# Patient Record
Sex: FEMALE | Race: WHITE | NOT HISPANIC OR LATINO | Employment: OTHER | URBAN - NONMETROPOLITAN AREA
[De-identification: names, ages, dates, MRNs, and addresses within clinical notes are randomized per-mention and may not be internally consistent; named-entity substitution may affect disease eponyms.]

---

## 2017-09-20 ENCOUNTER — DOCTOR'S OFFICE (OUTPATIENT)
Dept: URBAN - NONMETROPOLITAN AREA CLINIC 1 | Facility: CLINIC | Age: 67
Setting detail: OPHTHALMOLOGY
End: 2017-09-20
Payer: MEDICARE

## 2017-09-20 ENCOUNTER — RX ONLY (RX ONLY)
Age: 67
End: 2017-09-20

## 2017-09-20 DIAGNOSIS — H04.123: ICD-10-CM

## 2017-09-20 DIAGNOSIS — H26.492: ICD-10-CM

## 2017-09-20 DIAGNOSIS — H10.13: ICD-10-CM

## 2017-09-20 DIAGNOSIS — Z96.1: ICD-10-CM

## 2017-09-20 PROCEDURE — 92014 COMPRE OPH EXAM EST PT 1/>: CPT | Performed by: OPHTHALMOLOGY

## 2017-09-20 ASSESSMENT — REFRACTION_MANIFEST
OD_VA1: 20/
OD_ADD: +2.25
OD_VA1: 20/30
OD_CYLINDER: -0.50
OU_VA: 20/
OD_VA3: 20/
OD_VA2: 20/30
OD_VA1: 20/
OS_VA2: 20/25-1
OS_VA2: 20/
OD_VA3: 20/
OS_VA2: 20/
OU_VA: 20/
OU_VA: 20/
OD_VA3: 20/
OS_VA1: 20/25-1
OS_VA1: 20/
OS_VA3: 20/
OD_AXIS: 040
OS_VA3: 20/
OD_VA2: 20/
OD_SPHERE: -1.00
OS_VA3: 20/
OS_ADD: +2.25
OS_SPHERE: PLANO
OS_VA1: 20/
OS_CYLINDER: -0.25
OS_AXIS: 165
OD_VA2: 20/

## 2017-09-20 ASSESSMENT — SPHEQUIV_DERIVED
OD_SPHEQUIV: -1.25
OD_SPHEQUIV: -1.25
OS_SPHEQUIV: -1.125

## 2017-09-20 ASSESSMENT — REFRACTION_CURRENTRX
OS_OVR_VA: 20/
OS_OVR_VA: 20/
OD_OVR_VA: 20/
OD_OVR_VA: 20/
OS_OVR_VA: 20/
OD_OVR_VA: 20/

## 2017-09-20 ASSESSMENT — REFRACTION_AUTOREFRACTION
OS_SPHERE: -0.50
OS_CYLINDER: -1.25
OD_SPHERE: -0.75
OD_AXIS: 117
OS_AXIS: 163
OD_CYLINDER: -1.00

## 2017-09-20 ASSESSMENT — PUNCTA - ASSESSMENT
OS_PUNCTA: LLL LUL
OD_PUNCTA: RLL RUL

## 2017-09-20 ASSESSMENT — VISUAL ACUITY
OS_BCVA: 20/100-2
OD_BCVA: 20/30-2

## 2017-09-20 ASSESSMENT — CONFRONTATIONAL VISUAL FIELD TEST (CVF)
OS_FINDINGS: FULL
OD_FINDINGS: FULL

## 2017-09-27 ENCOUNTER — RX ONLY (RX ONLY)
Age: 67
End: 2017-09-27

## 2017-09-27 ENCOUNTER — DOCTOR'S OFFICE (OUTPATIENT)
Dept: URBAN - NONMETROPOLITAN AREA CLINIC 1 | Facility: CLINIC | Age: 67
Setting detail: OPHTHALMOLOGY
End: 2017-09-27
Payer: MEDICARE

## 2017-09-27 ENCOUNTER — DOCTOR'S OFFICE (OUTPATIENT)
Dept: URBAN - METROPOLITAN AREA CLINIC 125 | Facility: CLINIC | Age: 67
Setting detail: OPHTHALMOLOGY
End: 2017-09-27

## 2017-09-27 DIAGNOSIS — H10.13: ICD-10-CM

## 2017-09-27 DIAGNOSIS — H52.4: ICD-10-CM

## 2017-09-27 DIAGNOSIS — H04.123: ICD-10-CM

## 2017-09-27 DIAGNOSIS — H26.492: ICD-10-CM

## 2017-09-27 DIAGNOSIS — Z96.1: ICD-10-CM

## 2017-09-27 PROCEDURE — 92012 INTRM OPH EXAM EST PATIENT: CPT | Performed by: OPHTHALMOLOGY

## 2017-09-27 PROCEDURE — VITAEYE VITA EYE VITAMINS: Performed by: OPHTHALMOLOGY

## 2017-09-27 ASSESSMENT — REFRACTION_CURRENTRX
OS_OVR_VA: 20/
OS_OVR_VA: 20/
OD_OVR_VA: 20/
OS_OVR_VA: 20/
OD_OVR_VA: 20/
OD_OVR_VA: 20/

## 2017-09-27 ASSESSMENT — REFRACTION_MANIFEST
OD_VA2: 20/30
OS_VA2: 20/
OS_SPHERE: PLANO
OS_AXIS: 165
OD_CYLINDER: -0.50
OS_VA3: 20/
OS_VA3: 20/
OD_VA1: 20/
OU_VA: 20/
OD_VA3: 20/
OS_VA1: 20/
OU_VA: 20/
OS_ADD: +2.25
OD_VA3: 20/
OD_ADD: +2.25
OD_VA2: 20/
OD_AXIS: 040
OS_VA3: 20/
OD_VA1: 20/
OD_VA3: 20/
OD_SPHERE: -1.00
OS_VA2: 20/
OS_CYLINDER: -0.25
OS_VA1: 20/25-1
OU_VA: 20/
OS_VA1: 20/
OD_VA2: 20/
OD_VA1: 20/30
OS_VA2: 20/25-1

## 2017-09-27 ASSESSMENT — SPHEQUIV_DERIVED
OD_SPHEQUIV: -1.25
OD_SPHEQUIV: -1.25
OS_SPHEQUIV: -1.125

## 2017-09-27 ASSESSMENT — REFRACTION_AUTOREFRACTION
OD_AXIS: 117
OD_SPHERE: -0.75
OS_CYLINDER: -1.25
OD_CYLINDER: -1.00
OS_SPHERE: -0.50
OS_AXIS: 163

## 2017-09-27 ASSESSMENT — VISUAL ACUITY
OS_BCVA: 20/100-2
OD_BCVA: 20/40+2

## 2017-09-27 ASSESSMENT — CONFRONTATIONAL VISUAL FIELD TEST (CVF)
OS_FINDINGS: FULL
OD_FINDINGS: FULL

## 2017-09-27 ASSESSMENT — PUNCTA - ASSESSMENT
OS_PUNCTA: LLL LUL
OD_PUNCTA: RLL RUL

## 2017-11-06 ENCOUNTER — DOCTOR'S OFFICE (OUTPATIENT)
Dept: URBAN - METROPOLITAN AREA CLINIC 136 | Facility: CLINIC | Age: 67
Setting detail: OPHTHALMOLOGY
End: 2017-11-06
Payer: MEDICARE

## 2017-11-06 ENCOUNTER — RX ONLY (RX ONLY)
Age: 67
End: 2017-11-06

## 2017-11-06 DIAGNOSIS — H26.492: ICD-10-CM

## 2017-11-06 DIAGNOSIS — H10.13: ICD-10-CM

## 2017-11-06 DIAGNOSIS — D31.42: ICD-10-CM

## 2017-11-06 DIAGNOSIS — H35.373: ICD-10-CM

## 2017-11-06 DIAGNOSIS — H43.813: ICD-10-CM

## 2017-11-06 DIAGNOSIS — H33.311: ICD-10-CM

## 2017-11-06 DIAGNOSIS — Z96.1: ICD-10-CM

## 2017-11-06 DIAGNOSIS — H04.123: ICD-10-CM

## 2017-11-06 PROCEDURE — 92014 COMPRE OPH EXAM EST PT 1/>: CPT | Performed by: OPHTHALMOLOGY

## 2017-11-06 PROCEDURE — 92134 CPTRZ OPH DX IMG PST SGM RTA: CPT | Performed by: OPHTHALMOLOGY

## 2017-11-06 ASSESSMENT — CONFRONTATIONAL VISUAL FIELD TEST (CVF)
OD_FINDINGS: FULL
OS_FINDINGS: FULL

## 2017-11-06 ASSESSMENT — PUNCTA - ASSESSMENT
OS_PUNCTA: LLL LUL
OD_PUNCTA: RLL RUL

## 2017-11-08 ASSESSMENT — REFRACTION_CURRENTRX
OD_OVR_VA: 20/
OD_OVR_VA: 20/
OS_OVR_VA: 20/
OD_OVR_VA: 20/

## 2017-11-08 ASSESSMENT — REFRACTION_MANIFEST
OD_VA1: 20/
OS_VA1: 20/
OU_VA: 20/
OS_ADD: +2.25
OD_VA1: 20/30
OD_AXIS: 040
OS_CYLINDER: -0.25
OU_VA: 20/
OS_VA2: 20/
OD_VA2: 20/
OD_VA2: 20/
OS_VA2: 20/25-1
OS_VA1: 20/
OU_VA: 20/
OS_SPHERE: PLANO
OS_VA3: 20/
OD_ADD: +2.25
OS_VA3: 20/
OD_VA1: 20/
OD_VA2: 20/30
OS_AXIS: 165
OS_VA1: 20/25-1
OS_VA2: 20/
OD_VA3: 20/
OD_VA3: 20/
OD_CYLINDER: -0.50
OS_VA3: 20/
OD_SPHERE: -1.00
OD_VA3: 20/

## 2017-11-08 ASSESSMENT — REFRACTION_AUTOREFRACTION
OS_AXIS: 163
OD_SPHERE: -0.75
OS_SPHERE: -0.50
OD_CYLINDER: -1.00
OD_AXIS: 117
OS_CYLINDER: -1.25

## 2017-11-08 ASSESSMENT — VISUAL ACUITY
OD_BCVA: 20/40-2
OS_BCVA: 20/200

## 2017-11-08 ASSESSMENT — SPHEQUIV_DERIVED
OD_SPHEQUIV: -1.25
OD_SPHEQUIV: -1.25
OS_SPHEQUIV: -1.125

## 2017-11-16 ENCOUNTER — DOCTOR'S OFFICE (OUTPATIENT)
Dept: URBAN - NONMETROPOLITAN AREA CLINIC 1 | Facility: CLINIC | Age: 67
Setting detail: OPHTHALMOLOGY
End: 2017-11-16
Payer: MEDICARE

## 2017-11-16 DIAGNOSIS — H01.001: ICD-10-CM

## 2017-11-16 DIAGNOSIS — H43.813: ICD-10-CM

## 2017-11-16 DIAGNOSIS — H04.122: ICD-10-CM

## 2017-11-16 DIAGNOSIS — H10.13: ICD-10-CM

## 2017-11-16 DIAGNOSIS — D31.42: ICD-10-CM

## 2017-11-16 DIAGNOSIS — Z96.1: ICD-10-CM

## 2017-11-16 DIAGNOSIS — H01.002: ICD-10-CM

## 2017-11-16 DIAGNOSIS — H35.379: ICD-10-CM

## 2017-11-16 DIAGNOSIS — H04.121: ICD-10-CM

## 2017-11-16 DIAGNOSIS — B30.9: ICD-10-CM

## 2017-11-16 DIAGNOSIS — H26.492: ICD-10-CM

## 2017-11-16 DIAGNOSIS — H33.311: ICD-10-CM

## 2017-11-16 PROBLEM — H10.503: Status: ACTIVE | Noted: 2017-11-16

## 2017-11-16 PROCEDURE — 92012 INTRM OPH EXAM EST PATIENT: CPT | Performed by: OPHTHALMOLOGY

## 2017-11-16 ASSESSMENT — LID EXAM ASSESSMENTS
OD_BLEPHARITIS: 2+
OS_BLEPHARITIS: 2+

## 2017-11-16 ASSESSMENT — REFRACTION_CURRENTRX
OD_OVR_VA: 20/
OS_OVR_VA: 20/
OD_OVR_VA: 20/
OD_OVR_VA: 20/

## 2017-11-16 ASSESSMENT — REFRACTION_MANIFEST
OS_VA3: 20/
OS_CYLINDER: -0.25
OS_VA3: 20/
OU_VA: 20/
OS_VA2: 20/
OS_AXIS: 165
OS_VA2: 20/25-1
OD_VA1: 20/
OS_SPHERE: PLANO
OD_AXIS: 040
OD_VA3: 20/
OD_SPHERE: -1.00
OS_VA3: 20/
OS_VA1: 20/
OS_VA2: 20/
OD_VA2: 20/
OD_VA1: 20/30
OS_VA1: 20/
OD_VA2: 20/
OD_VA1: 20/
OD_CYLINDER: -0.50
OD_VA3: 20/
OD_ADD: +2.25
OS_ADD: +2.25
OD_VA3: 20/
OD_VA2: 20/30
OS_VA1: 20/25-1

## 2017-11-16 ASSESSMENT — REFRACTION_AUTOREFRACTION
OD_SPHERE: -0.75
OS_CYLINDER: -1.25
OS_SPHERE: -0.50
OD_CYLINDER: -1.00
OS_AXIS: 163
OD_AXIS: 117

## 2017-11-16 ASSESSMENT — SPHEQUIV_DERIVED
OS_SPHEQUIV: -1.125
OD_SPHEQUIV: -1.25
OD_SPHEQUIV: -1.25

## 2017-11-16 ASSESSMENT — VISUAL ACUITY
OD_BCVA: 20/25
OS_BCVA: 20/70

## 2017-11-16 ASSESSMENT — PUNCTA - ASSESSMENT
OD_PUNCTA: RLL RUL
OS_PUNCTA: LLL LUL

## 2017-11-20 ENCOUNTER — RX ONLY (RX ONLY)
Age: 67
End: 2017-11-20

## 2017-11-20 ENCOUNTER — HOSPITAL ENCOUNTER (EMERGENCY)
Facility: HOSPITAL | Age: 67
Discharge: HOME | End: 2017-11-20
Attending: EMERGENCY MEDICINE | Admitting: EMERGENCY MEDICINE
Payer: COMMERCIAL

## 2017-11-20 ENCOUNTER — APPOINTMENT (EMERGENCY)
Dept: CT IMAGING | Facility: HOSPITAL | Age: 67
End: 2017-11-20
Payer: COMMERCIAL

## 2017-11-20 ENCOUNTER — APPOINTMENT (EMERGENCY)
Dept: RADIOLOGY | Facility: HOSPITAL | Age: 67
End: 2017-11-20
Payer: COMMERCIAL

## 2017-11-20 VITALS
SYSTOLIC BLOOD PRESSURE: 205 MMHG | TEMPERATURE: 96.8 F | RESPIRATION RATE: 18 BRPM | HEART RATE: 78 BPM | OXYGEN SATURATION: 100 % | DIASTOLIC BLOOD PRESSURE: 92 MMHG | WEIGHT: 193 LBS

## 2017-11-20 DIAGNOSIS — S39.012A LUMBAR STRAIN, INITIAL ENCOUNTER: Primary | ICD-10-CM

## 2017-11-20 LAB
ALBUMIN SERPL BCP-MCNC: 4.1 G/DL (ref 3.5–5)
ALP SERPL-CCNC: 87 U/L (ref 46–116)
ALT SERPL W P-5'-P-CCNC: 31 U/L (ref 12–78)
ANION GAP SERPL CALCULATED.3IONS-SCNC: 6 MMOL/L (ref 4–13)
AST SERPL W P-5'-P-CCNC: 20 U/L (ref 5–45)
BASOPHILS # BLD MANUAL: 0 THOUSAND/UL (ref 0–0.1)
BASOPHILS NFR MAR MANUAL: 0 % (ref 0–1)
BILIRUB SERPL-MCNC: 0.6 MG/DL (ref 0.2–1)
BILIRUB UR QL STRIP: NEGATIVE
BUN SERPL-MCNC: 24 MG/DL (ref 5–25)
CALCIUM SERPL-MCNC: 9.1 MG/DL (ref 8.3–10.1)
CHLORIDE SERPL-SCNC: 102 MMOL/L (ref 100–108)
CLARITY UR: CLEAR
CO2 SERPL-SCNC: 27 MMOL/L (ref 21–32)
COLOR UR: YELLOW
CREAT SERPL-MCNC: 0.85 MG/DL (ref 0.6–1.3)
EOSINOPHIL # BLD MANUAL: 0 THOUSAND/UL (ref 0–0.4)
EOSINOPHIL NFR BLD MANUAL: 0 % (ref 0–6)
ERYTHROCYTE [DISTWIDTH] IN BLOOD BY AUTOMATED COUNT: 13 % (ref 11.6–15.1)
GFR SERPL CREATININE-BSD FRML MDRD: 70 ML/MIN/1.73SQ M
GLUCOSE SERPL-MCNC: 100 MG/DL (ref 65–140)
GLUCOSE UR STRIP-MCNC: NEGATIVE MG/DL
HCT VFR BLD AUTO: 44.7 % (ref 34.8–46.1)
HGB BLD-MCNC: 15.1 G/DL (ref 11.5–15.4)
HGB UR QL STRIP.AUTO: NEGATIVE
KETONES UR STRIP-MCNC: NEGATIVE MG/DL
LEUKOCYTE ESTERASE UR QL STRIP: NEGATIVE
LYMPHOCYTES # BLD AUTO: 1.17 THOUSAND/UL (ref 0.6–4.47)
LYMPHOCYTES # BLD AUTO: 9 % (ref 14–44)
MCH RBC QN AUTO: 31.3 PG (ref 26.8–34.3)
MCHC RBC AUTO-ENTMCNC: 33.8 G/DL (ref 31.4–37.4)
MCV RBC AUTO: 93 FL (ref 82–98)
MONOCYTES # BLD AUTO: 0.13 THOUSAND/UL (ref 0–1.22)
MONOCYTES NFR BLD: 1 % (ref 4–12)
NEUTROPHILS # BLD MANUAL: 11.66 THOUSAND/UL (ref 1.85–7.62)
NEUTS BAND NFR BLD MANUAL: 1 % (ref 0–8)
NEUTS SEG NFR BLD AUTO: 89 % (ref 43–75)
NITRITE UR QL STRIP: NEGATIVE
PH UR STRIP.AUTO: 6 [PH] (ref 4.5–8)
PLATELET # BLD AUTO: 183 THOUSANDS/UL (ref 149–390)
PLATELET BLD QL SMEAR: ADEQUATE
PMV BLD AUTO: 10.9 FL (ref 8.9–12.7)
POTASSIUM SERPL-SCNC: 4 MMOL/L (ref 3.5–5.3)
PROT SERPL-MCNC: 7 G/DL (ref 6.4–8.2)
PROT UR STRIP-MCNC: NEGATIVE MG/DL
RBC # BLD AUTO: 4.82 MILLION/UL (ref 3.81–5.12)
SODIUM SERPL-SCNC: 135 MMOL/L (ref 136–145)
SP GR UR STRIP.AUTO: 1.02 (ref 1–1.03)
TOTAL CELLS COUNTED SPEC: 100
TROPONIN I SERPL-MCNC: <0.02 NG/ML
UROBILINOGEN UR QL STRIP.AUTO: 0.2 E.U./DL
WBC # BLD AUTO: 12.95 THOUSAND/UL (ref 4.31–10.16)

## 2017-11-20 PROCEDURE — 74176 CT ABD & PELVIS W/O CONTRAST: CPT

## 2017-11-20 PROCEDURE — 96374 THER/PROPH/DIAG INJ IV PUSH: CPT

## 2017-11-20 PROCEDURE — 81003 URINALYSIS AUTO W/O SCOPE: CPT | Performed by: EMERGENCY MEDICINE

## 2017-11-20 PROCEDURE — 84484 ASSAY OF TROPONIN QUANT: CPT | Performed by: EMERGENCY MEDICINE

## 2017-11-20 PROCEDURE — 36415 COLL VENOUS BLD VENIPUNCTURE: CPT | Performed by: EMERGENCY MEDICINE

## 2017-11-20 PROCEDURE — 80053 COMPREHEN METABOLIC PANEL: CPT | Performed by: EMERGENCY MEDICINE

## 2017-11-20 PROCEDURE — 71020 HB CHEST X-RAY 2VW FRONTAL&LATL: CPT

## 2017-11-20 PROCEDURE — 99285 EMERGENCY DEPT VISIT HI MDM: CPT

## 2017-11-20 PROCEDURE — 96375 TX/PRO/DX INJ NEW DRUG ADDON: CPT

## 2017-11-20 PROCEDURE — 96361 HYDRATE IV INFUSION ADD-ON: CPT

## 2017-11-20 PROCEDURE — 85027 COMPLETE CBC AUTOMATED: CPT | Performed by: EMERGENCY MEDICINE

## 2017-11-20 PROCEDURE — 85007 BL SMEAR W/DIFF WBC COUNT: CPT | Performed by: EMERGENCY MEDICINE

## 2017-11-20 PROCEDURE — 93005 ELECTROCARDIOGRAM TRACING: CPT | Performed by: EMERGENCY MEDICINE

## 2017-11-20 RX ORDER — ONDANSETRON 2 MG/ML
4 INJECTION INTRAMUSCULAR; INTRAVENOUS ONCE
Status: COMPLETED | OUTPATIENT
Start: 2017-11-20 | End: 2017-11-20

## 2017-11-20 RX ORDER — PREDNISONE 20 MG/1
60 TABLET ORAL DAILY
Qty: 12 TABLET | Refills: 0 | Status: SHIPPED | OUTPATIENT
Start: 2017-11-20 | End: 2019-06-10

## 2017-11-20 RX ORDER — LIDOCAINE 50 MG/G
1 PATCH TOPICAL DAILY
Qty: 30 PATCH | Refills: 0 | Status: SHIPPED | OUTPATIENT
Start: 2017-11-20 | End: 2019-06-10

## 2017-11-20 RX ORDER — KETOROLAC TROMETHAMINE 30 MG/ML
30 INJECTION, SOLUTION INTRAMUSCULAR; INTRAVENOUS ONCE
Status: COMPLETED | OUTPATIENT
Start: 2017-11-20 | End: 2017-11-20

## 2017-11-20 RX ORDER — LIDOCAINE 50 MG/G
1 PATCH TOPICAL ONCE
Status: DISCONTINUED | OUTPATIENT
Start: 2017-11-20 | End: 2017-11-20 | Stop reason: HOSPADM

## 2017-11-20 RX ORDER — PREDNISONE 20 MG/1
60 TABLET ORAL ONCE
Status: COMPLETED | OUTPATIENT
Start: 2017-11-20 | End: 2017-11-20

## 2017-11-20 RX ORDER — NAPROXEN 500 MG/1
500 TABLET ORAL 2 TIMES DAILY WITH MEALS
Qty: 14 TABLET | Refills: 0 | Status: SHIPPED | OUTPATIENT
Start: 2017-11-20 | End: 2019-06-10

## 2017-11-20 RX ADMIN — PREDNISONE 60 MG: 20 TABLET ORAL at 12:57

## 2017-11-20 RX ADMIN — KETOROLAC TROMETHAMINE 30 MG: 30 INJECTION, SOLUTION INTRAMUSCULAR at 11:21

## 2017-11-20 RX ADMIN — ONDANSETRON 4 MG: 2 INJECTION INTRAMUSCULAR; INTRAVENOUS at 11:21

## 2017-11-20 RX ADMIN — SODIUM CHLORIDE 1000 ML: 0.9 INJECTION, SOLUTION INTRAVENOUS at 11:21

## 2017-11-20 RX ADMIN — LIDOCAINE 1 PATCH: 50 PATCH CUTANEOUS at 12:56

## 2017-11-20 NOTE — ED PROCEDURE NOTE
PROCEDURE  ECG 12 Lead Documentation  Date/Time: 11/20/2017 11:09 AM  Performed by: Antonio Sosa  Authorized by: Antonio Sosa     Indications / Diagnosis:  Left lower back pain   ECG reviewed by me, the ED Provider: yes    Patient location:  ED  Previous ECG:     Previous ECG:  Unavailable  Interpretation:     Interpretation: non-specific    Rate:     ECG rate:  74    ECG rate assessment: normal    Rhythm:     Rhythm: sinus rhythm    Ectopy:     Ectopy: none    T waves:     T waves: non-specific

## 2017-11-20 NOTE — ED PROVIDER NOTES
History  Chief Complaint   Patient presents with    Flank Pain     left flank pain onset this morning     70-year-old female presents complaining of left lower back pain along with left flank pain which started this morning at about 5 o'clock  Patient states that last week she "had a twinge "of similar pain that went away spontaneously  She denies chest pain or shortness of breath  She has no pleuritic chest   Patient does state that it hurts when she rotates her torso or when she lifts her left leg  Patient is tender palpation in the left lateral lower back  Patient states that she was helping her daughter put of crown molding all day yesterday and this may have aggravated her back  History provided by:  Patient  Flank Pain   Pain location:  L flank  Pain quality: cramping, sharp and stabbing    Pain radiates to:  Does not radiate  Pain severity:  Moderate  Onset quality:  Gradual  Duration:  6 hours  Timing:  Constant  Progression:  Waxing and waning  Context: not alcohol use, not awakening from sleep, not diet changes, not eating and not laxative use    Relieved by:  Nothing  Worsened by:  Nothing  Ineffective treatments:  None tried  Associated symptoms: no anorexia, no belching, no chest pain, no chills, no constipation, no cough and no diarrhea    Risk factors: no alcohol abuse        None       History reviewed  No pertinent past medical history  History reviewed  No pertinent surgical history  History reviewed  No pertinent family history  I have reviewed and agree with the history as documented  Social History   Substance Use Topics    Smoking status: Never Smoker    Smokeless tobacco: Never Used    Alcohol use No        Review of Systems   Constitutional: Negative for chills  HENT: Negative for congestion, dental problem, drooling and ear discharge  Eyes: Negative for pain, discharge and itching  Respiratory: Negative for cough  Cardiovascular: Negative for chest pain  Gastrointestinal: Negative for anorexia, constipation and diarrhea  Endocrine: Negative for cold intolerance and heat intolerance  Genitourinary: Positive for flank pain  Musculoskeletal: Negative for arthralgias, back pain and gait problem  Skin: Negative for color change and pallor  Allergic/Immunologic: Negative for environmental allergies and food allergies  Neurological: Negative for dizziness, facial asymmetry and headaches  Hematological: Negative for adenopathy  Psychiatric/Behavioral: Negative for agitation, behavioral problems, confusion, decreased concentration and dysphoric mood  Physical Exam  ED Triage Vitals [11/20/17 1042]   Temperature Pulse Respirations Blood Pressure SpO2   (!) 96 8 °F (36 °C) 84 18 (!) 205/92 100 %      Temp Source Heart Rate Source Patient Position - Orthostatic VS BP Location FiO2 (%)   Temporal Monitor Lying Left arm --      Pain Score       Worst Possible Pain           Orthostatic Vital Signs  Vitals:    11/20/17 1042 11/20/17 1128   BP: (!) 205/92    Pulse: 84 78   Patient Position - Orthostatic VS: Lying        Physical Exam   Constitutional: She appears well-developed and well-nourished  HENT:   Head: Normocephalic  Right Ear: External ear normal    Left Ear: External ear normal    Eyes: Pupils are equal, round, and reactive to light  Right eye exhibits no discharge  Left eye exhibits no discharge  Neck: Normal range of motion  No JVD present  No tracheal deviation present  No thyromegaly present  Cardiovascular: Normal rate and regular rhythm  Pulmonary/Chest: Effort normal  No respiratory distress  She has no wheezes  She has no rales  Abdominal:   Left lower back at posterior superior ischial spine tender to palpation with paraspinal ropy spasm  Musculoskeletal: She exhibits tenderness  Tender to palpation left posterior lateral lumbar region   Neurological: She is alert  She displays normal reflexes  No cranial nerve deficit  She exhibits normal muscle tone  Coordination normal    Skin: Skin is warm  Capillary refill takes less than 2 seconds  No erythema  Psychiatric: She has a normal mood and affect  Her behavior is normal    Vitals reviewed  ED Medications  Medications   predniSONE tablet 60 mg (not administered)   lidocaine (LIDODERM) 5 % patch 1 patch (not administered)   sodium chloride 0 9 % bolus 1,000 mL (1,000 mL Intravenous New Bag 11/20/17 1121)   ondansetron (ZOFRAN) injection 4 mg (4 mg Intravenous Given 11/20/17 1121)   ketorolac (TORADOL) 30 mg/mL injection 30 mg (30 mg Intravenous Given 11/20/17 1121)       Diagnostic Studies  Results Reviewed     Procedure Component Value Units Date/Time    Troponin I [[id number]]  (Normal) Collected:  11/20/17 1113    Lab Status:  Final result Specimen:  Blood from Arm, Right Updated:  11/20/17 1140     Troponin I <0 02 ng/mL     Narrative:         [employer] Chemistry analyzer 99% cutoff is > 0 04 ng/mL in network labs    o cTnI 99% cutoff is useful only when applied to patients in the clinical setting of myocardial ischemia  o cTnI 99% cutoff should be interpreted in the context of clinical history, ECG findings and possibly cardiac imaging to establish correct diagnosis  o cTnI 99% cutoff may be suggestive but clearly not indicative of a coronary event without the clinical setting of myocardial ischemia  CBC and differential [[id number]]  (Abnormal) Collected:  11/20/17 1113    Lab Status:  Final result Specimen:  Blood from Arm, Right Updated:  11/20/17 1137     WBC 12 95 (H) Thousand/uL      RBC 4 82 Million/uL      Hemoglobin 15 1 g/dL      Hematocrit 44 7 %      MCV 93 fL      MCH 31 3 pg      MCHC 33 8 g/dL      RDW 13 0 %      MPV 10 9 fL      Platelets 618 Thousands/uL     Narrative: This is an appended report  These results have been appended to a previously verified report      Comprehensive metabolic panel [[id number]]  (Abnormal) Collected:  11/20/17 1113    Lab Status:  Final result Specimen:  Blood from Arm, Right Updated:  11/20/17 1135     Sodium 135 (L) mmol/L      Potassium 4 0 mmol/L      Chloride 102 mmol/L      CO2 27 mmol/L      Anion Gap 6 mmol/L      BUN 24 mg/dL      Creatinine 0 85 mg/dL      Glucose 100 mg/dL      Calcium 9 1 mg/dL      AST 20 U/L      ALT 31 U/L      Alkaline Phosphatase 87 U/L      Total Protein 7 0 g/dL      Albumin 4 1 g/dL      Total Bilirubin 0 60 mg/dL      eGFR 70 ml/min/1 73sq m     Narrative:         National Kidney Disease Education Program recommendations are as follows:  GFR calculation is accurate only with a steady state creatinine  Chronic Kidney disease less than 60 ml/min/1 73 sq  meters  Kidney failure less than 15 ml/min/1 73 sq  meters  UA w Reflex to Microscopic w Reflex to Culture [[id number]]  (Normal) Collected:  11/20/17 1114    Lab Status:  Final result Specimen:  Urine from Urine, Clean Catch Updated:  11/20/17 1125     Color, UA Yellow     Clarity, UA Clear     Specific [place], UA 1 020     pH, UA 6 0     Leukocytes, UA Negative     Nitrite, UA Negative     Protein, UA Negative mg/dl      Glucose, UA Negative mg/dl      Ketones, UA Negative mg/dl      Urobilinogen, UA 0 2 E U /dl      Bilirubin, UA Negative     Blood, UA Negative                 XR chest 2 views   ED Interpretation by Mary Montoya DO (11/20 1245)   No infiltrate       Final Result by VIRGILIO Morrow MD (11/20 1154)      No active pulmonary disease            Workstation performed: [id number]         CT renal stone study abdomen pelvis without contrast   Final Result by VIRGILIO Morrow MD (11/20 1220)      Negative for urinary calculi or obstructive uropathy  Multiple hepatic lesions, likely cysts  These may be confirmed with sonography  Cholecystectomy  Occasional colonic diverticular disease without evidence of acute diverticulitis  See above additional nonacute findings            Workstation performed: [id number]                    Procedures  Procedures       Phone Contacts  ED Phone Contact    ED Course  ED Course          HEART Risk Score    Flowsheet Row Most Recent Value   History  1 Filed at: 11/20/2017 1047   ECG  0 Filed at: 11/20/2017 1047   Age  2 Filed at: 11/20/2017 1047   Risk Factors  1 Filed at: 11/20/2017 1047   Troponin  0 Filed at: 11/20/2017 1047   Heart Score Risk Calculator   History  1 Filed at: 11/20/2017 1047   ECG  0 Filed at: 11/20/2017 1047   Age  2 Filed at: 11/20/2017 1047   Risk Factors  1 Filed at: 11/20/2017 1047   Troponin  0 Filed at: 11/20/2017 1047   HEART Score  4 Filed at: 11/20/2017 1047   HEART Score  4 Filed at: 11/20/2017 1047                            MDM  Number of Diagnoses or Management Options  Diagnosis management comments: Differential diagnosis 1  Nephrolithiasis 2  Ureterolithiasis 3  Musculoskeletal back pain  I will perform CT scan check labs EKG       Amount and/or Complexity of Data Reviewed  Clinical lab tests: reviewed  Tests in the radiology section of CPT®: reviewed  Tests in the medicine section of CPT®: reviewed    Risk of Complications, Morbidity, and/or Mortality  Presenting problems: high  Diagnostic procedures: high  Management options: high      CritCare Time    Disposition  Final diagnoses:   Lumbar strain, initial encounter     Time reflects when diagnosis was documented in both MDM as applicable and the Disposition within this note     Time User Action Codes Description Comment    11/20/2017 12:52 PM Kyle Day Add [[id number]] Lumbar strain, initial encounter       ED Disposition     ED Disposition Condition Comment    Discharge  [facility] discharge to home/self care      Condition at discharge: Stable        Follow-up Information    None       Patient's Medications   Discharge Prescriptions    LIDOCAINE (LIDODERM) 5 %    Place 1 patch on the skin daily Remove & Discard patch within 12 hours or as directed by MD       Start Date: 11/20/2017End Date: --       Order Dose: 1 patch       Quantity: 30 patch    Refills: 0    NAPROXEN (NAPROSYN) 500 MG TABLET    Take 1 tablet by mouth 2 (two) times a day with meals       Start Date: 11/20/2017End Date: --       Order Dose: 500 mg       Quantity: 14 tablet    Refills: 0    PREDNISONE 20 MG TABLET    Take 3 tablets by mouth daily       Start Date: 11/20/2017End Date: --       Order Dose: 60 mg       Quantity: 12 tablet    Refills: 0     No discharge procedures on file      ED Provider  Electronically Signed by           Sujatha Gary DO  11/20/17 1121

## 2017-11-20 NOTE — DISCHARGE INSTRUCTIONS
Low Back Strain, Ambulatory Care   GENERAL INFORMATION:   Low back strain  is an injury to your lower back muscles or tendons  Tendons are strong tissues that connect muscles to bones  The lower back supports most of your body weight and helps you move, twist, and bend  Low back strain is usually caused by activities that increase stress on the lower back, such as exercise or injury  Common symptoms include the following:   · Low back pain or muscle spasms    · Stiffness or limited movement    · Pain that goes down to the buttocks, groin, or legs    · Pain that is worse with activity  Seek immediate care for the following symptoms:   · A pop in your lower back    · Increased swelling or pain in your lower back    · Trouble moving your legs    · Numbness in your legs  Treatment for low back strain:   · NSAIDs  help decrease swelling and pain or fever  This medicine is available with or without a doctor's order  NSAIDs can cause stomach bleeding or kidney problems in certain people  If you take blood thinner medicine, always ask your healthcare provider if NSAIDs are safe for you  Always read the medicine label and follow directions  · Muscle relaxers  help decrease muscle spasms pain  · Prescription pain medicine  may be given  Ask how to take this medicine safely  Manage your symptoms:   · Rest  in bed after your injury  Slowly start to increase your activity as the pain decreases, or as directed  · Apply ice  on your lower back for 15 to 20 minutes every hour or as directed  Use an ice pack, or put crushed ice in a plastic bag  Cover it with a towel  Ice helps prevent tissue damage and decreases swelling and pain  You can alternate ice and heat  · Apply heat  on your lower back for 20 to 30 minutes every 2 hours for as many days as directed  Heat helps decrease pain and muscle spasms  Prevent another low back strain:   · Use proper body mechanics        ¨ Bend at the hips and knees when you  objects  Do not bend from the waist  Use your leg muscles as you lift the load  Do not use your back  Keep the object close to your chest as you lift it  Try not to twist or lift anything above your waist     ¨ Change your position often when you stand for long periods of time  Rest one foot on a small box or footrest, and then switch to the other foot often  ¨ Try not to sit for long periods of time  When you do, sit in a straight-backed chair with your feet flat on the floor  Never reach, pull, or push while you are sitting  · Exercise regularly  Warm up before you exercise  Do exercises that strengthen your back muscles  Ask about the best exercise plan for you  · Maintain a healthy weight  Ask your healthcare provider how much you should weigh  Ask him to help you create a weight loss plan if you are overweight  Follow up with your healthcare provider as directed:  Write down your questions so you remember to ask them during your visits  CARE AGREEMENT:   You have the right to help plan your care  Learn about your health condition and how it may be treated  Discuss treatment options with your caregivers to decide what care you want to receive  You always have the right to refuse treatment  The above information is an  only  It is not intended as medical advice for individual conditions or treatments  Talk to your doctor, nurse or pharmacist before following any medical regimen to see if it is safe and effective for you  © 2014 [address] is for End User's use only and may not be sold, redistributed or otherwise used for commercial purposes  All illustrations and images included in CareNotes® are the copyrighted property of [employer]  or [employer]  Low Back Strain, Ambulatory Care   GENERAL INFORMATION:   Low back strain  is an injury to your lower back muscles or tendons  Tendons are strong tissues that connect muscles to bones   The lower back supports most of your body weight and helps you move, twist, and bend  Low back strain is usually caused by activities that increase stress on the lower back, such as exercise or injury  Common symptoms include the following:   · Low back pain or muscle spasms    · Stiffness or limited movement    · Pain that goes down to the buttocks, groin, or legs    · Pain that is worse with activity  Seek immediate care for the following symptoms:   · A pop in your lower back    · Increased swelling or pain in your lower back    · Trouble moving your legs    · Numbness in your legs  Treatment for low back strain:   · NSAIDs  help decrease swelling and pain or fever  This medicine is available with or without a doctor's order  NSAIDs can cause stomach bleeding or kidney problems in certain people  If you take blood thinner medicine, always ask your healthcare provider if NSAIDs are safe for you  Always read the medicine label and follow directions  · Muscle relaxers  help decrease muscle spasms pain  · Prescription pain medicine  may be given  Ask how to take this medicine safely  Manage your symptoms:   · Rest  in bed after your injury  Slowly start to increase your activity as the pain decreases, or as directed  · Apply ice  on your lower back for 15 to 20 minutes every hour or as directed  Use an ice pack, or put crushed ice in a plastic bag  Cover it with a towel  Ice helps prevent tissue damage and decreases swelling and pain  You can alternate ice and heat  · Apply heat  on your lower back for 20 to 30 minutes every 2 hours for as many days as directed  Heat helps decrease pain and muscle spasms  Prevent another low back strain:   · Use proper body mechanics  ¨ Bend at the hips and knees when you  objects  Do not bend from the waist  Use your leg muscles as you lift the load  Do not use your back  Keep the object close to your chest as you lift it   Try not to twist or lift anything above your waist     ¨ Change your position often when you stand for long periods of time  Rest one foot on a small box or footrest, and then switch to the other foot often  ¨ Try not to sit for long periods of time  When you do, sit in a straight-backed chair with your feet flat on the floor  Never reach, pull, or push while you are sitting  · Exercise regularly  Warm up before you exercise  Do exercises that strengthen your back muscles  Ask about the best exercise plan for you  · Maintain a healthy weight  Ask your healthcare provider how much you should weigh  Ask him to help you create a weight loss plan if you are overweight  Follow up with your healthcare provider as directed:  Write down your questions so you remember to ask them during your visits  CARE AGREEMENT:   You have the right to help plan your care  Learn about your health condition and how it may be treated  Discuss treatment options with your caregivers to decide what care you want to receive  You always have the right to refuse treatment  The above information is an  only  It is not intended as medical advice for individual conditions or treatments  Talk to your doctor, nurse or pharmacist before following any medical regimen to see if it is safe and effective for you  © 2014 [address] is for End User's use only and may not be sold, redistributed or otherwise used for commercial purposes  All illustrations and images included in CareNotes® are the copyrighted property of [employer]  or [employer]

## 2017-11-21 LAB
ATRIAL RATE: 74 BPM
P AXIS: 53 DEGREES
PR INTERVAL: 180 MS
QRS AXIS: 7 DEGREES
QRSD INTERVAL: 62 MS
QT INTERVAL: 402 MS
QTC INTERVAL: 446 MS
T WAVE AXIS: 36 DEGREES
VENTRICULAR RATE: 74 BPM

## 2017-12-05 ENCOUNTER — APPOINTMENT (OUTPATIENT)
Dept: PHYSICAL THERAPY | Facility: CLINIC | Age: 67
End: 2017-12-05
Payer: COMMERCIAL

## 2017-12-05 PROCEDURE — G8978 MOBILITY CURRENT STATUS: HCPCS

## 2017-12-05 PROCEDURE — 97110 THERAPEUTIC EXERCISES: CPT

## 2017-12-05 PROCEDURE — 97161 PT EVAL LOW COMPLEX 20 MIN: CPT

## 2017-12-05 PROCEDURE — 97140 MANUAL THERAPY 1/> REGIONS: CPT

## 2017-12-05 PROCEDURE — G8979 MOBILITY GOAL STATUS: HCPCS

## 2017-12-05 PROCEDURE — 97010 HOT OR COLD PACKS THERAPY: CPT

## 2017-12-05 PROCEDURE — 97535 SELF CARE MNGMENT TRAINING: CPT

## 2017-12-11 ENCOUNTER — APPOINTMENT (OUTPATIENT)
Dept: PHYSICAL THERAPY | Facility: CLINIC | Age: 67
End: 2017-12-11
Payer: COMMERCIAL

## 2017-12-11 PROCEDURE — 97010 HOT OR COLD PACKS THERAPY: CPT

## 2017-12-11 PROCEDURE — 97140 MANUAL THERAPY 1/> REGIONS: CPT

## 2017-12-11 PROCEDURE — 97112 NEUROMUSCULAR REEDUCATION: CPT

## 2017-12-11 PROCEDURE — 97110 THERAPEUTIC EXERCISES: CPT

## 2017-12-15 ENCOUNTER — APPOINTMENT (OUTPATIENT)
Dept: PHYSICAL THERAPY | Facility: CLINIC | Age: 67
End: 2017-12-15
Payer: COMMERCIAL

## 2017-12-19 ENCOUNTER — APPOINTMENT (OUTPATIENT)
Dept: PHYSICAL THERAPY | Facility: CLINIC | Age: 67
End: 2017-12-19
Payer: COMMERCIAL

## 2017-12-19 PROCEDURE — 97140 MANUAL THERAPY 1/> REGIONS: CPT

## 2017-12-19 PROCEDURE — 97112 NEUROMUSCULAR REEDUCATION: CPT

## 2017-12-19 PROCEDURE — 97010 HOT OR COLD PACKS THERAPY: CPT

## 2017-12-19 PROCEDURE — 97110 THERAPEUTIC EXERCISES: CPT

## 2017-12-27 ENCOUNTER — APPOINTMENT (OUTPATIENT)
Dept: PHYSICAL THERAPY | Facility: CLINIC | Age: 67
End: 2017-12-27
Payer: COMMERCIAL

## 2018-01-31 ENCOUNTER — RX ONLY (RX ONLY)
Age: 68
End: 2018-01-31

## 2018-01-31 ENCOUNTER — DOCTOR'S OFFICE (OUTPATIENT)
Dept: URBAN - NONMETROPOLITAN AREA CLINIC 1 | Facility: CLINIC | Age: 68
Setting detail: OPHTHALMOLOGY
End: 2018-01-31
Payer: MEDICARE

## 2018-01-31 DIAGNOSIS — H01.004: ICD-10-CM

## 2018-01-31 DIAGNOSIS — H04.121: ICD-10-CM

## 2018-01-31 DIAGNOSIS — H04.122: ICD-10-CM

## 2018-01-31 DIAGNOSIS — B30.9: ICD-10-CM

## 2018-01-31 DIAGNOSIS — H01.005: ICD-10-CM

## 2018-01-31 DIAGNOSIS — H35.379: ICD-10-CM

## 2018-01-31 DIAGNOSIS — H26.492: ICD-10-CM

## 2018-01-31 DIAGNOSIS — H43.813: ICD-10-CM

## 2018-01-31 DIAGNOSIS — H10.13: ICD-10-CM

## 2018-01-31 DIAGNOSIS — H01.002: ICD-10-CM

## 2018-01-31 DIAGNOSIS — H33.311: ICD-10-CM

## 2018-01-31 DIAGNOSIS — H01.001: ICD-10-CM

## 2018-01-31 PROCEDURE — 92012 INTRM OPH EXAM EST PATIENT: CPT | Performed by: OPHTHALMOLOGY

## 2018-01-31 ASSESSMENT — REFRACTION_MANIFEST
OD_CYLINDER: -0.50
OS_VA3: 20/
OD_VA2: 20/
OD_VA1: 20/30
OS_VA1: 20/
OS_VA2: 20/
OD_VA3: 20/
OS_VA3: 20/
OD_VA1: 20/
OU_VA: 20/
OS_SPHERE: PLANO
OD_VA1: 20/
OU_VA: 20/
OS_AXIS: 165
OS_VA1: 20/25-1
OS_VA1: 20/
OU_VA: 20/
OD_VA2: 20/
OS_VA2: 20/
OD_SPHERE: -1.00
OD_VA3: 20/
OD_VA2: 20/30
OD_AXIS: 040
OS_CYLINDER: -0.25
OS_VA2: 20/25-1
OD_VA3: 20/
OD_ADD: +2.25
OS_VA3: 20/
OS_ADD: +2.25

## 2018-01-31 ASSESSMENT — REFRACTION_CURRENTRX
OS_OVR_VA: 20/
OD_OVR_VA: 20/
OD_OVR_VA: 20/
OS_OVR_VA: 20/
OD_OVR_VA: 20/
OS_OVR_VA: 20/

## 2018-01-31 ASSESSMENT — SPHEQUIV_DERIVED
OD_SPHEQUIV: -1.25
OD_SPHEQUIV: -1.25
OS_SPHEQUIV: -1.125

## 2018-01-31 ASSESSMENT — VISUAL ACUITY
OS_BCVA: 20/100
OD_BCVA: 20/50

## 2018-01-31 ASSESSMENT — REFRACTION_AUTOREFRACTION
OD_AXIS: 117
OS_AXIS: 163
OD_CYLINDER: -1.00
OS_CYLINDER: -1.25
OS_SPHERE: -0.50
OD_SPHERE: -0.75

## 2018-01-31 ASSESSMENT — LID EXAM ASSESSMENTS
OD_BLEPHARITIS: 2+
OS_BLEPHARITIS: 2+

## 2018-01-31 ASSESSMENT — PUNCTA - ASSESSMENT
OS_PUNCTA: LLL LUL
OD_PUNCTA: RLL RUL

## 2018-02-14 ENCOUNTER — RX ONLY (RX ONLY)
Age: 68
End: 2018-02-14

## 2018-02-14 ENCOUNTER — DOCTOR'S OFFICE (OUTPATIENT)
Dept: URBAN - NONMETROPOLITAN AREA CLINIC 1 | Facility: CLINIC | Age: 68
Setting detail: OPHTHALMOLOGY
End: 2018-02-14
Payer: MEDICARE

## 2018-02-14 DIAGNOSIS — B30.9: ICD-10-CM

## 2018-02-14 PROCEDURE — 92012 INTRM OPH EXAM EST PATIENT: CPT | Performed by: OPHTHALMOLOGY

## 2018-02-14 ASSESSMENT — VISUAL ACUITY
OD_BCVA: 20/60-2
OS_BCVA: 20/200

## 2018-02-14 ASSESSMENT — REFRACTION_CURRENTRX
OD_OVR_VA: 20/
OD_OVR_VA: 20/
OS_OVR_VA: 20/
OS_OVR_VA: 20/
OD_OVR_VA: 20/
OS_OVR_VA: 20/

## 2018-02-14 ASSESSMENT — LID EXAM ASSESSMENTS
OD_BLEPHARITIS: 2+
OS_BLEPHARITIS: 2+

## 2018-02-14 ASSESSMENT — REFRACTION_MANIFEST
OS_VA2: 20/25-1
OS_VA1: 20/
OS_VA3: 20/
OS_SPHERE: PLANO
OD_VA2: 20/
OU_VA: 20/
OS_VA1: 20/25-1
OS_VA1: 20/
OD_VA1: 20/
OS_VA2: 20/
OU_VA: 20/
OD_SPHERE: -1.00
OD_VA1: 20/30
OD_VA2: 20/30
OD_VA3: 20/
OS_ADD: +2.25
OS_VA3: 20/
OS_VA2: 20/
OS_VA3: 20/
OD_VA1: 20/
OD_CYLINDER: -0.50
OD_VA3: 20/
OS_CYLINDER: -0.25
OS_AXIS: 165
OU_VA: 20/
OD_VA2: 20/
OD_ADD: +2.25
OD_AXIS: 040
OD_VA3: 20/

## 2018-02-14 ASSESSMENT — REFRACTION_AUTOREFRACTION
OD_SPHERE: -0.75
OD_CYLINDER: -1.00
OS_SPHERE: -0.50
OD_AXIS: 117
OS_AXIS: 163
OS_CYLINDER: -1.25

## 2018-02-14 ASSESSMENT — CONFRONTATIONAL VISUAL FIELD TEST (CVF)
OS_FINDINGS: FULL
OD_FINDINGS: FULL

## 2018-02-14 ASSESSMENT — SPHEQUIV_DERIVED
OS_SPHEQUIV: -1.125
OD_SPHEQUIV: -1.25
OD_SPHEQUIV: -1.25

## 2018-02-14 ASSESSMENT — PUNCTA - ASSESSMENT
OS_PUNCTA: LLL LUL
OD_PUNCTA: RLL RUL

## 2018-02-22 ENCOUNTER — DOCTOR'S OFFICE (OUTPATIENT)
Dept: URBAN - NONMETROPOLITAN AREA CLINIC 1 | Facility: CLINIC | Age: 68
Setting detail: OPHTHALMOLOGY
End: 2018-02-22
Payer: MEDICARE

## 2018-02-22 DIAGNOSIS — H01.002: ICD-10-CM

## 2018-02-22 DIAGNOSIS — H04.121: ICD-10-CM

## 2018-02-22 DIAGNOSIS — H40.043: ICD-10-CM

## 2018-02-22 DIAGNOSIS — H35.379: ICD-10-CM

## 2018-02-22 DIAGNOSIS — H33.311: ICD-10-CM

## 2018-02-22 DIAGNOSIS — B30.9: ICD-10-CM

## 2018-02-22 DIAGNOSIS — H01.005: ICD-10-CM

## 2018-02-22 DIAGNOSIS — H43.813: ICD-10-CM

## 2018-02-22 DIAGNOSIS — H01.004: ICD-10-CM

## 2018-02-22 DIAGNOSIS — H26.492: ICD-10-CM

## 2018-02-22 DIAGNOSIS — H04.122: ICD-10-CM

## 2018-02-22 DIAGNOSIS — H10.13: ICD-10-CM

## 2018-02-22 PROCEDURE — 92012 INTRM OPH EXAM EST PATIENT: CPT | Performed by: OPHTHALMOLOGY

## 2018-02-22 PROCEDURE — 68761 CLOSE TEAR DUCT OPENING: CPT | Performed by: OPHTHALMOLOGY

## 2018-02-22 ASSESSMENT — REFRACTION_MANIFEST
OD_VA2: 20/
OD_VA1: 20/30
OD_AXIS: 040
OS_VA3: 20/
OS_VA2: 20/
OD_ADD: +2.25
OU_VA: 20/
OD_VA3: 20/
OD_VA3: 20/
OS_VA1: 20/
OD_VA2: 20/
OD_SPHERE: -1.00
OS_ADD: +2.25
OU_VA: 20/
OS_VA3: 20/
OD_VA2: 20/30
OS_VA1: 20/
OD_CYLINDER: -0.50
OS_SPHERE: PLANO
OS_VA2: 20/25-1
OS_VA1: 20/25-1
OU_VA: 20/
OD_VA1: 20/
OD_VA1: 20/
OS_VA2: 20/
OS_VA3: 20/
OS_CYLINDER: -0.25
OS_AXIS: 165
OD_VA3: 20/

## 2018-02-22 ASSESSMENT — REFRACTION_CURRENTRX
OD_OVR_VA: 20/
OS_OVR_VA: 20/
OD_OVR_VA: 20/
OD_OVR_VA: 20/
OS_OVR_VA: 20/
OS_OVR_VA: 20/

## 2018-02-22 ASSESSMENT — PUNCTA - ASSESSMENT
OS_PUNCTA: LLL LUL
OD_PUNCTA: RLL RUL

## 2018-02-22 ASSESSMENT — REFRACTION_AUTOREFRACTION
OD_SPHERE: -0.75
OD_AXIS: 117
OS_SPHERE: -0.50
OD_CYLINDER: -1.00
OS_AXIS: 163
OS_CYLINDER: -1.25

## 2018-02-22 ASSESSMENT — SPHEQUIV_DERIVED
OS_SPHEQUIV: -1.125
OD_SPHEQUIV: -1.25
OD_SPHEQUIV: -1.25

## 2018-02-22 ASSESSMENT — VISUAL ACUITY
OS_BCVA: 20/150
OD_BCVA: 20/25

## 2018-02-22 ASSESSMENT — LID EXAM ASSESSMENTS
OD_BLEPHARITIS: 2+
OS_BLEPHARITIS: 2+

## 2018-05-08 ENCOUNTER — DOCTOR'S OFFICE (OUTPATIENT)
Dept: URBAN - METROPOLITAN AREA CLINIC 136 | Facility: CLINIC | Age: 68
Setting detail: OPHTHALMOLOGY
End: 2018-05-08
Payer: MEDICARE

## 2018-05-08 ENCOUNTER — RX ONLY (RX ONLY)
Age: 68
End: 2018-05-08

## 2018-05-08 DIAGNOSIS — H01.004: ICD-10-CM

## 2018-05-08 DIAGNOSIS — H01.001: ICD-10-CM

## 2018-05-08 DIAGNOSIS — H04.123: ICD-10-CM

## 2018-05-08 DIAGNOSIS — H26.492: ICD-10-CM

## 2018-05-08 DIAGNOSIS — H35.373: ICD-10-CM

## 2018-05-08 DIAGNOSIS — H33.311: ICD-10-CM

## 2018-05-08 DIAGNOSIS — H01.002: ICD-10-CM

## 2018-05-08 DIAGNOSIS — H40.043: ICD-10-CM

## 2018-05-08 DIAGNOSIS — H43.813: ICD-10-CM

## 2018-05-08 DIAGNOSIS — H01.005: ICD-10-CM

## 2018-05-08 DIAGNOSIS — Z96.1: ICD-10-CM

## 2018-05-08 PROCEDURE — 92014 COMPRE OPH EXAM EST PT 1/>: CPT | Performed by: OPHTHALMOLOGY

## 2018-05-08 PROCEDURE — 92134 CPTRZ OPH DX IMG PST SGM RTA: CPT | Performed by: OPHTHALMOLOGY

## 2018-05-08 ASSESSMENT — LID EXAM ASSESSMENTS
OD_BLEPHARITIS: 2+
OS_BLEPHARITIS: 2+

## 2018-05-08 ASSESSMENT — CONFRONTATIONAL VISUAL FIELD TEST (CVF)
OS_FINDINGS: FULL
OD_FINDINGS: FULL

## 2018-05-08 ASSESSMENT — PUNCTA - ASSESSMENT
OS_PUNCTA: LLL LUL
OD_PUNCTA: RLL RUL

## 2018-05-10 ASSESSMENT — REFRACTION_AUTOREFRACTION
OS_SPHERE: -0.50
OS_AXIS: 163
OD_CYLINDER: -1.00
OS_CYLINDER: -1.25
OD_SPHERE: -0.75
OD_AXIS: 117

## 2018-05-10 ASSESSMENT — REFRACTION_MANIFEST
OD_VA1: 20/
OD_VA2: 20/
OD_VA3: 20/
OS_VA3: 20/
OD_VA1: 20/30
OU_VA: 20/
OS_AXIS: 165
OS_ADD: +2.25
OS_SPHERE: PLANO
OS_VA2: 20/25-1
OS_VA3: 20/
OD_AXIS: 040
OS_VA3: 20/
OU_VA: 20/
OD_VA1: 20/
OD_VA2: 20/30
OS_VA1: 20/25-1
OS_VA2: 20/
OS_VA1: 20/
OD_CYLINDER: -0.50
OU_VA: 20/
OD_SPHERE: -1.00
OS_VA1: 20/
OD_VA3: 20/
OD_VA2: 20/
OS_VA2: 20/
OS_CYLINDER: -0.25
OD_ADD: +2.25
OD_VA3: 20/

## 2018-05-10 ASSESSMENT — REFRACTION_CURRENTRX
OD_OVR_VA: 20/
OS_OVR_VA: 20/
OS_OVR_VA: 20/
OD_OVR_VA: 20/
OS_OVR_VA: 20/
OD_OVR_VA: 20/

## 2018-05-10 ASSESSMENT — VISUAL ACUITY
OS_BCVA: 20/100
OD_BCVA: 20/25

## 2018-05-10 ASSESSMENT — SPHEQUIV_DERIVED
OS_SPHEQUIV: -1.125
OD_SPHEQUIV: -1.25
OD_SPHEQUIV: -1.25

## 2018-05-30 ENCOUNTER — TELEPHONE (OUTPATIENT)
Dept: PAIN MEDICINE | Facility: MEDICAL CENTER | Age: 68
End: 2018-05-30

## 2018-05-30 NOTE — TELEPHONE ENCOUNTER
Pt called stating that Dr Renae Babinski referred her to [facility]  Pt is not a patient of his, but her  is and states that Dr Bard Matamoros told her to call us to make an appt  Pt does not have an order from Dr Bard Matamoros, or from her PCP  Pt made aware that she will need an order referring her here for pain management  Pt also stated that she saw a prior pain specialist in [facility] last year, but was not able to give me a name  Pt was not happy about needing a referral or having to get prior pain records  Pt stated that she was going to let Dr Bard Matamoros know that we would not give her an appt  Intake sent to [facility] office to be kept on file if patient calls back

## 2018-11-13 ENCOUNTER — DOCTOR'S OFFICE (OUTPATIENT)
Dept: URBAN - METROPOLITAN AREA CLINIC 136 | Facility: CLINIC | Age: 68
Setting detail: OPHTHALMOLOGY
End: 2018-11-13
Payer: MEDICARE

## 2018-11-13 DIAGNOSIS — H40.043: ICD-10-CM

## 2018-11-13 DIAGNOSIS — H26.492: ICD-10-CM

## 2018-11-13 DIAGNOSIS — H33.311: ICD-10-CM

## 2018-11-13 DIAGNOSIS — H01.004: ICD-10-CM

## 2018-11-13 DIAGNOSIS — H01.001: ICD-10-CM

## 2018-11-13 DIAGNOSIS — H01.005: ICD-10-CM

## 2018-11-13 DIAGNOSIS — H43.813: ICD-10-CM

## 2018-11-13 DIAGNOSIS — Z96.1: ICD-10-CM

## 2018-11-13 DIAGNOSIS — H35.373: ICD-10-CM

## 2018-11-13 DIAGNOSIS — H04.123: ICD-10-CM

## 2018-11-13 DIAGNOSIS — H01.002: ICD-10-CM

## 2018-11-13 PROCEDURE — 92134 CPTRZ OPH DX IMG PST SGM RTA: CPT | Performed by: OPHTHALMOLOGY

## 2018-11-13 PROCEDURE — 92014 COMPRE OPH EXAM EST PT 1/>: CPT | Performed by: OPHTHALMOLOGY

## 2018-11-13 ASSESSMENT — REFRACTION_MANIFEST
OS_VA1: 20/25-1
OD_VA3: 20/
OS_VA1: 20/
OS_CYLINDER: -0.25
OD_VA1: 20/
OD_CYLINDER: -0.50
OS_VA2: 20/
OD_AXIS: 040
OD_ADD: +2.25
OU_VA: 20/
OD_VA2: 20/
OD_VA1: 20/30
OS_ADD: +2.25
OS_AXIS: 165
OS_SPHERE: PLANO
OU_VA: 20/
OS_VA2: 20/25-1
OD_VA3: 20/
OD_VA2: 20/30
OS_VA3: 20/
OD_SPHERE: -1.00
OS_VA3: 20/

## 2018-11-13 ASSESSMENT — VISUAL ACUITY
OS_BCVA: 20/200
OD_BCVA: 20/20-1

## 2018-11-13 ASSESSMENT — REFRACTION_AUTOREFRACTION
OD_AXIS: 117
OS_AXIS: 163
OD_CYLINDER: -1.00
OS_CYLINDER: -1.25
OD_SPHERE: -0.75
OS_SPHERE: -0.50

## 2018-11-13 ASSESSMENT — REFRACTION_CURRENTRX
OD_OVR_VA: 20/
OD_OVR_VA: 20/
OS_OVR_VA: 20/
OD_OVR_VA: 20/

## 2018-11-13 ASSESSMENT — SPHEQUIV_DERIVED
OS_SPHEQUIV: -1.125
OD_SPHEQUIV: -1.25
OD_SPHEQUIV: -1.25

## 2018-11-13 ASSESSMENT — LID EXAM ASSESSMENTS
OD_BLEPHARITIS: 2+
OS_BLEPHARITIS: 2+

## 2018-11-13 ASSESSMENT — CONFRONTATIONAL VISUAL FIELD TEST (CVF)
OS_FINDINGS: FULL
OD_FINDINGS: FULL

## 2018-11-13 ASSESSMENT — PUNCTA - ASSESSMENT
OD_PUNCTA: RLL RUL
OS_PUNCTA: LLL LUL

## 2018-11-21 ENCOUNTER — DOCTOR'S OFFICE (OUTPATIENT)
Dept: URBAN - NONMETROPOLITAN AREA CLINIC 1 | Facility: CLINIC | Age: 68
Setting detail: OPHTHALMOLOGY
End: 2018-11-21
Payer: MEDICARE

## 2018-11-21 DIAGNOSIS — Z96.1: ICD-10-CM

## 2018-11-21 DIAGNOSIS — H52.4: ICD-10-CM

## 2018-11-21 DIAGNOSIS — H52.31: ICD-10-CM

## 2018-11-21 PROCEDURE — 92015 DETERMINE REFRACTIVE STATE: CPT | Performed by: OPTOMETRIST

## 2018-11-21 ASSESSMENT — REFRACTION_MANIFEST
OD_VA1: 20/25
OD_ADD: +2.50
OD_VA3: 20/
OD_VA3: 20/
OS_VA2: 20/
OS_VA1: 20/20-2
OS_VA3: 20/
OU_VA: 20/
OD_VA1: 20/
OS_CYLINDER: -0.25
OS_VA1: 20/
OS_VA3: 20/
OS_SPHERE: PLANO
OD_VA2: 20/
OD_CYLINDER: -1.00
OS_AXIS: 035
OS_ADD: +2.50
OD_SPHERE: -0.75
OD_AXIS: 105
OU_VA: 20/
OD_VA2: 20/25
OS_VA2: 20/20-2

## 2018-11-21 ASSESSMENT — SPHEQUIV_DERIVED
OD_SPHEQUIV: -1.25
OD_SPHEQUIV: -1.25
OS_SPHEQUIV: -0.125

## 2018-11-21 ASSESSMENT — REFRACTION_AUTOREFRACTION
OD_CYLINDER: -1.00
OS_AXIS: 037
OS_CYLINDER: -0.25
OD_SPHERE: -0.75
OD_AXIS: 107
OS_SPHERE: 0.00

## 2018-11-21 ASSESSMENT — REFRACTION_CURRENTRX
OD_CYLINDER: -0.50
OD_SPHERE: -1.00
OD_OVR_VA: 20/
OD_OVR_VA: 20/
OS_SPHERE: PLANO
OD_ADD: +2.25
OS_OVR_VA: 20/
OS_OVR_VA: 20/
OS_ADD: +2.25
OD_AXIS: 040
OS_AXIS: 165
OS_CYLINDER: -0.25
OD_OVR_VA: 20/
OS_OVR_VA: 20/

## 2018-11-21 ASSESSMENT — VISUAL ACUITY
OS_BCVA: 20/200
OD_BCVA: 20/25-2

## 2019-02-27 ENCOUNTER — DOCTOR'S OFFICE (OUTPATIENT)
Dept: URBAN - NONMETROPOLITAN AREA CLINIC 1 | Facility: CLINIC | Age: 69
Setting detail: OPHTHALMOLOGY
End: 2019-02-27
Payer: MEDICARE

## 2019-02-27 DIAGNOSIS — H53.2: ICD-10-CM

## 2019-02-27 PROCEDURE — 92012 INTRM OPH EXAM EST PATIENT: CPT | Performed by: OPHTHALMOLOGY

## 2019-02-27 ASSESSMENT — REFRACTION_MANIFEST
OS_CYLINDER: -0.25
OS_VA1: 20/20-2
OD_VA2: 20/
OD_VA3: 20/
OS_VA3: 20/
OS_ADD: +2.50
OS_VA1: 20/
OD_VA1: 20/
OS_AXIS: 035
OD_ADD: +2.50
OU_VA: 20/
OS_VA2: 20/20-2
OD_AXIS: 105
OD_CYLINDER: -1.00
OU_VA: 20/
OS_SPHERE: PLANO
OS_VA3: 20/
OD_SPHERE: -0.75
OD_VA2: 20/25
OS_VA2: 20/
OD_VA1: 20/25
OD_VA3: 20/

## 2019-02-27 ASSESSMENT — REFRACTION_AUTOREFRACTION
OS_CYLINDER: -0.25
OS_AXIS: 037
OD_SPHERE: -0.75
OD_CYLINDER: -1.00
OS_SPHERE: 0.00
OD_AXIS: 107

## 2019-02-27 ASSESSMENT — REFRACTION_CURRENTRX
OD_CYLINDER: -0.50
OD_OVR_VA: 20/
OS_OVR_VA: 20/
OS_OVR_VA: 20/
OD_SPHERE: -1.00
OD_AXIS: 040
OS_AXIS: 165
OD_ADD: +2.25
OS_ADD: +2.25
OS_SPHERE: PLANO
OD_OVR_VA: 20/
OS_CYLINDER: -0.25
OD_OVR_VA: 20/
OS_OVR_VA: 20/

## 2019-02-27 ASSESSMENT — VISUAL ACUITY
OS_BCVA: 20/200
OD_BCVA: 20/25-2

## 2019-02-27 ASSESSMENT — LID EXAM ASSESSMENTS
OS_BLEPHARITIS: 2+
OD_BLEPHARITIS: 2+

## 2019-02-27 ASSESSMENT — PUNCTA - ASSESSMENT
OD_PUNCTA: RLL RUL
OS_PUNCTA: LLL LUL

## 2019-02-27 ASSESSMENT — SPHEQUIV_DERIVED
OD_SPHEQUIV: -1.25
OS_SPHEQUIV: -0.125
OD_SPHEQUIV: -1.25

## 2019-03-19 ENCOUNTER — TRANSCRIBE ORDERS (OUTPATIENT)
Dept: ADMINISTRATIVE | Facility: HOSPITAL | Age: 69
End: 2019-03-19

## 2019-03-19 ENCOUNTER — HOSPITAL ENCOUNTER (OUTPATIENT)
Dept: RADIOLOGY | Facility: HOSPITAL | Age: 69
Discharge: HOME | End: 2019-03-19
Payer: COMMERCIAL

## 2019-03-19 DIAGNOSIS — M54.40 LOW BACK PAIN WITH SCIATICA, SCIATICA LATERALITY UNSPECIFIED, UNSPECIFIED BACK PAIN LATERALITY, UNSPECIFIED CHRONICITY: ICD-10-CM

## 2019-03-19 DIAGNOSIS — M54.40 LOW BACK PAIN WITH SCIATICA, SCIATICA LATERALITY UNSPECIFIED, UNSPECIFIED BACK PAIN LATERALITY, UNSPECIFIED CHRONICITY: Primary | ICD-10-CM

## 2019-03-19 PROCEDURE — 72220 X-RAY EXAM SACRUM TAILBONE: CPT

## 2019-03-19 PROCEDURE — 72110 X-RAY EXAM L-2 SPINE 4/>VWS: CPT

## 2019-03-20 ENCOUNTER — APPOINTMENT (OUTPATIENT)
Dept: LAB | Facility: HOSPITAL | Age: 69
End: 2019-03-20
Payer: COMMERCIAL

## 2019-03-20 DIAGNOSIS — M54.40 LOW BACK PAIN WITH SCIATICA, SCIATICA LATERALITY UNSPECIFIED, UNSPECIFIED BACK PAIN LATERALITY, UNSPECIFIED CHRONICITY: ICD-10-CM

## 2019-03-20 LAB
ALBUMIN SERPL BCP-MCNC: 3.9 G/DL (ref 3.5–5)
ALP SERPL-CCNC: 76 U/L (ref 46–116)
ALT SERPL W P-5'-P-CCNC: 34 U/L (ref 12–78)
ANION GAP SERPL CALCULATED.3IONS-SCNC: 12 MMOL/L (ref 4–13)
AST SERPL W P-5'-P-CCNC: 21 U/L (ref 5–45)
BILIRUB SERPL-MCNC: 0.5 MG/DL (ref 0.2–1)
BUN SERPL-MCNC: 22 MG/DL (ref 5–25)
CALCIUM SERPL-MCNC: 8.6 MG/DL (ref 8.3–10.1)
CHLORIDE SERPL-SCNC: 107 MMOL/L (ref 100–108)
CHOLEST SERPL-MCNC: 181 MG/DL (ref 50–200)
CO2 SERPL-SCNC: 23 MMOL/L (ref 21–32)
CREAT SERPL-MCNC: 0.8 MG/DL (ref 0.6–1.3)
ERYTHROCYTE [DISTWIDTH] IN BLOOD BY AUTOMATED COUNT: 13 % (ref 11.6–15.1)
GFR SERPL CREATININE-BSD FRML MDRD: 75 ML/MIN/1.73SQ M
GLUCOSE P FAST SERPL-MCNC: 103 MG/DL (ref 65–99)
HCT VFR BLD AUTO: 43.8 % (ref 34.8–46.1)
HDLC SERPL-MCNC: 54 MG/DL (ref 40–60)
HGB BLD-MCNC: 14.6 G/DL (ref 11.5–15.4)
LDLC SERPL CALC-MCNC: 111 MG/DL (ref 0–100)
MCH RBC QN AUTO: 32 PG (ref 26.8–34.3)
MCHC RBC AUTO-ENTMCNC: 33.3 G/DL (ref 31.4–37.4)
MCV RBC AUTO: 96 FL (ref 82–98)
NONHDLC SERPL-MCNC: 127 MG/DL
PLATELET # BLD AUTO: 197 THOUSANDS/UL (ref 149–390)
PMV BLD AUTO: 10.7 FL (ref 8.9–12.7)
POTASSIUM SERPL-SCNC: 4.4 MMOL/L (ref 3.5–5.3)
PROT SERPL-MCNC: 6.8 G/DL (ref 6.4–8.2)
RBC # BLD AUTO: 4.56 MILLION/UL (ref 3.81–5.12)
SODIUM SERPL-SCNC: 142 MMOL/L (ref 136–145)
TRIGL SERPL-MCNC: 81 MG/DL
TSH SERPL DL<=0.05 MIU/L-ACNC: 0.92 UIU/ML (ref 0.36–3.74)
WBC # BLD AUTO: 7.14 THOUSAND/UL (ref 4.31–10.16)

## 2019-03-20 PROCEDURE — 80061 LIPID PANEL: CPT

## 2019-03-20 PROCEDURE — 80053 COMPREHEN METABOLIC PANEL: CPT

## 2019-03-20 PROCEDURE — 36415 COLL VENOUS BLD VENIPUNCTURE: CPT

## 2019-03-20 PROCEDURE — 85027 COMPLETE CBC AUTOMATED: CPT

## 2019-03-20 PROCEDURE — 84443 ASSAY THYROID STIM HORMONE: CPT

## 2019-05-20 ENCOUNTER — APPOINTMENT (OUTPATIENT)
Dept: RADIOLOGY | Facility: MEDICAL CENTER | Age: 69
End: 2019-05-20
Payer: COMMERCIAL

## 2019-05-20 ENCOUNTER — TRANSCRIBE ORDERS (OUTPATIENT)
Dept: ADMINISTRATIVE | Facility: HOSPITAL | Age: 69
End: 2019-05-20

## 2019-05-20 DIAGNOSIS — M25.562 PAIN IN BOTH KNEES, UNSPECIFIED CHRONICITY: Primary | ICD-10-CM

## 2019-05-20 DIAGNOSIS — M25.561 PAIN IN BOTH KNEES, UNSPECIFIED CHRONICITY: Primary | ICD-10-CM

## 2019-05-20 DIAGNOSIS — M25.561 PAIN IN BOTH KNEES, UNSPECIFIED CHRONICITY: ICD-10-CM

## 2019-05-20 DIAGNOSIS — M25.562 PAIN IN BOTH KNEES, UNSPECIFIED CHRONICITY: ICD-10-CM

## 2019-05-20 PROCEDURE — 73564 X-RAY EXAM KNEE 4 OR MORE: CPT

## 2019-06-10 ENCOUNTER — APPOINTMENT (EMERGENCY)
Dept: NON INVASIVE DIAGNOSTICS | Facility: HOSPITAL | Age: 69
End: 2019-06-10
Payer: COMMERCIAL

## 2019-06-10 ENCOUNTER — APPOINTMENT (EMERGENCY)
Dept: RADIOLOGY | Facility: HOSPITAL | Age: 69
End: 2019-06-10
Payer: COMMERCIAL

## 2019-06-10 ENCOUNTER — HOSPITAL ENCOUNTER (EMERGENCY)
Facility: HOSPITAL | Age: 69
Discharge: HOME | End: 2019-06-10
Attending: EMERGENCY MEDICINE | Admitting: EMERGENCY MEDICINE
Payer: COMMERCIAL

## 2019-06-10 VITALS
OXYGEN SATURATION: 97 % | HEART RATE: 81 BPM | DIASTOLIC BLOOD PRESSURE: 89 MMHG | SYSTOLIC BLOOD PRESSURE: 151 MMHG | WEIGHT: 201 LBS | TEMPERATURE: 98.4 F | RESPIRATION RATE: 18 BRPM

## 2019-06-10 DIAGNOSIS — R60.0 LOWER EXTREMITY EDEMA: Primary | ICD-10-CM

## 2019-06-10 PROCEDURE — 93971 EXTREMITY STUDY: CPT

## 2019-06-10 PROCEDURE — 99284 EMERGENCY DEPT VISIT MOD MDM: CPT

## 2019-06-10 PROCEDURE — 73590 X-RAY EXAM OF LOWER LEG: CPT

## 2019-06-10 PROCEDURE — 99284 EMERGENCY DEPT VISIT MOD MDM: CPT | Performed by: EMERGENCY MEDICINE

## 2019-06-10 RX ORDER — DOXYCYCLINE 40 MG/1
40 CAPSULE ORAL
COMMUNITY

## 2019-06-10 RX ORDER — MECLIZINE HYDROCHLORIDE 25 MG/1
25 TABLET ORAL 3 TIMES DAILY PRN
COMMUNITY
Start: 2019-05-29 | End: 2020-05-28

## 2019-06-10 RX ORDER — PANTOPRAZOLE SODIUM 20 MG/1
TABLET, DELAYED RELEASE ORAL
COMMUNITY

## 2019-06-11 PROCEDURE — 93971 EXTREMITY STUDY: CPT | Performed by: SURGERY

## 2019-06-14 ENCOUNTER — EVALUATION (OUTPATIENT)
Dept: PHYSICAL THERAPY | Facility: CLINIC | Age: 69
End: 2019-06-14
Payer: COMMERCIAL

## 2019-06-14 DIAGNOSIS — H81.11 BPPV (BENIGN PAROXYSMAL POSITIONAL VERTIGO), RIGHT: Primary | ICD-10-CM

## 2019-06-14 PROCEDURE — 97161 PT EVAL LOW COMPLEX 20 MIN: CPT | Performed by: PHYSICAL THERAPIST

## 2019-06-14 PROCEDURE — 97112 NEUROMUSCULAR REEDUCATION: CPT | Performed by: PHYSICAL THERAPIST

## 2019-06-14 PROCEDURE — 95992 CANALITH REPOSITIONING PROC: CPT | Performed by: PHYSICAL THERAPIST

## 2019-06-18 ENCOUNTER — TRANSCRIBE ORDERS (OUTPATIENT)
Dept: PHYSICAL THERAPY | Facility: CLINIC | Age: 69
End: 2019-06-18

## 2019-06-18 DIAGNOSIS — H81.11 BENIGN PAROXYSMAL VERTIGO OF RIGHT EAR: Primary | ICD-10-CM

## 2019-07-23 ENCOUNTER — CONSULT (OUTPATIENT)
Dept: PAIN MEDICINE | Facility: CLINIC | Age: 69
End: 2019-07-23
Payer: COMMERCIAL

## 2019-07-23 VITALS — WEIGHT: 204.2 LBS | BODY MASS INDEX: 30.95 KG/M2 | HEIGHT: 68 IN

## 2019-07-23 DIAGNOSIS — M54.16 LUMBAR RADICULOPATHY: ICD-10-CM

## 2019-07-23 DIAGNOSIS — M54.41 CHRONIC BILATERAL LOW BACK PAIN WITH BILATERAL SCIATICA: Primary | ICD-10-CM

## 2019-07-23 DIAGNOSIS — G89.4 CHRONIC PAIN SYNDROME: ICD-10-CM

## 2019-07-23 DIAGNOSIS — M47.816 LUMBAR SPONDYLOSIS: ICD-10-CM

## 2019-07-23 DIAGNOSIS — M48.062 SPINAL STENOSIS OF LUMBAR REGION WITH NEUROGENIC CLAUDICATION: ICD-10-CM

## 2019-07-23 DIAGNOSIS — M54.42 CHRONIC BILATERAL LOW BACK PAIN WITH BILATERAL SCIATICA: Primary | ICD-10-CM

## 2019-07-23 DIAGNOSIS — M51.16 LUMBAR DISC DISEASE WITH RADICULOPATHY: ICD-10-CM

## 2019-07-23 DIAGNOSIS — G89.29 CHRONIC BILATERAL LOW BACK PAIN WITH BILATERAL SCIATICA: Primary | ICD-10-CM

## 2019-07-23 PROCEDURE — 99214 OFFICE O/P EST MOD 30 MIN: CPT | Performed by: ANESTHESIOLOGY

## 2019-07-23 NOTE — PROGRESS NOTES
Assessment:  1  Chronic bilateral low back pain with bilateral sciatica    2  Lumbar radiculopathy    3  Lumbar disc disease with radiculopathy    4  Lumbar spondylosis    5  Chronic pain syndrome        Plan:  Patient is a 57-year-old female with complaints of low back pain and bilateral leg pain with a history significant for lumbar degenerative disc disease, presents to office for initial consultation  Patient reports radiculopathy in the L3, L4, S1 nerve root distribution bilaterally  On x-ray patient does have degenerative disc changes with disc height changes multiple levels of the lumbar spine  There is a high suspicion of multilevel degenerative disc changes that would correlate with bilateral anterior-posterior radicular symptoms that the patient is reported  We will knee MRI of the lumbar spine because will provide diagnostic imaging of the discs and the nerve roots which may be possibly impacted causing patient's bilateral lower extremity symptoms  Patient reports symptoms are brought on by standing for prolonged periods of time or standing with weight in hand  Patient reports pain is alleviated when she bends forward which is similar to the shopping cart sign  There is a high suspicion clinically of patient having a discogenic pathology is correlate with patient's radicular symptoms  Patient reports having sensation of feeling like her legs are going to give out after long walking several blocks  One patient walks her legs get very heavy  1  We will start patient on physical therapy for lumbar core strengthening  2  We will order a MRI of lumbar spine, patient does have a bladder stimulator on [employer] is MRI compatible  Model number [id number]  3  Follow-up in 1 month        [facility] Program report was reviewed and was appropriate       History of Present Illness:     The patient is a 68 y o  female who presents for consultation in regards to Leg Pain; Knee Pain; and Foot Pain  Symptoms have been present for several years  Symptoms began without any precipitating injury or trauma  Pain is reported to be 4 on the numeric rating scale  Symptoms are felt intermittently and worst in the no typical pattern  Symptoms are characterized as burning, cramping, dull/aching, numbing, tingling and throbbing  Symptoms are associated with no weakness  Aggravating factors include kneeling, lying down, walking, coughing/sneezing and bowel movements  Relieving factors include sitting  No change in symptoms with bending  Treatments that have been helpful include prior injections including LESI and physical therapy  heat/ice have provided no relief  Medications to relieve symptoms include nothing  Review of Systems:    Review of Systems   All other systems reviewed and are negative  Past Medical History:   Diagnosis Date    Urine incontinence        Past Surgical History:   Procedure Laterality Date    BLADDER SURGERY      BREAST SURGERY      CHOLECYSTECTOMY      EYE SURGERY      HYSTERECTOMY      JOINT REPLACEMENT      SHOULDER SURGERY      THROAT SURGERY      TONSILLECTOMY         No family history on file      Social History     Occupational History    Not on file   Tobacco Use    Smoking status: Never Smoker    Smokeless tobacco: Never Used   Substance and Sexual Activity    Alcohol use: No    Drug use: No    Sexual activity: Not on file         Current Outpatient Medications:     doxycycline (ORACEA) 40 MG capsule, Take 40 mg by mouth, Disp: , Rfl:     meclizine (ANTIVERT) 25 mg tablet, Take 25 mg by mouth Three times daily as needed, Disp: , Rfl:     pantoprazole (PROTONIX) 20 mg tablet, Take by mouth, Disp: , Rfl:     Allergies   Allergen Reactions    Morphine GI Intolerance    Fentanyl Rash       Physical Exam:    Ht 5' 7 5" (1 715 m)   Wt 92 6 kg (204 lb 3 2 oz)   BMI 31 51 kg/m²     Constitutional: normal, well developed, well nourished, alert, in no distress and non-toxic and no overt pain behavior  and obese  Eyes: anicteric  HEENT: grossly intact  Neck: supple, symmetric, trachea midline and no masses   Pulmonary:even and unlabored  Cardiovascular:No edema or pitting edema present  Skin:Normal without rashes or lesions and well hydrated  Psychiatric:Mood and affect appropriate  Neurologic:Cranial Nerves II-XII grossly intact  Musculoskeletal:antalgic       Lumbar/Sacral Spine examination demonstrates  Full range of motion lumbar spine with pain upon: flexion, lateral rotation to the left/right, and bending to the left/right  Bilateral lumbar paraspinals tender to palpation  Muscle spasms noted in the lumbar area bilaterally  4/5 lower extremity strength in all muscle groups bilaterally  Positive seated straight leg raise for bilateral lower extremities  Sensitivity to light touch intact bilateral lower extremities  2+ reflexes in the patella and Achilles  No ankle clonus     Imaging  RIGHT TIBIA AND FIBULA     INDICATION:   rt leg swelling      COMPARISON:  None     VIEWS:  XR TIBIA FIBULA 2 VW RIGHT         FINDINGS:     There is no acute fracture or dislocation      No significant degenerative changes seen      No lytic or blastic lesions are seen      There appears to be some mild swelling of the lower leg mostly laterally  No soft tissue gas or foreign bodies      IMPRESSION:     Mild swelling  No acute bone pathology seen  RIGHT KNEE     INDICATION:   M25 561: Pain in right knee  M25 562: Pain in left knee      COMPARISON:  None     VIEWS:  XR KNEE 4+ VW RIGHT INJURY   Images: 4     FINDINGS:     There is no acute fracture or dislocation      There is no joint effusion      Mild degenerative changes in the medial compartment and patellofemoral compartment     No lytic or blastic lesions are seen      Soft tissues are unremarkable      IMPRESSION:     Mild degenerative changes in the medial compartment and patellofemoral compartment      LEFT KNEE     INDICATION:   M25 561: Pain in right knee  M25 562: Pain in left knee  Bilateral knee pain  No injury  Occasional swelling      COMPARISON:  None     VIEWS:  XR KNEE 4+ VW LEFT INJURY   Images: 4     FINDINGS:     There is no acute fracture or dislocation      There is no joint effusion      Degenerative changes are mild, characterized by osteophyte formation and joint space narrowing, particularly involving the medial and patellar joint compartments      No lytic or blastic lesions are seen      Soft tissues are unremarkable    Calcific tendinopathy involving quadriceps insertion upon the patella is present      IMPRESSION:     Mild degenerative changes, most notably involving the medial and patellar joint compartments        LUMBAR SPINE     INDICATION:   M54 40: Lumbago with sciatica, unspecified side      COMPARISON:  June 1, 2015     VIEWS:  XR SPINE LUMBAR MINIMUM 4 VIEWS NON INJURY        FINDINGS:     There is no evidence of acute fracture or destructive osseous lesion      Mild lumbar scoliosis again noted convex to the right centered at L2-L3 level      Disc space narrowing is seen at the L3-L4, L4-L5 and L5-S1 levels      The pedicles appear intact      Soft tissues are unremarkable      IMPRESSION:     Multilevel degenerative disc disease

## 2019-07-31 ENCOUNTER — TELEPHONE (OUTPATIENT)
Dept: PAIN MEDICINE | Facility: CLINIC | Age: 69
End: 2019-07-31

## 2019-07-31 NOTE — TELEPHONE ENCOUNTER
S/W Scheduling, tech is going to contact pt once she can be scheduled as she is on a wait list   S/W pt and made aware  done

## 2019-07-31 NOTE — TELEPHONE ENCOUNTER
S/w pt can not schedule the MRI due to a contradiction, pt has an implant   Please review and f/u w/pt need new order     # [phone]

## 2019-08-02 DIAGNOSIS — M51.16 LUMBAR DISC DISEASE WITH RADICULOPATHY: ICD-10-CM

## 2019-08-02 DIAGNOSIS — M54.16 LUMBAR RADICULOPATHY: Primary | ICD-10-CM

## 2019-08-02 NOTE — TELEPHONE ENCOUNTER
Welby Brittle from Radiology called stating that MRI of lumbar spine can not be done due to pt Bladder Stimulator  Pt can only have MRI of her brain  radiology has not called pt to advise yet   Suggest that pt can have CAT scan done instead RM would agree       Pt can be reached at [phone]

## 2019-08-02 NOTE — TELEPHONE ENCOUNTER
S/W pt  Advised pt of the previous tasks  Gave pt central scheduling's phone #  Pt verbalized understanding

## 2019-08-12 ENCOUNTER — HOSPITAL ENCOUNTER (OUTPATIENT)
Dept: CT IMAGING | Facility: HOSPITAL | Age: 69
Discharge: HOME | End: 2019-08-12
Attending: ANESTHESIOLOGY
Payer: COMMERCIAL

## 2019-08-12 DIAGNOSIS — M54.16 LUMBAR RADICULOPATHY: ICD-10-CM

## 2019-08-12 DIAGNOSIS — M51.16 LUMBAR DISC DISEASE WITH RADICULOPATHY: ICD-10-CM

## 2019-08-12 PROCEDURE — 72131 CT LUMBAR SPINE W/O DYE: CPT

## 2019-09-26 ENCOUNTER — OFFICE VISIT (OUTPATIENT)
Dept: PAIN MEDICINE | Facility: CLINIC | Age: 69
End: 2019-09-26
Payer: COMMERCIAL

## 2019-09-26 ENCOUNTER — APPOINTMENT (OUTPATIENT)
Dept: RADIOLOGY | Facility: MEDICAL CENTER | Age: 69
End: 2019-09-26
Payer: COMMERCIAL

## 2019-09-26 VITALS
BODY MASS INDEX: 30.92 KG/M2 | WEIGHT: 204 LBS | DIASTOLIC BLOOD PRESSURE: 75 MMHG | SYSTOLIC BLOOD PRESSURE: 142 MMHG | HEART RATE: 71 BPM | HEIGHT: 68 IN

## 2019-09-26 DIAGNOSIS — M47.816 LUMBAR SPONDYLOSIS: ICD-10-CM

## 2019-09-26 DIAGNOSIS — G89.29 CHRONIC BILATERAL LOW BACK PAIN WITH BILATERAL SCIATICA: ICD-10-CM

## 2019-09-26 DIAGNOSIS — G89.4 CHRONIC PAIN SYNDROME: ICD-10-CM

## 2019-09-26 DIAGNOSIS — M54.16 LUMBAR RADICULOPATHY: ICD-10-CM

## 2019-09-26 DIAGNOSIS — M54.41 CHRONIC BILATERAL LOW BACK PAIN WITH BILATERAL SCIATICA: ICD-10-CM

## 2019-09-26 DIAGNOSIS — M54.42 CHRONIC BILATERAL LOW BACK PAIN WITH BILATERAL SCIATICA: ICD-10-CM

## 2019-09-26 DIAGNOSIS — M51.16 LUMBAR DISC DISEASE WITH RADICULOPATHY: ICD-10-CM

## 2019-09-26 DIAGNOSIS — M48.062 SPINAL STENOSIS OF LUMBAR REGION WITH NEUROGENIC CLAUDICATION: Primary | ICD-10-CM

## 2019-09-26 PROCEDURE — 99214 OFFICE O/P EST MOD 30 MIN: CPT | Performed by: ANESTHESIOLOGY

## 2019-09-26 PROCEDURE — 72114 X-RAY EXAM L-S SPINE BENDING: CPT

## 2019-09-26 NOTE — PROGRESS NOTES
Assessment:  1  Spinal stenosis of lumbar region with neurogenic claudication    2  Lumbar disc disease with radiculopathy    3  Lumbar radiculopathy    4  Lumbar spondylosis    5  Chronic bilateral low back pain with bilateral sciatica    6  Chronic pain syndrome        Plan:  Patient is a 64-year-old female complaints of low back pain and bilateral leg pain in the L3, L4, L5 nerve root distribution bilaterally with a history significant for lumbar degenerative disc disease presents office for follow-up visit  Patient had a CT lumbar spine performed on 08/12/2019 which showed multilevel spondylosis and osteoarthritic changes with severe canal stenosis at L3-L4  Patient has had multiple epidural steroid injections which failed to provide patient with any relief lasting greater than 2 weeks at a time  After which then her pain in her weakness resolves rapidly and back to baseline  Patient reports the weakness in her legs and her inability to stand for prolonged periods of time have been increasingly worse over the past months  Patient fails to respond to physical therapy also  At this time we feel that surgical intervention would be the best route to go to provide patient with a longer period of relief  1  We will provide patient with a neurosurgical evaluation referral severe L3-L4 spinal canal stenosis  2  We will also obtain an x-ray of lumbar spine with flexion-extension rule out any instability  3  Follow-up orthopedic spinal surgeon referral    [place] Prescription Drug Monitoring Program report was reviewed and was appropriate     Complete risks and benefits including bleeding, infection, tissue reaction, nerve injury and allergic reaction were discussed  The approach was demonstrated using models and literature was provided  Verbal and written consent was obtained      My impressions and treatment recommendations were discussed in detail with the patient who verbalized understanding and had no further questions  Discharge instructions were provided  I personally saw and examined the patient and I agree with the above discussed plan of care  No orders of the defined types were placed in this encounter  No orders of the defined types were placed in this encounter  History of Present Illness:  Colt Keene is a 68 y o  female who presents for a follow up office visit in regards to No chief complaint on file      The patients current symptoms include 2/10 intermittent dull/aching, cramping, numbness, pins and needles in bilateral lower extremities without any particular time pattern  Current pain medications includes:  None  The patient reports that this regimen is providing 0% pain relief  The patient is reporting no side effects from this pain medication regimen  I have personally reviewed and/or updated the patient's past medical history, past surgical history, family history, social history, current medications, allergies, and vital signs today  Review of Systems   Musculoskeletal: Positive for gait problem  Joint stiffness  Pain in extremity-legs       Patient Active Problem List   Diagnosis    Chronic bilateral low back pain with bilateral sciatica    Lumbar radiculopathy    Chronic pain syndrome    Lumbar spondylosis    Lumbar disc disease with radiculopathy       Past Medical History:   Diagnosis Date    Urine incontinence        Past Surgical History:   Procedure Laterality Date    BLADDER SURGERY      BREAST SURGERY      CHOLECYSTECTOMY      EYE SURGERY      HYSTERECTOMY      JOINT REPLACEMENT      SHOULDER SURGERY      THROAT SURGERY      TONSILLECTOMY         No family history on file      Social History     Occupational History    Not on file   Tobacco Use    Smoking status: Never Smoker    Smokeless tobacco: Never Used   Substance and Sexual Activity    Alcohol use: No    Drug use: No    Sexual activity: Not on file       Current Outpatient Medications on File Prior to Visit   Medication Sig    doxycycline (ORACEA) 40 MG capsule Take 40 mg by mouth    meclizine (ANTIVERT) 25 mg tablet Take 25 mg by mouth Three times daily as needed    pantoprazole (PROTONIX) 20 mg tablet Take by mouth     No current facility-administered medications on file prior to visit  Allergies   Allergen Reactions    Morphine GI Intolerance    Fentanyl Rash       Physical Exam:    /75 (BP Location: Left arm, Patient Position: Sitting, Cuff Size: Large)   Pulse 71   Ht 5' 7 5" (1 715 m)   Wt 92 5 kg (204 lb)   BMI 31 48 kg/m²     Constitutional:normal, well developed, well nourished, alert, in no distress and non-toxic and no overt pain behavior  Eyes:anicteric  HEENT:grossly intact  Neck:supple, symmetric, trachea midline and no masses   Pulmonary:even and unlabored  Cardiovascular:No edema or pitting edema present  Skin:Normal without rashes or lesions and well hydrated  Psychiatric:Mood and affect appropriate  Neurologic:Cranial Nerves II-XII grossly intact  Musculoskeletal:normal     Lumbar/Sacral Spine examination demonstrates  Decreased range of motion lumbar spine with pain upon: flexion, lateral rotation to the left/right, and bending to the left/right  Bilateral lumbar paraspinals tender to palpation  Muscle spasms noted in the lumbar area bilaterally  4/5 lower extremity strength in all muscle groups bilaterally  Positive seated straight leg raise for bilateral lower extremities  Sensitivity to light touch intact bilateral lower extremities  2+ reflexes in the patella and Achilles    No ankle clonus     Imaging

## 2019-10-02 ENCOUNTER — TELEPHONE (OUTPATIENT)
Dept: RADIOLOGY | Facility: CLINIC | Age: 69
End: 2019-10-02

## 2019-10-02 NOTE — TELEPHONE ENCOUNTER
----- Message from Leigh Doss MD sent at 10/2/2019 10:40 AM EDT -----  Please call the patient regarding her abnormal result    X-ray lumbar spine shows a mild disc slip at L3-L4 with multilevel degenerative disc changes most pronounced at L5-S1 without any signs of instability noted

## 2019-10-02 NOTE — TELEPHONE ENCOUNTER
S/W pt and advised of below results  Will consult with Dr Bethel Gutierrez and back to RM per OV scheduled

## 2019-10-02 NOTE — TELEPHONE ENCOUNTER
Pt returned call and would like a callback  Tried to reach out but received         Pt can be reached at [phone]

## 2019-10-14 ENCOUNTER — OFFICE VISIT (OUTPATIENT)
Dept: OBGYN CLINIC | Facility: HOSPITAL | Age: 69
End: 2019-10-14
Attending: ANESTHESIOLOGY
Payer: COMMERCIAL

## 2019-10-14 ENCOUNTER — TELEPHONE (OUTPATIENT)
Dept: INTERVENTIONAL RADIOLOGY/VASCULAR | Facility: HOSPITAL | Age: 69
End: 2019-10-14

## 2019-10-14 VITALS
BODY MASS INDEX: 32.02 KG/M2 | DIASTOLIC BLOOD PRESSURE: 83 MMHG | WEIGHT: 204 LBS | HEART RATE: 69 BPM | SYSTOLIC BLOOD PRESSURE: 133 MMHG | HEIGHT: 67 IN

## 2019-10-14 DIAGNOSIS — M48.062 NEUROGENIC CLAUDICATION DUE TO LUMBAR SPINAL STENOSIS: ICD-10-CM

## 2019-10-14 DIAGNOSIS — M51.16 LUMBAR DISC DISEASE WITH RADICULOPATHY: ICD-10-CM

## 2019-10-14 DIAGNOSIS — G89.4 CHRONIC PAIN SYNDROME: ICD-10-CM

## 2019-10-14 DIAGNOSIS — M47.816 LUMBAR SPONDYLOSIS: ICD-10-CM

## 2019-10-14 DIAGNOSIS — M48.062 SPINAL STENOSIS OF LUMBAR REGION WITH NEUROGENIC CLAUDICATION: Primary | ICD-10-CM

## 2019-10-14 DIAGNOSIS — M54.16 LUMBAR RADICULOPATHY: ICD-10-CM

## 2019-10-14 PROCEDURE — 99203 OFFICE O/P NEW LOW 30 MIN: CPT | Performed by: ORTHOPAEDIC SURGERY

## 2019-10-14 NOTE — TELEPHONE ENCOUNTER
Called patient to schedule CT myelogram  Patient scheduled for 10/24/19 for 1000 IR 1030 CT  Patient aware of 4 hour bed rest post procedure  Consult complete

## 2019-10-14 NOTE — PROGRESS NOTES
Assessment/Plan:    Multilevel lumbar DDD with stenosis and spondylolisthesis at L3-4  · CT myelogram of lumbar spine was ordered for further evaluation  · BUN/Cratinine ordered  · Surgical intervention was discussed  · Follow up after CT myelogram        Problem List Items Addressed This Visit        Nervous and Auditory    Lumbar radiculopathy    Lumbar disc disease with radiculopathy    Relevant Orders    CT spine lumbar w contrast    BUN/Creatinine Ratio    IR spine procedure       Musculoskeletal and Integument    Lumbar spondylosis    Relevant Orders    CT spine lumbar w contrast    BUN/Creatinine Ratio    IR spine procedure       Other    Chronic pain syndrome    Neurogenic claudication due to lumbar spinal stenosis      Other Visit Diagnoses     Spinal stenosis of lumbar region with neurogenic claudication    -  Primary    Relevant Orders    CT spine lumbar w contrast    BUN/Creatinine Ratio    IR spine procedure            Subjective:      Patient ID: Erna Heredia is a 68 y o  female  SHANNAN Upton is a 68year old female who presents to the office for evaluation of low back and bilateral leg pain  She was referred to us by Dr Tiara Samayoa  She is experiencing low back pain that goes down her bilateral legs to her feet  She has associated numbness, tingling and weakness  She describes a positive shopping cart sign  She has difficulty with standing and prolonged activities  She is accompanied by her daughter today in the office  Patient has history of bladder stimulator    The following portions of the patient's history were reviewed and updated as appropriate: current medications, past family history, past medical history, past social history, past surgical history and problem list     Review of Systems   Constitutional: Negative for fever and unexpected weight change  HENT: Negative for hearing loss, nosebleeds and sore throat  Eyes: Negative for pain, redness and visual disturbance  Respiratory: Negative for cough, shortness of breath and wheezing  Cardiovascular: Negative for chest pain, palpitations and leg swelling  Gastrointestinal: Negative for abdominal pain, nausea and vomiting  Endocrine: Negative for polydipsia and polyuria  Genitourinary: Negative for dysuria and hematuria  Musculoskeletal: Positive for back pain  Skin: Negative for rash and wound  Neurological: Negative for dizziness and headaches  Psychiatric/Behavioral: Negative for agitation and suicidal ideas  Objective:      /83   Pulse 69   Ht 5' 7" (1 702 m)   Wt 92 5 kg (204 lb)   BMI 31 95 kg/m²          Physical Exam   Constitutional: She is oriented to person, place, and time  She appears well-developed and well-nourished  HENT:   Head: Normocephalic and atraumatic  Eyes: Pupils are equal, round, and reactive to light  Neck: Neck supple  Cardiovascular: Intact distal pulses  Pulmonary/Chest: Effort normal and breath sounds normal    Neurological: She is alert and oriented to person, place, and time  Skin: Skin is warm and dry  Psychiatric: She has a normal mood and affect   Her behavior is normal        Patient ambulates without assistance  Non-tender to palpation   Modified straight leg raise negative bilaterally  Strength L2-S1 5/5 bilaterally  Sensation L2-S1 intact bilaterally  Reflexes 1+ at L4 and absent at S1    Imaging  CT of lumbar spine 08/12/2019 was reviewed and shows multilevel lumbar DDD with stenosis and spondylolisthesis at L3-4    Scribe Attestation    I,:   Marcy Salamanca am acting as a scribe while in the presence of the attending physician :        I,:   Ilsa Teixeira MD personally performed the services described in this documentation    as scribed in my presence :

## 2019-10-18 ENCOUNTER — APPOINTMENT (OUTPATIENT)
Dept: LAB | Facility: HOSPITAL | Age: 69
End: 2019-10-18
Payer: COMMERCIAL

## 2019-10-18 DIAGNOSIS — M51.16 LUMBAR DISC DISEASE WITH RADICULOPATHY: ICD-10-CM

## 2019-10-18 DIAGNOSIS — M47.816 LUMBAR SPONDYLOSIS: Primary | ICD-10-CM

## 2019-10-18 DIAGNOSIS — M48.062 SPINAL STENOSIS OF LUMBAR REGION WITH NEUROGENIC CLAUDICATION: ICD-10-CM

## 2019-10-18 LAB
BUN SERPL-MCNC: 14 MG/DL (ref 5–25)
CREAT SERPL-MCNC: 0.75 MG/DL (ref 0.6–1.3)
GFR SERPL CREATININE-BSD FRML MDRD: 80 ML/MIN/1.73SQ M

## 2019-10-18 PROCEDURE — 82565 ASSAY OF CREATININE: CPT

## 2019-10-18 PROCEDURE — 36415 COLL VENOUS BLD VENIPUNCTURE: CPT

## 2019-10-18 PROCEDURE — 84520 ASSAY OF UREA NITROGEN: CPT

## 2019-10-23 ENCOUNTER — TELEPHONE (OUTPATIENT)
Dept: SURGERY | Facility: HOSPITAL | Age: 69
End: 2019-10-23

## 2019-10-24 ENCOUNTER — HOSPITAL ENCOUNTER (OUTPATIENT)
Dept: CT IMAGING | Facility: HOSPITAL | Age: 69
Discharge: HOME | End: 2019-10-24
Payer: COMMERCIAL

## 2019-10-24 ENCOUNTER — HOSPITAL ENCOUNTER (OUTPATIENT)
Dept: INTERVENTIONAL RADIOLOGY/VASCULAR | Facility: HOSPITAL | Age: 69
Discharge: HOME | End: 2019-10-24
Admitting: RADIOLOGY
Payer: COMMERCIAL

## 2019-10-24 VITALS
HEIGHT: 67 IN | BODY MASS INDEX: 32.02 KG/M2 | TEMPERATURE: 98 F | HEART RATE: 75 BPM | OXYGEN SATURATION: 97 % | SYSTOLIC BLOOD PRESSURE: 146 MMHG | WEIGHT: 204 LBS | DIASTOLIC BLOOD PRESSURE: 89 MMHG | RESPIRATION RATE: 18 BRPM

## 2019-10-24 VITALS
HEART RATE: 76 BPM | RESPIRATION RATE: 18 BRPM | DIASTOLIC BLOOD PRESSURE: 57 MMHG | TEMPERATURE: 98.8 F | SYSTOLIC BLOOD PRESSURE: 118 MMHG | OXYGEN SATURATION: 96 %

## 2019-10-24 DIAGNOSIS — M51.16 LUMBAR DISC DISEASE WITH RADICULOPATHY: ICD-10-CM

## 2019-10-24 DIAGNOSIS — M48.062 SPINAL STENOSIS OF LUMBAR REGION WITH NEUROGENIC CLAUDICATION: ICD-10-CM

## 2019-10-24 DIAGNOSIS — M47.816 LUMBAR SPONDYLOSIS: ICD-10-CM

## 2019-10-24 PROCEDURE — 62304 MYELOGRAPHY LUMBAR INJECTION: CPT

## 2019-10-24 PROCEDURE — 62284 INJECTION FOR MYELOGRAM: CPT | Performed by: RADIOLOGY

## 2019-10-24 PROCEDURE — 72132 CT LUMBAR SPINE W/DYE: CPT

## 2019-10-24 RX ADMIN — IOHEXOL 10 ML: 300 INJECTION, SOLUTION INTRAVENOUS at 10:12

## 2019-10-24 NOTE — DISCHARGE INSTRUCTIONS
Computed Tomographic Myelography   WHAT YOU NEED TO KNOW:   Computed tomographic (CT) myelography is a procedure to examine your spinal canal  Contrast dye is used to help healthcare providers see your nerves, bones, or spinal cord more clearly  DISCHARGE INSTRUCTIONS:   Follow up with your healthcare provider or specialist as directed:  Write down your questions so you remember to ask them during your visits  Drink liquids as directed:  Liquids will help flush the contrast dye out of your body  Ask how much liquid to drink each day, and which liquids to drink  Some foods, such as soup and fruit, also provide liquid  Contact your healthcare provider or specialist if:   · You have bleeding or a discharge coming from where the needle was put into your back  · You have severe neck or back pain  · You have a headache or nausea that does not go away with rest and medicine  · You feel anxious or irritable  · You have questions or concerns about your condition or care  Seek care immediately or call 911 if:   · You have a stiff neck or have trouble thinking clearly  · You have numbness, tingling, or weakness anywhere below your waist     · You have a severe headache that does not get better  · You have a fever  © 2017 [employer]  Information is for End User's use only and may not be sold, redistributed or otherwise used for commercial purposes  All illustrations and images included in CareNotes® are the copyrighted property of [employer]  or [employer]  The above information is an  only  It is not intended as medical advice for individual conditions or treatments  Talk to your doctor, nurse or pharmacist before following any medical regimen to see if it is safe and effective for you

## 2019-10-28 ENCOUNTER — OFFICE VISIT (OUTPATIENT)
Dept: OBGYN CLINIC | Facility: HOSPITAL | Age: 69
End: 2019-10-28
Payer: COMMERCIAL

## 2019-10-28 VITALS
HEIGHT: 67 IN | HEART RATE: 96 BPM | SYSTOLIC BLOOD PRESSURE: 149 MMHG | BODY MASS INDEX: 31.55 KG/M2 | DIASTOLIC BLOOD PRESSURE: 82 MMHG | WEIGHT: 201 LBS

## 2019-10-28 DIAGNOSIS — M54.42 CHRONIC BILATERAL LOW BACK PAIN WITH BILATERAL SCIATICA: ICD-10-CM

## 2019-10-28 DIAGNOSIS — M54.16 LUMBAR RADICULOPATHY: Primary | ICD-10-CM

## 2019-10-28 DIAGNOSIS — G89.29 CHRONIC BILATERAL LOW BACK PAIN WITH BILATERAL SCIATICA: ICD-10-CM

## 2019-10-28 DIAGNOSIS — M47.816 LUMBAR SPONDYLOSIS: ICD-10-CM

## 2019-10-28 DIAGNOSIS — M54.41 CHRONIC BILATERAL LOW BACK PAIN WITH BILATERAL SCIATICA: ICD-10-CM

## 2019-10-28 PROCEDURE — 99214 OFFICE O/P EST MOD 30 MIN: CPT | Performed by: ORTHOPAEDIC SURGERY

## 2019-10-28 NOTE — PROGRESS NOTES
68 y o female presenting for follow-up of spinal stenosis a review of a CT myelogram   Today she tissues complain of bilateral leg pain and heaviness  This is not improved since her last visit  Leg heaviness and pain is made worse with standing for prolonged periods of time and is improved with when he over an object or when sitting down  She also describes stiffness and difficulty when rising from a seated position and  She describes very minimal back pain  In the past we have allergies had epidural steroid injections which gave her a small amount of relief  Denies any numbness tingling or weakness    Review of Systems  Review of systems negative unless otherwise specified in HPI    Past Medical History  Past Medical History:   Diagnosis Date    Joint pain     Urine incontinence        Past Surgical History  Past Surgical History:   Procedure Laterality Date    BLADDER SURGERY      BREAST SURGERY      CHOLECYSTECTOMY      COLONOSCOPY      EYE SURGERY      HYSTERECTOMY      IR SPINE PROCEDURE  10/24/2019    ROTATOR CUFF REPAIR      SHOULDER SURGERY      THROAT SURGERY      TONSILLECTOMY         Current Medications  Current Outpatient Medications on File Prior to Visit   Medication Sig Dispense Refill    doxycycline (ORACEA) 40 MG capsule Take 40 mg by mouth      meclizine (ANTIVERT) 25 mg tablet Take 25 mg by mouth Three times daily as needed      pantoprazole (PROTONIX) 20 mg tablet Take by mouth       No current facility-administered medications on file prior to visit          Recent Labs [facility])  0   Lab Value Date/Time    HCT 43 8 03/20/2019 0808    HGB 14 6 03/20/2019 0808    WBC 7 14 03/20/2019 0808         Physical exam  · General: Awake, Alert, Oriented  · Eyes: Pupils equal, round and reactive to light  · Heart: regular rate and rhythm  · Lungs: No audible wheezing  · Abdomen: soft  Back exam  · Skin intact  · Tender over lumbar paraspinal muscles  · 5/5 strength in L3-S1 distribution   · Negative straight leg raise  · Sensation intact L3-S1  · Equal reflexes bilaterally  · Intact distal pulses      Imaging  Images were personally reviewed with the patient    CT myelogram of the lumbar spine shows multilevel DDD worse at L3-L4 L4-5 and L5-S1 with grade 1 spondylolisthesis at the L3-L4 with central canal stenosis and bilateral foraminal stenosis    Assessment/Plan:   68 y  o female with spinal stenosis, radiculopathy and L3-L4 spondylolisthesis    · A long discussion was had with the patient and her family member regarding the treatment options  This includes continued conservative care with injections, oral medications and activity modification as well as operative intervention  This time the patient wishes to think about her options and possibly seek a 2nd opinion from another spine surgeon

## 2019-11-12 ENCOUNTER — DOCTOR'S OFFICE (OUTPATIENT)
Dept: URBAN - METROPOLITAN AREA CLINIC 136 | Facility: CLINIC | Age: 69
Setting detail: OPHTHALMOLOGY
End: 2019-11-12
Payer: MEDICARE

## 2019-11-12 ENCOUNTER — RX ONLY (RX ONLY)
Age: 69
End: 2019-11-12

## 2019-11-12 DIAGNOSIS — H43.813: ICD-10-CM

## 2019-11-12 DIAGNOSIS — D31.42: ICD-10-CM

## 2019-11-12 DIAGNOSIS — H33.311: ICD-10-CM

## 2019-11-12 DIAGNOSIS — H35.373: ICD-10-CM

## 2019-11-12 PROCEDURE — 92014 COMPRE OPH EXAM EST PT 1/>: CPT | Performed by: OPHTHALMOLOGY

## 2019-11-12 PROCEDURE — 92134 CPTRZ OPH DX IMG PST SGM RTA: CPT | Performed by: OPHTHALMOLOGY

## 2019-11-12 ASSESSMENT — REFRACTION_AUTOREFRACTION
OD_AXIS: 107
OD_CYLINDER: -1.00
OS_CYLINDER: -0.25
OD_SPHERE: -0.75
OS_AXIS: 037
OS_SPHERE: 0.00

## 2019-11-12 ASSESSMENT — REFRACTION_MANIFEST
OS_SPHERE: PLANO
OU_VA: 20/
OU_VA: 20/
OD_VA3: 20/
OS_VA1: 20/
OS_VA3: 20/
OD_AXIS: 105
OD_SPHERE: -0.75
OD_ADD: +2.50
OS_ADD: +2.50
OS_CYLINDER: -0.25
OD_VA1: 20/
OS_VA2: 20/
OD_VA1: 20/25
OS_VA3: 20/
OD_CYLINDER: -1.00
OD_VA2: 20/
OS_AXIS: 035
OD_VA2: 20/25
OS_VA1: 20/20-2
OD_VA3: 20/
OS_VA2: 20/20-2

## 2019-11-12 ASSESSMENT — REFRACTION_CURRENTRX
OD_OVR_VA: 20/
OD_OVR_VA: 20/
OD_ADD: +2.25
OS_CYLINDER: -0.25
OS_OVR_VA: 20/
OD_CYLINDER: -0.50
OS_ADD: +2.25
OD_OVR_VA: 20/
OS_OVR_VA: 20/
OD_SPHERE: -1.00
OS_AXIS: 165
OD_AXIS: 040
OS_SPHERE: PLANO
OS_OVR_VA: 20/

## 2019-11-12 ASSESSMENT — VISUAL ACUITY
OS_BCVA: 20/150
OD_BCVA: 20/20-2

## 2019-11-12 ASSESSMENT — CONFRONTATIONAL VISUAL FIELD TEST (CVF)
OD_FINDINGS: FULL
OS_FINDINGS: FULL

## 2019-11-12 ASSESSMENT — LID EXAM ASSESSMENTS
OD_BLEPHARITIS: 2+
OS_BLEPHARITIS: 2+

## 2019-11-12 ASSESSMENT — SPHEQUIV_DERIVED
OS_SPHEQUIV: -0.125
OD_SPHEQUIV: -1.25
OD_SPHEQUIV: -1.25

## 2019-11-12 ASSESSMENT — PUNCTA - ASSESSMENT
OD_PUNCTA: RLL RUL
OS_PUNCTA: LLL LUL

## 2020-07-08 ENCOUNTER — APPOINTMENT (RX ONLY)
Dept: URBAN - NONMETROPOLITAN AREA CLINIC 4 | Facility: CLINIC | Age: 70
Setting detail: DERMATOLOGY
End: 2020-07-08

## 2020-07-08 DIAGNOSIS — L72.8 OTHER FOLLICULAR CYSTS OF THE SKIN AND SUBCUTANEOUS TISSUE: ICD-10-CM

## 2020-07-08 DIAGNOSIS — D22 MELANOCYTIC NEVI: ICD-10-CM

## 2020-07-08 DIAGNOSIS — L82.0 INFLAMED SEBORRHEIC KERATOSIS: ICD-10-CM

## 2020-07-08 PROBLEM — D48.5 NEOPLASM OF UNCERTAIN BEHAVIOR OF SKIN: Status: ACTIVE | Noted: 2020-07-08

## 2020-07-08 PROCEDURE — ? EXCISION

## 2020-07-08 PROCEDURE — 17110 DESTRUCTION B9 LES UP TO 14: CPT | Mod: 59

## 2020-07-08 PROCEDURE — 12031 INTMD RPR S/A/T/EXT 2.5 CM/<: CPT | Mod: 59

## 2020-07-08 PROCEDURE — 11401 EXC TR-EXT B9+MARG 0.6-1 CM: CPT | Mod: 59

## 2020-07-08 PROCEDURE — 99213 OFFICE O/P EST LOW 20 MIN: CPT | Mod: 25

## 2020-07-08 PROCEDURE — ? LIQUID NITROGEN

## 2020-07-08 PROCEDURE — ? COUNSELING

## 2020-07-08 PROCEDURE — ? SHAVE REMOVAL

## 2020-07-08 PROCEDURE — 11301 SHAVE SKIN LESION 0.6-1.0 CM: CPT

## 2020-07-08 PROCEDURE — ? DEFER

## 2020-07-08 ASSESSMENT — LOCATION SIMPLE DESCRIPTION DERM
LOCATION SIMPLE: LEFT LOWER BACK
LOCATION SIMPLE: LEFT BUTTOCK
LOCATION SIMPLE: LOWER BACK
LOCATION SIMPLE: LEFT THIGH

## 2020-07-08 ASSESSMENT — LOCATION DETAILED DESCRIPTION DERM
LOCATION DETAILED: SUPERIOR LUMBAR SPINE
LOCATION DETAILED: LEFT INFERIOR LATERAL MIDBACK
LOCATION DETAILED: LEFT BUTTOCK
LOCATION DETAILED: LEFT ANTERIOR LATERAL PROXIMAL THIGH

## 2020-07-08 ASSESSMENT — LOCATION ZONE DERM
LOCATION ZONE: TRUNK
LOCATION ZONE: LEG

## 2020-07-08 NOTE — PROCEDURE: LIQUID NITROGEN
Add 52 Modifier (Optional): no
Medical Necessity Clause: This procedure was medically necessary because the lesions that were treated were:
Render Post-Care Instructions In Note?: yes
Post-Care Instructions: I reviewed with the patient in detail post-care instructions. Patient is to wear sunprotection, and avoid picking at any of the treated lesions. Pt may apply Vaseline to crusted or scabbing areas.
Medical Necessity Information: It is in your best interest to select a reason for this procedure from the list below. All of these items fulfill various CMS LCD requirements except the new and changing color options.
Number Of Freeze-Thaw Cycles: 1 freeze-thaw cycle
Consent: The patient's consent was obtained including but not limited to risks of crusting, scabbing, blistering, scarring, darker or lighter pigmentary change, recurrence, incomplete removal and infection.
Detail Level: Zone

## 2020-07-08 NOTE — PROCEDURE: DEFER
Detail Level: Zone
Procedure To Be Performed At Next Visit: Excision
Introduction Text (Please End With A Colon): The following procedure was deferred: excision

## 2020-07-08 NOTE — PROCEDURE: SHAVE REMOVAL
Medical Necessity Information: It is in your best interest to select a reason for this procedure from the list below. All of these items fulfill various CMS LCD requirements except the new and changing color options.
Medical Necessity Clause: This procedure was medically necessary because the lesion that was treated was:
Lab: 6
Lab Facility: 3
Detail Level: Detailed
Was A Bandage Applied: Yes
Size Of Lesion In Cm (Required): 0.8
X Size Of Lesion In Cm (Optional): 0
Biopsy Method: Dermablade
Anesthesia Type: 1% lidocaine without epinephrine
Anesthesia Volume In Cc: 1
Hemostasis: Electrocautery and Aluminum Chloride
Wound Care: Bacitracin
Path Notes (To The Dermatopathologist): Please check margins.
Render Path Notes In Note?: No
Consent was obtained from the patient. The risks and benefits to therapy were discussed in detail. Specifically, the risks of infection, scarring, bleeding, prolonged wound healing, incomplete removal, allergy to anesthesia, nerve injury and recurrence were addressed. Prior to the procedure, the treatment site was clearly identified and confirmed by the patient. All components of Universal Protocol/PAUSE Rule completed.
Post-Care Instructions: I reviewed with the patient in detail post-care instructions. Patient is to keep the biopsy site dry overnight, and then apply bacitracin twice daily until healed. Patient may apply hydrogen peroxide soaks to remove any crusting.
Notification Instructions: Patient will be notified of biopsy results. However, patient instructed to call the office if not contacted within 2 weeks.
Billing Type: Third-Party Bill

## 2020-07-08 NOTE — PROCEDURE: EXCISION
Medical Necessity Information: It is in your best interest to select a reason for this procedure from the list below. All of these items fulfill various CMS LCD requirements except lesion extends to a margin.
Include Z78.9 (Other Specified Conditions Influencing Health Status) As An Associated Diagnosis?: No
Medical Necessity Clause: This procedure was medically necessary because the lesion that was treated was:
Lab: 6
Lab Facility: 3
Surgeon (Optional): RODNEY
Surgeon Performing Repair (Optional): Jules Luciano PA-C
Biopsy Photograph Reviewed: Yes
Size Of Lesion In Cm: 1
X Size Of Lesion In Cm (Optional): 0
Excision Method: Round
Repair Type: Intermediate
Suturegard Retention Suture: 2-0 Nylon
Retention Suture Bite Size: 3 mm
Length To Time In Minutes Device Was In Place: 10
Undermining Type: Entire Wound
Debridement Text: The wound edges were debrided prior to proceeding with the closure to facilitate wound healing.
Helical Rim Text: The closure involved the helical rim.
Vermilion Border Text: The closure involved the vermilion border.
Nostril Rim Text: The closure involved the nostril rim.
Retention Suture Text: Retention sutures were placed to support the closure and prevent dehiscence.
Suture Removal: 14 days
Epidermal Closure Graft Donor Site (Optional): simple interrupted
Detail Level: Detailed
Excision Depth: adipose tissue
Scalpel Size: 15 blade
Anesthesia Type: 1% lidocaine with epinephrine
Hemostasis: Ligature
Estimated Blood Loss (Cc): minimal
Deep Sutures: 4-0 Vicryl
Epidermal Sutures: 4-0 Ethilon
Wound Care: Bacitracin
Dressing: pressure dressing with telfa
Suturegard Intro: Intraoperative tissue expansion was performed, utilizing the SUTUREGARD device, in order to reduce wound tension.
Suturegard Body: The suture ends were repeatedly re-tightened and re-clamped to achieve the desired tissue expansion.
Hemigard Intro: Due to skin fragility and wound tension, it was decided to use HEMIGARD adhesive retention suture devices to permit a linear closure. The skin was cleaned and dried for a 6cm distance away from the wound. Excessive hair, if present, was removed to allow for adhesion.
Hemigard Postcare Instructions: The HEMIGARD strips are to remain completely dry for at least 5-7 days.
Complex Repair Preamble Text (Leave Blank If You Do Not Want): Extensive wide undermining was performed.
Intermediate Repair Preamble Text (Leave Blank If You Do Not Want): Undermining was performed with blunt dissection.
Fusiform Excision Additional Text (Leave Blank If You Do Not Want): The margin was drawn around the clinically apparent lesion.  A fusiform shape was then drawn on the skin incorporating the lesion and margins.  Incisions were then made along these lines to the appropriate tissue plane and the lesion was extirpated.
Eliptical Excision Additional Text (Leave Blank If You Do Not Want): The margin was drawn around the clinically apparent lesion.  An elliptical shape was then drawn on the skin incorporating the lesion and margins.  Incisions were then made along these lines to the appropriate tissue plane and the lesion was extirpated.
Saucerization Excision Additional Text (Leave Blank If You Do Not Want): The margin was drawn around the clinically apparent lesion.  Incisions were then made along these lines, in a tangential fashion, to the appropriate tissue plane and the lesion was extirpated.
Slit Excision Additional Text (Leave Blank If You Do Not Want): A linear line was drawn on the skin overlying the lesion. An incision was made slowly until the lesion was visualized.  Once visualized, the lesion was removed with blunt dissection.
Excisional Biopsy Additional Text (Leave Blank If You Do Not Want): The margin was drawn around the clinically apparent lesion. An elliptical shape was then drawn on the skin incorporating the lesion and margins.  Incisions were then made along these lines to the appropriate tissue plane and the lesion was extirpated.
Perilesional Excision Additional Text (Leave Blank If You Do Not Want): The margin was drawn around the clinically apparent lesion. Incisions were then made along these lines to the appropriate tissue plane and the lesion was extirpated.
Repair Performed By Another Provider Text (Leave Blank If You Do Not Want): After the tissue was excised the defect was repaired by another provider.
No Repair - Repaired With Adjacent Surgical Defect Text (Leave Blank If You Do Not Want): After the excision the defect was repaired concurrently with another surgical defect which was in close approximation.
Advancement Flap (Single) Text: The defect edges were debeveled with a #15 scalpel blade.  Given the location of the defect and the proximity to free margins a single advancement flap was deemed most appropriate.  Using a sterile surgical marker, an appropriate advancement flap was drawn incorporating the defect and placing the expected incisions within the relaxed skin tension lines where possible.    The area thus outlined was incised deep to adipose tissue with a #15 scalpel blade.  The skin margins were undermined to an appropriate distance in all directions utilizing iris scissors.
Advancement Flap (Double) Text: The defect edges were debeveled with a #15 scalpel blade.  Given the location of the defect and the proximity to free margins a double advancement flap was deemed most appropriate.  Using a sterile surgical marker, the appropriate advancement flaps were drawn incorporating the defect and placing the expected incisions within the relaxed skin tension lines where possible.    The area thus outlined was incised deep to adipose tissue with a #15 scalpel blade.  The skin margins were undermined to an appropriate distance in all directions utilizing iris scissors.
Burow's Advancement Flap Text: The defect edges were debeveled with a #15 scalpel blade.  Given the location of the defect and the proximity to free margins a Burow's advancement flap was deemed most appropriate.  Using a sterile surgical marker, the appropriate advancement flap was drawn incorporating the defect and placing the expected incisions within the relaxed skin tension lines where possible.    The area thus outlined was incised deep to adipose tissue with a #15 scalpel blade.  The skin margins were undermined to an appropriate distance in all directions utilizing iris scissors.
Chonodrocutaneous Helical Advancement Flap Text: The defect edges were debeveled with a #15 scalpel blade.  Given the location of the defect and the proximity to free margins a chondrocutaneous helical advancement flap was deemed most appropriate.  Using a sterile surgical marker, the appropriate advancement flap was drawn incorporating the defect and placing the expected incisions within the relaxed skin tension lines where possible.    The area thus outlined was incised deep to adipose tissue with a #15 scalpel blade.  The skin margins were undermined to an appropriate distance in all directions utilizing iris scissors.
Crescentic Advancement Flap Text: The defect edges were debeveled with a #15 scalpel blade.  Given the location of the defect and the proximity to free margins a crescentic advancement flap was deemed most appropriate.  Using a sterile surgical marker, the appropriate advancement flap was drawn incorporating the defect and placing the expected incisions within the relaxed skin tension lines where possible.    The area thus outlined was incised deep to adipose tissue with a #15 scalpel blade.  The skin margins were undermined to an appropriate distance in all directions utilizing iris scissors.
A-T Advancement Flap Text: The defect edges were debeveled with a #15 scalpel blade.  Given the location of the defect, shape of the defect and the proximity to free margins an A-T advancement flap was deemed most appropriate.  Using a sterile surgical marker, an appropriate advancement flap was drawn incorporating the defect and placing the expected incisions within the relaxed skin tension lines where possible.    The area thus outlined was incised deep to adipose tissue with a #15 scalpel blade.  The skin margins were undermined to an appropriate distance in all directions utilizing iris scissors.
O-T Advancement Flap Text: The defect edges were debeveled with a #15 scalpel blade.  Given the location of the defect, shape of the defect and the proximity to free margins an O-T advancement flap was deemed most appropriate.  Using a sterile surgical marker, an appropriate advancement flap was drawn incorporating the defect and placing the expected incisions within the relaxed skin tension lines where possible.    The area thus outlined was incised deep to adipose tissue with a #15 scalpel blade.  The skin margins were undermined to an appropriate distance in all directions utilizing iris scissors.
O-L Flap Text: The defect edges were debeveled with a #15 scalpel blade.  Given the location of the defect, shape of the defect and the proximity to free margins an O-L flap was deemed most appropriate.  Using a sterile surgical marker, an appropriate advancement flap was drawn incorporating the defect and placing the expected incisions within the relaxed skin tension lines where possible.    The area thus outlined was incised deep to adipose tissue with a #15 scalpel blade.  The skin margins were undermined to an appropriate distance in all directions utilizing iris scissors.
O-Z Flap Text: The defect edges were debeveled with a #15 scalpel blade.  Given the location of the defect, shape of the defect and the proximity to free margins an O-Z flap was deemed most appropriate.  Using a sterile surgical marker, an appropriate transposition flap was drawn incorporating the defect and placing the expected incisions within the relaxed skin tension lines where possible. The area thus outlined was incised deep to adipose tissue with a #15 scalpel blade.  The skin margins were undermined to an appropriate distance in all directions utilizing iris scissors.
Double O-Z Flap Text: The defect edges were debeveled with a #15 scalpel blade.  Given the location of the defect, shape of the defect and the proximity to free margins a Double O-Z flap was deemed most appropriate.  Using a sterile surgical marker, an appropriate transposition flap was drawn incorporating the defect and placing the expected incisions within the relaxed skin tension lines where possible. The area thus outlined was incised deep to adipose tissue with a #15 scalpel blade.  The skin margins were undermined to an appropriate distance in all directions utilizing iris scissors.
V-Y Flap Text: The defect edges were debeveled with a #15 scalpel blade.  Given the location of the defect, shape of the defect and the proximity to free margins a V-Y flap was deemed most appropriate.  Using a sterile surgical marker, an appropriate advancement flap was drawn incorporating the defect and placing the expected incisions within the relaxed skin tension lines where possible.    The area thus outlined was incised deep to adipose tissue with a #15 scalpel blade.  The skin margins were undermined to an appropriate distance in all directions utilizing iris scissors.
Mercedes Flap Text: The defect edges were debeveled with a #15 scalpel blade.  Given the location of the defect, shape of the defect and the proximity to free margins a Mercedes flap was deemed most appropriate.  Using a sterile surgical marker, an appropriate advancement flap was drawn incorporating the defect and placing the expected incisions within the relaxed skin tension lines where possible. The area thus outlined was incised deep to adipose tissue with a #15 scalpel blade.  The skin margins were undermined to an appropriate distance in all directions utilizing iris scissors.
Modified Advancement Flap Text: The defect edges were debeveled with a #15 scalpel blade.  Given the location of the defect, shape of the defect and the proximity to free margins a modified advancement flap was deemed most appropriate.  Using a sterile surgical marker, an appropriate advancement flap was drawn incorporating the defect and placing the expected incisions within the relaxed skin tension lines where possible.    The area thus outlined was incised deep to adipose tissue with a #15 scalpel blade.  The skin margins were undermined to an appropriate distance in all directions utilizing iris scissors.
Mucosal Advancement Flap Text: Given the location of the defect, shape of the defect and the proximity to free margins a mucosal advancement flap was deemed most appropriate. Incisions were made with a 15 blade scalpel in the appropriate fashion along the cutaneous vermillion border and the mucosal lip. The remaining actinically damaged mucosal tissue was excised.  The mucosal advancement flap was then elevated to the gingival sulcus with care taken to preserve the neurovascular structures and advanced into the primary defect. Care was taken to ensure that precise realignment of the vermilion border was achieved.
Hatchet Flap Text: The defect edges were debeveled with a #15 scalpel blade.  Given the location of the defect, shape of the defect and the proximity to free margins a hatchet flap was deemed most appropriate.  Using a sterile surgical marker, an appropriate hatchet flap was drawn incorporating the defect and placing the expected incisions within the relaxed skin tension lines where possible.    The area thus outlined was incised deep to adipose tissue with a #15 scalpel blade.  The skin margins were undermined to an appropriate distance in all directions utilizing iris scissors.
Rotation Flap Text: The defect edges were debeveled with a #15 scalpel blade.  Given the location of the defect, shape of the defect and the proximity to free margins a rotation flap was deemed most appropriate.  Using a sterile surgical marker, an appropriate rotation flap was drawn incorporating the defect and placing the expected incisions within the relaxed skin tension lines where possible.    The area thus outlined was incised deep to adipose tissue with a #15 scalpel blade.  The skin margins were undermined to an appropriate distance in all directions utilizing iris scissors.
Spiral Flap Text: The defect edges were debeveled with a #15 scalpel blade.  Given the location of the defect, shape of the defect and the proximity to free margins a spiral flap was deemed most appropriate.  Using a sterile surgical marker, an appropriate rotation flap was drawn incorporating the defect and placing the expected incisions within the relaxed skin tension lines where possible. The area thus outlined was incised deep to adipose tissue with a #15 scalpel blade.  The skin margins were undermined to an appropriate distance in all directions utilizing iris scissors.
Star Wedge Flap Text: The defect edges were debeveled with a #15 scalpel blade.  Given the location of the defect, shape of the defect and the proximity to free margins a star wedge flap was deemed most appropriate.  Using a sterile surgical marker, an appropriate rotation flap was drawn incorporating the defect and placing the expected incisions within the relaxed skin tension lines where possible. The area thus outlined was incised deep to adipose tissue with a #15 scalpel blade.  The skin margins were undermined to an appropriate distance in all directions utilizing iris scissors.
Transposition Flap Text: The defect edges were debeveled with a #15 scalpel blade.  Given the location of the defect and the proximity to free margins a transposition flap was deemed most appropriate.  Using a sterile surgical marker, an appropriate transposition flap was drawn incorporating the defect.    The area thus outlined was incised deep to adipose tissue with a #15 scalpel blade.  The skin margins were undermined to an appropriate distance in all directions utilizing iris scissors.
Muscle Hinge Flap Text: The defect edges were debeveled with a #15 scalpel blade.  Given the size, depth and location of the defect and the proximity to free margins a muscle hinge flap was deemed most appropriate.  Using a sterile surgical marker, an appropriate hinge flap was drawn incorporating the defect. The area thus outlined was incised with a #15 scalpel blade.  The skin margins were undermined to an appropriate distance in all directions utilizing iris scissors.
Melolabial Transposition Flap Text: The defect edges were debeveled with a #15 scalpel blade.  Given the location of the defect and the proximity to free margins a melolabial flap was deemed most appropriate.  Using a sterile surgical marker, an appropriate melolabial transposition flap was drawn incorporating the defect.    The area thus outlined was incised deep to adipose tissue with a #15 scalpel blade.  The skin margins were undermined to an appropriate distance in all directions utilizing iris scissors.
Rhombic Flap Text: The defect edges were debeveled with a #15 scalpel blade.  Given the location of the defect and the proximity to free margins a rhombic flap was deemed most appropriate.  Using a sterile surgical marker, an appropriate rhombic flap was drawn incorporating the defect.    The area thus outlined was incised deep to adipose tissue with a #15 scalpel blade.  The skin margins were undermined to an appropriate distance in all directions utilizing iris scissors.
Rhomboid Transposition Flap Text: The defect edges were debeveled with a #15 scalpel blade.  Given the location of the defect and the proximity to free margins a rhomboid transposition flap was deemed most appropriate.  Using a sterile surgical marker, an appropriate rhomboid flap was drawn incorporating the defect.    The area thus outlined was incised deep to adipose tissue with a #15 scalpel blade.  The skin margins were undermined to an appropriate distance in all directions utilizing iris scissors.
Bi-Rhombic Flap Text: The defect edges were debeveled with a #15 scalpel blade.  Given the location of the defect and the proximity to free margins a bi-rhombic flap was deemed most appropriate.  Using a sterile surgical marker, an appropriate rhombic flap was drawn incorporating the defect. The area thus outlined was incised deep to adipose tissue with a #15 scalpel blade.  The skin margins were undermined to an appropriate distance in all directions utilizing iris scissors.
Helical Rim Advancement Flap Text: The defect edges were debeveled with a #15 blade scalpel.  Given the location of the defect and the proximity to free margins (helical rim) a double helical rim advancement flap was deemed most appropriate.  Using a sterile surgical marker, the appropriate advancement flaps were drawn incorporating the defect and placing the expected incisions between the helical rim and antihelix where possible.  The area thus outlined was incised through and through with a #15 scalpel blade.  With a skin hook and iris scissors, the flaps were gently and sharply undermined and freed up.
Bilateral Helical Rim Advancement Flap Text: The defect edges were debeveled with a #15 blade scalpel.  Given the location of the defect and the proximity to free margins (helical rim) a bilateral helical rim advancement flap was deemed most appropriate.  Using a sterile surgical marker, the appropriate advancement flaps were drawn incorporating the defect and placing the expected incisions between the helical rim and antihelix where possible.  The area thus outlined was incised through and through with a #15 scalpel blade.  With a skin hook and iris scissors, the flaps were gently and sharply undermined and freed up.
Ear Star Wedge Flap Text: The defect edges were debeveled with a #15 blade scalpel.  Given the location of the defect and the proximity to free margins (helical rim) an ear star wedge flap was deemed most appropriate.  Using a sterile surgical marker, the appropriate flap was drawn incorporating the defect and placing the expected incisions between the helical rim and antihelix where possible.  The area thus outlined was incised through and through with a #15 scalpel blade.
Banner Transposition Flap Text: The defect edges were debeveled with a #15 scalpel blade.  Given the location of the defect and the proximity to free margins a Banner transposition flap was deemed most appropriate.  Using a sterile surgical marker, an appropriate flap drawn around the defect. The area thus outlined was incised deep to adipose tissue with a #15 scalpel blade.  The skin margins were undermined to an appropriate distance in all directions utilizing iris scissors.
Bilobed Flap Text: The defect edges were debeveled with a #15 scalpel blade.  Given the location of the defect and the proximity to free margins a bilobe flap was deemed most appropriate.  Using a sterile surgical marker, an appropriate bilobe flap drawn around the defect.    The area thus outlined was incised deep to adipose tissue with a #15 scalpel blade.  The skin margins were undermined to an appropriate distance in all directions utilizing iris scissors.
Bilobed Transposition Flap Text: The defect edges were debeveled with a #15 scalpel blade.  Given the location of the defect and the proximity to free margins a bilobed transposition flap was deemed most appropriate.  Using a sterile surgical marker, an appropriate bilobe flap drawn around the defect.    The area thus outlined was incised deep to adipose tissue with a #15 scalpel blade.  The skin margins were undermined to an appropriate distance in all directions utilizing iris scissors.
Trilobed Flap Text: The defect edges were debeveled with a #15 scalpel blade.  Given the location of the defect and the proximity to free margins a trilobed flap was deemed most appropriate.  Using a sterile surgical marker, an appropriate trilobed flap drawn around the defect.    The area thus outlined was incised deep to adipose tissue with a #15 scalpel blade.  The skin margins were undermined to an appropriate distance in all directions utilizing iris scissors.
Dorsal Nasal Flap Text: The defect edges were debeveled with a #15 scalpel blade.  Given the location of the defect and the proximity to free margins a dorsal nasal flap was deemed most appropriate.  Using a sterile surgical marker, an appropriate dorsal nasal flap was drawn around the defect.    The area thus outlined was incised deep to adipose tissue with a #15 scalpel blade.  The skin margins were undermined to an appropriate distance in all directions utilizing iris scissors.
Island Pedicle Flap Text: The defect edges were debeveled with a #15 scalpel blade.  Given the location of the defect, shape of the defect and the proximity to free margins an island pedicle advancement flap was deemed most appropriate.  Using a sterile surgical marker, an appropriate advancement flap was drawn incorporating the defect, outlining the appropriate donor tissue and placing the expected incisions within the relaxed skin tension lines where possible.    The area thus outlined was incised deep to adipose tissue with a #15 scalpel blade.  The skin margins were undermined to an appropriate distance in all directions around the primary defect and laterally outward around the island pedicle utilizing iris scissors.  There was minimal undermining beneath the pedicle flap.
Island Pedicle Flap With Canthal Suspension Text: The defect edges were debeveled with a #15 scalpel blade.  Given the location of the defect, shape of the defect and the proximity to free margins an island pedicle advancement flap was deemed most appropriate.  Using a sterile surgical marker, an appropriate advancement flap was drawn incorporating the defect, outlining the appropriate donor tissue and placing the expected incisions within the relaxed skin tension lines where possible. The area thus outlined was incised deep to adipose tissue with a #15 scalpel blade.  The skin margins were undermined to an appropriate distance in all directions around the primary defect and laterally outward around the island pedicle utilizing iris scissors.  There was minimal undermining beneath the pedicle flap. A suspension suture was placed in the canthal tendon to prevent tension and prevent ectropion.
Alar Island Pedicle Flap Text: The defect edges were debeveled with a #15 scalpel blade.  Given the location of the defect, shape of the defect and the proximity to the alar rim an island pedicle advancement flap was deemed most appropriate.  Using a sterile surgical marker, an appropriate advancement flap was drawn incorporating the defect, outlining the appropriate donor tissue and placing the expected incisions within the nasal ala running parallel to the alar rim. The area thus outlined was incised with a #15 scalpel blade.  The skin margins were undermined minimally to an appropriate distance in all directions around the primary defect and laterally outward around the island pedicle utilizing iris scissors.  There was minimal undermining beneath the pedicle flap.
Double Island Pedicle Flap Text: The defect edges were debeveled with a #15 scalpel blade.  Given the location of the defect, shape of the defect and the proximity to free margins a double island pedicle advancement flap was deemed most appropriate.  Using a sterile surgical marker, an appropriate advancement flap was drawn incorporating the defect, outlining the appropriate donor tissue and placing the expected incisions within the relaxed skin tension lines where possible.    The area thus outlined was incised deep to adipose tissue with a #15 scalpel blade.  The skin margins were undermined to an appropriate distance in all directions around the primary defect and laterally outward around the island pedicle utilizing iris scissors.  There was minimal undermining beneath the pedicle flap.
Island Pedicle Flap-Requiring Vessel Identification Text: The defect edges were debeveled with a #15 scalpel blade.  Given the location of the defect, shape of the defect and the proximity to free margins an island pedicle advancement flap was deemed most appropriate.  Using a sterile surgical marker, an appropriate advancement flap was drawn, based on the axial vessel mentioned above, incorporating the defect, outlining the appropriate donor tissue and placing the expected incisions within the relaxed skin tension lines where possible.    The area thus outlined was incised deep to adipose tissue with a #15 scalpel blade.  The skin margins were undermined to an appropriate distance in all directions around the primary defect and laterally outward around the island pedicle utilizing iris scissors.  There was minimal undermining beneath the pedicle flap.
Keystone Flap Text: The defect edges were debeveled with a #15 scalpel blade.  Given the location of the defect, shape of the defect a keystone flap was deemed most appropriate.  Using a sterile surgical marker, an appropriate keystone flap was drawn incorporating the defect, outlining the appropriate donor tissue and placing the expected incisions within the relaxed skin tension lines where possible. The area thus outlined was incised deep to adipose tissue with a #15 scalpel blade.  The skin margins were undermined to an appropriate distance in all directions around the primary defect and laterally outward around the flap utilizing iris scissors.
O-T Plasty Text: The defect edges were debeveled with a #15 scalpel blade.  Given the location of the defect, shape of the defect and the proximity to free margins an O-T plasty was deemed most appropriate.  Using a sterile surgical marker, an appropriate O-T plasty was drawn incorporating the defect and placing the expected incisions within the relaxed skin tension lines where possible.    The area thus outlined was incised deep to adipose tissue with a #15 scalpel blade.  The skin margins were undermined to an appropriate distance in all directions utilizing iris scissors.
O-Z Plasty Text: The defect edges were debeveled with a #15 scalpel blade.  Given the location of the defect, shape of the defect and the proximity to free margins an O-Z plasty (double transposition flap) was deemed most appropriate.  Using a sterile surgical marker, the appropriate transposition flaps were drawn incorporating the defect and placing the expected incisions within the relaxed skin tension lines where possible.    The area thus outlined was incised deep to adipose tissue with a #15 scalpel blade.  The skin margins were undermined to an appropriate distance in all directions utilizing iris scissors.  Hemostasis was achieved with electrocautery.  The flaps were then transposed into place, one clockwise and the other counterclockwise, and anchored with interrupted buried subcutaneous sutures.
Double O-Z Plasty Text: The defect edges were debeveled with a #15 scalpel blade.  Given the location of the defect, shape of the defect and the proximity to free margins a Double O-Z plasty (double transposition flap) was deemed most appropriate.  Using a sterile surgical marker, the appropriate transposition flaps were drawn incorporating the defect and placing the expected incisions within the relaxed skin tension lines where possible. The area thus outlined was incised deep to adipose tissue with a #15 scalpel blade.  The skin margins were undermined to an appropriate distance in all directions utilizing iris scissors.  Hemostasis was achieved with electrocautery.  The flaps were then transposed into place, one clockwise and the other counterclockwise, and anchored with interrupted buried subcutaneous sutures.
V-Y Plasty Text: The defect edges were debeveled with a #15 scalpel blade.  Given the location of the defect, shape of the defect and the proximity to free margins an V-Y advancement flap was deemed most appropriate.  Using a sterile surgical marker, an appropriate advancement flap was drawn incorporating the defect and placing the expected incisions within the relaxed skin tension lines where possible.    The area thus outlined was incised deep to adipose tissue with a #15 scalpel blade.  The skin margins were undermined to an appropriate distance in all directions utilizing iris scissors.
H Plasty Text: Given the location of the defect, shape of the defect and the proximity to free margins a H-plasty was deemed most appropriate for repair.  Using a sterile surgical marker, the appropriate advancement arms of the H-plasty were drawn incorporating the defect and placing the expected incisions within the relaxed skin tension lines where possible. The area thus outlined was incised deep to adipose tissue with a #15 scalpel blade. The skin margins were undermined to an appropriate distance in all directions utilizing iris scissors.  The opposing advancement arms were then advanced into place in opposite direction and anchored with interrupted buried subcutaneous sutures.
W Plasty Text: The lesion was extirpated to the level of the fat with a #15 scalpel blade.  Given the location of the defect, shape of the defect and the proximity to free margins a W-plasty was deemed most appropriate for repair.  Using a sterile surgical marker, the appropriate transposition arms of the W-plasty were drawn incorporating the defect and placing the expected incisions within the relaxed skin tension lines where possible.    The area thus outlined was incised deep to adipose tissue with a #15 scalpel blade.  The skin margins were undermined to an appropriate distance in all directions utilizing iris scissors.  The opposing transposition arms were then transposed into place in opposite direction and anchored with interrupted buried subcutaneous sutures.
Z Plasty Text: The lesion was extirpated to the level of the fat with a #15 scalpel blade.  Given the location of the defect, shape of the defect and the proximity to free margins a Z-plasty was deemed most appropriate for repair.  Using a sterile surgical marker, the appropriate transposition arms of the Z-plasty were drawn incorporating the defect and placing the expected incisions within the relaxed skin tension lines where possible.    The area thus outlined was incised deep to adipose tissue with a #15 scalpel blade.  The skin margins were undermined to an appropriate distance in all directions utilizing iris scissors.  The opposing transposition arms were then transposed into place in opposite direction and anchored with interrupted buried subcutaneous sutures.
Cheek Interpolation Flap Text: A decision was made to reconstruct the defect utilizing an interpolation axial flap and a staged reconstruction.  A telfa template was made of the defect.  This telfa template was then used to outline the Cheek Interpolation flap.  The donor area for the pedicle flap was then injected with anesthesia.  The flap was excised through the skin and subcutaneous tissue down to the layer of the underlying musculature.  The interpolation flap was carefully excised within this deep plane to maintain its blood supply.  The edges of the donor site were undermined.   The donor site was closed in a primary fashion.  The pedicle was then rotated into position and sutured.  Once the tube was sutured into place, adequate blood supply was confirmed with blanching and refill.  The pedicle was then wrapped with xeroform gauze and dressed appropriately with a telfa and gauze bandage to ensure continued blood supply and protect the attached pedicle.
Cheek-To-Nose Interpolation Flap Text: A decision was made to reconstruct the defect utilizing an interpolation axial flap and a staged reconstruction.  A telfa template was made of the defect.  This telfa template was then used to outline the Cheek-To-Nose Interpolation flap.  The donor area for the pedicle flap was then injected with anesthesia.  The flap was excised through the skin and subcutaneous tissue down to the layer of the underlying musculature.  The interpolation flap was carefully excised within this deep plane to maintain its blood supply.  The edges of the donor site were undermined.   The donor site was closed in a primary fashion.  The pedicle was then rotated into position and sutured.  Once the tube was sutured into place, adequate blood supply was confirmed with blanching and refill.  The pedicle was then wrapped with xeroform gauze and dressed appropriately with a telfa and gauze bandage to ensure continued blood supply and protect the attached pedicle.
Interpolation Flap Text: A decision was made to reconstruct the defect utilizing an interpolation axial flap and a staged reconstruction.  A telfa template was made of the defect.  This telfa template was then used to outline the interpolation flap.  The donor area for the pedicle flap was then injected with anesthesia.  The flap was excised through the skin and subcutaneous tissue down to the layer of the underlying musculature.  The interpolation flap was carefully excised within this deep plane to maintain its blood supply.  The edges of the donor site were undermined.   The donor site was closed in a primary fashion.  The pedicle was then rotated into position and sutured.  Once the tube was sutured into place, adequate blood supply was confirmed with blanching and refill.  The pedicle was then wrapped with xeroform gauze and dressed appropriately with a telfa and gauze bandage to ensure continued blood supply and protect the attached pedicle.
Melolabial Interpolation Flap Text: A decision was made to reconstruct the defect utilizing an interpolation axial flap and a staged reconstruction.  A telfa template was made of the defect.  This telfa template was then used to outline the melolabial interpolation flap.  The donor area for the pedicle flap was then injected with anesthesia.  The flap was excised through the skin and subcutaneous tissue down to the layer of the underlying musculature.  The pedicle flap was carefully excised within this deep plane to maintain its blood supply.  The edges of the donor site were undermined.   The donor site was closed in a primary fashion.  The pedicle was then rotated into position and sutured.  Once the tube was sutured into place, adequate blood supply was confirmed with blanching and refill.  The pedicle was then wrapped with xeroform gauze and dressed appropriately with a telfa and gauze bandage to ensure continued blood supply and protect the attached pedicle.
Mastoid Interpolation Flap Text: A decision was made to reconstruct the defect utilizing an interpolation axial flap and a staged reconstruction.  A telfa template was made of the defect.  This telfa template was then used to outline the mastoid interpolation flap.  The donor area for the pedicle flap was then injected with anesthesia.  The flap was excised through the skin and subcutaneous tissue down to the layer of the underlying musculature.  The pedicle flap was carefully excised within this deep plane to maintain its blood supply.  The edges of the donor site were undermined.   The donor site was closed in a primary fashion.  The pedicle was then rotated into position and sutured.  Once the tube was sutured into place, adequate blood supply was confirmed with blanching and refill.  The pedicle was then wrapped with xeroform gauze and dressed appropriately with a telfa and gauze bandage to ensure continued blood supply and protect the attached pedicle.
Posterior Auricular Interpolation Flap Text: A decision was made to reconstruct the defect utilizing an interpolation axial flap and a staged reconstruction.  A telfa template was made of the defect.  This telfa template was then used to outline the posterior auricular interpolation flap.  The donor area for the pedicle flap was then injected with anesthesia.  The flap was excised through the skin and subcutaneous tissue down to the layer of the underlying musculature.  The pedicle flap was carefully excised within this deep plane to maintain its blood supply.  The edges of the donor site were undermined.   The donor site was closed in a primary fashion.  The pedicle was then rotated into position and sutured.  Once the tube was sutured into place, adequate blood supply was confirmed with blanching and refill.  The pedicle was then wrapped with xeroform gauze and dressed appropriately with a telfa and gauze bandage to ensure continued blood supply and protect the attached pedicle.
Paramedian Forehead Flap Text: A decision was made to reconstruct the defect utilizing an interpolation axial flap and a staged reconstruction.  A telfa template was made of the defect.  This telfa template was then used to outline the paramedian forehead pedicle flap.  The donor area for the pedicle flap was then injected with anesthesia.  The flap was excised through the skin and subcutaneous tissue down to the layer of the underlying musculature.  The pedicle flap was carefully excised within this deep plane to maintain its blood supply.  The edges of the donor site were undermined.   The donor site was closed in a primary fashion.  The pedicle was then rotated into position and sutured.  Once the tube was sutured into place, adequate blood supply was confirmed with blanching and refill.  The pedicle was then wrapped with xeroform gauze and dressed appropriately with a telfa and gauze bandage to ensure continued blood supply and protect the attached pedicle.
Lip Wedge Excision Repair Text: Given the location of the defect and the proximity to free margins a full thickness wedge repair was deemed most appropriate.  Using a sterile surgical marker, the appropriate repair was drawn incorporating the defect and placing the expected incisions perpendicular to the vermilion border.  The vermilion border was also meticulously outlined to ensure appropriate reapproximation during the repair.  The area thus outlined was incised through and through with a #15 scalpel blade.  The muscularis and dermis were reaproximated with deep sutures following hemostasis. Care was taken to realign the vermilion border before proceeding with the superficial closure.  Once the vermilion was realigned the superfical and mucosal closure was finished.
Ftsg Text: The defect edges were debeveled with a #15 scalpel blade.  Given the location of the defect, shape of the defect and the proximity to free margins a full thickness skin graft was deemed most appropriate.  Using a sterile surgical marker, the primary defect shape was transferred to the donor site. The area thus outlined was incised deep to adipose tissue with a #15 scalpel blade.  The harvested graft was then trimmed of adipose tissue until only dermis and epidermis was left.  The skin margins of the secondary defect were undermined to an appropriate distance in all directions utilizing iris scissors.  The secondary defect was closed with interrupted buried subcutaneous sutures.  The skin edges were then re-apposed with running  sutures.  The skin graft was then placed in the primary defect and oriented appropriately.
Split-Thickness Skin Graft Text: The defect edges were debeveled with a #15 scalpel blade.  Given the location of the defect, shape of the defect and the proximity to free margins a split thickness skin graft was deemed most appropriate.  Using a sterile surgical marker, the primary defect shape was transferred to the donor site. The split thickness graft was then harvested.  The skin graft was then placed in the primary defect and oriented appropriately.
Burow's Graft Text: The defect edges were debeveled with a #15 scalpel blade.  Given the location of the defect, shape of the defect, the proximity to free margins and the presence of a standing cone deformity a Burow's skin graft was deemed most appropriate. The standing cone was removed and this tissue was then trimmed to the shape of the primary defect. The adipose tissue was also removed until only dermis and epidermis were left.  The skin margins of the secondary defect were undermined to an appropriate distance in all directions utilizing iris scissors.  The secondary defect was closed with interrupted buried subcutaneous sutures.  The skin edges were then re-apposed with running  sutures.  The skin graft was then placed in the primary defect and oriented appropriately.
Cartilage Graft Text: The defect edges were debeveled with a #15 scalpel blade.  Given the location of the defect, shape of the defect, the fact the defect involved a full thickness cartilage defect a cartilage graft was deemed most appropriate.  An appropriate donor site was identified, cleansed, and anesthetized. The cartilage graft was then harvested and transferred to the recipient site, oriented appropriately and then sutured into place.  The secondary defect was then repaired using a primary closure.
Composite Graft Text: The defect edges were debeveled with a #15 scalpel blade.  Given the location of the defect, shape of the defect, the proximity to free margins and the fact the defect was full thickness a composite graft was deemed most appropriate.  The defect was outline and then transferred to the donor site.  A full thickness graft was then excised from the donor site. The graft was then placed in the primary defect, oriented appropriately and then sutured into place.  The secondary defect was then repaired using a primary closure.
Epidermal Autograft Text: The defect edges were debeveled with a #15 scalpel blade.  Given the location of the defect, shape of the defect and the proximity to free margins an epidermal autograft was deemed most appropriate.  Using a sterile surgical marker, the primary defect shape was transferred to the donor site. The epidermal graft was then harvested.  The skin graft was then placed in the primary defect and oriented appropriately.
Dermal Autograft Text: The defect edges were debeveled with a #15 scalpel blade.  Given the location of the defect, shape of the defect and the proximity to free margins a dermal autograft was deemed most appropriate.  Using a sterile surgical marker, the primary defect shape was transferred to the donor site. The area thus outlined was incised deep to adipose tissue with a #15 scalpel blade.  The harvested graft was then trimmed of adipose and epidermal tissue until only dermis was left.  The skin graft was then placed in the primary defect and oriented appropriately.
Skin Substitute Text: The defect edges were debeveled with a #15 scalpel blade.  Given the location of the defect, shape of the defect and the proximity to free margins a skin substitute graft was deemed most appropriate.  The graft material was trimmed to fit the size of the defect. The graft was then placed in the primary defect and oriented appropriately.
Tissue Cultured Epidermal Autograft Text: The defect edges were debeveled with a #15 scalpel blade.  Given the location of the defect, shape of the defect and the proximity to free margins a tissue cultured epidermal autograft was deemed most appropriate.  The graft was then trimmed to fit the size of the defect.  The graft was then placed in the primary defect and oriented appropriately.
Xenograft Text: The defect edges were debeveled with a #15 scalpel blade.  Given the location of the defect, shape of the defect and the proximity to free margins a xenograft was deemed most appropriate.  The graft was then trimmed to fit the size of the defect.  The graft was then placed in the primary defect and oriented appropriately.
Purse String (Intermediate) Text: Given the location of the defect and the characteristics of the surrounding skin a purse string intermediate closure was deemed most appropriate.  Undermining was performed circumferentially around the surgical defect.  A purse string suture was then placed and tightened.
Purse String (Simple) Text: Given the location of the defect and the characteristics of the surrounding skin a purse string simple closure was deemed most appropriate.  Undermining was performed circumferentially around the surgical defect.  A purse string suture was then placed and tightened.
Complex Repair And Single Advancement Flap Text: The defect edges were debeveled with a #15 scalpel blade.  The primary defect was closed partially with a complex linear closure.  Given the location of the remaining defect, shape of the defect and the proximity to free margins a single advancement flap was deemed most appropriate for complete closure of the defect.  Using a sterile surgical marker, an appropriate advancement flap was drawn incorporating the defect and placing the expected incisions within the relaxed skin tension lines where possible.    The area thus outlined was incised deep to adipose tissue with a #15 scalpel blade.  The skin margins were undermined to an appropriate distance in all directions utilizing iris scissors.
Complex Repair And Double Advancement Flap Text: The defect edges were debeveled with a #15 scalpel blade.  The primary defect was closed partially with a complex linear closure.  Given the location of the remaining defect, shape of the defect and the proximity to free margins a double advancement flap was deemed most appropriate for complete closure of the defect.  Using a sterile surgical marker, an appropriate advancement flap was drawn incorporating the defect and placing the expected incisions within the relaxed skin tension lines where possible.    The area thus outlined was incised deep to adipose tissue with a #15 scalpel blade.  The skin margins were undermined to an appropriate distance in all directions utilizing iris scissors.
Complex Repair And Modified Advancement Flap Text: The defect edges were debeveled with a #15 scalpel blade.  The primary defect was closed partially with a complex linear closure.  Given the location of the remaining defect, shape of the defect and the proximity to free margins a modified advancement flap was deemed most appropriate for complete closure of the defect.  Using a sterile surgical marker, an appropriate advancement flap was drawn incorporating the defect and placing the expected incisions within the relaxed skin tension lines where possible.    The area thus outlined was incised deep to adipose tissue with a #15 scalpel blade.  The skin margins were undermined to an appropriate distance in all directions utilizing iris scissors.
Complex Repair And A-T Advancement Flap Text: The defect edges were debeveled with a #15 scalpel blade.  The primary defect was closed partially with a complex linear closure.  Given the location of the remaining defect, shape of the defect and the proximity to free margins an A-T advancement flap was deemed most appropriate for complete closure of the defect.  Using a sterile surgical marker, an appropriate advancement flap was drawn incorporating the defect and placing the expected incisions within the relaxed skin tension lines where possible.    The area thus outlined was incised deep to adipose tissue with a #15 scalpel blade.  The skin margins were undermined to an appropriate distance in all directions utilizing iris scissors.
Complex Repair And O-T Advancement Flap Text: The defect edges were debeveled with a #15 scalpel blade.  The primary defect was closed partially with a complex linear closure.  Given the location of the remaining defect, shape of the defect and the proximity to free margins an O-T advancement flap was deemed most appropriate for complete closure of the defect.  Using a sterile surgical marker, an appropriate advancement flap was drawn incorporating the defect and placing the expected incisions within the relaxed skin tension lines where possible.    The area thus outlined was incised deep to adipose tissue with a #15 scalpel blade.  The skin margins were undermined to an appropriate distance in all directions utilizing iris scissors.
Complex Repair And O-L Flap Text: The defect edges were debeveled with a #15 scalpel blade.  The primary defect was closed partially with a complex linear closure.  Given the location of the remaining defect, shape of the defect and the proximity to free margins an O-L flap was deemed most appropriate for complete closure of the defect.  Using a sterile surgical marker, an appropriate flap was drawn incorporating the defect and placing the expected incisions within the relaxed skin tension lines where possible.    The area thus outlined was incised deep to adipose tissue with a #15 scalpel blade.  The skin margins were undermined to an appropriate distance in all directions utilizing iris scissors.
Complex Repair And Bilobe Flap Text: The defect edges were debeveled with a #15 scalpel blade.  The primary defect was closed partially with a complex linear closure.  Given the location of the remaining defect, shape of the defect and the proximity to free margins a bilobe flap was deemed most appropriate for complete closure of the defect.  Using a sterile surgical marker, an appropriate advancement flap was drawn incorporating the defect and placing the expected incisions within the relaxed skin tension lines where possible.    The area thus outlined was incised deep to adipose tissue with a #15 scalpel blade.  The skin margins were undermined to an appropriate distance in all directions utilizing iris scissors.
Complex Repair And Melolabial Flap Text: The defect edges were debeveled with a #15 scalpel blade.  The primary defect was closed partially with a complex linear closure.  Given the location of the remaining defect, shape of the defect and the proximity to free margins a melolabial flap was deemed most appropriate for complete closure of the defect.  Using a sterile surgical marker, an appropriate advancement flap was drawn incorporating the defect and placing the expected incisions within the relaxed skin tension lines where possible.    The area thus outlined was incised deep to adipose tissue with a #15 scalpel blade.  The skin margins were undermined to an appropriate distance in all directions utilizing iris scissors.
Complex Repair And Rotation Flap Text: The defect edges were debeveled with a #15 scalpel blade.  The primary defect was closed partially with a complex linear closure.  Given the location of the remaining defect, shape of the defect and the proximity to free margins a rotation flap was deemed most appropriate for complete closure of the defect.  Using a sterile surgical marker, an appropriate advancement flap was drawn incorporating the defect and placing the expected incisions within the relaxed skin tension lines where possible.    The area thus outlined was incised deep to adipose tissue with a #15 scalpel blade.  The skin margins were undermined to an appropriate distance in all directions utilizing iris scissors.
Complex Repair And Rhombic Flap Text: The defect edges were debeveled with a #15 scalpel blade.  The primary defect was closed partially with a complex linear closure.  Given the location of the remaining defect, shape of the defect and the proximity to free margins a rhombic flap was deemed most appropriate for complete closure of the defect.  Using a sterile surgical marker, an appropriate advancement flap was drawn incorporating the defect and placing the expected incisions within the relaxed skin tension lines where possible.    The area thus outlined was incised deep to adipose tissue with a #15 scalpel blade.  The skin margins were undermined to an appropriate distance in all directions utilizing iris scissors.
Complex Repair And Transposition Flap Text: The defect edges were debeveled with a #15 scalpel blade.  The primary defect was closed partially with a complex linear closure.  Given the location of the remaining defect, shape of the defect and the proximity to free margins a transposition flap was deemed most appropriate for complete closure of the defect.  Using a sterile surgical marker, an appropriate advancement flap was drawn incorporating the defect and placing the expected incisions within the relaxed skin tension lines where possible.    The area thus outlined was incised deep to adipose tissue with a #15 scalpel blade.  The skin margins were undermined to an appropriate distance in all directions utilizing iris scissors.
Complex Repair And V-Y Plasty Text: The defect edges were debeveled with a #15 scalpel blade.  The primary defect was closed partially with a complex linear closure.  Given the location of the remaining defect, shape of the defect and the proximity to free margins a V-Y plasty was deemed most appropriate for complete closure of the defect.  Using a sterile surgical marker, an appropriate advancement flap was drawn incorporating the defect and placing the expected incisions within the relaxed skin tension lines where possible.    The area thus outlined was incised deep to adipose tissue with a #15 scalpel blade.  The skin margins were undermined to an appropriate distance in all directions utilizing iris scissors.
Complex Repair And M Plasty Text: The defect edges were debeveled with a #15 scalpel blade.  The primary defect was closed partially with a complex linear closure.  Given the location of the remaining defect, shape of the defect and the proximity to free margins an M plasty was deemed most appropriate for complete closure of the defect.  Using a sterile surgical marker, an appropriate advancement flap was drawn incorporating the defect and placing the expected incisions within the relaxed skin tension lines where possible.    The area thus outlined was incised deep to adipose tissue with a #15 scalpel blade.  The skin margins were undermined to an appropriate distance in all directions utilizing iris scissors.
Complex Repair And Double M Plasty Text: The defect edges were debeveled with a #15 scalpel blade.  The primary defect was closed partially with a complex linear closure.  Given the location of the remaining defect, shape of the defect and the proximity to free margins a double M plasty was deemed most appropriate for complete closure of the defect.  Using a sterile surgical marker, an appropriate advancement flap was drawn incorporating the defect and placing the expected incisions within the relaxed skin tension lines where possible.    The area thus outlined was incised deep to adipose tissue with a #15 scalpel blade.  The skin margins were undermined to an appropriate distance in all directions utilizing iris scissors.
Complex Repair And W Plasty Text: The defect edges were debeveled with a #15 scalpel blade.  The primary defect was closed partially with a complex linear closure.  Given the location of the remaining defect, shape of the defect and the proximity to free margins a W plasty was deemed most appropriate for complete closure of the defect.  Using a sterile surgical marker, an appropriate advancement flap was drawn incorporating the defect and placing the expected incisions within the relaxed skin tension lines where possible.    The area thus outlined was incised deep to adipose tissue with a #15 scalpel blade.  The skin margins were undermined to an appropriate distance in all directions utilizing iris scissors.
Complex Repair And Z Plasty Text: The defect edges were debeveled with a #15 scalpel blade.  The primary defect was closed partially with a complex linear closure.  Given the location of the remaining defect, shape of the defect and the proximity to free margins a Z plasty was deemed most appropriate for complete closure of the defect.  Using a sterile surgical marker, an appropriate advancement flap was drawn incorporating the defect and placing the expected incisions within the relaxed skin tension lines where possible.    The area thus outlined was incised deep to adipose tissue with a #15 scalpel blade.  The skin margins were undermined to an appropriate distance in all directions utilizing iris scissors.
Complex Repair And Dorsal Nasal Flap Text: The defect edges were debeveled with a #15 scalpel blade.  The primary defect was closed partially with a complex linear closure.  Given the location of the remaining defect, shape of the defect and the proximity to free margins a dorsal nasal flap was deemed most appropriate for complete closure of the defect.  Using a sterile surgical marker, an appropriate flap was drawn incorporating the defect and placing the expected incisions within the relaxed skin tension lines where possible.    The area thus outlined was incised deep to adipose tissue with a #15 scalpel blade.  The skin margins were undermined to an appropriate distance in all directions utilizing iris scissors.
Complex Repair And Ftsg Text: The defect edges were debeveled with a #15 scalpel blade.  The primary defect was closed partially with a complex linear closure.  Given the location of the defect, shape of the defect and the proximity to free margins a full thickness skin graft was deemed most appropriate to repair the remaining defect.  The graft was trimmed to fit the size of the remaining defect.  The graft was then placed in the primary defect, oriented appropriately, and sutured into place.
Complex Repair And Burow's Graft Text: The defect edges were debeveled with a #15 scalpel blade.  The primary defect was closed partially with a complex linear closure.  Given the location of the defect, shape of the defect, the proximity to free margins and the presence of a standing cone deformity a Burow's graft was deemed most appropriate to repair the remaining defect.  The graft was trimmed to fit the size of the remaining defect.  The graft was then placed in the primary defect, oriented appropriately, and sutured into place.
Complex Repair And Split-Thickness Skin Graft Text: The defect edges were debeveled with a #15 scalpel blade.  The primary defect was closed partially with a complex linear closure.  Given the location of the defect, shape of the defect and the proximity to free margins a split thickness skin graft was deemed most appropriate to repair the remaining defect.  The graft was trimmed to fit the size of the remaining defect.  The graft was then placed in the primary defect, oriented appropriately, and sutured into place.
Complex Repair And Epidermal Autograft Text: The defect edges were debeveled with a #15 scalpel blade.  The primary defect was closed partially with a complex linear closure.  Given the location of the defect, shape of the defect and the proximity to free margins an epidermal autograft was deemed most appropriate to repair the remaining defect.  The graft was trimmed to fit the size of the remaining defect.  The graft was then placed in the primary defect, oriented appropriately, and sutured into place.
Complex Repair And Dermal Autograft Text: The defect edges were debeveled with a #15 scalpel blade.  The primary defect was closed partially with a complex linear closure.  Given the location of the defect, shape of the defect and the proximity to free margins an dermal autograft was deemed most appropriate to repair the remaining defect.  The graft was trimmed to fit the size of the remaining defect.  The graft was then placed in the primary defect, oriented appropriately, and sutured into place.
Complex Repair And Tissue Cultured Epidermal Autograft Text: The defect edges were debeveled with a #15 scalpel blade.  The primary defect was closed partially with a complex linear closure.  Given the location of the defect, shape of the defect and the proximity to free margins an tissue cultured epidermal autograft was deemed most appropriate to repair the remaining defect.  The graft was trimmed to fit the size of the remaining defect.  The graft was then placed in the primary defect, oriented appropriately, and sutured into place.
Complex Repair And Xenograft Text: The defect edges were debeveled with a #15 scalpel blade.  The primary defect was closed partially with a complex linear closure.  Given the location of the defect, shape of the defect and the proximity to free margins a xenograft was deemed most appropriate to repair the remaining defect.  The graft was trimmed to fit the size of the remaining defect.  The graft was then placed in the primary defect, oriented appropriately, and sutured into place.
Complex Repair And Skin Substitute Graft Text: The defect edges were debeveled with a #15 scalpel blade.  The primary defect was closed partially with a complex linear closure.  Given the location of the remaining defect, shape of the defect and the proximity to free margins a skin substitute graft was deemed most appropriate to repair the remaining defect.  The graft was trimmed to fit the size of the remaining defect.  The graft was then placed in the primary defect, oriented appropriately, and sutured into place.
Path Notes (To The Dermatopathologist): Please check margins.
Consent was obtained from the patient. The risks and benefits to therapy were discussed in detail. Specifically, the risks of infection, scarring, bleeding, prolonged wound healing, incomplete removal, allergy to anesthesia, nerve injury and recurrence were addressed. Prior to the procedure, the treatment site was clearly identified and confirmed by the patient. All components of Universal Protocol/PAUSE Rule completed.
Post-Care Instructions: I reviewed with the patient in detail post-care instructions. Patient is not to engage in any heavy lifting, exercise, or swimming for the next 14 days. Should the patient develop any fevers, chills, bleeding, severe pain patient will contact the office immediately.
Home Suture Removal Text: Patient was provided a home suture removal kit and will remove their sutures at home.  If they have any questions or difficulties they will call the office.
Where Do You Want The Question To Include Opioid Counseling Located?: Case Summary Tab
Billing Type: Third-Party Bill
Information: Selecting Yes will display possible errors in your note based on the variables you have selected. This validation is only offered as a suggestion for you. PLEASE NOTE THAT THE VALIDATION TEXT WILL BE REMOVED WHEN YOU FINALIZE YOUR NOTE. IF YOU WANT TO FAX A PRELIMINARY NOTE YOU WILL NEED TO TOGGLE THIS TO 'NO' IF YOU DO NOT WANT IT IN YOUR FAXED NOTE.

## 2020-07-22 ENCOUNTER — APPOINTMENT (RX ONLY)
Dept: URBAN - NONMETROPOLITAN AREA CLINIC 4 | Facility: CLINIC | Age: 70
Setting detail: DERMATOLOGY
End: 2020-07-22

## 2020-07-22 DIAGNOSIS — L72.8 OTHER FOLLICULAR CYSTS OF THE SKIN AND SUBCUTANEOUS TISSUE: ICD-10-CM

## 2020-07-22 DIAGNOSIS — Z48.817 ENCOUNTER FOR SURGICAL AFTERCARE FOLLOWING SURGERY ON THE SKIN AND SUBCUTANEOUS TISSUE: ICD-10-CM

## 2020-07-22 DIAGNOSIS — D22 MELANOCYTIC NEVI: ICD-10-CM

## 2020-07-22 PROBLEM — D48.5 NEOPLASM OF UNCERTAIN BEHAVIOR OF SKIN: Status: ACTIVE | Noted: 2020-07-22

## 2020-07-22 PROCEDURE — 11301 SHAVE SKIN LESION 0.6-1.0 CM: CPT

## 2020-07-22 PROCEDURE — ? SHAVE REMOVAL

## 2020-07-22 PROCEDURE — ? EXCISION

## 2020-07-22 PROCEDURE — 11400 EXC TR-EXT B9+MARG 0.5 CM<: CPT

## 2020-07-22 PROCEDURE — ? SUTURE REMOVAL (GLOBAL PERIOD)

## 2020-07-22 PROCEDURE — 99024 POSTOP FOLLOW-UP VISIT: CPT

## 2020-07-22 ASSESSMENT — LOCATION SIMPLE DESCRIPTION DERM
LOCATION SIMPLE: RIGHT PRETIBIAL REGION
LOCATION SIMPLE: LEFT LOWER BACK
LOCATION SIMPLE: LEFT BUTTOCK

## 2020-07-22 ASSESSMENT — LOCATION DETAILED DESCRIPTION DERM
LOCATION DETAILED: LEFT BUTTOCK
LOCATION DETAILED: RIGHT PROXIMAL PRETIBIAL REGION
LOCATION DETAILED: LEFT SUPERIOR LATERAL LOWER BACK

## 2020-07-22 ASSESSMENT — LOCATION ZONE DERM
LOCATION ZONE: LEG
LOCATION ZONE: TRUNK

## 2020-07-22 NOTE — PROCEDURE: EXCISION
Medical Necessity Information: It is in your best interest to select a reason for this procedure from the list below. All of these items fulfill various CMS LCD requirements except lesion extends to a margin.
Include Z78.9 (Other Specified Conditions Influencing Health Status) As An Associated Diagnosis?: No
Medical Necessity Clause: This procedure was medically necessary because the lesion that was treated was:
Lab: 6
Lab Facility: 3
Surgeon (Optional): RODNEY
Surgeon Performing Repair (Optional): Jules Luciano PA-C
Size Of Lesion In Cm: 0.6
X Size Of Lesion In Cm (Optional): 0
Anesthesia Volume In Cc: 1
Excision Method: Round
Repair Type: None (Simple)
Suturegard Retention Suture: 2-0 Nylon
Retention Suture Bite Size: 3 mm
Length To Time In Minutes Device Was In Place: 10
Undermining Type: Entire Wound
Debridement Text: The wound edges were debrided prior to proceeding with the closure to facilitate wound healing.
Helical Rim Text: The closure involved the helical rim.
Vermilion Border Text: The closure involved the vermilion border.
Nostril Rim Text: The closure involved the nostril rim.
Retention Suture Text: Retention sutures were placed to support the closure and prevent dehiscence.
Suture Removal: 14 days
Epidermal Closure Graft Donor Site (Optional): simple interrupted
Detail Level: Detailed
Excision Depth: adipose tissue
Scalpel Size: 15 blade
Anesthesia Type: 1% lidocaine with epinephrine
Hemostasis: Ligature
Estimated Blood Loss (Cc): minimal
Epidermal Sutures: 3-0 Ethilon
Wound Care: Bacitracin
Dressing: pressure dressing with telfa
Suturegard Intro: Intraoperative tissue expansion was performed, utilizing the SUTUREGARD device, in order to reduce wound tension.
Suturegard Body: The suture ends were repeatedly re-tightened and re-clamped to achieve the desired tissue expansion.
Hemigard Intro: Due to skin fragility and wound tension, it was decided to use HEMIGARD adhesive retention suture devices to permit a linear closure. The skin was cleaned and dried for a 6cm distance away from the wound. Excessive hair, if present, was removed to allow for adhesion.
Hemigard Postcare Instructions: The HEMIGARD strips are to remain completely dry for at least 5-7 days.
Complex Repair Preamble Text (Leave Blank If You Do Not Want): Extensive wide undermining was performed.
Intermediate Repair Preamble Text (Leave Blank If You Do Not Want): Undermining was performed with blunt dissection.
Fusiform Excision Additional Text (Leave Blank If You Do Not Want): The margin was drawn around the clinically apparent lesion.  A fusiform shape was then drawn on the skin incorporating the lesion and margins.  Incisions were then made along these lines to the appropriate tissue plane and the lesion was extirpated.
Eliptical Excision Additional Text (Leave Blank If You Do Not Want): The margin was drawn around the clinically apparent lesion.  An elliptical shape was then drawn on the skin incorporating the lesion and margins.  Incisions were then made along these lines to the appropriate tissue plane and the lesion was extirpated.
Saucerization Excision Additional Text (Leave Blank If You Do Not Want): The margin was drawn around the clinically apparent lesion.  Incisions were then made along these lines, in a tangential fashion, to the appropriate tissue plane and the lesion was extirpated.
Slit Excision Additional Text (Leave Blank If You Do Not Want): A linear line was drawn on the skin overlying the lesion. An incision was made slowly until the lesion was visualized.  Once visualized, the lesion was removed with blunt dissection.
Excisional Biopsy Additional Text (Leave Blank If You Do Not Want): The margin was drawn around the clinically apparent lesion. An elliptical shape was then drawn on the skin incorporating the lesion and margins.  Incisions were then made along these lines to the appropriate tissue plane and the lesion was extirpated.
Perilesional Excision Additional Text (Leave Blank If You Do Not Want): The margin was drawn around the clinically apparent lesion. Incisions were then made along these lines to the appropriate tissue plane and the lesion was extirpated.
Repair Performed By Another Provider Text (Leave Blank If You Do Not Want): After the tissue was excised the defect was repaired by another provider.
No Repair - Repaired With Adjacent Surgical Defect Text (Leave Blank If You Do Not Want): After the excision the defect was repaired concurrently with another surgical defect which was in close approximation.
Advancement Flap (Single) Text: The defect edges were debeveled with a #15 scalpel blade.  Given the location of the defect and the proximity to free margins a single advancement flap was deemed most appropriate.  Using a sterile surgical marker, an appropriate advancement flap was drawn incorporating the defect and placing the expected incisions within the relaxed skin tension lines where possible.    The area thus outlined was incised deep to adipose tissue with a #15 scalpel blade.  The skin margins were undermined to an appropriate distance in all directions utilizing iris scissors.
Advancement Flap (Double) Text: The defect edges were debeveled with a #15 scalpel blade.  Given the location of the defect and the proximity to free margins a double advancement flap was deemed most appropriate.  Using a sterile surgical marker, the appropriate advancement flaps were drawn incorporating the defect and placing the expected incisions within the relaxed skin tension lines where possible.    The area thus outlined was incised deep to adipose tissue with a #15 scalpel blade.  The skin margins were undermined to an appropriate distance in all directions utilizing iris scissors.
Burow's Advancement Flap Text: The defect edges were debeveled with a #15 scalpel blade.  Given the location of the defect and the proximity to free margins a Burow's advancement flap was deemed most appropriate.  Using a sterile surgical marker, the appropriate advancement flap was drawn incorporating the defect and placing the expected incisions within the relaxed skin tension lines where possible.    The area thus outlined was incised deep to adipose tissue with a #15 scalpel blade.  The skin margins were undermined to an appropriate distance in all directions utilizing iris scissors.
Chonodrocutaneous Helical Advancement Flap Text: The defect edges were debeveled with a #15 scalpel blade.  Given the location of the defect and the proximity to free margins a chondrocutaneous helical advancement flap was deemed most appropriate.  Using a sterile surgical marker, the appropriate advancement flap was drawn incorporating the defect and placing the expected incisions within the relaxed skin tension lines where possible.    The area thus outlined was incised deep to adipose tissue with a #15 scalpel blade.  The skin margins were undermined to an appropriate distance in all directions utilizing iris scissors.
Crescentic Advancement Flap Text: The defect edges were debeveled with a #15 scalpel blade.  Given the location of the defect and the proximity to free margins a crescentic advancement flap was deemed most appropriate.  Using a sterile surgical marker, the appropriate advancement flap was drawn incorporating the defect and placing the expected incisions within the relaxed skin tension lines where possible.    The area thus outlined was incised deep to adipose tissue with a #15 scalpel blade.  The skin margins were undermined to an appropriate distance in all directions utilizing iris scissors.
A-T Advancement Flap Text: The defect edges were debeveled with a #15 scalpel blade.  Given the location of the defect, shape of the defect and the proximity to free margins an A-T advancement flap was deemed most appropriate.  Using a sterile surgical marker, an appropriate advancement flap was drawn incorporating the defect and placing the expected incisions within the relaxed skin tension lines where possible.    The area thus outlined was incised deep to adipose tissue with a #15 scalpel blade.  The skin margins were undermined to an appropriate distance in all directions utilizing iris scissors.
O-T Advancement Flap Text: The defect edges were debeveled with a #15 scalpel blade.  Given the location of the defect, shape of the defect and the proximity to free margins an O-T advancement flap was deemed most appropriate.  Using a sterile surgical marker, an appropriate advancement flap was drawn incorporating the defect and placing the expected incisions within the relaxed skin tension lines where possible.    The area thus outlined was incised deep to adipose tissue with a #15 scalpel blade.  The skin margins were undermined to an appropriate distance in all directions utilizing iris scissors.
O-L Flap Text: The defect edges were debeveled with a #15 scalpel blade.  Given the location of the defect, shape of the defect and the proximity to free margins an O-L flap was deemed most appropriate.  Using a sterile surgical marker, an appropriate advancement flap was drawn incorporating the defect and placing the expected incisions within the relaxed skin tension lines where possible.    The area thus outlined was incised deep to adipose tissue with a #15 scalpel blade.  The skin margins were undermined to an appropriate distance in all directions utilizing iris scissors.
O-Z Flap Text: The defect edges were debeveled with a #15 scalpel blade.  Given the location of the defect, shape of the defect and the proximity to free margins an O-Z flap was deemed most appropriate.  Using a sterile surgical marker, an appropriate transposition flap was drawn incorporating the defect and placing the expected incisions within the relaxed skin tension lines where possible. The area thus outlined was incised deep to adipose tissue with a #15 scalpel blade.  The skin margins were undermined to an appropriate distance in all directions utilizing iris scissors.
Double O-Z Flap Text: The defect edges were debeveled with a #15 scalpel blade.  Given the location of the defect, shape of the defect and the proximity to free margins a Double O-Z flap was deemed most appropriate.  Using a sterile surgical marker, an appropriate transposition flap was drawn incorporating the defect and placing the expected incisions within the relaxed skin tension lines where possible. The area thus outlined was incised deep to adipose tissue with a #15 scalpel blade.  The skin margins were undermined to an appropriate distance in all directions utilizing iris scissors.
V-Y Flap Text: The defect edges were debeveled with a #15 scalpel blade.  Given the location of the defect, shape of the defect and the proximity to free margins a V-Y flap was deemed most appropriate.  Using a sterile surgical marker, an appropriate advancement flap was drawn incorporating the defect and placing the expected incisions within the relaxed skin tension lines where possible.    The area thus outlined was incised deep to adipose tissue with a #15 scalpel blade.  The skin margins were undermined to an appropriate distance in all directions utilizing iris scissors.
Mercedes Flap Text: The defect edges were debeveled with a #15 scalpel blade.  Given the location of the defect, shape of the defect and the proximity to free margins a Mercedes flap was deemed most appropriate.  Using a sterile surgical marker, an appropriate advancement flap was drawn incorporating the defect and placing the expected incisions within the relaxed skin tension lines where possible. The area thus outlined was incised deep to adipose tissue with a #15 scalpel blade.  The skin margins were undermined to an appropriate distance in all directions utilizing iris scissors.
Modified Advancement Flap Text: The defect edges were debeveled with a #15 scalpel blade.  Given the location of the defect, shape of the defect and the proximity to free margins a modified advancement flap was deemed most appropriate.  Using a sterile surgical marker, an appropriate advancement flap was drawn incorporating the defect and placing the expected incisions within the relaxed skin tension lines where possible.    The area thus outlined was incised deep to adipose tissue with a #15 scalpel blade.  The skin margins were undermined to an appropriate distance in all directions utilizing iris scissors.
Mucosal Advancement Flap Text: Given the location of the defect, shape of the defect and the proximity to free margins a mucosal advancement flap was deemed most appropriate. Incisions were made with a 15 blade scalpel in the appropriate fashion along the cutaneous vermillion border and the mucosal lip. The remaining actinically damaged mucosal tissue was excised.  The mucosal advancement flap was then elevated to the gingival sulcus with care taken to preserve the neurovascular structures and advanced into the primary defect. Care was taken to ensure that precise realignment of the vermilion border was achieved.
Hatchet Flap Text: The defect edges were debeveled with a #15 scalpel blade.  Given the location of the defect, shape of the defect and the proximity to free margins a hatchet flap was deemed most appropriate.  Using a sterile surgical marker, an appropriate hatchet flap was drawn incorporating the defect and placing the expected incisions within the relaxed skin tension lines where possible.    The area thus outlined was incised deep to adipose tissue with a #15 scalpel blade.  The skin margins were undermined to an appropriate distance in all directions utilizing iris scissors.
Rotation Flap Text: The defect edges were debeveled with a #15 scalpel blade.  Given the location of the defect, shape of the defect and the proximity to free margins a rotation flap was deemed most appropriate.  Using a sterile surgical marker, an appropriate rotation flap was drawn incorporating the defect and placing the expected incisions within the relaxed skin tension lines where possible.    The area thus outlined was incised deep to adipose tissue with a #15 scalpel blade.  The skin margins were undermined to an appropriate distance in all directions utilizing iris scissors.
Spiral Flap Text: The defect edges were debeveled with a #15 scalpel blade.  Given the location of the defect, shape of the defect and the proximity to free margins a spiral flap was deemed most appropriate.  Using a sterile surgical marker, an appropriate rotation flap was drawn incorporating the defect and placing the expected incisions within the relaxed skin tension lines where possible. The area thus outlined was incised deep to adipose tissue with a #15 scalpel blade.  The skin margins were undermined to an appropriate distance in all directions utilizing iris scissors.
Star Wedge Flap Text: The defect edges were debeveled with a #15 scalpel blade.  Given the location of the defect, shape of the defect and the proximity to free margins a star wedge flap was deemed most appropriate.  Using a sterile surgical marker, an appropriate rotation flap was drawn incorporating the defect and placing the expected incisions within the relaxed skin tension lines where possible. The area thus outlined was incised deep to adipose tissue with a #15 scalpel blade.  The skin margins were undermined to an appropriate distance in all directions utilizing iris scissors.
Transposition Flap Text: The defect edges were debeveled with a #15 scalpel blade.  Given the location of the defect and the proximity to free margins a transposition flap was deemed most appropriate.  Using a sterile surgical marker, an appropriate transposition flap was drawn incorporating the defect.    The area thus outlined was incised deep to adipose tissue with a #15 scalpel blade.  The skin margins were undermined to an appropriate distance in all directions utilizing iris scissors.
Muscle Hinge Flap Text: The defect edges were debeveled with a #15 scalpel blade.  Given the size, depth and location of the defect and the proximity to free margins a muscle hinge flap was deemed most appropriate.  Using a sterile surgical marker, an appropriate hinge flap was drawn incorporating the defect. The area thus outlined was incised with a #15 scalpel blade.  The skin margins were undermined to an appropriate distance in all directions utilizing iris scissors.
Melolabial Transposition Flap Text: The defect edges were debeveled with a #15 scalpel blade.  Given the location of the defect and the proximity to free margins a melolabial flap was deemed most appropriate.  Using a sterile surgical marker, an appropriate melolabial transposition flap was drawn incorporating the defect.    The area thus outlined was incised deep to adipose tissue with a #15 scalpel blade.  The skin margins were undermined to an appropriate distance in all directions utilizing iris scissors.
Rhombic Flap Text: The defect edges were debeveled with a #15 scalpel blade.  Given the location of the defect and the proximity to free margins a rhombic flap was deemed most appropriate.  Using a sterile surgical marker, an appropriate rhombic flap was drawn incorporating the defect.    The area thus outlined was incised deep to adipose tissue with a #15 scalpel blade.  The skin margins were undermined to an appropriate distance in all directions utilizing iris scissors.
Rhomboid Transposition Flap Text: The defect edges were debeveled with a #15 scalpel blade.  Given the location of the defect and the proximity to free margins a rhomboid transposition flap was deemed most appropriate.  Using a sterile surgical marker, an appropriate rhomboid flap was drawn incorporating the defect.    The area thus outlined was incised deep to adipose tissue with a #15 scalpel blade.  The skin margins were undermined to an appropriate distance in all directions utilizing iris scissors.
Bi-Rhombic Flap Text: The defect edges were debeveled with a #15 scalpel blade.  Given the location of the defect and the proximity to free margins a bi-rhombic flap was deemed most appropriate.  Using a sterile surgical marker, an appropriate rhombic flap was drawn incorporating the defect. The area thus outlined was incised deep to adipose tissue with a #15 scalpel blade.  The skin margins were undermined to an appropriate distance in all directions utilizing iris scissors.
Helical Rim Advancement Flap Text: The defect edges were debeveled with a #15 blade scalpel.  Given the location of the defect and the proximity to free margins (helical rim) a double helical rim advancement flap was deemed most appropriate.  Using a sterile surgical marker, the appropriate advancement flaps were drawn incorporating the defect and placing the expected incisions between the helical rim and antihelix where possible.  The area thus outlined was incised through and through with a #15 scalpel blade.  With a skin hook and iris scissors, the flaps were gently and sharply undermined and freed up.
Bilateral Helical Rim Advancement Flap Text: The defect edges were debeveled with a #15 blade scalpel.  Given the location of the defect and the proximity to free margins (helical rim) a bilateral helical rim advancement flap was deemed most appropriate.  Using a sterile surgical marker, the appropriate advancement flaps were drawn incorporating the defect and placing the expected incisions between the helical rim and antihelix where possible.  The area thus outlined was incised through and through with a #15 scalpel blade.  With a skin hook and iris scissors, the flaps were gently and sharply undermined and freed up.
Ear Star Wedge Flap Text: The defect edges were debeveled with a #15 blade scalpel.  Given the location of the defect and the proximity to free margins (helical rim) an ear star wedge flap was deemed most appropriate.  Using a sterile surgical marker, the appropriate flap was drawn incorporating the defect and placing the expected incisions between the helical rim and antihelix where possible.  The area thus outlined was incised through and through with a #15 scalpel blade.
Banner Transposition Flap Text: The defect edges were debeveled with a #15 scalpel blade.  Given the location of the defect and the proximity to free margins a Banner transposition flap was deemed most appropriate.  Using a sterile surgical marker, an appropriate flap drawn around the defect. The area thus outlined was incised deep to adipose tissue with a #15 scalpel blade.  The skin margins were undermined to an appropriate distance in all directions utilizing iris scissors.
Bilobed Flap Text: The defect edges were debeveled with a #15 scalpel blade.  Given the location of the defect and the proximity to free margins a bilobe flap was deemed most appropriate.  Using a sterile surgical marker, an appropriate bilobe flap drawn around the defect.    The area thus outlined was incised deep to adipose tissue with a #15 scalpel blade.  The skin margins were undermined to an appropriate distance in all directions utilizing iris scissors.
Bilobed Transposition Flap Text: The defect edges were debeveled with a #15 scalpel blade.  Given the location of the defect and the proximity to free margins a bilobed transposition flap was deemed most appropriate.  Using a sterile surgical marker, an appropriate bilobe flap drawn around the defect.    The area thus outlined was incised deep to adipose tissue with a #15 scalpel blade.  The skin margins were undermined to an appropriate distance in all directions utilizing iris scissors.
Trilobed Flap Text: The defect edges were debeveled with a #15 scalpel blade.  Given the location of the defect and the proximity to free margins a trilobed flap was deemed most appropriate.  Using a sterile surgical marker, an appropriate trilobed flap drawn around the defect.    The area thus outlined was incised deep to adipose tissue with a #15 scalpel blade.  The skin margins were undermined to an appropriate distance in all directions utilizing iris scissors.
Dorsal Nasal Flap Text: The defect edges were debeveled with a #15 scalpel blade.  Given the location of the defect and the proximity to free margins a dorsal nasal flap was deemed most appropriate.  Using a sterile surgical marker, an appropriate dorsal nasal flap was drawn around the defect.    The area thus outlined was incised deep to adipose tissue with a #15 scalpel blade.  The skin margins were undermined to an appropriate distance in all directions utilizing iris scissors.
Island Pedicle Flap Text: The defect edges were debeveled with a #15 scalpel blade.  Given the location of the defect, shape of the defect and the proximity to free margins an island pedicle advancement flap was deemed most appropriate.  Using a sterile surgical marker, an appropriate advancement flap was drawn incorporating the defect, outlining the appropriate donor tissue and placing the expected incisions within the relaxed skin tension lines where possible.    The area thus outlined was incised deep to adipose tissue with a #15 scalpel blade.  The skin margins were undermined to an appropriate distance in all directions around the primary defect and laterally outward around the island pedicle utilizing iris scissors.  There was minimal undermining beneath the pedicle flap.
Island Pedicle Flap With Canthal Suspension Text: The defect edges were debeveled with a #15 scalpel blade.  Given the location of the defect, shape of the defect and the proximity to free margins an island pedicle advancement flap was deemed most appropriate.  Using a sterile surgical marker, an appropriate advancement flap was drawn incorporating the defect, outlining the appropriate donor tissue and placing the expected incisions within the relaxed skin tension lines where possible. The area thus outlined was incised deep to adipose tissue with a #15 scalpel blade.  The skin margins were undermined to an appropriate distance in all directions around the primary defect and laterally outward around the island pedicle utilizing iris scissors.  There was minimal undermining beneath the pedicle flap. A suspension suture was placed in the canthal tendon to prevent tension and prevent ectropion.
Alar Island Pedicle Flap Text: The defect edges were debeveled with a #15 scalpel blade.  Given the location of the defect, shape of the defect and the proximity to the alar rim an island pedicle advancement flap was deemed most appropriate.  Using a sterile surgical marker, an appropriate advancement flap was drawn incorporating the defect, outlining the appropriate donor tissue and placing the expected incisions within the nasal ala running parallel to the alar rim. The area thus outlined was incised with a #15 scalpel blade.  The skin margins were undermined minimally to an appropriate distance in all directions around the primary defect and laterally outward around the island pedicle utilizing iris scissors.  There was minimal undermining beneath the pedicle flap.
Double Island Pedicle Flap Text: The defect edges were debeveled with a #15 scalpel blade.  Given the location of the defect, shape of the defect and the proximity to free margins a double island pedicle advancement flap was deemed most appropriate.  Using a sterile surgical marker, an appropriate advancement flap was drawn incorporating the defect, outlining the appropriate donor tissue and placing the expected incisions within the relaxed skin tension lines where possible.    The area thus outlined was incised deep to adipose tissue with a #15 scalpel blade.  The skin margins were undermined to an appropriate distance in all directions around the primary defect and laterally outward around the island pedicle utilizing iris scissors.  There was minimal undermining beneath the pedicle flap.
Island Pedicle Flap-Requiring Vessel Identification Text: The defect edges were debeveled with a #15 scalpel blade.  Given the location of the defect, shape of the defect and the proximity to free margins an island pedicle advancement flap was deemed most appropriate.  Using a sterile surgical marker, an appropriate advancement flap was drawn, based on the axial vessel mentioned above, incorporating the defect, outlining the appropriate donor tissue and placing the expected incisions within the relaxed skin tension lines where possible.    The area thus outlined was incised deep to adipose tissue with a #15 scalpel blade.  The skin margins were undermined to an appropriate distance in all directions around the primary defect and laterally outward around the island pedicle utilizing iris scissors.  There was minimal undermining beneath the pedicle flap.
Keystone Flap Text: The defect edges were debeveled with a #15 scalpel blade.  Given the location of the defect, shape of the defect a keystone flap was deemed most appropriate.  Using a sterile surgical marker, an appropriate keystone flap was drawn incorporating the defect, outlining the appropriate donor tissue and placing the expected incisions within the relaxed skin tension lines where possible. The area thus outlined was incised deep to adipose tissue with a #15 scalpel blade.  The skin margins were undermined to an appropriate distance in all directions around the primary defect and laterally outward around the flap utilizing iris scissors.
O-T Plasty Text: The defect edges were debeveled with a #15 scalpel blade.  Given the location of the defect, shape of the defect and the proximity to free margins an O-T plasty was deemed most appropriate.  Using a sterile surgical marker, an appropriate O-T plasty was drawn incorporating the defect and placing the expected incisions within the relaxed skin tension lines where possible.    The area thus outlined was incised deep to adipose tissue with a #15 scalpel blade.  The skin margins were undermined to an appropriate distance in all directions utilizing iris scissors.
O-Z Plasty Text: The defect edges were debeveled with a #15 scalpel blade.  Given the location of the defect, shape of the defect and the proximity to free margins an O-Z plasty (double transposition flap) was deemed most appropriate.  Using a sterile surgical marker, the appropriate transposition flaps were drawn incorporating the defect and placing the expected incisions within the relaxed skin tension lines where possible.    The area thus outlined was incised deep to adipose tissue with a #15 scalpel blade.  The skin margins were undermined to an appropriate distance in all directions utilizing iris scissors.  Hemostasis was achieved with electrocautery.  The flaps were then transposed into place, one clockwise and the other counterclockwise, and anchored with interrupted buried subcutaneous sutures.
Double O-Z Plasty Text: The defect edges were debeveled with a #15 scalpel blade.  Given the location of the defect, shape of the defect and the proximity to free margins a Double O-Z plasty (double transposition flap) was deemed most appropriate.  Using a sterile surgical marker, the appropriate transposition flaps were drawn incorporating the defect and placing the expected incisions within the relaxed skin tension lines where possible. The area thus outlined was incised deep to adipose tissue with a #15 scalpel blade.  The skin margins were undermined to an appropriate distance in all directions utilizing iris scissors.  Hemostasis was achieved with electrocautery.  The flaps were then transposed into place, one clockwise and the other counterclockwise, and anchored with interrupted buried subcutaneous sutures.
V-Y Plasty Text: The defect edges were debeveled with a #15 scalpel blade.  Given the location of the defect, shape of the defect and the proximity to free margins an V-Y advancement flap was deemed most appropriate.  Using a sterile surgical marker, an appropriate advancement flap was drawn incorporating the defect and placing the expected incisions within the relaxed skin tension lines where possible.    The area thus outlined was incised deep to adipose tissue with a #15 scalpel blade.  The skin margins were undermined to an appropriate distance in all directions utilizing iris scissors.
H Plasty Text: Given the location of the defect, shape of the defect and the proximity to free margins a H-plasty was deemed most appropriate for repair.  Using a sterile surgical marker, the appropriate advancement arms of the H-plasty were drawn incorporating the defect and placing the expected incisions within the relaxed skin tension lines where possible. The area thus outlined was incised deep to adipose tissue with a #15 scalpel blade. The skin margins were undermined to an appropriate distance in all directions utilizing iris scissors.  The opposing advancement arms were then advanced into place in opposite direction and anchored with interrupted buried subcutaneous sutures.
W Plasty Text: The lesion was extirpated to the level of the fat with a #15 scalpel blade.  Given the location of the defect, shape of the defect and the proximity to free margins a W-plasty was deemed most appropriate for repair.  Using a sterile surgical marker, the appropriate transposition arms of the W-plasty were drawn incorporating the defect and placing the expected incisions within the relaxed skin tension lines where possible.    The area thus outlined was incised deep to adipose tissue with a #15 scalpel blade.  The skin margins were undermined to an appropriate distance in all directions utilizing iris scissors.  The opposing transposition arms were then transposed into place in opposite direction and anchored with interrupted buried subcutaneous sutures.
Z Plasty Text: The lesion was extirpated to the level of the fat with a #15 scalpel blade.  Given the location of the defect, shape of the defect and the proximity to free margins a Z-plasty was deemed most appropriate for repair.  Using a sterile surgical marker, the appropriate transposition arms of the Z-plasty were drawn incorporating the defect and placing the expected incisions within the relaxed skin tension lines where possible.    The area thus outlined was incised deep to adipose tissue with a #15 scalpel blade.  The skin margins were undermined to an appropriate distance in all directions utilizing iris scissors.  The opposing transposition arms were then transposed into place in opposite direction and anchored with interrupted buried subcutaneous sutures.
Zygomaticofacial Flap Text: Given the location of the defect, shape of the defect and the proximity to free margins a zygomaticofacial flap was deemed most appropriate for repair.  Using a sterile surgical marker, the appropriate flap was drawn incorporating the defect and placing the expected incisions within the relaxed skin tension lines where possible. The area thus outlined was incised deep to adipose tissue with a #15 scalpel blade with preservation of a vascular pedicle.  The skin margins were undermined to an appropriate distance in all directions utilizing iris scissors.  The flap was then placed into the defect and anchored with interrupted buried subcutaneous sutures.
Cheek Interpolation Flap Text: A decision was made to reconstruct the defect utilizing an interpolation axial flap and a staged reconstruction.  A telfa template was made of the defect.  This telfa template was then used to outline the Cheek Interpolation flap.  The donor area for the pedicle flap was then injected with anesthesia.  The flap was excised through the skin and subcutaneous tissue down to the layer of the underlying musculature.  The interpolation flap was carefully excised within this deep plane to maintain its blood supply.  The edges of the donor site were undermined.   The donor site was closed in a primary fashion.  The pedicle was then rotated into position and sutured.  Once the tube was sutured into place, adequate blood supply was confirmed with blanching and refill.  The pedicle was then wrapped with xeroform gauze and dressed appropriately with a telfa and gauze bandage to ensure continued blood supply and protect the attached pedicle.
Cheek-To-Nose Interpolation Flap Text: A decision was made to reconstruct the defect utilizing an interpolation axial flap and a staged reconstruction.  A telfa template was made of the defect.  This telfa template was then used to outline the Cheek-To-Nose Interpolation flap.  The donor area for the pedicle flap was then injected with anesthesia.  The flap was excised through the skin and subcutaneous tissue down to the layer of the underlying musculature.  The interpolation flap was carefully excised within this deep plane to maintain its blood supply.  The edges of the donor site were undermined.   The donor site was closed in a primary fashion.  The pedicle was then rotated into position and sutured.  Once the tube was sutured into place, adequate blood supply was confirmed with blanching and refill.  The pedicle was then wrapped with xeroform gauze and dressed appropriately with a telfa and gauze bandage to ensure continued blood supply and protect the attached pedicle.
Interpolation Flap Text: A decision was made to reconstruct the defect utilizing an interpolation axial flap and a staged reconstruction.  A telfa template was made of the defect.  This telfa template was then used to outline the interpolation flap.  The donor area for the pedicle flap was then injected with anesthesia.  The flap was excised through the skin and subcutaneous tissue down to the layer of the underlying musculature.  The interpolation flap was carefully excised within this deep plane to maintain its blood supply.  The edges of the donor site were undermined.   The donor site was closed in a primary fashion.  The pedicle was then rotated into position and sutured.  Once the tube was sutured into place, adequate blood supply was confirmed with blanching and refill.  The pedicle was then wrapped with xeroform gauze and dressed appropriately with a telfa and gauze bandage to ensure continued blood supply and protect the attached pedicle.
Melolabial Interpolation Flap Text: A decision was made to reconstruct the defect utilizing an interpolation axial flap and a staged reconstruction.  A telfa template was made of the defect.  This telfa template was then used to outline the melolabial interpolation flap.  The donor area for the pedicle flap was then injected with anesthesia.  The flap was excised through the skin and subcutaneous tissue down to the layer of the underlying musculature.  The pedicle flap was carefully excised within this deep plane to maintain its blood supply.  The edges of the donor site were undermined.   The donor site was closed in a primary fashion.  The pedicle was then rotated into position and sutured.  Once the tube was sutured into place, adequate blood supply was confirmed with blanching and refill.  The pedicle was then wrapped with xeroform gauze and dressed appropriately with a telfa and gauze bandage to ensure continued blood supply and protect the attached pedicle.
Mastoid Interpolation Flap Text: A decision was made to reconstruct the defect utilizing an interpolation axial flap and a staged reconstruction.  A telfa template was made of the defect.  This telfa template was then used to outline the mastoid interpolation flap.  The donor area for the pedicle flap was then injected with anesthesia.  The flap was excised through the skin and subcutaneous tissue down to the layer of the underlying musculature.  The pedicle flap was carefully excised within this deep plane to maintain its blood supply.  The edges of the donor site were undermined.   The donor site was closed in a primary fashion.  The pedicle was then rotated into position and sutured.  Once the tube was sutured into place, adequate blood supply was confirmed with blanching and refill.  The pedicle was then wrapped with xeroform gauze and dressed appropriately with a telfa and gauze bandage to ensure continued blood supply and protect the attached pedicle.
Posterior Auricular Interpolation Flap Text: A decision was made to reconstruct the defect utilizing an interpolation axial flap and a staged reconstruction.  A telfa template was made of the defect.  This telfa template was then used to outline the posterior auricular interpolation flap.  The donor area for the pedicle flap was then injected with anesthesia.  The flap was excised through the skin and subcutaneous tissue down to the layer of the underlying musculature.  The pedicle flap was carefully excised within this deep plane to maintain its blood supply.  The edges of the donor site were undermined.   The donor site was closed in a primary fashion.  The pedicle was then rotated into position and sutured.  Once the tube was sutured into place, adequate blood supply was confirmed with blanching and refill.  The pedicle was then wrapped with xeroform gauze and dressed appropriately with a telfa and gauze bandage to ensure continued blood supply and protect the attached pedicle.
Paramedian Forehead Flap Text: A decision was made to reconstruct the defect utilizing an interpolation axial flap and a staged reconstruction.  A telfa template was made of the defect.  This telfa template was then used to outline the paramedian forehead pedicle flap.  The donor area for the pedicle flap was then injected with anesthesia.  The flap was excised through the skin and subcutaneous tissue down to the layer of the underlying musculature.  The pedicle flap was carefully excised within this deep plane to maintain its blood supply.  The edges of the donor site were undermined.   The donor site was closed in a primary fashion.  The pedicle was then rotated into position and sutured.  Once the tube was sutured into place, adequate blood supply was confirmed with blanching and refill.  The pedicle was then wrapped with xeroform gauze and dressed appropriately with a telfa and gauze bandage to ensure continued blood supply and protect the attached pedicle.
Lip Wedge Excision Repair Text: Given the location of the defect and the proximity to free margins a full thickness wedge repair was deemed most appropriate.  Using a sterile surgical marker, the appropriate repair was drawn incorporating the defect and placing the expected incisions perpendicular to the vermilion border.  The vermilion border was also meticulously outlined to ensure appropriate reapproximation during the repair.  The area thus outlined was incised through and through with a #15 scalpel blade.  The muscularis and dermis were reaproximated with deep sutures following hemostasis. Care was taken to realign the vermilion border before proceeding with the superficial closure.  Once the vermilion was realigned the superfical and mucosal closure was finished.
Ftsg Text: The defect edges were debeveled with a #15 scalpel blade.  Given the location of the defect, shape of the defect and the proximity to free margins a full thickness skin graft was deemed most appropriate.  Using a sterile surgical marker, the primary defect shape was transferred to the donor site. The area thus outlined was incised deep to adipose tissue with a #15 scalpel blade.  The harvested graft was then trimmed of adipose tissue until only dermis and epidermis was left.  The skin margins of the secondary defect were undermined to an appropriate distance in all directions utilizing iris scissors.  The secondary defect was closed with interrupted buried subcutaneous sutures.  The skin edges were then re-apposed with running  sutures.  The skin graft was then placed in the primary defect and oriented appropriately.
Split-Thickness Skin Graft Text: The defect edges were debeveled with a #15 scalpel blade.  Given the location of the defect, shape of the defect and the proximity to free margins a split thickness skin graft was deemed most appropriate.  Using a sterile surgical marker, the primary defect shape was transferred to the donor site. The split thickness graft was then harvested.  The skin graft was then placed in the primary defect and oriented appropriately.
Burow's Graft Text: The defect edges were debeveled with a #15 scalpel blade.  Given the location of the defect, shape of the defect, the proximity to free margins and the presence of a standing cone deformity a Burow's skin graft was deemed most appropriate. The standing cone was removed and this tissue was then trimmed to the shape of the primary defect. The adipose tissue was also removed until only dermis and epidermis were left.  The skin margins of the secondary defect were undermined to an appropriate distance in all directions utilizing iris scissors.  The secondary defect was closed with interrupted buried subcutaneous sutures.  The skin edges were then re-apposed with running  sutures.  The skin graft was then placed in the primary defect and oriented appropriately.
Cartilage Graft Text: The defect edges were debeveled with a #15 scalpel blade.  Given the location of the defect, shape of the defect, the fact the defect involved a full thickness cartilage defect a cartilage graft was deemed most appropriate.  An appropriate donor site was identified, cleansed, and anesthetized. The cartilage graft was then harvested and transferred to the recipient site, oriented appropriately and then sutured into place.  The secondary defect was then repaired using a primary closure.
Composite Graft Text: The defect edges were debeveled with a #15 scalpel blade.  Given the location of the defect, shape of the defect, the proximity to free margins and the fact the defect was full thickness a composite graft was deemed most appropriate.  The defect was outline and then transferred to the donor site.  A full thickness graft was then excised from the donor site. The graft was then placed in the primary defect, oriented appropriately and then sutured into place.  The secondary defect was then repaired using a primary closure.
Epidermal Autograft Text: The defect edges were debeveled with a #15 scalpel blade.  Given the location of the defect, shape of the defect and the proximity to free margins an epidermal autograft was deemed most appropriate.  Using a sterile surgical marker, the primary defect shape was transferred to the donor site. The epidermal graft was then harvested.  The skin graft was then placed in the primary defect and oriented appropriately.
Dermal Autograft Text: The defect edges were debeveled with a #15 scalpel blade.  Given the location of the defect, shape of the defect and the proximity to free margins a dermal autograft was deemed most appropriate.  Using a sterile surgical marker, the primary defect shape was transferred to the donor site. The area thus outlined was incised deep to adipose tissue with a #15 scalpel blade.  The harvested graft was then trimmed of adipose and epidermal tissue until only dermis was left.  The skin graft was then placed in the primary defect and oriented appropriately.
Skin Substitute Text: The defect edges were debeveled with a #15 scalpel blade.  Given the location of the defect, shape of the defect and the proximity to free margins a skin substitute graft was deemed most appropriate.  The graft material was trimmed to fit the size of the defect. The graft was then placed in the primary defect and oriented appropriately.
Tissue Cultured Epidermal Autograft Text: The defect edges were debeveled with a #15 scalpel blade.  Given the location of the defect, shape of the defect and the proximity to free margins a tissue cultured epidermal autograft was deemed most appropriate.  The graft was then trimmed to fit the size of the defect.  The graft was then placed in the primary defect and oriented appropriately.
Xenograft Text: The defect edges were debeveled with a #15 scalpel blade.  Given the location of the defect, shape of the defect and the proximity to free margins a xenograft was deemed most appropriate.  The graft was then trimmed to fit the size of the defect.  The graft was then placed in the primary defect and oriented appropriately.
Purse String (Intermediate) Text: Given the location of the defect and the characteristics of the surrounding skin a purse string intermediate closure was deemed most appropriate.  Undermining was performed circumferentially around the surgical defect.  A purse string suture was then placed and tightened.
Purse String (Simple) Text: Given the location of the defect and the characteristics of the surrounding skin a purse string simple closure was deemed most appropriate.  Undermining was performed circumferentially around the surgical defect.  A purse string suture was then placed and tightened.
Complex Repair And Single Advancement Flap Text: The defect edges were debeveled with a #15 scalpel blade.  The primary defect was closed partially with a complex linear closure.  Given the location of the remaining defect, shape of the defect and the proximity to free margins a single advancement flap was deemed most appropriate for complete closure of the defect.  Using a sterile surgical marker, an appropriate advancement flap was drawn incorporating the defect and placing the expected incisions within the relaxed skin tension lines where possible.    The area thus outlined was incised deep to adipose tissue with a #15 scalpel blade.  The skin margins were undermined to an appropriate distance in all directions utilizing iris scissors.
Complex Repair And Double Advancement Flap Text: The defect edges were debeveled with a #15 scalpel blade.  The primary defect was closed partially with a complex linear closure.  Given the location of the remaining defect, shape of the defect and the proximity to free margins a double advancement flap was deemed most appropriate for complete closure of the defect.  Using a sterile surgical marker, an appropriate advancement flap was drawn incorporating the defect and placing the expected incisions within the relaxed skin tension lines where possible.    The area thus outlined was incised deep to adipose tissue with a #15 scalpel blade.  The skin margins were undermined to an appropriate distance in all directions utilizing iris scissors.
Complex Repair And Modified Advancement Flap Text: The defect edges were debeveled with a #15 scalpel blade.  The primary defect was closed partially with a complex linear closure.  Given the location of the remaining defect, shape of the defect and the proximity to free margins a modified advancement flap was deemed most appropriate for complete closure of the defect.  Using a sterile surgical marker, an appropriate advancement flap was drawn incorporating the defect and placing the expected incisions within the relaxed skin tension lines where possible.    The area thus outlined was incised deep to adipose tissue with a #15 scalpel blade.  The skin margins were undermined to an appropriate distance in all directions utilizing iris scissors.
Complex Repair And A-T Advancement Flap Text: The defect edges were debeveled with a #15 scalpel blade.  The primary defect was closed partially with a complex linear closure.  Given the location of the remaining defect, shape of the defect and the proximity to free margins an A-T advancement flap was deemed most appropriate for complete closure of the defect.  Using a sterile surgical marker, an appropriate advancement flap was drawn incorporating the defect and placing the expected incisions within the relaxed skin tension lines where possible.    The area thus outlined was incised deep to adipose tissue with a #15 scalpel blade.  The skin margins were undermined to an appropriate distance in all directions utilizing iris scissors.
Complex Repair And O-T Advancement Flap Text: The defect edges were debeveled with a #15 scalpel blade.  The primary defect was closed partially with a complex linear closure.  Given the location of the remaining defect, shape of the defect and the proximity to free margins an O-T advancement flap was deemed most appropriate for complete closure of the defect.  Using a sterile surgical marker, an appropriate advancement flap was drawn incorporating the defect and placing the expected incisions within the relaxed skin tension lines where possible.    The area thus outlined was incised deep to adipose tissue with a #15 scalpel blade.  The skin margins were undermined to an appropriate distance in all directions utilizing iris scissors.
Complex Repair And O-L Flap Text: The defect edges were debeveled with a #15 scalpel blade.  The primary defect was closed partially with a complex linear closure.  Given the location of the remaining defect, shape of the defect and the proximity to free margins an O-L flap was deemed most appropriate for complete closure of the defect.  Using a sterile surgical marker, an appropriate flap was drawn incorporating the defect and placing the expected incisions within the relaxed skin tension lines where possible.    The area thus outlined was incised deep to adipose tissue with a #15 scalpel blade.  The skin margins were undermined to an appropriate distance in all directions utilizing iris scissors.
Complex Repair And Bilobe Flap Text: The defect edges were debeveled with a #15 scalpel blade.  The primary defect was closed partially with a complex linear closure.  Given the location of the remaining defect, shape of the defect and the proximity to free margins a bilobe flap was deemed most appropriate for complete closure of the defect.  Using a sterile surgical marker, an appropriate advancement flap was drawn incorporating the defect and placing the expected incisions within the relaxed skin tension lines where possible.    The area thus outlined was incised deep to adipose tissue with a #15 scalpel blade.  The skin margins were undermined to an appropriate distance in all directions utilizing iris scissors.
Complex Repair And Melolabial Flap Text: The defect edges were debeveled with a #15 scalpel blade.  The primary defect was closed partially with a complex linear closure.  Given the location of the remaining defect, shape of the defect and the proximity to free margins a melolabial flap was deemed most appropriate for complete closure of the defect.  Using a sterile surgical marker, an appropriate advancement flap was drawn incorporating the defect and placing the expected incisions within the relaxed skin tension lines where possible.    The area thus outlined was incised deep to adipose tissue with a #15 scalpel blade.  The skin margins were undermined to an appropriate distance in all directions utilizing iris scissors.
Complex Repair And Rotation Flap Text: The defect edges were debeveled with a #15 scalpel blade.  The primary defect was closed partially with a complex linear closure.  Given the location of the remaining defect, shape of the defect and the proximity to free margins a rotation flap was deemed most appropriate for complete closure of the defect.  Using a sterile surgical marker, an appropriate advancement flap was drawn incorporating the defect and placing the expected incisions within the relaxed skin tension lines where possible.    The area thus outlined was incised deep to adipose tissue with a #15 scalpel blade.  The skin margins were undermined to an appropriate distance in all directions utilizing iris scissors.
Complex Repair And Rhombic Flap Text: The defect edges were debeveled with a #15 scalpel blade.  The primary defect was closed partially with a complex linear closure.  Given the location of the remaining defect, shape of the defect and the proximity to free margins a rhombic flap was deemed most appropriate for complete closure of the defect.  Using a sterile surgical marker, an appropriate advancement flap was drawn incorporating the defect and placing the expected incisions within the relaxed skin tension lines where possible.    The area thus outlined was incised deep to adipose tissue with a #15 scalpel blade.  The skin margins were undermined to an appropriate distance in all directions utilizing iris scissors.
Complex Repair And Transposition Flap Text: The defect edges were debeveled with a #15 scalpel blade.  The primary defect was closed partially with a complex linear closure.  Given the location of the remaining defect, shape of the defect and the proximity to free margins a transposition flap was deemed most appropriate for complete closure of the defect.  Using a sterile surgical marker, an appropriate advancement flap was drawn incorporating the defect and placing the expected incisions within the relaxed skin tension lines where possible.    The area thus outlined was incised deep to adipose tissue with a #15 scalpel blade.  The skin margins were undermined to an appropriate distance in all directions utilizing iris scissors.
Complex Repair And V-Y Plasty Text: The defect edges were debeveled with a #15 scalpel blade.  The primary defect was closed partially with a complex linear closure.  Given the location of the remaining defect, shape of the defect and the proximity to free margins a V-Y plasty was deemed most appropriate for complete closure of the defect.  Using a sterile surgical marker, an appropriate advancement flap was drawn incorporating the defect and placing the expected incisions within the relaxed skin tension lines where possible.    The area thus outlined was incised deep to adipose tissue with a #15 scalpel blade.  The skin margins were undermined to an appropriate distance in all directions utilizing iris scissors.
Complex Repair And M Plasty Text: The defect edges were debeveled with a #15 scalpel blade.  The primary defect was closed partially with a complex linear closure.  Given the location of the remaining defect, shape of the defect and the proximity to free margins an M plasty was deemed most appropriate for complete closure of the defect.  Using a sterile surgical marker, an appropriate advancement flap was drawn incorporating the defect and placing the expected incisions within the relaxed skin tension lines where possible.    The area thus outlined was incised deep to adipose tissue with a #15 scalpel blade.  The skin margins were undermined to an appropriate distance in all directions utilizing iris scissors.
Complex Repair And Double M Plasty Text: The defect edges were debeveled with a #15 scalpel blade.  The primary defect was closed partially with a complex linear closure.  Given the location of the remaining defect, shape of the defect and the proximity to free margins a double M plasty was deemed most appropriate for complete closure of the defect.  Using a sterile surgical marker, an appropriate advancement flap was drawn incorporating the defect and placing the expected incisions within the relaxed skin tension lines where possible.    The area thus outlined was incised deep to adipose tissue with a #15 scalpel blade.  The skin margins were undermined to an appropriate distance in all directions utilizing iris scissors.
Complex Repair And W Plasty Text: The defect edges were debeveled with a #15 scalpel blade.  The primary defect was closed partially with a complex linear closure.  Given the location of the remaining defect, shape of the defect and the proximity to free margins a W plasty was deemed most appropriate for complete closure of the defect.  Using a sterile surgical marker, an appropriate advancement flap was drawn incorporating the defect and placing the expected incisions within the relaxed skin tension lines where possible.    The area thus outlined was incised deep to adipose tissue with a #15 scalpel blade.  The skin margins were undermined to an appropriate distance in all directions utilizing iris scissors.
Complex Repair And Z Plasty Text: The defect edges were debeveled with a #15 scalpel blade.  The primary defect was closed partially with a complex linear closure.  Given the location of the remaining defect, shape of the defect and the proximity to free margins a Z plasty was deemed most appropriate for complete closure of the defect.  Using a sterile surgical marker, an appropriate advancement flap was drawn incorporating the defect and placing the expected incisions within the relaxed skin tension lines where possible.    The area thus outlined was incised deep to adipose tissue with a #15 scalpel blade.  The skin margins were undermined to an appropriate distance in all directions utilizing iris scissors.
Complex Repair And Dorsal Nasal Flap Text: The defect edges were debeveled with a #15 scalpel blade.  The primary defect was closed partially with a complex linear closure.  Given the location of the remaining defect, shape of the defect and the proximity to free margins a dorsal nasal flap was deemed most appropriate for complete closure of the defect.  Using a sterile surgical marker, an appropriate flap was drawn incorporating the defect and placing the expected incisions within the relaxed skin tension lines where possible.    The area thus outlined was incised deep to adipose tissue with a #15 scalpel blade.  The skin margins were undermined to an appropriate distance in all directions utilizing iris scissors.
Complex Repair And Ftsg Text: The defect edges were debeveled with a #15 scalpel blade.  The primary defect was closed partially with a complex linear closure.  Given the location of the defect, shape of the defect and the proximity to free margins a full thickness skin graft was deemed most appropriate to repair the remaining defect.  The graft was trimmed to fit the size of the remaining defect.  The graft was then placed in the primary defect, oriented appropriately, and sutured into place.
Complex Repair And Burow's Graft Text: The defect edges were debeveled with a #15 scalpel blade.  The primary defect was closed partially with a complex linear closure.  Given the location of the defect, shape of the defect, the proximity to free margins and the presence of a standing cone deformity a Burow's graft was deemed most appropriate to repair the remaining defect.  The graft was trimmed to fit the size of the remaining defect.  The graft was then placed in the primary defect, oriented appropriately, and sutured into place.
Complex Repair And Split-Thickness Skin Graft Text: The defect edges were debeveled with a #15 scalpel blade.  The primary defect was closed partially with a complex linear closure.  Given the location of the defect, shape of the defect and the proximity to free margins a split thickness skin graft was deemed most appropriate to repair the remaining defect.  The graft was trimmed to fit the size of the remaining defect.  The graft was then placed in the primary defect, oriented appropriately, and sutured into place.
Complex Repair And Epidermal Autograft Text: The defect edges were debeveled with a #15 scalpel blade.  The primary defect was closed partially with a complex linear closure.  Given the location of the defect, shape of the defect and the proximity to free margins an epidermal autograft was deemed most appropriate to repair the remaining defect.  The graft was trimmed to fit the size of the remaining defect.  The graft was then placed in the primary defect, oriented appropriately, and sutured into place.
Complex Repair And Dermal Autograft Text: The defect edges were debeveled with a #15 scalpel blade.  The primary defect was closed partially with a complex linear closure.  Given the location of the defect, shape of the defect and the proximity to free margins an dermal autograft was deemed most appropriate to repair the remaining defect.  The graft was trimmed to fit the size of the remaining defect.  The graft was then placed in the primary defect, oriented appropriately, and sutured into place.
Complex Repair And Tissue Cultured Epidermal Autograft Text: The defect edges were debeveled with a #15 scalpel blade.  The primary defect was closed partially with a complex linear closure.  Given the location of the defect, shape of the defect and the proximity to free margins an tissue cultured epidermal autograft was deemed most appropriate to repair the remaining defect.  The graft was trimmed to fit the size of the remaining defect.  The graft was then placed in the primary defect, oriented appropriately, and sutured into place.
Complex Repair And Xenograft Text: The defect edges were debeveled with a #15 scalpel blade.  The primary defect was closed partially with a complex linear closure.  Given the location of the defect, shape of the defect and the proximity to free margins a xenograft was deemed most appropriate to repair the remaining defect.  The graft was trimmed to fit the size of the remaining defect.  The graft was then placed in the primary defect, oriented appropriately, and sutured into place.
Complex Repair And Skin Substitute Graft Text: The defect edges were debeveled with a #15 scalpel blade.  The primary defect was closed partially with a complex linear closure.  Given the location of the remaining defect, shape of the defect and the proximity to free margins a skin substitute graft was deemed most appropriate to repair the remaining defect.  The graft was trimmed to fit the size of the remaining defect.  The graft was then placed in the primary defect, oriented appropriately, and sutured into place.
Path Notes (To The Dermatopathologist): Please check margins.
Consent was obtained from the patient. The risks and benefits to therapy were discussed in detail. Specifically, the risks of infection, scarring, bleeding, prolonged wound healing, incomplete removal, allergy to anesthesia, nerve injury and recurrence were addressed. Prior to the procedure, the treatment site was clearly identified and confirmed by the patient. All components of Universal Protocol/PAUSE Rule completed.
Render Post-Care Instructions In Note?: yes
Post-Care Instructions: I reviewed with the patient in detail post-care instructions. Patient is not to engage in any heavy lifting, exercise, or swimming for the next 14 days. Should the patient develop any fevers, chills, bleeding, severe pain patient will contact the office immediately.
Home Suture Removal Text: Patient was provided a home suture removal kit and will remove their sutures at home.  If they have any questions or difficulties they will call the office.
Where Do You Want The Question To Include Opioid Counseling Located?: Case Summary Tab
Billing Type: Third-Party Bill
Information: Selecting Yes will display possible errors in your note based on the variables you have selected. This validation is only offered as a suggestion for you. PLEASE NOTE THAT THE VALIDATION TEXT WILL BE REMOVED WHEN YOU FINALIZE YOUR NOTE. IF YOU WANT TO FAX A PRELIMINARY NOTE YOU WILL NEED TO TOGGLE THIS TO 'NO' IF YOU DO NOT WANT IT IN YOUR FAXED NOTE.

## 2020-07-22 NOTE — PROCEDURE: SHAVE REMOVAL
Medical Necessity Information: It is in your best interest to select a reason for this procedure from the list below. All of these items fulfill various CMS LCD requirements except the new and changing color options.
Medical Necessity Clause: This procedure was medically necessary because the lesion that was treated was:
Lab: [id number]
Lab Facility: 3
Detail Level: Detailed
Was A Bandage Applied: Yes
Size Of Lesion In Cm (Required): 0.6
X Size Of Lesion In Cm (Optional): 0
Biopsy Method: Dermablade
Anesthesia Type: 1% lidocaine without epinephrine
Anesthesia Volume In Cc: 1
Hemostasis: Electrocautery and Aluminum Chloride
Wound Care: Bacitracin
Path Notes (To The Dermatopathologist): Please check margins.
Render Path Notes In Note?: No
Consent was obtained from the patient. The risks and benefits to therapy were discussed in detail. Specifically, the risks of infection, scarring, bleeding, prolonged wound healing, incomplete removal, allergy to anesthesia, nerve injury and recurrence were addressed. Prior to the procedure, the treatment site was clearly identified and confirmed by the patient. All components of Universal Protocol/PAUSE Rule completed.
Post-Care Instructions: I reviewed with the patient in detail post-care instructions. Patient is to keep the biopsy site dry overnight, and then apply bacitracin twice daily until healed. Patient may apply hydrogen peroxide soaks to remove any crusting.
Notification Instructions: Patient will be notified of biopsy results. However, patient instructed to call the office if not contacted within 2 weeks.
Billing Type: Third-Party Bill

## 2020-07-22 NOTE — PROCEDURE: SUTURE REMOVAL (GLOBAL PERIOD)
Detail Level: Zone
Add 62032 Cpt? (Important Note: In 2017 The Use Of 46315 Is Being Tracked By Cms To Determine Future Global Period Reimbursement For Global Periods): yes

## 2020-11-11 ENCOUNTER — RX ONLY (RX ONLY)
Age: 70
End: 2020-11-11

## 2020-11-11 ENCOUNTER — DOCTOR'S OFFICE (OUTPATIENT)
Dept: URBAN - NONMETROPOLITAN AREA CLINIC 1 | Facility: CLINIC | Age: 70
Setting detail: OPHTHALMOLOGY
End: 2020-11-11
Payer: MEDICARE

## 2020-11-11 ENCOUNTER — APPOINTMENT (OUTPATIENT)
Dept: LAB | Facility: HOSPITAL | Age: 70
End: 2020-11-11
Attending: PODIATRIST
Payer: COMMERCIAL

## 2020-11-11 ENCOUNTER — TRANSCRIBE ORDERS (OUTPATIENT)
Dept: ADMINISTRATIVE | Facility: HOSPITAL | Age: 70
End: 2020-11-11

## 2020-11-11 VITALS — HEIGHT: 55 IN

## 2020-11-11 DIAGNOSIS — Z96.1: ICD-10-CM

## 2020-11-11 DIAGNOSIS — B35.1 DERMATOPHYTOSIS OF NAIL: ICD-10-CM

## 2020-11-11 DIAGNOSIS — B35.1 DERMATOPHYTOSIS OF NAIL: Primary | ICD-10-CM

## 2020-11-11 DIAGNOSIS — H43.813: ICD-10-CM

## 2020-11-11 DIAGNOSIS — H10.13: ICD-10-CM

## 2020-11-11 DIAGNOSIS — H33.311: ICD-10-CM

## 2020-11-11 DIAGNOSIS — D31.42: ICD-10-CM

## 2020-11-11 DIAGNOSIS — H35.373: ICD-10-CM

## 2020-11-11 DIAGNOSIS — H53.2: ICD-10-CM

## 2020-11-11 LAB
ALBUMIN SERPL BCP-MCNC: 3.7 G/DL (ref 3.5–5)
ALP SERPL-CCNC: 93 U/L (ref 46–116)
ALT SERPL W P-5'-P-CCNC: 37 U/L (ref 12–78)
AST SERPL W P-5'-P-CCNC: 28 U/L (ref 5–45)
BILIRUB DIRECT SERPL-MCNC: 0.11 MG/DL (ref 0–0.2)
BILIRUB SERPL-MCNC: 0.5 MG/DL (ref 0.2–1)
PROT SERPL-MCNC: 6.9 G/DL (ref 6.4–8.2)

## 2020-11-11 PROCEDURE — 92014 COMPRE OPH EXAM EST PT 1/>: CPT | Performed by: OPHTHALMOLOGY

## 2020-11-11 PROCEDURE — 92134 CPTRZ OPH DX IMG PST SGM RTA: CPT | Performed by: OPHTHALMOLOGY

## 2020-11-11 PROCEDURE — 36415 COLL VENOUS BLD VENIPUNCTURE: CPT

## 2020-11-11 PROCEDURE — 80076 HEPATIC FUNCTION PANEL: CPT

## 2020-11-11 ASSESSMENT — REFRACTION_CURRENTRX
OD_OVR_VA: 20/
OD_VPRISM_DIRECTION: SV
OS_SPHERE: +1.25
OS_OVR_VA: 20/
OD_SPHERE: +1.25
OS_VPRISM_DIRECTION: SV

## 2020-11-11 ASSESSMENT — LID EXAM ASSESSMENTS
OD_BLEPHARITIS: 2+
OS_BLEPHARITIS: 2+

## 2020-11-11 ASSESSMENT — REFRACTION_MANIFEST
OS_AXIS: 035
OS_ADD: +2.50
OS_SPHERE: PLANO
OD_AXIS: 105
OD_VA1: 20/25
OD_VA2: 20/25
OD_ADD: +2.50
OD_CYLINDER: -1.00
OD_SPHERE: -0.75
OS_CYLINDER: -0.25
OS_VA1: 20/20-2
OS_VA2: 20/20-2

## 2020-11-11 ASSESSMENT — SPHEQUIV_DERIVED
OS_SPHEQUIV: -0.5
OD_SPHEQUIV: -1.25
OD_SPHEQUIV: -1.25

## 2020-11-11 ASSESSMENT — PUNCTA - ASSESSMENT
OS_PUNCTA: LLL LUL
OD_PUNCTA: RLL RUL

## 2020-11-11 ASSESSMENT — REFRACTION_AUTOREFRACTION
OD_AXIS: 095
OD_SPHERE: -1.00
OS_CYLINDER: -1.00
OS_SPHERE: 0.00
OS_AXIS: 177
OD_CYLINDER: -0.50

## 2020-11-11 ASSESSMENT — CONFRONTATIONAL VISUAL FIELD TEST (CVF)
OS_FINDINGS: FULL
OD_FINDINGS: FULL

## 2020-11-11 ASSESSMENT — VISUAL ACUITY
OS_BCVA: 20/150
OD_BCVA: 20/30-1

## 2021-01-13 ENCOUNTER — APPOINTMENT (RX ONLY)
Dept: URBAN - NONMETROPOLITAN AREA CLINIC 4 | Facility: CLINIC | Age: 71
Setting detail: DERMATOLOGY
End: 2021-01-13

## 2021-01-13 DIAGNOSIS — L72.8 OTHER FOLLICULAR CYSTS OF THE SKIN AND SUBCUTANEOUS TISSUE: ICD-10-CM | Status: RESOLVED

## 2021-01-13 DIAGNOSIS — L82.1 OTHER SEBORRHEIC KERATOSIS: ICD-10-CM

## 2021-01-13 DIAGNOSIS — D22 MELANOCYTIC NEVI: ICD-10-CM

## 2021-01-13 DIAGNOSIS — L81.4 OTHER MELANIN HYPERPIGMENTATION: ICD-10-CM

## 2021-01-13 PROBLEM — D22.5 MELANOCYTIC NEVI OF TRUNK: Status: ACTIVE | Noted: 2021-01-13

## 2021-01-13 PROCEDURE — 99213 OFFICE O/P EST LOW 20 MIN: CPT

## 2021-01-13 PROCEDURE — ? TREATMENT REGIMEN

## 2021-01-13 PROCEDURE — ? COUNSELING

## 2021-01-13 ASSESSMENT — LOCATION SIMPLE DESCRIPTION DERM
LOCATION SIMPLE: CHEST
LOCATION SIMPLE: UPPER BACK
LOCATION SIMPLE: LEFT FOREARM
LOCATION SIMPLE: RIGHT FOREARM
LOCATION SIMPLE: RIGHT POSTERIOR UPPER ARM
LOCATION SIMPLE: LEFT BUTTOCK
LOCATION SIMPLE: LEFT LOWER BACK
LOCATION SIMPLE: RIGHT CHEEK

## 2021-01-13 ASSESSMENT — LOCATION DETAILED DESCRIPTION DERM
LOCATION DETAILED: SUPERIOR THORACIC SPINE
LOCATION DETAILED: LEFT BUTTOCK
LOCATION DETAILED: INFERIOR THORACIC SPINE
LOCATION DETAILED: LEFT PROXIMAL DORSAL FOREARM
LOCATION DETAILED: RIGHT SUPERIOR LATERAL MALAR CHEEK
LOCATION DETAILED: LEFT SUPERIOR MEDIAL MIDBACK
LOCATION DETAILED: RIGHT PROXIMAL DORSAL FOREARM
LOCATION DETAILED: MIDDLE STERNUM
LOCATION DETAILED: RIGHT PROXIMAL POSTERIOR UPPER ARM

## 2021-01-13 ASSESSMENT — LOCATION ZONE DERM
LOCATION ZONE: FACE
LOCATION ZONE: TRUNK
LOCATION ZONE: ARM

## 2021-02-17 ENCOUNTER — APPOINTMENT (RX ONLY)
Dept: URBAN - NONMETROPOLITAN AREA CLINIC 4 | Facility: CLINIC | Age: 71
Setting detail: DERMATOLOGY
End: 2021-02-17

## 2021-02-17 DIAGNOSIS — L82.0 INFLAMED SEBORRHEIC KERATOSIS: ICD-10-CM

## 2021-02-17 PROCEDURE — 17110 DESTRUCTION B9 LES UP TO 14: CPT

## 2021-02-17 PROCEDURE — ? LIQUID NITROGEN

## 2021-02-17 PROCEDURE — ? COUNSELING

## 2021-02-17 ASSESSMENT — LOCATION SIMPLE DESCRIPTION DERM
LOCATION SIMPLE: RIGHT FOREHEAD
LOCATION SIMPLE: ABDOMEN
LOCATION SIMPLE: LEFT FOREHEAD

## 2021-02-17 ASSESSMENT — LOCATION DETAILED DESCRIPTION DERM
LOCATION DETAILED: LEFT SUPERIOR FOREHEAD
LOCATION DETAILED: LEFT SUPERIOR MEDIAL FOREHEAD
LOCATION DETAILED: RIGHT LATERAL ABDOMEN
LOCATION DETAILED: RIGHT SUPERIOR FOREHEAD
LOCATION DETAILED: RIGHT LATERAL FOREHEAD
LOCATION DETAILED: LEFT MEDIAL FOREHEAD
LOCATION DETAILED: LEFT LATERAL ABDOMEN
LOCATION DETAILED: LEFT LATERAL FOREHEAD

## 2021-02-17 ASSESSMENT — LOCATION ZONE DERM
LOCATION ZONE: TRUNK
LOCATION ZONE: FACE

## 2021-02-17 NOTE — PROCEDURE: LIQUID NITROGEN
Medical Necessity Clause: This procedure was medically necessary because the lesions that were treated were:
Render Post-Care Instructions In Note?: yes
Medical Necessity Information: It is in your best interest to select a reason for this procedure from the list below. All of these items fulfill various CMS LCD requirements except the new and changing color options.
Number Of Freeze-Thaw Cycles: 1 freeze-thaw cycle
Post-Care Instructions: I reviewed with the patient in detail post-care instructions. Patient is to wear sunprotection, and avoid picking at any of the treated lesions. Pt may apply Vaseline to crusted or scabbing areas.
Add 52 Modifier (Optional): no
Detail Level: Zone
Consent: The patient's consent was obtained including but not limited to risks of crusting, scabbing, blistering, scarring, darker or lighter pigmentary change, recurrence, incomplete removal and infection.

## 2021-03-17 ENCOUNTER — APPOINTMENT (RX ONLY)
Dept: URBAN - NONMETROPOLITAN AREA CLINIC 4 | Facility: CLINIC | Age: 71
Setting detail: DERMATOLOGY
End: 2021-03-17

## 2021-03-17 DIAGNOSIS — L82.0 INFLAMED SEBORRHEIC KERATOSIS: ICD-10-CM

## 2021-03-17 PROCEDURE — ? LIQUID NITROGEN

## 2021-03-17 PROCEDURE — 17110 DESTRUCTION B9 LES UP TO 14: CPT

## 2021-03-17 PROCEDURE — ? COUNSELING

## 2021-03-17 ASSESSMENT — LOCATION DETAILED DESCRIPTION DERM
LOCATION DETAILED: LEFT SUPERIOR FOREHEAD
LOCATION DETAILED: LEFT FOREHEAD
LOCATION DETAILED: RIGHT RIB CAGE
LOCATION DETAILED: RIGHT SUPERIOR MEDIAL MIDBACK
LOCATION DETAILED: LEFT SUPERIOR MEDIAL FOREHEAD
LOCATION DETAILED: RIGHT MID-UPPER BACK
LOCATION DETAILED: LEFT INFERIOR MEDIAL UPPER BACK

## 2021-03-17 ASSESSMENT — LOCATION SIMPLE DESCRIPTION DERM
LOCATION SIMPLE: LEFT UPPER BACK
LOCATION SIMPLE: LEFT FOREHEAD
LOCATION SIMPLE: RIGHT LOWER BACK
LOCATION SIMPLE: RIGHT UPPER BACK
LOCATION SIMPLE: ABDOMEN

## 2021-03-17 ASSESSMENT — LOCATION ZONE DERM
LOCATION ZONE: FACE
LOCATION ZONE: TRUNK

## 2021-03-17 NOTE — PROCEDURE: LIQUID NITROGEN
Add 52 Modifier (Optional): no
Detail Level: Zone
Medical Necessity Information: It is in your best interest to select a reason for this procedure from the list below. All of these items fulfill various CMS LCD requirements except the new and changing color options.
Consent: The patient's consent was obtained including but not limited to risks of crusting, scabbing, blistering, scarring, darker or lighter pigmentary change, recurrence, incomplete removal and infection.
Render Post-Care Instructions In Note?: yes
Post-Care Instructions: I reviewed with the patient in detail post-care instructions. Patient is to wear sunprotection, and avoid picking at any of the treated lesions. Pt may apply Vaseline to crusted or scabbing areas.
Number Of Freeze-Thaw Cycles: 1 freeze-thaw cycle
Medical Necessity Clause: This procedure was medically necessary because the lesions that were treated were:

## 2021-04-21 ENCOUNTER — APPOINTMENT (RX ONLY)
Dept: URBAN - NONMETROPOLITAN AREA CLINIC 4 | Facility: CLINIC | Age: 71
Setting detail: DERMATOLOGY
End: 2021-04-21

## 2021-04-21 DIAGNOSIS — L57.0 ACTINIC KERATOSIS: ICD-10-CM

## 2021-04-21 DIAGNOSIS — L82.0 INFLAMED SEBORRHEIC KERATOSIS: ICD-10-CM

## 2021-04-21 PROCEDURE — 17110 DESTRUCTION B9 LES UP TO 14: CPT

## 2021-04-21 PROCEDURE — ? LIQUID NITROGEN

## 2021-04-21 PROCEDURE — ? COUNSELING

## 2021-04-21 PROCEDURE — 17000 DESTRUCT PREMALG LESION: CPT | Mod: 59

## 2021-04-21 PROCEDURE — 17003 DESTRUCT PREMALG LES 2-14: CPT | Mod: 59

## 2021-04-21 ASSESSMENT — LOCATION ZONE DERM
LOCATION ZONE: FACE
LOCATION ZONE: TRUNK

## 2021-04-21 ASSESSMENT — LOCATION DETAILED DESCRIPTION DERM
LOCATION DETAILED: RIGHT CENTRAL TEMPLE
LOCATION DETAILED: RIGHT LATERAL TEMPLE
LOCATION DETAILED: LEFT MEDIAL TEMPLE
LOCATION DETAILED: LEFT CENTRAL ZYGOMA
LOCATION DETAILED: LEFT BUTTOCK
LOCATION DETAILED: INFERIOR MID FOREHEAD
LOCATION DETAILED: LEFT CENTRAL TEMPLE
LOCATION DETAILED: LEFT MEDIAL ZYGOMA

## 2021-04-21 ASSESSMENT — LOCATION SIMPLE DESCRIPTION DERM
LOCATION SIMPLE: RIGHT TEMPLE
LOCATION SIMPLE: LEFT ZYGOMA
LOCATION SIMPLE: LEFT BUTTOCK
LOCATION SIMPLE: INFERIOR FOREHEAD
LOCATION SIMPLE: LEFT TEMPLE

## 2021-04-21 NOTE — PROCEDURE: LIQUID NITROGEN
Duration Of Freeze Thaw-Cycle (Seconds): 5
Consent: The patient's consent was obtained including but not limited to risks of crusting, scabbing, blistering, scarring, darker or lighter pigmentary change, recurrence, incomplete removal and infection.
Render Note In Bullet Format When Appropriate: No
Detail Level: Zone
Post-Care Instructions: I reviewed with the patient in detail post-care instructions. Patient is to wear sunprotection, and avoid picking at any of the treated lesions. Pt may apply Vaseline to crusted or scabbing areas.
Number Of Freeze-Thaw Cycles: 1 freeze-thaw cycle
Render Post-Care Instructions In Note?: yes
Medical Necessity Information: It is in your best interest to select a reason for this procedure from the list below. All of these items fulfill various CMS LCD requirements except the new and changing color options.
Medical Necessity Clause: This procedure was medically necessary because the lesions that were treated were:

## 2021-05-10 ENCOUNTER — DOCTOR'S OFFICE (OUTPATIENT)
Dept: URBAN - NONMETROPOLITAN AREA CLINIC 1 | Facility: CLINIC | Age: 71
Setting detail: OPHTHALMOLOGY
End: 2021-05-10
Payer: MEDICARE

## 2021-05-10 DIAGNOSIS — H43.813: ICD-10-CM

## 2021-05-10 DIAGNOSIS — H33.311: ICD-10-CM

## 2021-05-10 DIAGNOSIS — H35.373: ICD-10-CM

## 2021-05-10 PROCEDURE — 92134 CPTRZ OPH DX IMG PST SGM RTA: CPT | Performed by: OPHTHALMOLOGY

## 2021-05-10 PROCEDURE — 92201 OPSCPY EXTND RTA DRAW UNI/BI: CPT | Performed by: OPHTHALMOLOGY

## 2021-05-10 PROCEDURE — 92014 COMPRE OPH EXAM EST PT 1/>: CPT | Performed by: OPHTHALMOLOGY

## 2021-05-10 ASSESSMENT — REFRACTION_AUTOREFRACTION
OD_SPHERE: -1.00
OS_CYLINDER: -1.00
OD_AXIS: 095
OD_CYLINDER: -0.50
OS_AXIS: 177
OS_SPHERE: 0.00

## 2021-05-10 ASSESSMENT — LID EXAM ASSESSMENTS
OD_BLEPHARITIS: 2+
OS_BLEPHARITIS: 2+

## 2021-05-10 ASSESSMENT — PUNCTA - ASSESSMENT
OD_PUNCTA: RLL RUL
OS_PUNCTA: LLL LUL

## 2021-05-10 ASSESSMENT — REFRACTION_MANIFEST
OD_VA2: 20/25
OD_AXIS: 105
OS_ADD: +2.50
OS_CYLINDER: -0.25
OD_VA1: 20/25
OS_SPHERE: PLANO
OS_VA1: 20/20-2
OS_VA2: 20/20-2
OS_AXIS: 035
OD_ADD: +2.50
OD_CYLINDER: -1.00
OD_SPHERE: -0.75

## 2021-05-10 ASSESSMENT — SPHEQUIV_DERIVED
OD_SPHEQUIV: -1.25
OS_SPHEQUIV: -0.5
OD_SPHEQUIV: -1.25

## 2021-05-10 ASSESSMENT — REFRACTION_CURRENTRX
OD_SPHERE: +1.25
OD_OVR_VA: 20/
OS_VPRISM_DIRECTION: SV
OS_SPHERE: +1.25
OD_VPRISM_DIRECTION: SV
OS_OVR_VA: 20/

## 2021-05-10 ASSESSMENT — VISUAL ACUITY
OD_BCVA: 20/30+2
OS_BCVA: 20/150

## 2021-05-10 ASSESSMENT — CONFRONTATIONAL VISUAL FIELD TEST (CVF)
OD_FINDINGS: FULL
OS_FINDINGS: FULL

## 2021-05-13 ENCOUNTER — DOCTOR'S OFFICE (OUTPATIENT)
Dept: URBAN - NONMETROPOLITAN AREA CLINIC 1 | Facility: CLINIC | Age: 71
Setting detail: OPHTHALMOLOGY
End: 2021-05-13
Payer: MEDICARE

## 2021-05-13 VITALS — HEIGHT: 55 IN

## 2021-05-13 DIAGNOSIS — H43.813: ICD-10-CM

## 2021-05-13 DIAGNOSIS — H53.121: ICD-10-CM

## 2021-05-13 DIAGNOSIS — H33.311: ICD-10-CM

## 2021-05-13 DIAGNOSIS — H35.373: ICD-10-CM

## 2021-05-13 PROCEDURE — 92014 COMPRE OPH EXAM EST PT 1/>: CPT | Performed by: OPHTHALMOLOGY

## 2021-05-13 PROCEDURE — 92250 FUNDUS PHOTOGRAPHY W/I&R: CPT | Performed by: OPHTHALMOLOGY

## 2021-05-13 PROCEDURE — 92235 FLUORESCEIN ANGRPH MLTIFRAME: CPT | Performed by: OPHTHALMOLOGY

## 2021-05-13 ASSESSMENT — REFRACTION_AUTOREFRACTION
OS_SPHERE: 0.00
OD_CYLINDER: -0.50
OS_CYLINDER: -1.00
OS_AXIS: 177
OD_AXIS: 095
OD_SPHERE: -1.00

## 2021-05-13 ASSESSMENT — REFRACTION_MANIFEST
OD_VA2: 20/25
OS_ADD: +2.50
OS_VA1: 20/20-2
OD_SPHERE: -0.75
OS_AXIS: 035
OD_AXIS: 105
OD_VA1: 20/25
OD_ADD: +2.50
OD_CYLINDER: -1.00
OS_VA2: 20/20-2
OS_SPHERE: PLANO
OS_CYLINDER: -0.25

## 2021-05-13 ASSESSMENT — REFRACTION_CURRENTRX
OS_SPHERE: +1.25
OS_VPRISM_DIRECTION: SV
OD_SPHERE: +1.25
OD_VPRISM_DIRECTION: SV
OS_OVR_VA: 20/
OD_OVR_VA: 20/

## 2021-05-13 ASSESSMENT — SPHEQUIV_DERIVED
OS_SPHEQUIV: -0.5
OD_SPHEQUIV: -1.25
OD_SPHEQUIV: -1.25

## 2021-05-13 ASSESSMENT — LID EXAM ASSESSMENTS
OD_BLEPHARITIS: 1+
OS_BLEPHARITIS: 1+

## 2021-05-13 ASSESSMENT — CONFRONTATIONAL VISUAL FIELD TEST (CVF)
OD_FINDINGS: FULL
OS_FINDINGS: FULL

## 2021-05-13 ASSESSMENT — PUNCTA - ASSESSMENT
OS_PUNCTA: LLL LUL
OD_PUNCTA: RLL RUL

## 2021-05-13 ASSESSMENT — VISUAL ACUITY
OD_BCVA: 20/25-1
OS_BCVA: 20/150

## 2021-05-26 ENCOUNTER — APPOINTMENT (RX ONLY)
Dept: URBAN - NONMETROPOLITAN AREA CLINIC 4 | Facility: CLINIC | Age: 71
Setting detail: DERMATOLOGY
End: 2021-05-26

## 2021-05-26 DIAGNOSIS — L81.4 OTHER MELANIN HYPERPIGMENTATION: ICD-10-CM

## 2021-05-26 DIAGNOSIS — L82.1 OTHER SEBORRHEIC KERATOSIS: ICD-10-CM

## 2021-05-26 DIAGNOSIS — L72.8 OTHER FOLLICULAR CYSTS OF THE SKIN AND SUBCUTANEOUS TISSUE: ICD-10-CM

## 2021-05-26 PROCEDURE — ? COUNSELING

## 2021-05-26 PROCEDURE — ? TREATMENT REGIMEN

## 2021-05-26 PROCEDURE — 99213 OFFICE O/P EST LOW 20 MIN: CPT

## 2021-05-26 ASSESSMENT — LOCATION SIMPLE DESCRIPTION DERM
LOCATION SIMPLE: RIGHT PRETIBIAL REGION
LOCATION SIMPLE: LEFT POSTERIOR THIGH
LOCATION SIMPLE: CHEST
LOCATION SIMPLE: LEFT GUTEAL CREASE
LOCATION SIMPLE: RIGHT UPPER BACK
LOCATION SIMPLE: LEFT CHEEK
LOCATION SIMPLE: INFERIOR FOREHEAD
LOCATION SIMPLE: LEFT TEMPLE
LOCATION SIMPLE: RIGHT TEMPLE
LOCATION SIMPLE: RIGHT CHEEK

## 2021-05-26 ASSESSMENT — LOCATION DETAILED DESCRIPTION DERM
LOCATION DETAILED: RIGHT SUPERIOR MEDIAL UPPER BACK
LOCATION DETAILED: RIGHT CENTRAL MALAR CHEEK
LOCATION DETAILED: LEFT SUPERIOR TEMPLE
LOCATION DETAILED: INFERIOR MID FOREHEAD
LOCATION DETAILED: LEFT LATERAL MALAR CHEEK
LOCATION DETAILED: LEFT GLUTEAL CREASE
LOCATION DETAILED: RIGHT MEDIAL PROXIMAL PRETIBIAL REGION
LOCATION DETAILED: LEFT PROXIMAL POSTERIOR THIGH
LOCATION DETAILED: RIGHT SUPERIOR TEMPLE
LOCATION DETAILED: STERNAL NOTCH

## 2021-05-26 ASSESSMENT — LOCATION ZONE DERM
LOCATION ZONE: TRUNK
LOCATION ZONE: FACE
LOCATION ZONE: LEG

## 2021-05-27 ENCOUNTER — DOCTOR'S OFFICE (OUTPATIENT)
Dept: URBAN - NONMETROPOLITAN AREA CLINIC 1 | Facility: CLINIC | Age: 71
Setting detail: OPHTHALMOLOGY
End: 2021-05-27
Payer: MEDICARE

## 2021-05-27 DIAGNOSIS — H33.311: ICD-10-CM

## 2021-05-27 DIAGNOSIS — H53.121: ICD-10-CM

## 2021-05-27 DIAGNOSIS — H43.813: ICD-10-CM

## 2021-05-27 DIAGNOSIS — H35.373: ICD-10-CM

## 2021-05-27 PROCEDURE — 92012 INTRM OPH EXAM EST PATIENT: CPT | Performed by: OPHTHALMOLOGY

## 2021-05-27 PROCEDURE — 92083 EXTENDED VISUAL FIELD XM: CPT | Performed by: OPHTHALMOLOGY

## 2021-05-27 ASSESSMENT — CONFRONTATIONAL VISUAL FIELD TEST (CVF)
OD_FINDINGS: FULL
OS_FINDINGS: FULL

## 2021-05-27 ASSESSMENT — REFRACTION_AUTOREFRACTION
OS_CYLINDER: -1.00
OS_SPHERE: 0.00
OD_CYLINDER: -0.50
OS_AXIS: 177
OD_AXIS: 095
OD_SPHERE: -1.00

## 2021-05-27 ASSESSMENT — REFRACTION_MANIFEST
OD_VA2: 20/25
OS_SPHERE: PLANO
OD_AXIS: 105
OS_AXIS: 035
OD_VA1: 20/25
OS_CYLINDER: -0.25
OS_VA2: 20/20-2
OD_ADD: +2.50
OS_VA1: 20/20-2
OS_ADD: +2.50
OD_CYLINDER: -1.00
OD_SPHERE: -0.75

## 2021-05-27 ASSESSMENT — SPHEQUIV_DERIVED
OS_SPHEQUIV: -0.5
OD_SPHEQUIV: -1.25
OD_SPHEQUIV: -1.25

## 2021-05-27 ASSESSMENT — REFRACTION_CURRENTRX
OD_VPRISM_DIRECTION: SV
OS_SPHERE: +1.25
OS_OVR_VA: 20/
OD_SPHERE: +1.25
OS_VPRISM_DIRECTION: SV
OD_OVR_VA: 20/

## 2021-05-27 ASSESSMENT — VISUAL ACUITY
OS_BCVA: 20/150
OD_BCVA: 20/25-1

## 2021-06-01 ENCOUNTER — TRANSCRIBE ORDERS (OUTPATIENT)
Dept: ADMINISTRATIVE | Facility: HOSPITAL | Age: 71
End: 2021-06-01

## 2021-06-01 DIAGNOSIS — Z13.6 ENCOUNTER FOR SCREENING FOR VASCULAR DISEASE: Primary | ICD-10-CM

## 2021-06-07 ENCOUNTER — RX ONLY (RX ONLY)
Age: 71
End: 2021-06-07

## 2021-06-07 RX ORDER — DOXYCYCLINE 40 MG/1
CAPSULE ORAL
Qty: 90 | Refills: 4 | Status: ACTIVE | OUTPATIENT

## 2021-06-24 ENCOUNTER — HOSPITAL ENCOUNTER (OUTPATIENT)
Dept: NON INVASIVE DIAGNOSTICS | Facility: HOSPITAL | Age: 71
Discharge: HOME | End: 2021-06-24
Attending: SURGERY
Payer: COMMERCIAL

## 2021-06-24 DIAGNOSIS — Z13.6 ENCOUNTER FOR SCREENING FOR VASCULAR DISEASE: ICD-10-CM

## 2021-06-24 PROCEDURE — 93922 UPR/L XTREMITY ART 2 LEVELS: CPT

## 2021-06-24 PROCEDURE — VASC: Performed by: SURGERY

## 2021-10-04 ENCOUNTER — APPOINTMENT (RX ONLY)
Dept: URBAN - NONMETROPOLITAN AREA CLINIC 4 | Facility: CLINIC | Age: 71
Setting detail: DERMATOLOGY
End: 2021-10-04

## 2021-10-04 DIAGNOSIS — L82.0 INFLAMED SEBORRHEIC KERATOSIS: ICD-10-CM

## 2021-10-04 DIAGNOSIS — L57.0 ACTINIC KERATOSIS: ICD-10-CM

## 2021-10-04 DIAGNOSIS — L73.8 OTHER SPECIFIED FOLLICULAR DISORDERS: ICD-10-CM

## 2021-10-04 PROCEDURE — 17110 DESTRUCTION B9 LES UP TO 14: CPT

## 2021-10-04 PROCEDURE — ? COUNSELING

## 2021-10-04 PROCEDURE — ? LIQUID NITROGEN

## 2021-10-04 PROCEDURE — ? ELECTRODESICCATION

## 2021-10-04 PROCEDURE — 17000 DESTRUCT PREMALG LESION: CPT | Mod: 59

## 2021-10-04 ASSESSMENT — LOCATION DETAILED DESCRIPTION DERM
LOCATION DETAILED: RIGHT INFERIOR UPPER BACK
LOCATION DETAILED: RIGHT FOREHEAD
LOCATION DETAILED: RIGHT INFERIOR FOREHEAD
LOCATION DETAILED: LEFT INFERIOR FOREHEAD
LOCATION DETAILED: LEFT INFERIOR CENTRAL BUCCAL CHEEK
LOCATION DETAILED: LEFT LATERAL ABDOMEN
LOCATION DETAILED: INFERIOR MID FOREHEAD
LOCATION DETAILED: RIGHT MID-UPPER BACK
LOCATION DETAILED: RIGHT INFERIOR MEDIAL FOREHEAD

## 2021-10-04 ASSESSMENT — LOCATION SIMPLE DESCRIPTION DERM
LOCATION SIMPLE: ABDOMEN
LOCATION SIMPLE: LEFT CHEEK
LOCATION SIMPLE: LEFT FOREHEAD
LOCATION SIMPLE: INFERIOR FOREHEAD
LOCATION SIMPLE: RIGHT FOREHEAD
LOCATION SIMPLE: RIGHT UPPER BACK

## 2021-10-04 ASSESSMENT — LOCATION ZONE DERM
LOCATION ZONE: TRUNK
LOCATION ZONE: FACE

## 2021-10-04 NOTE — PROCEDURE: LIQUID NITROGEN
Show Aperture Variable?: Yes
Duration Of Freeze Thaw-Cycle (Seconds): 5
Render Note In Bullet Format When Appropriate: No
Number Of Freeze-Thaw Cycles: 1 freeze-thaw cycle
Detail Level: Zone
Post-Care Instructions: I reviewed with the patient in detail post-care instructions. Patient is to wear sunprotection, and avoid picking at any of the treated lesions. Pt may apply Vaseline to crusted or scabbing areas.
Consent: The patient's consent was obtained including but not limited to risks of crusting, scabbing, blistering, scarring, darker or lighter pigmentary change, recurrence, incomplete removal and infection.
Medical Necessity Information: It is in your best interest to select a reason for this procedure from the list below. All of these items fulfill various CMS LCD requirements except the new and changing color options.
Medical Necessity Clause: This procedure was medically necessary because the lesions that were treated were:

## 2021-10-04 NOTE — PROCEDURE: ELECTRODESICCATION
Post-Care Instructions: I reviewed with the patient in detail post-care instructions. Patient is to wear sunprotection, and avoid picking at any of the treated lesions. Pt may apply Vaseline to crusted or scabbing areas
Include Z78.9 (Other Specified Conditions Influencing Health Status) As An Associated Diagnosis?: No
Consent: The patient's consent was obtained including but not limited to risks of crusting, scabbing, blistering, scarring, darker or lighter pigmentary change, recurrence, incomplete removal and infection.
Medical Necessity Information: It is in your best interest to select a reason for this procedure from the list below. All of these items fulfill various CMS LCD requirements except the new and changing color options.
Detail Level: Zone
Medical Necessity Clause: This procedure was medically necessary because the lesions that were treated were:
Anesthesia Type: 1% lidocaine with epinephrine

## 2021-10-18 ENCOUNTER — DOCTOR'S OFFICE (OUTPATIENT)
Dept: URBAN - NONMETROPOLITAN AREA CLINIC 1 | Facility: CLINIC | Age: 71
Setting detail: OPHTHALMOLOGY
End: 2021-10-18
Payer: COMMERCIAL

## 2021-10-18 DIAGNOSIS — H52.223: ICD-10-CM

## 2021-10-18 DIAGNOSIS — H52.4: ICD-10-CM

## 2021-10-18 PROCEDURE — 92012 INTRM OPH EXAM EST PATIENT: CPT | Performed by: OPTOMETRIST

## 2021-10-18 ASSESSMENT — PUNCTA - ASSESSMENT
OD_PUNCTA: RLL RUL
OS_PUNCTA: LLL LUL

## 2021-10-18 ASSESSMENT — SPHEQUIV_DERIVED
OD_SPHEQUIV: -1.25
OD_SPHEQUIV: -1.125
OS_SPHEQUIV: -0.375

## 2021-10-18 ASSESSMENT — LID EXAM ASSESSMENTS
OD_BLEPHARITIS: 1+
OS_BLEPHARITIS: 1+

## 2021-10-18 ASSESSMENT — REFRACTION_MANIFEST
OS_VA1: 20/20-2
OS_SPHERE: PLANO
OD_AXIS: 105
OD_VA1: 20/25+2
OS_ADD: +2.50
OD_VA2: 20/25
OS_CYLINDER: -0.75
OS_AXIS: 180
OD_ADD: +2.50
OD_SPHERE: -0.75
OD_CYLINDER: -1.00
OS_VA2: 20/20-2

## 2021-10-18 ASSESSMENT — VISUAL ACUITY
OD_BCVA: 20/30-2
OS_BCVA: 20/100-1

## 2021-10-18 ASSESSMENT — REFRACTION_CURRENTRX
OD_VPRISM_DIRECTION: SV
OS_VPRISM_DIRECTION: SV
OS_SPHERE: +1.25
OD_SPHERE: +1.25
OD_OVR_VA: 20/
OS_OVR_VA: 20/

## 2021-10-18 ASSESSMENT — REFRACTION_AUTOREFRACTION
OD_SPHERE: -0.50
OD_AXIS: 117
OS_SPHERE: 0.00
OS_AXIS: 002
OD_CYLINDER: -1.25
OS_CYLINDER: -0.75

## 2021-10-18 ASSESSMENT — CONFRONTATIONAL VISUAL FIELD TEST (CVF)
OS_FINDINGS: FULL
OD_FINDINGS: FULL

## 2021-11-15 ENCOUNTER — DOCTOR'S OFFICE (OUTPATIENT)
Dept: URBAN - NONMETROPOLITAN AREA CLINIC 1 | Facility: CLINIC | Age: 71
Setting detail: OPHTHALMOLOGY
End: 2021-11-15
Payer: MEDICARE

## 2021-11-15 DIAGNOSIS — H53.121: ICD-10-CM

## 2021-11-15 DIAGNOSIS — H35.373: ICD-10-CM

## 2021-11-15 DIAGNOSIS — D31.42: ICD-10-CM

## 2021-11-15 DIAGNOSIS — H43.813: ICD-10-CM

## 2021-11-15 DIAGNOSIS — H33.311: ICD-10-CM

## 2021-11-15 PROCEDURE — 92201 OPSCPY EXTND RTA DRAW UNI/BI: CPT | Performed by: OPHTHALMOLOGY

## 2021-11-15 PROCEDURE — 99214 OFFICE O/P EST MOD 30 MIN: CPT | Performed by: OPHTHALMOLOGY

## 2021-11-15 PROCEDURE — 92134 CPTRZ OPH DX IMG PST SGM RTA: CPT | Performed by: OPHTHALMOLOGY

## 2021-11-15 ASSESSMENT — REFRACTION_AUTOREFRACTION
OS_AXIS: 002
OS_SPHERE: 0.00
OD_AXIS: 117
OD_SPHERE: -0.50
OD_CYLINDER: -1.25
OS_CYLINDER: -0.75

## 2021-11-15 ASSESSMENT — REFRACTION_MANIFEST
OD_SPHERE: -0.75
OS_ADD: +2.50
OD_VA1: 20/25+2
OD_AXIS: 105
OS_AXIS: 180
OD_ADD: +2.50
OS_CYLINDER: -0.75
OS_VA2: 20/20-2
OD_CYLINDER: -1.00
OD_VA2: 20/25
OS_VA1: 20/20-2
OS_SPHERE: PLANO

## 2021-11-15 ASSESSMENT — VISUAL ACUITY
OD_BCVA: 20/30-2
OS_BCVA: 20/100

## 2021-11-15 ASSESSMENT — REFRACTION_CURRENTRX
OD_VPRISM_DIRECTION: SV
OS_SPHERE: +1.25
OD_OVR_VA: 20/
OS_VPRISM_DIRECTION: SV
OD_SPHERE: +1.25
OS_OVR_VA: 20/

## 2021-11-15 ASSESSMENT — PUNCTA - ASSESSMENT
OD_PUNCTA: RLL RUL
OS_PUNCTA: LLL LUL

## 2021-11-15 ASSESSMENT — LID EXAM ASSESSMENTS
OD_BLEPHARITIS: 1+
OS_BLEPHARITIS: 1+

## 2021-11-15 ASSESSMENT — CONFRONTATIONAL VISUAL FIELD TEST (CVF)
OS_FINDINGS: FULL
OD_FINDINGS: FULL

## 2021-11-15 ASSESSMENT — SPHEQUIV_DERIVED
OD_SPHEQUIV: -1.125
OS_SPHEQUIV: -0.375
OD_SPHEQUIV: -1.25

## 2021-11-29 ENCOUNTER — DOCTOR'S OFFICE (OUTPATIENT)
Dept: URBAN - NONMETROPOLITAN AREA CLINIC 1 | Facility: CLINIC | Age: 71
Setting detail: OPHTHALMOLOGY
End: 2021-11-29
Payer: MEDICARE

## 2021-11-29 ENCOUNTER — OPTICAL OFFICE (OUTPATIENT)
Dept: URBAN - NONMETROPOLITAN AREA CLINIC 4 | Facility: CLINIC | Age: 71
Setting detail: OPHTHALMOLOGY
End: 2021-11-29

## 2021-11-29 DIAGNOSIS — H52.4: ICD-10-CM

## 2021-11-29 DIAGNOSIS — H35.373: ICD-10-CM

## 2021-11-29 DIAGNOSIS — H43.813: ICD-10-CM

## 2021-11-29 DIAGNOSIS — H52.02: ICD-10-CM

## 2021-11-29 DIAGNOSIS — H52.223: ICD-10-CM

## 2021-11-29 DIAGNOSIS — H33.311: ICD-10-CM

## 2021-11-29 DIAGNOSIS — H53.121: ICD-10-CM

## 2021-11-29 DIAGNOSIS — H52.11: ICD-10-CM

## 2021-11-29 PROCEDURE — V2744 TINT PHOTOCHROMATIC LENS/ES: HCPCS | Performed by: OPTOMETRIST

## 2021-11-29 PROCEDURE — 99213 OFFICE O/P EST LOW 20 MIN: CPT | Performed by: OPHTHALMOLOGY

## 2021-11-29 PROCEDURE — 92250 FUNDUS PHOTOGRAPHY W/I&R: CPT | Performed by: OPHTHALMOLOGY

## 2021-11-29 PROCEDURE — V2020 VISION SVCS FRAMES PURCHASES: HCPCS | Performed by: OPTOMETRIST

## 2021-11-29 PROCEDURE — V2750 ANTI-REFLECTIVE COATING: HCPCS | Performed by: OPTOMETRIST

## 2021-11-29 PROCEDURE — V2781 PROGRESSIVE LENS PER LENS: HCPCS | Performed by: OPTOMETRIST

## 2021-11-29 PROCEDURE — V2784 LENS POLYCARB OR EQUAL: HCPCS | Performed by: OPTOMETRIST

## 2021-11-29 PROCEDURE — 92235 FLUORESCEIN ANGRPH MLTIFRAME: CPT | Performed by: OPHTHALMOLOGY

## 2021-11-29 ASSESSMENT — REFRACTION_MANIFEST
OS_VA2: 20/20-2
OS_ADD: +2.50
OD_SPHERE: -0.75
OD_VA2: 20/25
OD_VA1: 20/25+2
OS_CYLINDER: -0.75
OD_CYLINDER: -1.00
OS_SPHERE: PLANO
OS_VA1: 20/20-2
OS_AXIS: 180
OD_ADD: +2.50
OD_AXIS: 105

## 2021-11-29 ASSESSMENT — LID EXAM ASSESSMENTS
OD_BLEPHARITIS: 1+
OS_BLEPHARITIS: 1+

## 2021-11-29 ASSESSMENT — PUNCTA - ASSESSMENT
OS_PUNCTA: LLL LUL
OD_PUNCTA: RLL RUL

## 2021-11-29 ASSESSMENT — VISUAL ACUITY
OS_BCVA: 20/70
OD_BCVA: 20/30

## 2021-11-29 ASSESSMENT — SPHEQUIV_DERIVED
OS_SPHEQUIV: -0.375
OD_SPHEQUIV: -1.25
OD_SPHEQUIV: -1.125

## 2021-11-29 ASSESSMENT — REFRACTION_AUTOREFRACTION
OS_AXIS: 002
OD_CYLINDER: -1.25
OS_CYLINDER: -0.75
OS_SPHERE: 0.00
OD_AXIS: 117
OD_SPHERE: -0.50

## 2021-11-29 ASSESSMENT — REFRACTION_CURRENTRX
OS_OVR_VA: 20/
OD_OVR_VA: 20/
OD_SPHERE: +1.25
OS_SPHERE: +1.25
OD_VPRISM_DIRECTION: SV
OS_VPRISM_DIRECTION: SV

## 2021-11-29 ASSESSMENT — CONFRONTATIONAL VISUAL FIELD TEST (CVF)
OD_FINDINGS: FULL
OS_FINDINGS: FULL

## 2021-12-06 ENCOUNTER — APPOINTMENT (RX ONLY)
Dept: URBAN - NONMETROPOLITAN AREA CLINIC 4 | Facility: CLINIC | Age: 71
Setting detail: DERMATOLOGY
End: 2021-12-06

## 2021-12-06 DIAGNOSIS — L82.0 INFLAMED SEBORRHEIC KERATOSIS: ICD-10-CM

## 2021-12-06 PROCEDURE — ? LIQUID NITROGEN

## 2021-12-06 PROCEDURE — ? COUNSELING

## 2021-12-06 PROCEDURE — 17110 DESTRUCTION B9 LES UP TO 14: CPT

## 2021-12-06 ASSESSMENT — LOCATION ZONE DERM
LOCATION ZONE: FACE
LOCATION ZONE: TRUNK

## 2021-12-06 ASSESSMENT — LOCATION SIMPLE DESCRIPTION DERM
LOCATION SIMPLE: UPPER BACK
LOCATION SIMPLE: LEFT FOREHEAD
LOCATION SIMPLE: LEFT CHEEK
LOCATION SIMPLE: RIGHT TEMPLE
LOCATION SIMPLE: RIGHT UPPER BACK
LOCATION SIMPLE: RIGHT FOREHEAD

## 2021-12-06 ASSESSMENT — LOCATION DETAILED DESCRIPTION DERM
LOCATION DETAILED: RIGHT CENTRAL TEMPLE
LOCATION DETAILED: LEFT SUPERIOR MEDIAL FOREHEAD
LOCATION DETAILED: SUPERIOR THORACIC SPINE
LOCATION DETAILED: RIGHT INFERIOR FOREHEAD
LOCATION DETAILED: LEFT CENTRAL MALAR CHEEK
LOCATION DETAILED: RIGHT MEDIAL UPPER BACK
LOCATION DETAILED: RIGHT MID-UPPER BACK
LOCATION DETAILED: RIGHT INFERIOR LATERAL FOREHEAD

## 2021-12-06 NOTE — PROCEDURE: LIQUID NITROGEN
Post-Care Instructions: I reviewed with the patient in detail post-care instructions. Patient is to wear sunprotection, and avoid picking at any of the treated lesions. Pt may apply Vaseline to crusted or scabbing areas.
Number Of Freeze-Thaw Cycles: 1 freeze-thaw cycle
Show Topical Anesthesia Variable?: Yes
Consent: The patient's consent was obtained including but not limited to risks of crusting, scabbing, blistering, scarring, darker or lighter pigmentary change, recurrence, incomplete removal and infection.
Include Z78.9 (Other Specified Conditions Influencing Health Status) As An Associated Diagnosis?: No
Medical Necessity Information: It is in your best interest to select a reason for this procedure from the list below. All of these items fulfill various CMS LCD requirements except the new and changing color options.
Detail Level: Zone
Medical Necessity Clause: This procedure was medically necessary because the lesions that were treated were:

## 2022-01-10 ENCOUNTER — HOSPITAL ENCOUNTER (EMERGENCY)
Facility: HOSPITAL | Age: 72
Discharge: HOME | End: 2022-01-10
Attending: EMERGENCY MEDICINE
Payer: COMMERCIAL

## 2022-01-10 ENCOUNTER — APPOINTMENT (EMERGENCY)
Dept: RADIOLOGY | Facility: HOSPITAL | Age: 72
End: 2022-01-10
Payer: COMMERCIAL

## 2022-01-10 VITALS
HEART RATE: 75 BPM | OXYGEN SATURATION: 96 % | RESPIRATION RATE: 16 BRPM | SYSTOLIC BLOOD PRESSURE: 119 MMHG | TEMPERATURE: 98.7 F | DIASTOLIC BLOOD PRESSURE: 65 MMHG

## 2022-01-10 DIAGNOSIS — U07.1 COVID-19: Primary | ICD-10-CM

## 2022-01-10 PROCEDURE — 99283 EMERGENCY DEPT VISIT LOW MDM: CPT

## 2022-01-10 PROCEDURE — 71045 X-RAY EXAM CHEST 1 VIEW: CPT

## 2022-01-10 PROCEDURE — 99284 EMERGENCY DEPT VISIT MOD MDM: CPT | Performed by: EMERGENCY MEDICINE

## 2022-01-10 RX ORDER — BENZONATATE 100 MG/1
100 CAPSULE ORAL 2 TIMES DAILY PRN
Qty: 21 CAPSULE | Refills: 0 | Status: SHIPPED | OUTPATIENT
Start: 2022-01-10

## 2022-01-10 RX ORDER — ONDANSETRON 4 MG/1
4 TABLET, ORALLY DISINTEGRATING ORAL ONCE
Status: COMPLETED | OUTPATIENT
Start: 2022-01-10 | End: 2022-01-10

## 2022-01-10 RX ORDER — FAMOTIDINE 20 MG/1
20 TABLET, FILM COATED ORAL 2 TIMES DAILY
Qty: 10 TABLET | Refills: 0 | Status: SHIPPED | OUTPATIENT
Start: 2022-01-10 | End: 2022-01-15

## 2022-01-10 RX ORDER — ONDANSETRON 4 MG/1
4 TABLET, FILM COATED ORAL EVERY 8 HOURS PRN
Qty: 12 TABLET | Refills: 0 | Status: SHIPPED | OUTPATIENT
Start: 2022-01-10

## 2022-01-10 RX ADMIN — ONDANSETRON 4 MG: 4 TABLET, ORALLY DISINTEGRATING ORAL at 12:45

## 2022-01-10 NOTE — ED PROVIDER NOTES
Final Diagnosis:  1  COVID-19        Chief Complaint   Patient presents with    Cough     Cough, nausea, COVID positive      HPI  Patient presents for evaluation COVID infection  Patient states that she has been having significantly decreased oral intake  She states that whenever she tries to eat something she feels like she is going to gag  Denies any chest pain, abdominal pain, shortness of breath  No fever chills  Patient has not been taking any medication to help with her symptoms as non was prescribed  Denies any palpitations  No cardiac or pulmonary history  Unless otherwise specified:  - No language barrier    - History obtained from patient  - There are no limitations to the history obtained  - Previous charting was reviewed    PMH:   has a past medical history of Joint pain and Urine incontinence  PSH:   has a past surgical history that includes Bladder surgery; Cholecystectomy; Hysterectomy; Tonsillectomy; Eye surgery; Throat surgery; Shoulder surgery; Breast surgery; Rotator cuff repair; Colonoscopy; and IR spine procedure (10/24/2019)  ROS:  Review of Systems   -   - 13 point ROS was performed and all are normal unless stated in the history above  - Nursing note reviewed  Vitals reviewed  - Orders placed by myself and/or advanced practitioner / resident  PE:   Vitals:    01/10/22 1200   BP: 119/65   BP Location: Left arm   Pulse: 75   Resp: 16   Temp: 98 7 °F (37 1 °C)   TempSrc: Temporal   SpO2: 96%     Vitals reviewed by me  Unless otherwise specified above:    General: VS reviewed  Appears in NAD    Head: Normocephalic, atraumatic  Eyes: EOM-I  No exudate  ENT: Atraumatic external nose and ears  No malocclusion  No stridor  No drooling  Neck: No JVD  CV: No pallor noted  Lungs:   No tachypnea  No respiratory distress    Abdomen:  Soft, non-tender, non-distended    MSK:   FROM spontaneously  No obvious deformity    Skin: Dry, intact   No obvious rash     Neuro: Awake, alert, GCS15, CN II-XII grossly intact  Speaking in full sentences  Motor grossly intact  Psychiatric/Behavioral: Appropriate mood and affect   Exam: deferred    Physical Exam     Procedures   A:  - Nursing note reviewed  XR chest 1 view portable   ED Interpretation   No acute consolidation to indicate a focal bacterial pneumonia  Patient does have ongoing COVID infection  No pneumothorax or effusion        Orders Placed This Encounter   Procedures    XR chest 1 view portable    Contact and airborne isolation status     Labs Reviewed - No data to display      Final Diagnosis:  1  COVID-19        P:  - patient with normal vital signs  No tachycardia or tachypnea  Saturating well on room air  Will provide chest x-ray to evaluate for any occult pneumonia  Will provide Zofran see if this gives her symptom improvement   -chest x-ray with no acute consolidation on my interpretation  Will provide Zofran, Pepcid for symptom relief  Return precautions    Medications   ondansetron (ZOFRAN-ODT) dispersible tablet 4 mg (4 mg Oral Given 1/10/22 1245)     Time reflects when diagnosis was documented in both MDM as applicable and the Disposition within this note     Time User Action Codes Description Comment    1/10/2022  2:34 PM Armond Heimlich Add [U07 1] COVID-19       ED Disposition     ED Disposition Condition Date/Time Comment    Discharge Stable Mon Ochoa 10, 2022  2:33 PM Josr Hameed discharge to home/self care              Follow-up Information     Follow up With Specialties Details Why Contact Info    Meenakshi Anthony DO Family Medicine   [address] [facility]  [phone]          Discharge Medication List as of 1/10/2022  2:35 PM      START taking these medications    Details   benzonatate (TESSALON PERLES) 100 mg capsule Take 1 capsule (100 mg total) by mouth 2 (two) times a day as needed for cough, Starting Mon 1/10/2022, Normal famotidine (PEPCID) 20 mg tablet Take 1 tablet (20 mg total) by mouth 2 (two) times a day for 5 days, Starting Mon 1/10/2022, Until Sat 1/15/2022, Normal      ondansetron (ZOFRAN) 4 mg tablet Take 1 tablet (4 mg total) by mouth every 8 (eight) hours as needed for nausea or vomiting, Starting Mon 1/10/2022, Normal         CONTINUE these medications which have NOT CHANGED    Details   doxycycline (ORACEA) 40 MG capsule Take 40 mg by mouth, Historical Med      meclizine (ANTIVERT) 25 mg tablet Take 25 mg by mouth Three times daily as needed, Starting Wed 5/29/2019, Until Thu 5/28/2020, Historical Med      pantoprazole (PROTONIX) 20 mg tablet Take by mouth, Historical Med           No discharge procedures on file  Prior to Admission Medications   Prescriptions Last Dose Informant Patient Reported? Taking?   doxycycline (ORACEA) 40 MG capsule  Self Yes No   Sig: Take 40 mg by mouth   meclizine (ANTIVERT) 25 mg tablet  Self Yes No   Sig: Take 25 mg by mouth Three times daily as needed   pantoprazole (PROTONIX) 20 mg tablet  Self Yes No   Sig: Take by mouth      Facility-Administered Medications: None       Portions of the record may have been created with voice recognition software  Occasional wrong word or "sound a like" substitutions may have occurred due to the inherent limitations of voice recognition software  Read the chart carefully and recognize, using context, where substitutions have occurred      Electronically signed by:  MD Delmy Doe MD  01/10/22 0878

## 2022-01-10 NOTE — Clinical Note
Damaris Paiz was seen and treated in our emergency department on 1/10/2022  Diagnosis:     Padmaja Cope  may return to work on return date  She may return on this date: 01/14/2022         If you have any questions or concerns, please don't hesitate to call        Jordana Doan MD    ______________________________           _______________          _______________  Hospital Representative                              Date                                Time

## 2022-01-25 ENCOUNTER — DOCTOR'S OFFICE (OUTPATIENT)
Dept: URBAN - NONMETROPOLITAN AREA CLINIC 1 | Facility: CLINIC | Age: 72
Setting detail: OPHTHALMOLOGY
End: 2022-01-25

## 2022-01-25 DIAGNOSIS — H52.223: ICD-10-CM

## 2022-01-25 DIAGNOSIS — H52.4: ICD-10-CM

## 2022-01-25 PROBLEM — H40.043 STEROID RESPONDER; BOTH EYES: Status: ACTIVE | Noted: 2018-02-22

## 2022-01-25 PROBLEM — H04.121 DRY EYES; RIGHT EYE, LEFT EYE: Status: ACTIVE | Noted: 2022-01-25

## 2022-01-25 PROBLEM — H53.2: Status: ACTIVE | Noted: 2019-02-27

## 2022-01-25 PROBLEM — B30.9: Status: ACTIVE | Noted: 2017-11-16

## 2022-01-25 PROBLEM — H04.122 DRY EYES; RIGHT EYE, LEFT EYE: Status: ACTIVE | Noted: 2022-01-25

## 2022-01-25 PROBLEM — H35.373 EPIRETINAL MEMBRANE ; BOTH EYES: Status: ACTIVE | Noted: 2018-05-08

## 2022-01-25 PROBLEM — H01.00: Status: ACTIVE | Noted: 2021-05-13

## 2022-01-25 PROBLEM — H53.121 SUBJECTIVE VISUAL DISTURBANCE; RIGHT EYE: Status: ACTIVE | Noted: 2021-05-13

## 2022-01-25 PROCEDURE — NCRX N/C GLASSES RX: Performed by: OPTOMETRIST

## 2022-01-25 ASSESSMENT — REFRACTION_AUTOREFRACTION
OD_SPHERE: -0.25
OS_SPHERE: +0.50
OD_AXIS: 180
OD_CYLINDER: 0.00
OS_CYLINDER: -0.75
OS_AXIS: 24

## 2022-01-25 ASSESSMENT — VISUAL ACUITY
OD_BCVA: 20/40+2
OS_BCVA: 20/150

## 2022-01-25 ASSESSMENT — REFRACTION_MANIFEST
OD_VA1: 20/25+2
OD_AXIS: 105
OS_ADD: +2.50
OD_SPHERE: -0.75
OS_SPHERE: PLANO
OS_CYLINDER: -0.25
OD_VA1: 20/25+2
OS_AXIS: 015
OD_ADD: +2.50
OS_VA2: 20/20-2
OD_CYLINDER: -0.75
OD_SPHERE: -0.75
OS_AXIS: 015
OD_AXIS: 105
OD_CYLINDER: -0.75
OS_VA1: 20/25
OD_VA2: 20/25
OS_CYLINDER: -0.75
OS_VA1: 20/20-2
OS_SPHERE: -0.25

## 2022-01-25 ASSESSMENT — SPHEQUIV_DERIVED
OS_SPHEQUIV: -0.375
OD_SPHEQUIV: -0.25
OD_SPHEQUIV: -1.125
OS_SPHEQUIV: 0.125
OD_SPHEQUIV: -1.125

## 2022-01-25 ASSESSMENT — REFRACTION_CURRENTRX
OS_SPHERE: +1.25
OD_SPHERE: +1.25
OD_VPRISM_DIRECTION: SV
OS_VPRISM_DIRECTION: SV
OD_OVR_VA: 20/
OS_OVR_VA: 20/

## 2022-04-27 ENCOUNTER — APPOINTMENT (RX ONLY)
Dept: URBAN - NONMETROPOLITAN AREA CLINIC 4 | Facility: CLINIC | Age: 72
Setting detail: DERMATOLOGY
End: 2022-04-27

## 2022-04-27 DIAGNOSIS — L82.0 INFLAMED SEBORRHEIC KERATOSIS: ICD-10-CM

## 2022-04-27 PROCEDURE — ? MEDICARE ABN

## 2022-04-27 PROCEDURE — ? COUNSELING

## 2022-04-27 PROCEDURE — ? LIQUID NITROGEN

## 2022-04-27 PROCEDURE — 17111 DESTRUCTION B9 LESIONS 15/>: CPT

## 2022-04-27 ASSESSMENT — LOCATION DETAILED DESCRIPTION DERM
LOCATION DETAILED: RIGHT MEDIAL UPPER BACK
LOCATION DETAILED: RIGHT MEDIAL FOREHEAD
LOCATION DETAILED: RIGHT CENTRAL MALAR CHEEK
LOCATION DETAILED: MIDDLE STERNUM
LOCATION DETAILED: LEFT INFERIOR FOREHEAD
LOCATION DETAILED: LEFT MEDIAL SUPERIOR CHEST
LOCATION DETAILED: RIGHT INFERIOR LATERAL FOREHEAD
LOCATION DETAILED: LEFT INFERIOR MEDIAL FOREHEAD
LOCATION DETAILED: LEFT CENTRAL MALAR CHEEK
LOCATION DETAILED: LEFT SUPERIOR FOREHEAD
LOCATION DETAILED: RIGHT FOREHEAD
LOCATION DETAILED: RIGHT MID-UPPER BACK
LOCATION DETAILED: LEFT INFERIOR LATERAL FOREHEAD

## 2022-04-27 ASSESSMENT — LOCATION SIMPLE DESCRIPTION DERM
LOCATION SIMPLE: LEFT FOREHEAD
LOCATION SIMPLE: LEFT CHEEK
LOCATION SIMPLE: RIGHT CHEEK
LOCATION SIMPLE: CHEST
LOCATION SIMPLE: RIGHT UPPER BACK
LOCATION SIMPLE: RIGHT FOREHEAD

## 2022-04-27 ASSESSMENT — LOCATION ZONE DERM
LOCATION ZONE: TRUNK
LOCATION ZONE: FACE

## 2022-04-27 NOTE — PROCEDURE: LIQUID NITROGEN
Spray Paint Text: The liquid nitrogen was applied to the skin utilizing a spray paint frosting technique.
Medical Necessity Clause: This procedure was medically necessary because the lesions that were treated were:
Add 52 Modifier (Optional): no
Show Applicator Variable?: Yes
Detail Level: Zone
Post-Care Instructions: I reviewed with the patient in detail post-care instructions. Patient is to wear sunprotection, and avoid picking at any of the treated lesions. Pt may apply Vaseline to crusted or scabbing areas.
Number Of Freeze-Thaw Cycles: 1 freeze-thaw cycle
Duration Of Freeze Thaw-Cycle (Seconds): 5-10
Consent: The patient's consent was obtained including but not limited to risks of crusting, scabbing, blistering, scarring, darker or lighter pigmentary change, recurrence, incomplete removal and infection.
Medical Necessity Information: It is in your best interest to select a reason for this procedure from the list below. All of these items fulfill various CMS LCD requirements except the new and changing color options.

## 2022-04-27 NOTE — PROCEDURE: MEDICARE ABN
Detail Level: Detailed
Procedure (Limit To 20 Characters): liquid nitrogen
Payment Option: Option 1: Bill Medicare, await for decision on payment.
Reason?: Additional Information
Reason?: non-covered service

## 2022-05-20 ENCOUNTER — APPOINTMENT (EMERGENCY)
Dept: CT IMAGING | Facility: HOSPITAL | Age: 72
End: 2022-05-20
Payer: COMMERCIAL

## 2022-05-20 ENCOUNTER — APPOINTMENT (EMERGENCY)
Dept: RADIOLOGY | Facility: HOSPITAL | Age: 72
End: 2022-05-20
Payer: COMMERCIAL

## 2022-05-20 ENCOUNTER — HOSPITAL ENCOUNTER (EMERGENCY)
Facility: HOSPITAL | Age: 72
Discharge: HOME | End: 2022-05-20
Attending: EMERGENCY MEDICINE
Payer: COMMERCIAL

## 2022-05-20 VITALS
OXYGEN SATURATION: 95 % | TEMPERATURE: 97.9 F | DIASTOLIC BLOOD PRESSURE: 65 MMHG | BODY MASS INDEX: 28.58 KG/M2 | HEART RATE: 73 BPM | HEIGHT: 67 IN | SYSTOLIC BLOOD PRESSURE: 137 MMHG | RESPIRATION RATE: 16 BRPM | WEIGHT: 182.1 LBS

## 2022-05-20 DIAGNOSIS — M54.9 BACK PAIN: ICD-10-CM

## 2022-05-20 DIAGNOSIS — M25.511 ACUTE PAIN OF RIGHT SHOULDER: ICD-10-CM
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
## 2022-05-20 DIAGNOSIS — S16.1XXA STRAIN OF NECK MUSCLE, INITIAL ENCOUNTER: ICD-10-CM

## 2022-05-20 PROBLEM — E55.9 VITAMIN D DEFICIENCY: Status: ACTIVE | Noted: 2020-11-04

## 2022-05-20 PROBLEM — K63.5 POLYP OF COLON: Status: ACTIVE | Noted: 2022-05-20

## 2022-05-20 PROBLEM — M15.9 GENERALIZED OSTEOARTHRITIS: Status: ACTIVE | Noted: 2020-11-04

## 2022-05-20 PROCEDURE — 73564 X-RAY EXAM KNEE 4 OR MORE: CPT

## 2022-05-20 PROCEDURE — 72128 CT CHEST SPINE W/O DYE: CPT

## 2022-05-20 PROCEDURE — 73030 X-RAY EXAM OF SHOULDER: CPT

## 2022-05-20 PROCEDURE — 99284 EMERGENCY DEPT VISIT MOD MDM: CPT

## 2022-05-20 PROCEDURE — 72131 CT LUMBAR SPINE W/O DYE: CPT

## 2022-05-20 PROCEDURE — G1004 CDSM NDSC: HCPCS

## 2022-05-20 PROCEDURE — 72125 CT NECK SPINE W/O DYE: CPT

## 2022-05-20 PROCEDURE — 99284 EMERGENCY DEPT VISIT MOD MDM: CPT | Performed by: PHYSICIAN ASSISTANT

## 2022-05-21 NOTE — DISCHARGE INSTRUCTIONS
Alternate ice/heat, topical muscle rubs, etc   Continue OTC medications as needed for pain relief  Follow up with orthopedics for recheck of your shoulder  Return to ER as needed

## 2022-05-21 NOTE — ED PROVIDER NOTES
History  Chief Complaint   Patient presents with    Neck Pain     Pt  reports going to fall and catching herself before she did; did not hit her head, remembers the event, no LOC or thinners; reporting neck, right arm, and middle back pain     74year old female with PMH arthritis presents ambulatory from home for evaluation  She reports she tripped while walking earlier and fell to the right side  She reports she "jammed everything"  She denies hitting head  No LOC  Denies aspirin or blood thinners  Reports right shoulder pain, right knee pain, neck and back pain  No prodromal symptoms  Denies headache, visual disturbance, dizziness, lightheadedness  Denies chest pain, SOB, N/V/D, abdominal pain  Denies numbness, tingling, weakness  Has been ambulatory since fall  Pain worse with movement  Took advil without relief  This occurred today prior to arrival   Denies prior back pain  No loss of bladder or bowel control  No recent illness  History provided by:  Patient   used: No        Prior to Admission Medications   Prescriptions Last Dose Informant Patient Reported?  Taking?   benzonatate (TESSALON PERLES) 100 mg capsule   No No   Sig: Take 1 capsule (100 mg total) by mouth 2 (two) times a day as needed for cough   doxycycline (ORACEA) 40 MG capsule  Self Yes No   Sig: Take 40 mg by mouth   famotidine (PEPCID) 20 mg tablet   No No   Sig: Take 1 tablet (20 mg total) by mouth 2 (two) times a day for 5 days   meclizine (ANTIVERT) 25 mg tablet  Self Yes No   Sig: Take 25 mg by mouth Three times daily as needed   ondansetron (ZOFRAN) 4 mg tablet   No No   Sig: Take 1 tablet (4 mg total) by mouth every 8 (eight) hours as needed for nausea or vomiting   pantoprazole (PROTONIX) 20 mg tablet  Self Yes No   Sig: Take by mouth      Facility-Administered Medications: None       Past Medical History:   Diagnosis Date    Joint pain     Urine incontinence        Past Surgical History: Procedure Laterality Date    BLADDER SURGERY      BREAST SURGERY      CHOLECYSTECTOMY      COLONOSCOPY      EYE SURGERY      HYSTERECTOMY      IR SPINE PROCEDURE  10/24/2019    ROTATOR CUFF REPAIR      SHOULDER SURGERY      THROAT SURGERY      TONSILLECTOMY         History reviewed  No pertinent family history  I have reviewed and agree with the history as documented  E-Cigarette/Vaping     E-Cigarette/Vaping Substances     Social History     Tobacco Use    Smoking status: Never Smoker    Smokeless tobacco: Never Used   Substance Use Topics    Alcohol use: Yes    Drug use: No       Review of Systems   Constitutional: Negative  Negative for chills, fatigue and fever  HENT: Negative  Negative for congestion, rhinorrhea and sore throat  Eyes: Negative  Negative for visual disturbance  Respiratory: Negative  Negative for cough, shortness of breath and wheezing  Cardiovascular: Negative  Negative for chest pain, palpitations and leg swelling  Gastrointestinal: Negative  Negative for abdominal pain, constipation, diarrhea, nausea and vomiting  Genitourinary: Negative  Negative for dysuria, flank pain, frequency and hematuria  Musculoskeletal: Positive for arthralgias, back pain and neck pain  Negative for myalgias  Skin: Negative  Negative for rash  Neurological: Negative  Negative for dizziness, light-headedness, numbness and headaches  Psychiatric/Behavioral: Negative  Negative for confusion  All other systems reviewed and are negative  Physical Exam  Physical Exam  Vitals and nursing note reviewed  Constitutional:       General: She is not in acute distress  Appearance: She is well-developed  She is not toxic-appearing  HENT:      Head: Normocephalic and atraumatic        Right Ear: Hearing and external ear normal       Left Ear: Hearing and external ear normal       Nose: Nose normal       Mouth/Throat:      Mouth: Mucous membranes are moist  Tongue: Tongue does not deviate from midline  Pharynx: Oropharynx is clear  Uvula midline  Eyes:      General: Lids are normal  No scleral icterus  Conjunctiva/sclera: Conjunctivae normal       Pupils: Pupils are equal, round, and reactive to light  Neck:      Trachea: Trachea and phonation normal  No tracheal deviation  Cardiovascular:      Rate and Rhythm: Normal rate and regular rhythm  Pulses: Normal pulses  Radial pulses are 2+ on the right side and 2+ on the left side  Dorsalis pedis pulses are 2+ on the right side and 2+ on the left side  Posterior tibial pulses are 2+ on the right side and 2+ on the left side  Heart sounds: Normal heart sounds, S1 normal and S2 normal  No murmur heard  Pulmonary:      Effort: Pulmonary effort is normal  No tachypnea or respiratory distress  Breath sounds: Normal breath sounds  No wheezing, rhonchi or rales  Abdominal:      General: Bowel sounds are normal  There is no distension  Palpations: Abdomen is soft  Tenderness: There is no abdominal tenderness  There is no guarding or rebound  Musculoskeletal:      Right shoulder: Tenderness present  No swelling, deformity, laceration or crepitus  Decreased range of motion (limits lifting arm above shoulder level)  Normal strength  Normal pulse  Right elbow: Normal       Right wrist: Normal       Cervical back: Normal range of motion and neck supple  Tenderness present  No swelling, deformity, lacerations or bony tenderness  Thoracic back: Tenderness present  No swelling, deformity, lacerations or bony tenderness  Normal range of motion  Lumbar back: Tenderness present  No swelling, deformity, signs of trauma or bony tenderness  Negative right straight leg raise test and negative left straight leg raise test         Back:       Right knee: No swelling or deformity  Normal range of motion  Tenderness present over the medial joint line  Normal pulse  Skin:     General: Skin is warm and dry  Capillary Refill: Capillary refill takes less than 2 seconds  Findings: No abrasion, bruising, erythema, laceration or rash  Neurological:      General: No focal deficit present  Mental Status: She is alert and oriented to person, place, and time  GCS: GCS eye subscore is 4  GCS verbal subscore is 5  GCS motor subscore is 6  Cranial Nerves: No cranial nerve deficit  Sensory: No sensory deficit  Motor: No abnormal muscle tone  Gait: Gait normal       Deep Tendon Reflexes: Reflexes are normal and symmetric  Comments: Pt ambulated into department unassisted  Psychiatric:         Mood and Affect: Mood normal          Speech: Speech normal          Behavior: Behavior normal          Vital Signs  ED Triage Vitals   Temperature Pulse Respirations Blood Pressure SpO2   05/20/22 2046 05/20/22 1907 05/20/22 1907 05/20/22 1907 05/20/22 1907   97 9 °F (36 6 °C) 81 16 159/73 98 %      Temp Source Heart Rate Source Patient Position - Orthostatic VS BP Location FiO2 (%)   05/20/22 2046 05/20/22 1907 -- -- --   Temporal Monitor         Pain Score       05/20/22 1907       4           Vitals:    05/20/22 1907 05/20/22 1930   BP: 159/73 137/65   Pulse: 81 73         Visual Acuity      ED Medications  Medications - No data to display    Diagnostic Studies  Results Reviewed     None                 CT spine cervical without contrast   Final Result by Roseline Marquez MD (05/20 2147)      No cervical spine fracture or traumatic malalignment  Workstation performed: [id number]         CT spine thoracic without contrast   Final Result by Roseline Marquez MD (05/20 2146)      No acute fracture or traumatic listhesis of the visualized thoracic spine  Workstation performed: [id number]         CT spine lumbar without contrast   Final Result by Roseline Marquez MD (05/20 2146)      No acute fracture or traumatic listhesis of the visualized lumbar spine  Workstation performed: [id number]         XR shoulder 2+ views RIGHT    (Results Pending)   XR knee 4+ vw right injury    (Results Pending)              Procedures  Procedures         ED Course  ED Course as of 05/20/22 2244   Fri May 20, 2022   2149 CT spine cervical without contrast  IMPRESSION:     No cervical spine fracture or traumatic malalignment  2149 CT spine lumbar without contrast  IMPRESSION:     No acute fracture or traumatic listhesis of the visualized lumbar spine  2149 CT spine lumbar without contrast  IMPRESSION:     No acute fracture or traumatic listhesis of the visualized thoracic spine  2150 XR shoulder 2+ views RIGHT  Independently viewed and interpreted by me - no fracture or acute osseous findings, calcific deposits; pending official read  2150 XR knee 4+ vw right injury  Independently viewed and interpreted by me - no fracture, degenerative changes; pending official read  2151 Pt updated on results of imaging  Imaging obtained showed no acute findings  Noted to have degenerative changes in spine  Arthritic changes in knee and shoulder  Pt give sling for RUE for comfort  Reviewed exercises and advised to remove from sling throughout the day  Pt neurovascularly intact post application  Advised recheck with orthopedics  No red flags on exam   Discussed continued symptomatic/supportive care  Advised to alternate ice/heat, topical muscle rubs, lidocaine patches, etc   Pt declined any Rx's to include muscle relaxant  Continue OTC medications as needed  Reviewed incidental finding of hepatic cyst and advised to follow up with PCP in this regards for some dedicated liver imaging  Strict return precautions outlined  Advised outpatient follow up with PCP or return to ER for change in condition as outlined  Pt verbalized understanding and had no further questions  Pt left in stable, improved condition                                SBIRT 20yo+    Flowsheet Row Most Recent Value   SBIRT (22 yo +)    In order to provide better care to our patients, we are screening all of our patients for alcohol and drug use  Would it be okay to ask you these screening questions? Yes Filed at: 05/20/2022 2045   Initial Alcohol Screen: US AUDIT-C     1  How often do you have a drink containing alcohol? 0 Filed at: 05/20/2022 2045   2  How many drinks containing alcohol do you have on a typical day you are drinking? 0 Filed at: 05/20/2022 2045   3b  FEMALE Any Age, or MALE 65+: How often do you have 4 or more drinks on one occassion? 0 Filed at: 05/20/2022 2045   Audit-C Score 0 Filed at: 05/20/2022 2045   GRAZYNA: How many times in the past year have you    Used an illegal drug or used a prescription medication for non-medical reasons? Never Filed at: 05/20/2022 2045                    MDM  Number of Diagnoses or Management Options  Acute pain of right shoulder: new and requires workup  Back pain: new and requires workup  Fall from standing, initial encounter: new and requires workup  Strain of neck muscle, initial encounter: new and requires workup     Amount and/or Complexity of Data Reviewed  Clinical lab tests: reviewed  Tests in the radiology section of CPT®: ordered and reviewed  Decide to obtain previous medical records or to obtain history from someone other than the patient: yes  Obtain history from someone other than the patient: yes  Review and summarize past medical records: yes  Independent visualization of images, tracings, or specimens: yes    Patient Progress  Patient progress: improved      Disposition  Final diagnoses:   Fall from standing, initial encounter   Acute pain of right shoulder   Strain of neck muscle, initial encounter   Back pain     Time reflects when diagnosis was documented in both MDM as applicable and the Disposition within this note     Time User Action Codes Description Comment    5/20/2022  9:56 PM Sandie Martin Add [W19  XXXA] Fall from standing, initial encounter     5/20/2022  9:56 PM Jacoby Elias Add [M25 511] Acute pain of right shoulder     5/20/2022  9:56 PM Jacoby Elias Add [S16  1XXA] Strain of neck muscle, initial encounter     5/20/2022  9:57 PM Jacoby Elias Add [M54 9] Back pain       ED Disposition     ED Disposition   Discharge    Condition   Stable    Date/Time   Fri May 20, 2022  9:56 PM    Comment   Amie Villalobos [facility] discharge to home/self care                 Follow-up Information     Follow up With Specialties Details Why Contact Info Additional [address] [facility] Anil Wilhelm Orthopedic Surgery Schedule an appointment as soon as possible for a visit   [address] [facility] [address] [address] [facility] Wilder [address], [place], [address]    [facility] Emergency Department Emergency Medicine  As needed [place] [phone]  [address] [facility] Emergency Department, [address], [address]          Discharge Medication List as of 5/20/2022  9:59 PM      CONTINUE these medications which have NOT CHANGED    Details   benzonatate (TESSALON PERLES) 100 mg capsule Take 1 capsule (100 mg total) by mouth 2 (two) times a day as needed for cough, Starting Mon 1/10/2022, Normal      doxycycline (ORACEA) 40 MG capsule Take 40 mg by mouth, Historical Med      famotidine (PEPCID) 20 mg tablet Take 1 tablet (20 mg total) by mouth 2 (two) times a day for 5 days, Starting Mon 1/10/2022, Until Sat 1/15/2022, Normal      meclizine (ANTIVERT) 25 mg tablet Take 25 mg by mouth Three times daily as needed, Starting Wed 5/29/2019, Until Thu 5/28/2020, Historical Med      ondansetron (ZOFRAN) 4 mg tablet Take 1 tablet (4 mg total) by mouth every 8 (eight) hours as needed for nausea or vomiting, Starting Mon 1/10/2022, Normal      pantoprazole (PROTONIX) 20 mg tablet Take by mouth, Historical Med             No discharge procedures on file      PDMP Review     None          ED Provider  Electronically Signed by           Sweta Hung PA-C  05/20/22 1890

## 2022-09-12 ENCOUNTER — APPOINTMENT (RX ONLY)
Dept: URBAN - NONMETROPOLITAN AREA CLINIC 4 | Facility: CLINIC | Age: 72
Setting detail: DERMATOLOGY
End: 2022-09-12

## 2022-09-12 DIAGNOSIS — L82.0 INFLAMED SEBORRHEIC KERATOSIS: ICD-10-CM

## 2022-09-12 PROCEDURE — ? LIQUID NITROGEN

## 2022-09-12 PROCEDURE — ? COUNSELING

## 2022-09-12 PROCEDURE — 17111 DESTRUCTION B9 LESIONS 15/>: CPT

## 2022-09-12 ASSESSMENT — LOCATION DETAILED DESCRIPTION DERM
LOCATION DETAILED: RIGHT SUPERIOR LATERAL MALAR CHEEK
LOCATION DETAILED: RIGHT INFERIOR LATERAL FOREHEAD
LOCATION DETAILED: RIGHT LATERAL UPPER BACK
LOCATION DETAILED: RIGHT LATERAL CANTHUS
LOCATION DETAILED: LEFT INFERIOR TEMPLE
LOCATION DETAILED: RIGHT MEDIAL SUPERIOR CHEST
LOCATION DETAILED: LEFT SUPERIOR UPPER BACK
LOCATION DETAILED: RIGHT INFERIOR MEDIAL FOREHEAD
LOCATION DETAILED: RIGHT LATERAL SUPERIOR CHEST
LOCATION DETAILED: RIGHT SUPERIOR MEDIAL UPPER BACK
LOCATION DETAILED: LEFT INFERIOR FOREHEAD
LOCATION DETAILED: LEFT MEDIAL SUPERIOR CHEST
LOCATION DETAILED: RIGHT LATERAL MALAR CHEEK
LOCATION DETAILED: RIGHT FOREHEAD
LOCATION DETAILED: RIGHT MID-UPPER BACK
LOCATION DETAILED: LEFT MID-UPPER BACK
LOCATION DETAILED: LEFT LATERAL UPPER BACK
LOCATION DETAILED: INFERIOR MID FOREHEAD
LOCATION DETAILED: RIGHT INFERIOR FOREHEAD
LOCATION DETAILED: MIDDLE STERNUM

## 2022-09-12 ASSESSMENT — LOCATION SIMPLE DESCRIPTION DERM
LOCATION SIMPLE: RIGHT UPPER BACK
LOCATION SIMPLE: LEFT UPPER BACK
LOCATION SIMPLE: RIGHT EYELID
LOCATION SIMPLE: INFERIOR FOREHEAD
LOCATION SIMPLE: RIGHT CHEEK
LOCATION SIMPLE: CHEST
LOCATION SIMPLE: RIGHT FOREHEAD
LOCATION SIMPLE: LEFT FOREHEAD
LOCATION SIMPLE: LEFT TEMPLE

## 2022-09-12 ASSESSMENT — LOCATION ZONE DERM
LOCATION ZONE: EYELID
LOCATION ZONE: TRUNK
LOCATION ZONE: FACE

## 2022-09-12 NOTE — PROCEDURE: LIQUID NITROGEN
Medical Necessity Clause: This procedure was medically necessary because the lesions that were treated were:
Show Aperture Variable?: Yes
Number Of Freeze-Thaw Cycles: 1 freeze-thaw cycle
Spray Paint Text: The liquid nitrogen was applied to the skin utilizing a spray paint frosting technique.
Post-Care Instructions: I reviewed with the patient in detail post-care instructions. Patient is to wear sunprotection, and avoid picking at any of the treated lesions. Pt may apply Vaseline to crusted or scabbing areas.
Add 52 Modifier (Optional): no
Consent: The patient's consent was obtained including but not limited to risks of crusting, scabbing, blistering, scarring, darker or lighter pigmentary change, recurrence, incomplete removal and infection.
Detail Level: Zone
Medical Necessity Information: It is in your best interest to select a reason for this procedure from the list below. All of these items fulfill various CMS LCD requirements except the new and changing color options.

## 2022-12-14 ENCOUNTER — APPOINTMENT (RX ONLY)
Dept: URBAN - NONMETROPOLITAN AREA CLINIC 4 | Facility: CLINIC | Age: 72
Setting detail: DERMATOLOGY
End: 2022-12-14

## 2022-12-14 DIAGNOSIS — L91.0 HYPERTROPHIC SCAR: ICD-10-CM

## 2022-12-14 DIAGNOSIS — L82.0 INFLAMED SEBORRHEIC KERATOSIS: ICD-10-CM

## 2022-12-14 PROCEDURE — ? LIQUID NITROGEN

## 2022-12-14 PROCEDURE — ? INTRALESIONAL KENALOG

## 2022-12-14 PROCEDURE — ? COUNSELING

## 2022-12-14 PROCEDURE — 11900 INJECT SKIN LESIONS </W 7: CPT | Mod: 59

## 2022-12-14 PROCEDURE — 17110 DESTRUCTION B9 LES UP TO 14: CPT

## 2022-12-14 ASSESSMENT — LOCATION ZONE DERM
LOCATION ZONE: NECK
LOCATION ZONE: LEG
LOCATION ZONE: FACE
LOCATION ZONE: TRUNK

## 2022-12-14 ASSESSMENT — LOCATION DETAILED DESCRIPTION DERM
LOCATION DETAILED: LEFT MEDIAL SUPERIOR CHEST
LOCATION DETAILED: LEFT FOREHEAD
LOCATION DETAILED: UPPER STERNUM
LOCATION DETAILED: LEFT INFERIOR LATERAL FOREHEAD
LOCATION DETAILED: LEFT LATERAL FOREHEAD
LOCATION DETAILED: RIGHT PROXIMAL PRETIBIAL REGION
LOCATION DETAILED: RIGHT INFERIOR LATERAL NECK

## 2022-12-14 ASSESSMENT — LOCATION SIMPLE DESCRIPTION DERM
LOCATION SIMPLE: CHEST
LOCATION SIMPLE: RIGHT ANTERIOR NECK
LOCATION SIMPLE: LEFT FOREHEAD
LOCATION SIMPLE: RIGHT PRETIBIAL REGION

## 2022-12-14 ASSESSMENT — SCAR ASSESSEMENT OVERALL: SCAR ASSESSMENT: 2.5 (RAISED UNIFORM SCAR, ERYTHEMA)

## 2022-12-14 NOTE — PROCEDURE: LIQUID NITROGEN
Post-Care Instructions: I reviewed with the patient in detail post-care instructions. Patient is to wear sunprotection, and avoid picking at any of the treated lesions. Pt may apply Vaseline to crusted or scabbing areas.
Medical Necessity Information: It is in your best interest to select a reason for this procedure from the list below. All of these items fulfill various CMS LCD requirements except the new and changing color options.
Show Spray Paint Technique Variable?: Yes
Spray Paint Text: The liquid nitrogen was applied to the skin utilizing a spray paint frosting technique.
Add 52 Modifier (Optional): no
Consent: The patient's consent was obtained including but not limited to risks of crusting, scabbing, blistering, scarring, darker or lighter pigmentary change, recurrence, incomplete removal and infection.
Number Of Freeze-Thaw Cycles: 1 freeze-thaw cycle
Medical Necessity Clause: This procedure was medically necessary because the lesions that were treated were:
Detail Level: Zone
Duration Of Freeze Thaw-Cycle (Seconds): 5-10

## 2022-12-14 NOTE — PROCEDURE: INTRALESIONAL KENALOG
X Size Of Lesion In Cm (Optional): 0
Concentration Of Solution Injected (Mg/Ml): 3.0
Total Volume Injected (Ccs- Only Use Numbers And Decimals): .2
Include Z78.9 (Other Specified Conditions Influencing Health Status) As An Associated Diagnosis?: No
Consent: The risks of atrophy were reviewed with the patient.
Detail Level: Simple
Kenalog Preparation: Kenalog
Medical Necessity Clause: This procedure was medically necessary because the lesions that were treated were:
Validate Note Data When Using Inventory: Yes
Administered By (Optional): Jules Luciano PA-C

## 2023-01-18 ENCOUNTER — RA CDI HCC (OUTPATIENT)
Dept: OTHER | Facility: HOSPITAL | Age: 73
End: 2023-01-18

## 2023-01-18 NOTE — PROGRESS NOTES
[facility]  coding opportunities       Chart reviewed, no opportunity found:   [address]        Patients Insurance     Medicare Insurance: [employer] Advantage

## 2023-01-19 ENCOUNTER — OFFICE VISIT (OUTPATIENT)
Dept: FAMILY MEDICINE CLINIC | Facility: CLINIC | Age: 73
End: 2023-01-19

## 2023-01-19 VITALS
HEART RATE: 67 BPM | HEIGHT: 67 IN | BODY MASS INDEX: 30.04 KG/M2 | WEIGHT: 191.4 LBS | OXYGEN SATURATION: 98 % | DIASTOLIC BLOOD PRESSURE: 68 MMHG | SYSTOLIC BLOOD PRESSURE: 120 MMHG

## 2023-01-19 DIAGNOSIS — H81.10 BENIGN PAROXYSMAL POSITIONAL VERTIGO, UNSPECIFIED LATERALITY: ICD-10-CM

## 2023-01-19 DIAGNOSIS — M19.90 ARTHRITIS: ICD-10-CM

## 2023-01-19 DIAGNOSIS — K21.9 GASTROESOPHAGEAL REFLUX DISEASE, UNSPECIFIED WHETHER ESOPHAGITIS PRESENT: ICD-10-CM

## 2023-01-19 DIAGNOSIS — Z00.00 MEDICARE ANNUAL WELLNESS VISIT, SUBSEQUENT: Primary | ICD-10-CM

## 2023-01-19 DIAGNOSIS — Z11.59 NEED FOR HEPATITIS C SCREENING TEST: ICD-10-CM

## 2023-01-19 DIAGNOSIS — E78.5 HYPERLIPIDEMIA, UNSPECIFIED HYPERLIPIDEMIA TYPE: ICD-10-CM

## 2023-01-19 DIAGNOSIS — Z78.0 POSTMENOPAUSAL: ICD-10-CM

## 2023-01-19 RX ORDER — SULINDAC 150 MG/1
150 TABLET ORAL 2 TIMES DAILY
COMMUNITY

## 2023-01-19 RX ORDER — MECLIZINE HYDROCHLORIDE 25 MG/1
25 TABLET ORAL EVERY 8 HOURS PRN
Qty: 30 TABLET | Refills: 0 | Status: SHIPPED | OUTPATIENT
Start: 2023-01-19 | End: 2024-01-19

## 2023-01-19 NOTE — PROGRESS NOTES
Assessment and Plan:     Problem List Items Addressed This Visit        Digestive    Gastroesophageal reflux disease       Other    Hyperlipidemia   Other Visit Diagnoses     Medicare annual wellness visit, subsequent    -  Primary    Relevant Orders    CBC and differential    Comprehensive metabolic panel    Lipid panel    DXA bone density spine hip and pelvis    Hepatitis C antibody    Postmenopausal        Relevant Orders    DXA bone density spine hip and pelvis    Benign paroxysmal positional vertigo, unspecified laterality        Relevant Medications    meclizine (ANTIVERT) 25 mg tablet    Need for hepatitis C screening test        Relevant Orders    Hepatitis C antibody    Arthritis            Routine labs ordered  DXA ordered  Follow-up in 6 months or sooner if needed      Depression Screening and Follow-up Plan: Patient was screened for depression during today's encounter  They screened negative with a PHQ-2 score of 0  Preventive health issues were discussed with patient, and age appropriate screening tests were ordered as noted in patient's After Visit Summary  Personalized health advice and appropriate referrals for health education or preventive services given if needed, as noted in patient's After Visit Summary  History of Present Illness:     Patient presents for a Medicare Wellness Visit    Earnest Vera is a pleasant 74year old female who is here today to establish care and for her medicare well visit  She does not have any acute concerns  She suffers from arthritis and GERD which are stable  She is due for labs  She is up to date with GYN visits  She follows with Dr Doris Boles  She is due for a DXA scan  She is up to date with her mammograms  Patient Care Team:  Zenon Soulier, PA-C as PCP - General (Family Medicine)     Review of Systems:     Review of Systems   Constitutional: Negative for chills, diaphoresis, fatigue and fever     HENT: Negative for congestion, ear pain, postnasal drip, rhinorrhea, sneezing, sore throat and trouble swallowing  Eyes: Negative for pain and visual disturbance  Respiratory: Negative for apnea, cough, shortness of breath and wheezing  Cardiovascular: Negative for chest pain and palpitations  Gastrointestinal: Negative for abdominal pain, constipation, diarrhea, nausea and vomiting  Genitourinary: Negative for dysuria and hematuria  Musculoskeletal: Positive for arthralgias  Negative for gait problem and myalgias  Neurological: Negative for dizziness, syncope, weakness, light-headedness, numbness and headaches  Psychiatric/Behavioral: Negative for suicidal ideas  The patient is not nervous/anxious           Problem List:     Patient Active Problem List   Diagnosis   • Chronic bilateral low back pain with bilateral sciatica   • Lumbar radiculopathy   • Chronic pain syndrome   • Lumbar spondylosis   • Lumbar disc disease with radiculopathy   • Neurogenic claudication due to lumbar spinal stenosis   • Vitamin D deficiency   • Polyp of colon   • Osteoarthritis of knee   • Hyperlipidemia   • Generalized osteoarthritis   • Gastroesophageal reflux disease      Past Medical and Surgical History:     Past Medical History:   Diagnosis Date   • Joint pain    • Urine incontinence      Past Surgical History:   Procedure Laterality Date   • BLADDER SURGERY     • BREAST SURGERY     • CHOLECYSTECTOMY     • COLONOSCOPY     • EYE SURGERY     • HYSTERECTOMY     • IR SPINE PROCEDURE  10/24/2019   • ROTATOR CUFF REPAIR     • SHOULDER SURGERY     • THROAT SURGERY     • TONSILLECTOMY        Family History:     Family History   Problem Relation Age of Onset   • Breast cancer Mother    • Cancer Mother    • Stroke Father    • Heart attack Father       Social History:     Social History     Socioeconomic History   • Marital status: /Civil Union     Spouse name: None   • Number of children: None   • Years of education: None   • Highest education level: None   Occupational History   • None   Tobacco Use   • Smoking status: Never   • Smokeless tobacco: Never   Vaping Use   • Vaping Use: Never used   Substance and Sexual Activity   • Alcohol use: Yes     Comment: social   • Drug use: No   • Sexual activity: None   Other Topics Concern   • None   Social History Narrative   • None     Social Determinants of Health     Financial Resource Strain: Low Risk    • Difficulty of Paying Living Expenses: Not hard at all   Food Insecurity: Not on file   Transportation Needs: No Transportation Needs   • Lack of Transportation (Medical): No   • Lack of Transportation (Non-Medical): No   Physical Activity: Not on file   Stress: Not on file   Social Connections: Not on file   Intimate Partner Violence: Not on file   Housing Stability: Not on file      Medications and Allergies:     Current Outpatient Medications   Medication Sig Dispense Refill   • doxycycline (ORACEA) 40 MG capsule Take 40 mg by mouth every morning     • meclizine (ANTIVERT) 25 mg tablet Take 1 tablet (25 mg total) by mouth every 8 (eight) hours as needed for dizziness 30 tablet 0   • pantoprazole (PROTONIX) 40 mg Take 40 mg by mouth daily     • sulindac (CLINORIL) 150 MG tablet Take 150 mg by mouth 2 (two) times a day       No current facility-administered medications for this visit       Allergies   Allergen Reactions   • Corticosteroids    • Morphine GI Intolerance   • Fentanyl Rash      Immunizations:     Immunization History   Administered Date(s) Administered   • INFLUENZA 12/04/2014, 11/13/2015, 12/09/2016, 10/02/2020, 09/22/2021   • Pneumococcal Conjugate 13-Valent 11/04/2020   • Pneumococcal Polysaccharide PPV23 12/15/2021      Health Maintenance:         Topic Date Due   • Hepatitis C Screening  Never done   • Colorectal Cancer Screening  01/19/2024 (Originally 5/6/1995)   • Breast Cancer Screening: Mammogram  08/11/2023         Topic Date Due   • COVID-19 Vaccine (1) Never done      Medicare Screening Tests and Risk Assessments:     Mary Ann Sunshine is here for her Subsequent Wellness visit  Health Risk Assessment:   Patient rates overall health as good  Patient feels that their physical health rating is same  Patient is satisfied with their life  Eyesight was rated as same  Hearing was rated as same  Patient feels that their emotional and mental health rating is slightly worse  Patients states they are never, rarely angry  Patient states they are sometimes unusually tired/fatigued  Pain experienced in the last 7 days has been none  Patient states that she has experienced no weight loss or gain in last 6 months  Depression Screening:   PHQ-2 Score: 0      Fall Risk Screening: In the past year, patient has experienced: no history of falling in past year      Urinary Incontinence Screening:   Patient has not leaked urine accidently in the last six months  Home Safety:  Patient does not have trouble with stairs inside or outside of their home  Patient has working smoke alarms and has working carbon monoxide detector  Home safety hazards include: none  Nutrition:   Current diet is Regular  Medications:   Patient is currently taking over-the-counter supplements  OTC medications include: see medication list  Patient is able to manage medications  Activities of Daily Living (ADLs)/Instrumental Activities of Daily Living (IADLs):   Walk and transfer into and out of bed and chair?: Yes  Dress and groom yourself?: Yes    Bathe or shower yourself?: Yes    Feed yourself?  Yes  Do your laundry/housekeeping?: Yes  Manage your money, pay your bills and track your expenses?: Yes  Make your own meals?: Yes    Do your own shopping?: Yes    Previous Hospitalizations:   Any hospitalizations or ED visits within the last 12 months?: No      Advance Care Planning:   Living will: No    Durable POA for healthcare: No    Advanced directive: No      Cognitive Screening:   Provider or family/friend/caregiver concerned regarding cognition?: No    PREVENTIVE SCREENINGS      Cardiovascular Screening:    General: Screening Not Indicated, History Lipid Disorder and Risks and Benefits Discussed    Due for: Lipid Panel      Diabetes Screening:     General: Risks and Benefits Discussed    Due for: Blood Glucose      Colorectal Cancer Screening:     General: Risks and Benefits Discussed and Screening Current      Breast Cancer Screening:     General: Screening Current and Risks and Benefits Discussed      Cervical Cancer Screening:    General: Screening Not Indicated      Osteoporosis Screening:    General: Risks and Benefits Discussed    Due for: DXA Axial      Abdominal Aortic Aneurysm (AAA) Screening:        General: Screening Not Indicated      Lung Cancer Screening:     General: Screening Not Indicated      Hepatitis C Screening:    General: Risks and Benefits Discussed    Hep C Screening Accepted: Yes      Screening, Brief Intervention, and Referral to Treatment (SBIRT)    Screening  Typical number of drinks in a day: 0  Typical number of drinks in a week: 0  Interpretation: Low risk drinking behavior  Single Item Drug Screening:  How often have you used an illegal drug (including marijuana) or a prescription medication for non-medical reasons in the past year? never    Single Item Drug Screen Score: 0  Interpretation: Negative screen for possible drug use disorder    Brief Intervention  Alcohol & drug use screenings were reviewed  No concerns regarding substance use disorder identified  Other Counseling Topics:   Car/seat belt/driving safety, skin self-exam, sunscreen and calcium and vitamin D intake and regular weightbearing exercise  No results found  Physical Exam:     /68   Pulse 67   Ht 5' 7" (1 702 m)   Wt 86 8 kg (191 lb 6 4 oz)   SpO2 98%   BMI 29 98 kg/m²     Physical Exam  Vitals and nursing note reviewed  Constitutional:       General: She is not in acute distress  Appearance: She is well-developed   She is not diaphoretic  HENT:      Head: Normocephalic and atraumatic  Right Ear: Hearing, tympanic membrane, ear canal and external ear normal       Left Ear: Hearing, tympanic membrane, ear canal and external ear normal       Nose: Nose normal  No mucosal edema or rhinorrhea  Mouth/Throat:      Pharynx: No oropharyngeal exudate or posterior oropharyngeal erythema  Cardiovascular:      Rate and Rhythm: Normal rate and regular rhythm  Heart sounds: Normal heart sounds  No murmur heard  No friction rub  No gallop  Pulmonary:      Effort: Pulmonary effort is normal  No respiratory distress  Breath sounds: Normal breath sounds  No wheezing or rales  Musculoskeletal:         General: Normal range of motion  Cervical back: Normal range of motion and neck supple  Lymphadenopathy:      Cervical: No cervical adenopathy  Skin:     General: Skin is warm and dry  Findings: No rash  Neurological:      Mental Status: She is alert and oriented to person, place, and time  Psychiatric:         Behavior: Behavior normal          Thought Content:  Thought content normal          Judgment: Judgment normal           Yunior Araujo PA-C

## 2023-01-19 NOTE — PATIENT INSTRUCTIONS
Medicare Preventive Visit Patient Instructions  Thank you for completing your Welcome to Medicare Visit or Medicare Annual Wellness Visit today  Your next wellness visit will be due in one year (1/20/2024)  The screening/preventive services that you may require over the next 5-10 years are detailed below  Some tests may not apply to you based off risk factors and/or age  Screening tests ordered at today's visit but not completed yet may show as past due  Also, please note that scanned in results may not display below  Preventive Screenings:  Service Recommendations Previous Testing/Comments   Colorectal Cancer Screening  * Colonoscopy    * Fecal Occult Blood Test (FOBT)/Fecal Immunochemical Test (FIT)  * Fecal DNA/Cologuard Test  * Flexible Sigmoidoscopy Age: 39-70 years old   Colonoscopy: every 10 years (may be performed more frequently if at higher risk)  OR  FOBT/FIT: every 1 year  OR  Cologuard: every 3 years  OR  Sigmoidoscopy: every 5 years  Screening may be recommended earlier than age 39 if at higher risk for colorectal cancer  Also, an individualized decision between you and your healthcare provider will decide whether screening between the ages of 74-80 would be appropriate  Colonoscopy: Not on file  FOBT/FIT: Not on file  Cologuard: Not on file  Sigmoidoscopy: Not on file          Breast Cancer Screening Age: 36 years old  Frequency: every 1-2 years  Not required if history of left and right mastectomy Mammogram: 08/11/2022    Screening Current   Cervical Cancer Screening Between the ages of 21-29, pap smear recommended once every 3 years  Between the ages of 33-67, can perform pap smear with HPV co-testing every 5 years     Recommendations may differ for women with a history of total hysterectomy, cervical cancer, or abnormal pap smears in past  Pap Smear: Not on file    Screening Not Indicated   Hepatitis C Screening Once for adults born between 1945 and 1965  More frequently in patients at high risk for Hepatitis C Hep C Antibody: Not on file        Diabetes Screening 1-2 times per year if you're at risk for diabetes or have pre-diabetes Fasting glucose: 103 mg/dL (3/20/2019)  A1C: No results in last 5 years (No results in last 5 years)      Cholesterol Screening Once every 5 years if you don't have a lipid disorder  May order more often based on risk factors  Lipid panel: 03/20/2019    Screening Not Indicated  History Lipid Disorder     Other Preventive Screenings Covered by Medicare:  1  Abdominal Aortic Aneurysm (AAA) Screening: covered once if your at risk  You're considered to be at risk if you have a family history of AAA  2  Lung Cancer Screening: covers low dose CT scan once per year if you meet all of the following conditions: (1) Age 50-69; (2) No signs or symptoms of lung cancer; (3) Current smoker or have quit smoking within the last 15 years; (4) You have a tobacco smoking history of at least 20 pack years (packs per day multiplied by number of years you smoked); (5) You get a written order from a healthcare provider  3  Glaucoma Screening: covered annually if you're considered high risk: (1) You have diabetes OR (2) Family history of glaucoma OR (3)  aged 48 and older OR (3)  American aged 72 and older  3  Osteoporosis Screening: covered every 2 years if you meet one of the following conditions: (1) You're estrogen deficient and at risk for osteoporosis based off medical history and other findings; (2) Have a vertebral abnormality; (3) On glucocorticoid therapy for more than 3 months; (4) Have primary hyperparathyroidism; (5) On osteoporosis medications and need to assess response to drug therapy  · Last bone density test (DXA Scan): Not on file  5  HIV Screening: covered annually if you're between the age of 12-76  Also covered annually if you are younger than 13 and older than 72 with risk factors for HIV infection   For pregnant patients, it is covered up to 3 times per pregnancy  Immunizations:  Immunization Recommendations   Influenza Vaccine Annual influenza vaccination during flu season is recommended for all persons aged >= 6 months who do not have contraindications   Pneumococcal Vaccine   * Pneumococcal conjugate vaccine = PCV13 (Prevnar 13), PCV15 (Vaxneuvance), PCV20 (Prevnar 20)  * Pneumococcal polysaccharide vaccine = PPSV23 (Pneumovax) Adults 25-60 years old: 1-3 doses may be recommended based on certain risk factors  Adults 72 years old: 1-2 doses may be recommended based off what pneumonia vaccine you previously received   Hepatitis B Vaccine 3 dose series if at intermediate or high risk (ex: diabetes, end stage renal disease, liver disease)   Tetanus (Td) Vaccine - COST NOT COVERED BY MEDICARE PART B Following completion of primary series, a booster dose should be given every 10 years to maintain immunity against tetanus  Td may also be given as tetanus wound prophylaxis  Tdap Vaccine - COST NOT COVERED BY MEDICARE PART B Recommended at least once for all adults  For pregnant patients, recommended with each pregnancy  Shingles Vaccine (Shingrix) - COST NOT COVERED BY MEDICARE PART B  2 shot series recommended in those aged 48 and above     Health Maintenance Due:      Topic Date Due   • Hepatitis C Screening  Never done   • Colorectal Cancer Screening  01/19/2024 (Originally [birth year]/1993)   • Breast Cancer Screening: Mammogram  08/11/2023     Immunizations Due:      Topic Date Due   • COVID-19 Vaccine (1) Never done     Advance Directives   What are advance directives? Advance directives are legal documents that state your wishes and plans for medical care  These plans are made ahead of time in case you lose your ability to make decisions for yourself  Advance directives can apply to any medical decision, such as the treatments you want, and if you want to donate organs  What are the types of advance directives?   There are many types of advance directives, and each state has rules about how to use them  You may choose a combination of any of the following:  · Living will: This is a written record of the treatment you want  You can also choose which treatments you do not want, which to limit, and which to stop at a certain time  This includes surgery, medicine, IV fluid, and tube feedings  · Durable power of  for healthcare [facility]): This is a written record that states who you want to make healthcare choices for you when you are unable to make them for yourself  This person, called a proxy, is usually a family member or a friend  You may choose more than 1 proxy  · Do not resuscitate (DNR) order:  A DNR order is used in case your heart stops beating or you stop breathing  It is a request not to have certain forms of treatment, such as CPR  A DNR order may be included in other types of advance directives  · Medical directive: This covers the care that you want if you are in a coma, near death, or unable to make decisions for yourself  You can list the treatments you want for each condition  Treatment may include pain medicine, surgery, blood transfusions, dialysis, IV or tube feedings, and a ventilator (breathing machine)  · Values history: This document has questions about your views, beliefs, and how you feel and think about life  This information can help others choose the care that you would choose  Why are advance directives important? An advance directive helps you control your care  Although spoken wishes may be used, it is better to have your wishes written down  Spoken wishes can be misunderstood, or not followed  Treatments may be given even if you do not want them  An advance directive may make it easier for your family to make difficult choices about your care     Weight Management   Why it is important to manage your weight:  Being overweight increases your risk of health conditions such as heart disease, high blood pressure, type 2 diabetes, and certain types of cancer  It can also increase your risk for osteoarthritis, sleep apnea, and other respiratory problems  Aim for a slow, steady weight loss  Even a small amount of weight loss can lower your risk of health problems  How to lose weight safely:  A safe and healthy way to lose weight is to eat fewer calories and get regular exercise  You can lose up about 1 pound a week by decreasing the number of calories you eat by 500 calories each day  Healthy meal plan for weight management:  A healthy meal plan includes a variety of foods, contains fewer calories, and helps you stay healthy  A healthy meal plan includes the following:  · Eat whole-grain foods more often  A healthy meal plan should contain fiber  Fiber is the part of grains, fruits, and vegetables that is not broken down by your body  Whole-grain foods are healthy and provide extra fiber in your diet  Some examples of whole-grain foods are whole-wheat breads and pastas, oatmeal, brown rice, and bulgur  · Eat a variety of vegetables every day  Include dark, leafy greens such as spinach, kale, len greens, and mustard greens  Eat yellow and orange vegetables such as carrots, sweet potatoes, and winter squash  · Eat a variety of fruits every day  Choose fresh or canned fruit (canned in its own juice or light syrup) instead of juice  Fruit juice has very little or no fiber  · Eat low-fat dairy foods  Drink fat-free (skim) milk or 1% milk  Eat fat-free yogurt and low-fat cottage cheese  Try low-fat cheeses such as mozzarella and other reduced-fat cheeses  · Choose meat and other protein foods that are low in fat  Choose beans or other legumes such as split peas or lentils  Choose fish, skinless poultry (chicken or turkey), or lean cuts of red meat (beef or pork)  Before you cook meat or poultry, cut off any visible fat  · Use less fat and oil  Try baking foods instead of frying them   Add less fat, such as margarine, sour cream, regular salad dressing and mayonnaise to foods  Eat fewer high-fat foods  Some examples of high-fat foods include french fries, doughnuts, ice cream, and cakes  · Eat fewer sweets  Limit foods and drinks that are high in sugar  This includes candy, cookies, regular soda, and sweetened drinks  Exercise:  Exercise at least 30 minutes per day on most days of the week  Some examples of exercise include walking, biking, dancing, and swimming  You can also fit in more physical activity by taking the stairs instead of the elevator or parking farther away from stores  Ask your healthcare provider about the best exercise plan for you  © Copyright [address] 2018 Information is for End User's use only and may not be sold, redistributed or otherwise used for commercial purposes   All illustrations and images included in CareNotes® are the copyrighted property of [employer]  or [address] [place] [address]

## 2023-02-08 ENCOUNTER — HOSPITAL ENCOUNTER (OUTPATIENT)
Dept: BONE DENSITY | Facility: HOSPITAL | Age: 73
Discharge: HOME | End: 2023-02-08

## 2023-02-08 VITALS — BODY MASS INDEX: 30.37 KG/M2 | WEIGHT: 189 LBS | HEIGHT: 66 IN

## 2023-02-08 DIAGNOSIS — Z78.0 POSTMENOPAUSAL: ICD-10-CM

## 2023-02-08 DIAGNOSIS — Z00.00 MEDICARE ANNUAL WELLNESS VISIT, SUBSEQUENT: ICD-10-CM

## 2023-02-08 DIAGNOSIS — M19.90 ARTHRITIS: Primary | ICD-10-CM

## 2023-02-08 RX ORDER — SULINDAC 150 MG/1
150 TABLET ORAL 2 TIMES DAILY
Qty: 180 TABLET | Refills: 0 | Status: SHIPPED | OUTPATIENT
Start: 2023-02-08

## 2023-02-16 ENCOUNTER — APPOINTMENT (RX ONLY)
Dept: URBAN - NONMETROPOLITAN AREA CLINIC 4 | Facility: CLINIC | Age: 73
Setting detail: DERMATOLOGY
End: 2023-02-16

## 2023-02-16 DIAGNOSIS — L91.0 HYPERTROPHIC SCAR: ICD-10-CM | Status: INADEQUATELY CONTROLLED

## 2023-02-16 PROCEDURE — ? TREATMENT REGIMEN

## 2023-02-16 PROCEDURE — 99213 OFFICE O/P EST LOW 20 MIN: CPT

## 2023-02-16 PROCEDURE — ? OTHER

## 2023-02-16 PROCEDURE — ? PRESCRIPTION

## 2023-02-16 PROCEDURE — ? COUNSELING

## 2023-02-16 RX ORDER — TRIAMCINOLONE ACETONIDE 5 MG/G
OINTMENT TOPICAL
Qty: 15 | Refills: 0 | Status: ERX | COMMUNITY
Start: 2023-02-16

## 2023-02-16 RX ADMIN — TRIAMCINOLONE ACETONIDE 0.5: 5 OINTMENT TOPICAL at 00:00

## 2023-02-16 ASSESSMENT — LOCATION DETAILED DESCRIPTION DERM: LOCATION DETAILED: RIGHT DISTAL PRETIBIAL REGION

## 2023-02-16 ASSESSMENT — LOCATION SIMPLE DESCRIPTION DERM: LOCATION SIMPLE: RIGHT PRETIBIAL REGION

## 2023-02-16 ASSESSMENT — SCAR ASSESSEMENT OVERALL: SCAR ASSESSMENT: 1 (FLAT SCAR, NO ERYTHEMA)

## 2023-02-16 ASSESSMENT — LOCATION ZONE DERM: LOCATION ZONE: LEG

## 2023-02-16 NOTE — PROCEDURE: OTHER
Other (Free Text): Lesions Injected: 1\\nMedication Injected: 5.0 mg/cc of Kenalog\\nTotal Volume Injected: .2cc\\nAdministered by: Jules Luciano PA-C
Detail Level: Zone
Note Text (......Xxx Chief Complaint.): This diagnosis correlates with the
Render Risk Assessment In Note?: no

## 2023-03-02 ENCOUNTER — APPOINTMENT (OUTPATIENT)
Dept: LAB | Facility: MEDICAL CENTER | Age: 73
End: 2023-03-02

## 2023-03-02 DIAGNOSIS — Z00.00 MEDICARE ANNUAL WELLNESS VISIT, SUBSEQUENT: ICD-10-CM

## 2023-03-02 DIAGNOSIS — Z11.59 NEED FOR HEPATITIS C SCREENING TEST: ICD-10-CM

## 2023-03-02 LAB
ALBUMIN SERPL BCP-MCNC: 3.7 G/DL (ref 3.5–5)
ALP SERPL-CCNC: 69 U/L (ref 46–116)
ALT SERPL W P-5'-P-CCNC: 32 U/L (ref 12–78)
ANION GAP SERPL CALCULATED.3IONS-SCNC: 4 MMOL/L (ref 4–13)
AST SERPL W P-5'-P-CCNC: 21 U/L (ref 5–45)
BASOPHILS # BLD AUTO: 0.05 THOUSANDS/ÂΜL (ref 0–0.1)
BASOPHILS NFR BLD AUTO: 1 % (ref 0–1)
BILIRUB SERPL-MCNC: 0.6 MG/DL (ref 0.2–1)
BUN SERPL-MCNC: 17 MG/DL (ref 5–25)
CALCIUM SERPL-MCNC: 9 MG/DL (ref 8.3–10.1)
CHLORIDE SERPL-SCNC: 110 MMOL/L (ref 96–108)
CHOLEST SERPL-MCNC: 167 MG/DL
CO2 SERPL-SCNC: 27 MMOL/L (ref 21–32)
CREAT SERPL-MCNC: 0.63 MG/DL (ref 0.6–1.3)
EOSINOPHIL # BLD AUTO: 0.13 THOUSAND/ÂΜL (ref 0–0.61)
EOSINOPHIL NFR BLD AUTO: 2 % (ref 0–6)
ERYTHROCYTE [DISTWIDTH] IN BLOOD BY AUTOMATED COUNT: 13.2 % (ref 11.6–15.1)
GFR SERPL CREATININE-BSD FRML MDRD: 88 ML/MIN/1.73SQ M
GLUCOSE P FAST SERPL-MCNC: 97 MG/DL (ref 65–99)
HCT VFR BLD AUTO: 42.6 % (ref 34.8–46.1)
HCV AB SER QL: NORMAL
HDLC SERPL-MCNC: 65 MG/DL
HGB BLD-MCNC: 13.3 G/DL (ref 11.5–15.4)
IMM GRANULOCYTES # BLD AUTO: 0.03 THOUSAND/UL (ref 0–0.2)
IMM GRANULOCYTES NFR BLD AUTO: 1 % (ref 0–2)
LDLC SERPL CALC-MCNC: 90 MG/DL (ref 0–100)
LYMPHOCYTES # BLD AUTO: 1.39 THOUSANDS/ÂΜL (ref 0.6–4.47)
LYMPHOCYTES NFR BLD AUTO: 21 % (ref 14–44)
MCH RBC QN AUTO: 32.1 PG (ref 26.8–34.3)
MCHC RBC AUTO-ENTMCNC: 31.2 G/DL (ref 31.4–37.4)
MCV RBC AUTO: 103 FL (ref 82–98)
MONOCYTES # BLD AUTO: 0.3 THOUSAND/ÂΜL (ref 0.17–1.22)
MONOCYTES NFR BLD AUTO: 5 % (ref 4–12)
NEUTROPHILS # BLD AUTO: 4.71 THOUSANDS/ÂΜL (ref 1.85–7.62)
NEUTS SEG NFR BLD AUTO: 70 % (ref 43–75)
NONHDLC SERPL-MCNC: 102 MG/DL
NRBC BLD AUTO-RTO: 0 /100 WBCS
PLATELET # BLD AUTO: 226 THOUSANDS/UL (ref 149–390)
PMV BLD AUTO: 11.5 FL (ref 8.9–12.7)
POTASSIUM SERPL-SCNC: 4.2 MMOL/L (ref 3.5–5.3)
PROT SERPL-MCNC: 6.6 G/DL (ref 6.4–8.4)
RBC # BLD AUTO: 4.14 MILLION/UL (ref 3.81–5.12)
SODIUM SERPL-SCNC: 141 MMOL/L (ref 135–147)
TRIGL SERPL-MCNC: 60 MG/DL
WBC # BLD AUTO: 6.61 THOUSAND/UL (ref 4.31–10.16)

## 2023-03-27 DIAGNOSIS — K21.9 GASTROESOPHAGEAL REFLUX DISEASE, UNSPECIFIED WHETHER ESOPHAGITIS PRESENT: Primary | ICD-10-CM

## 2023-03-27 RX ORDER — PANTOPRAZOLE SODIUM 40 MG/1
40 GRANULE, DELAYED RELEASE ORAL DAILY
Qty: 30 EACH | Refills: 1 | Status: CANCELLED | OUTPATIENT
Start: 2023-03-27

## 2023-03-28 DIAGNOSIS — K21.9 GASTROESOPHAGEAL REFLUX DISEASE, UNSPECIFIED WHETHER ESOPHAGITIS PRESENT: ICD-10-CM

## 2023-03-28 DIAGNOSIS — K21.9 GASTROESOPHAGEAL REFLUX DISEASE, UNSPECIFIED WHETHER ESOPHAGITIS PRESENT: Primary | ICD-10-CM

## 2023-03-28 RX ORDER — PANTOPRAZOLE SODIUM 40 MG/1
40 TABLET, DELAYED RELEASE ORAL DAILY
Qty: 90 TABLET | Refills: 1 | Status: SHIPPED | OUTPATIENT
Start: 2023-03-28 | End: 2023-03-28 | Stop reason: SDUPTHER

## 2023-03-28 RX ORDER — PANTOPRAZOLE SODIUM 40 MG/1
40 TABLET, DELAYED RELEASE ORAL DAILY
Qty: 90 TABLET | Refills: 1 | Status: SHIPPED | OUTPATIENT
Start: 2023-03-28

## 2023-04-12 ENCOUNTER — APPOINTMENT (RX ONLY)
Dept: URBAN - NONMETROPOLITAN AREA CLINIC 4 | Facility: CLINIC | Age: 73
Setting detail: DERMATOLOGY
End: 2023-04-12

## 2023-04-12 DIAGNOSIS — D18.0 HEMANGIOMA: ICD-10-CM

## 2023-04-12 DIAGNOSIS — L91.0 HYPERTROPHIC SCAR: ICD-10-CM | Status: IMPROVED

## 2023-04-12 PROBLEM — D18.01 HEMANGIOMA OF SKIN AND SUBCUTANEOUS TISSUE: Status: ACTIVE | Noted: 2023-04-12

## 2023-04-12 PROCEDURE — 17110 DESTRUCTION B9 LES UP TO 14: CPT

## 2023-04-12 PROCEDURE — ? BENIGN DESTRUCTION

## 2023-04-12 PROCEDURE — 99212 OFFICE O/P EST SF 10 MIN: CPT | Mod: 25

## 2023-04-12 PROCEDURE — ? TREATMENT REGIMEN

## 2023-04-12 PROCEDURE — ? COUNSELING

## 2023-04-12 PROCEDURE — ? PHOTO-DOCUMENTATION

## 2023-04-12 ASSESSMENT — LOCATION DETAILED DESCRIPTION DERM
LOCATION DETAILED: RIGHT MEDIAL PROXIMAL PRETIBIAL REGION
LOCATION DETAILED: RIGHT PROXIMAL PRETIBIAL REGION

## 2023-04-12 ASSESSMENT — LOCATION SIMPLE DESCRIPTION DERM: LOCATION SIMPLE: RIGHT PRETIBIAL REGION

## 2023-04-12 ASSESSMENT — LOCATION ZONE DERM: LOCATION ZONE: LEG

## 2023-04-12 NOTE — PROCEDURE: BENIGN DESTRUCTION
Medical Necessity Clause: This procedure was medically necessary because the lesions that were treated were:
Consent: The patient's consent was obtained including but not limited to risks of crusting, scabbing, blistering, scarring, darker or lighter pigmentary change, recurrence, incomplete removal and infection.
Include Z78.9 (Other Specified Conditions Influencing Health Status) As An Associated Diagnosis?: No
Anesthesia Volume In Cc: 0.5
Detail Level: Zone
Treatment Number (Will Not Render If 0): 0
Post-Care Instructions: I reviewed with the patient in detail post-care instructions. Patient is to wear sunprotection, and avoid picking at any of the treated lesions. Pt may apply Vaseline to crusted or scabbing areas.
Medical Necessity Information: It is in your best interest to select a reason for this procedure from the list below. All of these items fulfill various CMS LCD requirements except the new and changing color options.

## 2023-04-27 ENCOUNTER — OFFICE VISIT (OUTPATIENT)
Dept: PHYSICAL THERAPY | Facility: CLINIC | Age: 73
End: 2023-04-27

## 2023-04-27 DIAGNOSIS — M54.16 LUMBAR RADICULOPATHY: Primary | ICD-10-CM

## 2023-04-27 DIAGNOSIS — M51.16 LUMBAR DISC DISEASE WITH RADICULOPATHY: ICD-10-CM

## 2023-04-27 DIAGNOSIS — M47.816 LUMBAR SPONDYLOSIS: ICD-10-CM

## 2023-04-27 NOTE — PROGRESS NOTES
"Daily Note     Today's date: 2023  Patient name: Anjel Swann  : 1950  MRN: [id number]  Referring provider: Shayy Moses PA-C  Dx:   Encounter Diagnosis     ICD-10-CM    1  Lumbar radiculopathy  M54 16       2  Lumbar disc disease with radiculopathy  M51 16       3  Lumbar spondylosis  M47 816                      Subjective: Pt reports she cont to have radicular sx into R LE  Pt would like to try stretching to help relieve sx  Objective: See treatment diary below      Assessment: Tolerated treatment well  Patient demonstrated fatigue post treatment  Pt with notable tightness with R hip and hamstring/gastroc/piriformis stretching  Pt given HEP and instructed on stretching  Plan: Continue per plan of care          Precautions: chronic LBP       Manuals       BLE  10' JV HS/Gastroc/Piriformis                              Neuro Re-Ed         Standing Gastroc/HS stretch  \" ea      PPT  2/10      PPT w/marches        PPT w/SLR  2/10      PPT w/knee fallouts  NV      [employer]        Postural Ed Posture, body mechanics, spine anatomy - ML       Ther Ex        NuStep  10' L3 strength      SLR x 3        Bridges  2/10      SKTC HEP 10x10\"      LTR HEP 10x ea      S/L Hip Abd        Clamshells  S/L 2/10              Ther Activity                        Gait Training                        Modalities        HP/CP prn                           "

## 2023-05-04 ENCOUNTER — OFFICE VISIT (OUTPATIENT)
Dept: PHYSICAL THERAPY | Facility: CLINIC | Age: 73
End: 2023-05-04

## 2023-05-04 DIAGNOSIS — M47.816 LUMBAR SPONDYLOSIS: ICD-10-CM

## 2023-05-04 DIAGNOSIS — M54.16 LUMBAR RADICULOPATHY: Primary | ICD-10-CM

## 2023-05-04 DIAGNOSIS — M51.16 LUMBAR DISC DISEASE WITH RADICULOPATHY: ICD-10-CM

## 2023-05-04 NOTE — PROGRESS NOTES
"Daily Note     Today's date: 2023  Patient name: Cecilia Leonard  : 1950  MRN: [id number]  Referring provider: Beau Joshua PA-C  Dx:   Encounter Diagnosis     ICD-10-CM    1  Lumbar radiculopathy  M54 16       2  Lumbar disc disease with radiculopathy  M51 16       3  Lumbar spondylosis  M47 816                      Subjective: Pt reports having improved sx upon leaving last visit  Reports increased sx into B LE's at work the next day  Objective: See treatment diary below      Assessment: Tolerated treatment well  Patient exhibited good technique with therapeutic exercises  Pt lara program today with good lara  Notable LE fatigue and fatigue with bridging  Pt lara back ext machine with standing LS ext today with good lara  Plan: Continue per plan of care          Precautions: chronic LBP       Manuals      BLE  10' JV HS/Gastroc/Piriformis 10' JV HS/Gastroc/Piriformis                             Neuro Re-Ed         Standing Gastroc/HS stretch  \" ea \" ea     PPT  2/10 2/10     PPT w/marches   10x     PPT w/SLR  2/10 2/10     PPT w/knee fallouts  NV      [employer]        Postural Ed Posture, body mechanics, spine anatomy - ML       Ther Ex        NuStep  10' L3 strength 10' L3 strength     SLR x 3        Bridges  2/10 10x fatigue     SKTC HEP 10x10\" 10x10\"     LTR HEP 10x ea 10x ea     Standing LS ext   2/10     Clamshells  S/L 2/10 S/L 2/10     Back ext machine   60# 2/10     Ther Activity                        Gait Training                        Modalities        HP/CP prn   5' cp post                          "

## 2023-05-08 ENCOUNTER — CONSULT (OUTPATIENT)
Dept: PAIN MEDICINE | Facility: CLINIC | Age: 73
End: 2023-05-08

## 2023-05-08 VITALS
DIASTOLIC BLOOD PRESSURE: 78 MMHG | WEIGHT: 193.6 LBS | HEART RATE: 80 BPM | HEIGHT: 66 IN | SYSTOLIC BLOOD PRESSURE: 125 MMHG | RESPIRATION RATE: 16 BRPM | BODY MASS INDEX: 31.12 KG/M2

## 2023-05-08 DIAGNOSIS — M51.16 LUMBAR DISC DISEASE WITH RADICULOPATHY: ICD-10-CM

## 2023-05-08 DIAGNOSIS — M54.16 LUMBAR RADICULOPATHY: ICD-10-CM

## 2023-05-08 DIAGNOSIS — M47.816 LUMBAR SPONDYLOSIS: ICD-10-CM

## 2023-05-08 NOTE — PROGRESS NOTES
Assessment:  1  Lumbar radiculopathy    2  Lumbar disc disease with radiculopathy    3  Lumbar spondylosis        Plan:  Patient is a 75-year-old female complains of right-sided leg pain chronic pain syndrome secondary to lumbar degenerative disc disease with radiculopathy presents to office for initial consultation  Pt reports that she will work and after an hour she will start getting tingling and numbness in the leg  She notices her back starting to hunch  1   We will schedule patient for a right L3-4 and L4-5 transforaminal epidural steroid injection  2  Follow-up in 1 month to injection      Complete risks and benefits including bleeding, infection, tissue reaction, nerve injury and allergic reaction were discussed  The approach was demonstrated using models and literature was provided  Verbal and written consent was obtained  History of Present Illness: The patient is a 74 y o  female who presents for consultation in regards to Back Pain  Symptoms have been present for several months  Symptoms began without any precipitating injury or trauma  Pain is reported to be 6 on the numeric rating scale  Symptoms are felt intermittently and worst in the nighttime  Symptoms are characterized as burning, cramping, sharp, dull/aching, numbing and tingling  Symptoms are associated with right leg weakness  Aggravating factors include standing and walking  Relieving factors include walking  No change in symptoms with kneeling, lying down, bending, leaning forward, leaning bckward, sitting, turning the head, relaxation, coughing/sneezing and bowel movements  Treatments that have been helpful include physical therapy  Medications to relieve symptoms include none  Review of Systems:    Review of Systems   Constitutional: Positive for unexpected weight change  Weight gain   Musculoskeletal: Positive for back pain  Muscle pain   All other systems reviewed and are negative            Past Medical "History:   Diagnosis Date   • Joint pain    • Urine incontinence        Past Surgical History:   Procedure Laterality Date   • BLADDER SURGERY     • BREAST SURGERY     • CHOLECYSTECTOMY     • COLONOSCOPY     • EYE SURGERY     • HYSTERECTOMY     • IR SPINE PROCEDURE  10/24/2019   • ROTATOR CUFF REPAIR     • SHOULDER SURGERY     • THROAT SURGERY     • TONSILLECTOMY         Family History   Problem Relation Age of Onset   • Breast cancer Mother    • Cancer Mother    • Stroke Father    • Heart attack Father        Social History     Occupational History   • Not on file   Tobacco Use   • Smoking status: Never   • Smokeless tobacco: Never   Vaping Use   • Vaping Use: Never used   Substance and Sexual Activity   • Alcohol use: Yes     Comment: social   • Drug use: No   • Sexual activity: Not Currently         Current Outpatient Medications:   •  doxycycline (ORACEA) 40 MG capsule, Take 40 mg by mouth every morning, Disp: , Rfl:   •  gabapentin (Neurontin) 100 mg capsule, Take 1 capsule (100 mg total) by mouth 3 (three) times a day, Disp: 90 capsule, Rfl: 0  •  meclizine (ANTIVERT) 25 mg tablet, Take 1 tablet (25 mg total) by mouth every 8 (eight) hours as needed for dizziness, Disp: 30 tablet, Rfl: 0  •  pantoprazole (PROTONIX) 40 mg tablet, Take 1 tablet (40 mg total) by mouth daily, Disp: 90 tablet, Rfl: 1  •  sulindac (CLINORIL) 150 MG tablet, Take 1 tablet (150 mg total) by mouth 2 (two) times a day, Disp: 180 tablet, Rfl: 0    Allergies   Allergen Reactions   • Corticosteroids    • Morphine GI Intolerance   • Fentanyl Rash       Physical Exam:    /78 (BP Location: Right arm, Patient Position: Sitting, Cuff Size: Large)   Pulse 80   Resp 16   Ht 5' 6\" (1 676 m)   Wt 87 8 kg (193 lb 9 6 oz)   BMI 31 25 kg/m²     Constitutional: normal, well developed, well nourished, alert, in no distress and non-toxic and no overt pain behavior    Eyes: anicteric  HEENT: grossly intact  Neck: supple, symmetric, trachea " midline and no masses   Pulmonary:even and unlabored  Cardiovascular:No edema or pitting edema present  Skin:Normal without rashes or lesions and well hydrated  Psychiatric:Mood and affect appropriate  Neurologic:Cranial Nerves II-XII grossly intact  Musculoskeletal:antalgic     Lumbar/Sacral Spine examination demonstrates  Full range of motion lumbar spine with pain upon: flexion, lateral rotation to the left/right, and bending to the left/right  Bilateral lumbar paraspinals tender to palpation  Muscle spasms noted in the lumbar area bilaterally  4/5 right lower extremity strength in all muscle groups  Positive seated straight leg raise for bilateral lower extremities  Sensitivity to light touch intact bilateral lower extremities  2+ reflexes in the patella and Achilles  No ankle clonus    Imaging    Narrative & Impression   CT LUMBAR SPINE     INDICATION:   Low back pain, trauma  trauma/pain      COMPARISON: CT of the lumbar spine October 24, 2019      TECHNIQUE:  Contiguous axial images through the lumbar spine were obtained  Sagittal and coronal reconstructions were performed        Radiation dose length product (DLP) for this visit:  662 mGy-cm   This examination, like all CT scans performed in the [facility], was performed utilizing techniques to minimize radiation dose exposure, including the use of iterative   reconstruction and automated exposure control        IMAGE QUALITY:  Diagnostic      FINDINGS:     ALIGNMENT:  There are 5 lumbar type vertebral bodies  Grade 1 anterolisthesis of L3 on L4 similar to prior examination, likely degenerative      VERTEBRAL BODIES:  No acute fracture      DEGENERATIVE CHANGES: Stable moderate multilevel degenerative changes  Mild to moderate left greater than right neural foraminal narrowing at L3-L4      PARASPINAL SOFT TISSUES:   Normal      IMPRESSION:     No acute fracture or traumatic listhesis of the visualized lumbar spine               No orders to display       No orders of the defined types were placed in this encounter

## 2023-05-08 NOTE — H&P (VIEW-ONLY)
Assessment:  1  Lumbar radiculopathy    2  Lumbar disc disease with radiculopathy    3  Lumbar spondylosis        Plan:  Patient is a 75-year-old female complains of right-sided leg pain chronic pain syndrome secondary to lumbar degenerative disc disease with radiculopathy presents to office for initial consultation  Pt reports that she will work and after an hour she will start getting tingling and numbness in the leg  She notices her back starting to hunch  1   We will schedule patient for a right L3-4 and L4-5 transforaminal epidural steroid injection  2  Follow-up in 1 month to injection      Complete risks and benefits including bleeding, infection, tissue reaction, nerve injury and allergic reaction were discussed  The approach was demonstrated using models and literature was provided  Verbal and written consent was obtained  History of Present Illness: The patient is a 74 y o  female who presents for consultation in regards to Back Pain  Symptoms have been present for several months  Symptoms began without any precipitating injury or trauma  Pain is reported to be 6 on the numeric rating scale  Symptoms are felt intermittently and worst in the nighttime  Symptoms are characterized as burning, cramping, sharp, dull/aching, numbing and tingling  Symptoms are associated with right leg weakness  Aggravating factors include standing and walking  Relieving factors include walking  No change in symptoms with kneeling, lying down, bending, leaning forward, leaning bckward, sitting, turning the head, relaxation, coughing/sneezing and bowel movements  Treatments that have been helpful include physical therapy  Medications to relieve symptoms include none  Review of Systems:    Review of Systems   Constitutional: Positive for unexpected weight change  Weight gain   Musculoskeletal: Positive for back pain  Muscle pain   All other systems reviewed and are negative            Past Medical "History:   Diagnosis Date   • Joint pain    • Urine incontinence        Past Surgical History:   Procedure Laterality Date   • BLADDER SURGERY     • BREAST SURGERY     • CHOLECYSTECTOMY     • COLONOSCOPY     • EYE SURGERY     • HYSTERECTOMY     • IR SPINE PROCEDURE  10/24/2019   • ROTATOR CUFF REPAIR     • SHOULDER SURGERY     • THROAT SURGERY     • TONSILLECTOMY         Family History   Problem Relation Age of Onset   • Breast cancer Mother    • Cancer Mother    • Stroke Father    • Heart attack Father        Social History     Occupational History   • Not on file   Tobacco Use   • Smoking status: Never   • Smokeless tobacco: Never   Vaping Use   • Vaping Use: Never used   Substance and Sexual Activity   • Alcohol use: Yes     Comment: social   • Drug use: No   • Sexual activity: Not Currently         Current Outpatient Medications:   •  doxycycline (ORACEA) 40 MG capsule, Take 40 mg by mouth every morning, Disp: , Rfl:   •  gabapentin (Neurontin) 100 mg capsule, Take 1 capsule (100 mg total) by mouth 3 (three) times a day, Disp: 90 capsule, Rfl: 0  •  meclizine (ANTIVERT) 25 mg tablet, Take 1 tablet (25 mg total) by mouth every 8 (eight) hours as needed for dizziness, Disp: 30 tablet, Rfl: 0  •  pantoprazole (PROTONIX) 40 mg tablet, Take 1 tablet (40 mg total) by mouth daily, Disp: 90 tablet, Rfl: 1  •  sulindac (CLINORIL) 150 MG tablet, Take 1 tablet (150 mg total) by mouth 2 (two) times a day, Disp: 180 tablet, Rfl: 0    Allergies   Allergen Reactions   • Corticosteroids    • Morphine GI Intolerance   • Fentanyl Rash       Physical Exam:    /78 (BP Location: Right arm, Patient Position: Sitting, Cuff Size: Large)   Pulse 80   Resp 16   Ht 5' 6\" (1 676 m)   Wt 87 8 kg (193 lb 9 6 oz)   BMI 31 25 kg/m²     Constitutional: normal, well developed, well nourished, alert, in no distress and non-toxic and no overt pain behavior    Eyes: anicteric  HEENT: grossly intact  Neck: supple, symmetric, trachea " midline and no masses   Pulmonary:even and unlabored  Cardiovascular:No edema or pitting edema present  Skin:Normal without rashes or lesions and well hydrated  Psychiatric:Mood and affect appropriate  Neurologic:Cranial Nerves II-XII grossly intact  Musculoskeletal:antalgic     Lumbar/Sacral Spine examination demonstrates  Full range of motion lumbar spine with pain upon: flexion, lateral rotation to the left/right, and bending to the left/right  Bilateral lumbar paraspinals tender to palpation  Muscle spasms noted in the lumbar area bilaterally  4/5 right lower extremity strength in all muscle groups  Positive seated straight leg raise for bilateral lower extremities  Sensitivity to light touch intact bilateral lower extremities  2+ reflexes in the patella and Achilles  No ankle clonus    Imaging    Narrative & Impression   CT LUMBAR SPINE     INDICATION:   Low back pain, trauma  trauma/pain      COMPARISON: CT of the lumbar spine October 24, 2019      TECHNIQUE:  Contiguous axial images through the lumbar spine were obtained  Sagittal and coronal reconstructions were performed        Radiation dose length product (DLP) for this visit:  662 mGy-cm   This examination, like all CT scans performed in the [facility], was performed utilizing techniques to minimize radiation dose exposure, including the use of iterative   reconstruction and automated exposure control        IMAGE QUALITY:  Diagnostic      FINDINGS:     ALIGNMENT:  There are 5 lumbar type vertebral bodies  Grade 1 anterolisthesis of L3 on L4 similar to prior examination, likely degenerative      VERTEBRAL BODIES:  No acute fracture      DEGENERATIVE CHANGES: Stable moderate multilevel degenerative changes  Mild to moderate left greater than right neural foraminal narrowing at L3-L4      PARASPINAL SOFT TISSUES:   Normal      IMPRESSION:     No acute fracture or traumatic listhesis of the visualized lumbar spine               No orders to display       No orders of the defined types were placed in this encounter

## 2023-05-11 ENCOUNTER — APPOINTMENT (OUTPATIENT)
Dept: PHYSICAL THERAPY | Facility: CLINIC | Age: 73
End: 2023-05-11
Payer: COMMERCIAL

## 2023-05-12 DIAGNOSIS — M54.16 LUMBAR RADICULOPATHY: ICD-10-CM

## 2023-05-12 DIAGNOSIS — M51.16 LUMBAR DISC DISEASE WITH RADICULOPATHY: ICD-10-CM

## 2023-05-12 DIAGNOSIS — M47.816 LUMBAR SPONDYLOSIS: ICD-10-CM

## 2023-05-12 RX ORDER — GABAPENTIN 100 MG/1
100 CAPSULE ORAL 3 TIMES DAILY
Qty: 90 CAPSULE | Refills: 0 | Status: SHIPPED | OUTPATIENT
Start: 2023-05-12

## 2023-05-18 ENCOUNTER — OFFICE VISIT (OUTPATIENT)
Dept: PHYSICAL THERAPY | Facility: CLINIC | Age: 73
End: 2023-05-18

## 2023-05-18 DIAGNOSIS — M54.16 LUMBAR RADICULOPATHY: Primary | ICD-10-CM

## 2023-05-18 DIAGNOSIS — M47.816 LUMBAR SPONDYLOSIS: ICD-10-CM

## 2023-05-18 DIAGNOSIS — M51.16 LUMBAR DISC DISEASE WITH RADICULOPATHY: ICD-10-CM

## 2023-05-18 NOTE — PROGRESS NOTES
"Daily Note     Today's date: 2023  Patient name: Niya Torres  : 1950  MRN: [id number]  Referring provider: Iline Sicard, PA-C  Dx:   Encounter Diagnosis     ICD-10-CM    1  Lumbar radiculopathy  M54 16       2  Lumbar disc disease with radiculopathy  M51 16       3  Lumbar spondylosis  M47 816                      Subjective: Pt reports she will injection on   Reports doing well upon waking up but cont to have pain and weakness in B LE's  Objective: See treatment diary below      Assessment: Tolerated treatment well  Patient demonstrated fatigue post treatment and exhibited good technique with therapeutic exercises  Pt cont to have weakness in LE's today  Notable fwd bending from LS  Ed on LS ext standing  Plan: Continue per plan of care          Precautions: chronic LBP       Manuals     BLE  10' JV HS/Gastroc/Piriformis 10' JV HS/Gastroc/Piriformis 10' JV HS/gastroc/piriformis                            Neuro Re-Ed         Standing Gastroc/HS stretch  \" ea \" ea \" ea    PPT  2/10 2/10 2/10    PPT w/marches   10x 10x    PPT w/SLR  2/10 2/10 2/10    PPT w/knee fallouts  NV      [employer]        Postural Ed Posture, body mechanics, spine anatomy - ML       Ther Ex        NuStep  10' L3 strength 10' L3 strength 10' L3 strength    SLR x 3        Bridges  2/10 10x fatigue 10x fatigue    SKTC HEP 10x10\" 10x10\" 10/10\"    LTR HEP 10x ea 10x ea 10x ea    Standing LS ext   210 2/10     Clamshells  S/L 2/10 S/L 2/10 Supine L2 2/10    Back ext machine   60# 2/10 60# 2/10    Ther Activity                        Gait Training                        Modalities        HP/CP prn   5' cp post 10' post                           "

## 2023-05-24 ENCOUNTER — OFFICE VISIT (OUTPATIENT)
Dept: PHYSICAL THERAPY | Facility: CLINIC | Age: 73
End: 2023-05-24

## 2023-05-24 DIAGNOSIS — M47.816 LUMBAR SPONDYLOSIS: ICD-10-CM

## 2023-05-24 DIAGNOSIS — M54.16 LUMBAR RADICULOPATHY: Primary | ICD-10-CM

## 2023-05-24 DIAGNOSIS — M51.16 LUMBAR DISC DISEASE WITH RADICULOPATHY: ICD-10-CM

## 2023-05-24 NOTE — PROGRESS NOTES
"Daily Note     Today's date: 2023  Patient name: Guzman Bird  : 1950  MRN: [id number]  Referring provider: Debby Vanegas PA-C  Dx:   Encounter Diagnosis     ICD-10-CM    1  Lumbar radiculopathy  M54 16       2  Lumbar disc disease with radiculopathy  M51 16       3  Lumbar spondylosis  M47 816                      Subjective: Pt reports she is doing well but has some cramping at night in legs  Reports cont sx into B LEs  Objective: See treatment diary below      Assessment: Tolerated treatment well  Patient exhibited good technique with therapeutic exercises  Pt lara program well with min increased sx today  Cont to be challenged with bridging with difficulty completing exercise  Plan: Continue per plan of care  Precautions: chronic LBP       Manuals    BLE  10' JV HS/Gastroc/Piriformis 10' JV HS/Gastroc/Piriformis 10' JV HS/gastroc/piriformis 10' JV HS/Piriformis/Gastroc                              Neuro Re-Ed         Standing Gastroc/HS stretch  \" ea \" ea \" ea \" ea   PPT  10 10 10 2/10   PPT w/marches   10x 10x 10x   PPT w/SLR  2/10 2/10 2/10 2/10   PPT w/knee fallouts  NV      [employer]        Postural Ed Posture, body mechanics, spine anatomy - ML       Ther Ex        NuStep  10' L3 strength 10' L3 strength 10' L3 strength 10' L3 strength   SLR x 3        Bridges  /10 10x fatigue 10x fatigue 10x   SKTC HEP 10x10\" 10x10\" 10/10\" 10/10\"   LTR HEP 10x ea 10x ea 10x ea 10x ea   Standing LS ext   10 210  2/10   Clamshells  S/L 2/10 S/L 2/10 Supine L2 2/10 Supine L2 2/10   Back ext machine   60# 2/10 60# 2/10 60# 2/10   Ther Activity                        Gait Training                        Modalities        HP/CP prn   5' cp post 10' post                             "

## 2023-05-31 ENCOUNTER — OFFICE VISIT (OUTPATIENT)
Dept: PHYSICAL THERAPY | Facility: CLINIC | Age: 73
End: 2023-05-31

## 2023-05-31 DIAGNOSIS — M47.816 LUMBAR SPONDYLOSIS: ICD-10-CM

## 2023-05-31 DIAGNOSIS — M54.16 LUMBAR RADICULOPATHY: Primary | ICD-10-CM

## 2023-05-31 DIAGNOSIS — M51.16 LUMBAR DISC DISEASE WITH RADICULOPATHY: ICD-10-CM

## 2023-05-31 NOTE — PROGRESS NOTES
"Daily Note     Today's date: 2023  Patient name: Rosette Lan  : 1950  MRN: [id number]  Referring provider: Darrion Yoo PA-C  Dx:   Encounter Diagnosis     ICD-10-CM    1  Lumbar radiculopathy  M54 16       2  Lumbar disc disease with radiculopathy  M51 16       3  Lumbar spondylosis  M47 816                      Subjective: Pt reports cont sx into LE's  States getting injection tomorrow  Pt had emergency and had to leave early during treatment today  Objective: See treatment diary below      Assessment: Tolerated treatment well  Patient demonstrated fatigue post treatment and exhibited good technique with therapeutic exercises  Pt making slow steady gains with program  Cont to have radicular sx and weakness in LE's  Will have injection at pain management tomorrow  Plan: Continue per plan of care  Precautions: chronic LBP       Manuals    BLE Held time 8' JV HS/Gastroc/Piriformis 10' JV HS/Gastroc/Piriformis 10' JV HS/gastroc/piriformis 10' JV HS/Piriformis/Gastroc                              Neuro Re-Ed         Standing Gastroc/HS stretch 'ea \" ea \" ea \" ea \" ea   PPT 10 10 10 10 2/10   PPT w/marches 10  10x 10x 10x   PPT w/SLR  2/10 2/10 /10 2/10   PPT w/knee fallouts  NV      [employer]        Postural Ed        Ther Ex        NuStep 10' strength 10' L3 strength 10' L3 strength 10' L3 strength 10' L3 strength   SLR x 3        Bridges NV 2/10 10x fatigue 10x fatigue 10x   SKTC 10x10\" 10x10\" 10x10\" 10/10\" 10/10\"   LTR 10x ea 10x ea 10x ea 10x ea 10x ea   Standing LS ext 2/10  2/10 2/10  2/10   Clamshells NV S/L 2/10 S/L 2/10 Supine L2 2/10 Supine L2 2/10   Back ext machine 60# 2/10  60# 2/10 60# 2/10 60# 2/10   Ther Activity                        Gait Training                        Modalities        HP/CP prn   5' cp post 10' post                               "

## 2023-06-01 ENCOUNTER — APPOINTMENT (OUTPATIENT)
Dept: RADIOLOGY | Facility: HOSPITAL | Age: 73
End: 2023-06-01

## 2023-06-01 ENCOUNTER — HOSPITAL ENCOUNTER (OUTPATIENT)
Facility: HOSPITAL | Age: 73
Setting detail: OUTPATIENT SURGERY
Discharge: HOME | End: 2023-06-01
Attending: ANESTHESIOLOGY | Admitting: ANESTHESIOLOGY

## 2023-06-01 ENCOUNTER — TELEPHONE (OUTPATIENT)
Dept: FAMILY MEDICINE CLINIC | Facility: CLINIC | Age: 73
End: 2023-06-01

## 2023-06-01 VITALS
OXYGEN SATURATION: 94 % | WEIGHT: 191 LBS | SYSTOLIC BLOOD PRESSURE: 128 MMHG | DIASTOLIC BLOOD PRESSURE: 67 MMHG | HEART RATE: 70 BPM | BODY MASS INDEX: 30.7 KG/M2 | RESPIRATION RATE: 18 BRPM | TEMPERATURE: 96.8 F | HEIGHT: 66 IN

## 2023-06-01 DIAGNOSIS — M51.16 LUMBAR DISC DISEASE WITH RADICULOPATHY: ICD-10-CM

## 2023-06-01 DIAGNOSIS — L71.9 ROSACEA: Primary | ICD-10-CM

## 2023-06-01 RX ORDER — LIDOCAINE HYDROCHLORIDE 10 MG/ML
INJECTION, SOLUTION EPIDURAL; INFILTRATION; INTRACAUDAL; PERINEURAL AS NEEDED
Status: DISCONTINUED | OUTPATIENT
Start: 2023-06-01 | End: 2023-06-01 | Stop reason: HOSPADM

## 2023-06-01 RX ORDER — DEXAMETHASONE SODIUM PHOSPHATE 10 MG/ML
INJECTION, SOLUTION INTRAMUSCULAR; INTRAVENOUS AS NEEDED
Status: DISCONTINUED | OUTPATIENT
Start: 2023-06-01 | End: 2023-06-01 | Stop reason: HOSPADM

## 2023-06-01 RX ORDER — DOXYCYCLINE 40 MG/1
40 CAPSULE ORAL EVERY MORNING
Qty: 90 CAPSULE | Refills: 0 | Status: SHIPPED | OUTPATIENT
Start: 2023-06-01

## 2023-06-01 NOTE — INTERVAL H&P NOTE
H&P reviewed  After examining the patient I find no changes in the patients condition since the H&P had been written      Vitals:    06/01/23 1240   BP: 144/68   Pulse: 74   Resp: 18   Temp: (!) 96 8 °F (36 °C)   SpO2: 95%

## 2023-06-01 NOTE — DISCHARGE INSTR - AVS FIRST PAGE
Epidural Steroid Injection   WHAT YOU NEED TO KNOW:   An epidural steroid injection (JUAN) is a procedure to inject steroid medicine into the epidural space  The epidural space is between your spinal cord and vertebrae  Steroids reduce inflammation and fluid buildup in your spine that may be causing pain  You may be given pain medicine along with the steroids  ACTIVITY  Do not drive or operate machinery today  No strenuous activity today - bending, lifting, etc   You may resume normal activites starting tomorrow - start slowly and as tolerated  You may shower today, but no tub baths or hot tubs  You may have numbness for several hours from the local anesthetic  Please use caution and common sense, especially with weight-bearing activities  CARE OF THE INJECTION SITE  If you have soreness or pain, apply ice to the area today (20 minutes on/20 minutes off)  Starting tomorrow, you may use warm, moist heat or ice if needed  You may have an increase or change in your discomfort for 36-48 hours after your treatment  Apply ice and continue with any pain medication you have been prescribed  Notify the [facility] if you have any of the following: redness, drainage, swelling, headache, stiff neck or fever above 100°F     SPECIAL INSTRUCTIONS  Our office will contact you in approximately 7 days for a progress report  MEDICATIONS  Continue to take all routine medications  Our office may have instructed you to hold some medications  As no general anesthesia was used in today's procedure, you should not experience any side effects related to anesthesia  If you are diabetic, the steroids used in today's injection may temporarily increase your blood sugar levels after the first few days after your injection  Please keep a close eye on your sugars and alert the doctor who manages your diabetes if your sugars are significantly high from your baseline or you are symptomatic       If you have a problem specifically related to your procedure, please call our office at [phone]  Problems not related to your procedure should be directed to your primary care physician

## 2023-06-01 NOTE — OP NOTE
OPERATIVE REPORT  PATIENT NAME: Hawa Albert    :  1950  MRN: [id number]  Pt Location: [facility] ROOM 01    SURGERY DATE: 2023    Surgeon(s) and Role:     * Edwin Thao MD - Primary    Preop Diagnosis:  Lumbar disc disease with radiculopathy [M51 16]    Post-Op Diagnosis Codes:     * Lumbar disc disease with radiculopathy [M51 16]    Procedure(s):  Right - Right L3-4 and L4-5 transforaminal epidural steroid injection    Specimen(s):  * No specimens in log *    Estimated Blood Loss:   Minimal    Drains:  * No LDAs found *    Anesthesia Type:   Local    Operative Indications:  Lumbar disc disease with radiculopathy [M51 16]      Operative Findings:  same    Complications:   None    Procedure and Technique:  Fluoroscopically-guided right L3-4 and L4-5 transforaminal epidural steroid injection under fluoroscopy      After discussing the risks, benefits, and alternatives to the procedure, the patient expressed understanding and wished to proceed  The patient was brought to the fluoroscopy suite and placed in the prone position  A procedural pause was conducted to verify:  correct patient identity, procedure to be performed and as applicable, correct side and site, correct patient position, and availability of implants, special equipment and special requirements  After identifying the right L3 and L4 pedicles fluoroscopically with an oblique view, the skin was sterilely prepped and draped in the usual fashion using Chloraprep skin prep  The skin and subcutaneous tissue were anesthetized with 0 5% lidocaine  A 5 inch 22 gauge spinal needle was then advanced under fluoroscopic guidance to the posterior aspect of the right L3-4 and L4-5 neural foramens  Appropriate foraminal depth was determined with a lateral fluoroscopic view, and AP visualization confirmed needle positioning at approximately the 6 o’clock position relative to the pedicles    After negative aspiration, 1 mL of Omnipaque 300 contrast was injected using live fluoroscopy/digital subtraction angiography, confirming appropriate transforaminal spread without evidence of intravascular or intrathecal uptake  Next, a local anesthetic test dose consisting of 1 mL of 2% lidocaine was injected through the needle at each level  After an appropriate period of observation, a directed neurological exam was performed which revealed no new neurologic deficits  Next, a 1 5 ml solution consisting of 7 5 mg of dexamethasone in sterile saline was injected slowly and incrementally into the epidural space at each level  Following the injection the needles were withdrawn slightly and flushed with lidocaine as they were fully extracted  The patient tolerated the procedure well and there were no apparent complications  The patient did not develop any new neurologic deficits  After appropriate observation, the patient was dismissed from the clinic in good condition under their own power  COMMENTS   The patient received a total steroid dose of 15 mg of dexamethasone  I was present for the entire procedure      Patient Disposition:  hemodynamically stable        SIGNATURE: Drew Glover MD  DATE: June 1, 2023  TIME: 1:17 PM

## 2023-06-01 NOTE — TELEPHONE ENCOUNTER
Patient called, asked if you could please call in Doxycycline 40 mgs for Rosecea in her eyes  She is in process of trying to find another eye doctor but did call current eye doctor and they cannot get her in for at least a month  Will not call anything in till they see her and asked why she cx's last appt and she told them she had just lost her   She cannot wait that long and said that you and her talked and you would refill it for her if she was ever in a pinch  She asked if it could be sent into [facility] for a 90 day supply

## 2023-06-08 ENCOUNTER — TELEPHONE (OUTPATIENT)
Dept: RADIOLOGY | Facility: MEDICAL CENTER | Age: 73
End: 2023-06-08

## 2023-07-03 ENCOUNTER — OFFICE VISIT (OUTPATIENT)
Dept: PAIN MEDICINE | Facility: CLINIC | Age: 73
End: 2023-07-03
Payer: COMMERCIAL

## 2023-07-03 VITALS
WEIGHT: 196 LBS | DIASTOLIC BLOOD PRESSURE: 76 MMHG | HEIGHT: 66 IN | HEART RATE: 87 BPM | BODY MASS INDEX: 31.5 KG/M2 | SYSTOLIC BLOOD PRESSURE: 139 MMHG

## 2023-07-03 DIAGNOSIS — M54.42 CHRONIC BILATERAL LOW BACK PAIN WITH BILATERAL SCIATICA: ICD-10-CM

## 2023-07-03 DIAGNOSIS — M54.16 LUMBAR RADICULOPATHY: ICD-10-CM

## 2023-07-03 DIAGNOSIS — M54.41 CHRONIC BILATERAL LOW BACK PAIN WITH BILATERAL SCIATICA: ICD-10-CM

## 2023-07-03 DIAGNOSIS — G89.29 CHRONIC BILATERAL LOW BACK PAIN WITH BILATERAL SCIATICA: ICD-10-CM

## 2023-07-03 DIAGNOSIS — M48.062 NEUROGENIC CLAUDICATION DUE TO LUMBAR SPINAL STENOSIS: ICD-10-CM

## 2023-07-03 DIAGNOSIS — G89.4 CHRONIC PAIN SYNDROME: Primary | ICD-10-CM

## 2023-07-03 DIAGNOSIS — M47.816 LUMBAR SPONDYLOSIS: ICD-10-CM

## 2023-07-03 PROCEDURE — 1159F MED LIST DOCD IN RCRD: CPT | Performed by: NURSE PRACTITIONER

## 2023-07-03 PROCEDURE — 1160F RVW MEDS BY RX/DR IN RCRD: CPT | Performed by: NURSE PRACTITIONER

## 2023-07-03 PROCEDURE — 99213 OFFICE O/P EST LOW 20 MIN: CPT | Performed by: NURSE PRACTITIONER

## 2023-07-03 NOTE — PROGRESS NOTES
Assessment:  1. Chronic pain syndrome    2. Chronic bilateral low back pain with bilateral sciatica    3. Lumbar radiculopathy    4. Lumbar spondylosis    5. Neurogenic claudication due to lumbar spinal stenosis        Plan:  While the patient was in the office today, I did have a thorough conversation regarding their chronic pain syndrome, medication management, and treatment plan options. Patient is being seen for a follow-up visit. She underwent a right-sided L3-4 and L4-5 transforaminal epidural steroid injection on 6/1/2023. Her pain has improved by almost 100%. He is actually denying any complaints of pain in the office today. She discontinued gabapentin. We will repeat right-sided L3-4 and L4-5 transforaminal epidural steroid injection if needed. Patient previously participated in physical therapy and was given exercises to do at home. She admits that she has not been doing the exercises on a regular basis. We discussed the importance of a lifelong commitment to daily exercises geared toward lumbar core stretching and strengthening. The patient was agreeable and verbalized an understanding. I discussed with the patient that at this point time she can follow up with our office on an as-needed basis. I did review the patient that if her pain symptoms should change, worsen, and/or if she would experience any new symptoms as she would like to be evaluated for, she should give our office a call. The patient was agreeable and verbalized an understanding. History of Present Illness: The patient is a 74 y.o. female who presents for a follow up office visit in regards to Follow-up. The patient’s current symptoms include complaints of low back pain. Right leg pain has resolved following recent injection. Current pain level is a 0/10. Current pain medications includes: None.     I have personally reviewed and/or updated the patient's past medical history, past surgical history, family history, social history, current medications, allergies, and vital signs today. Review of Systems  Review of Systems   All other systems reviewed and are negative.           Past Medical History:   Diagnosis Date   • Joint pain    • Urine incontinence        Past Surgical History:   Procedure Laterality Date   • BLADDER SURGERY     • BREAST SURGERY     • CHOLECYSTECTOMY     • COLONOSCOPY     • EPIDURAL BLOCK INJECTION Right 6/1/2023    Procedure: Right L3-4 and L4-5 transforaminal epidural steroid injection;  Surgeon: Sherrie Crump MD;  Location: [facility];  Service: Pain Management    • EYE SURGERY     • HYSTERECTOMY     • IR SPINE PROCEDURE  10/24/2019   • ROTATOR CUFF REPAIR     • SHOULDER SURGERY     • THROAT SURGERY     • TONSILLECTOMY         Family History   Problem Relation Age of Onset   • Breast cancer Mother    • Cancer Mother    • Stroke Father    • Heart attack Father        Social History     Occupational History   • Not on file   Tobacco Use   • Smoking status: Never   • Smokeless tobacco: Never   Vaping Use   • Vaping Use: Never used   Substance and Sexual Activity   • Alcohol use: Yes     Comment: social   • Drug use: No   • Sexual activity: Not Currently         Current Outpatient Medications:   •  doxycycline (ORACEA) 40 MG capsule, Take 1 capsule (40 mg total) by mouth every morning, Disp: 90 capsule, Rfl: 0  •  meclizine (ANTIVERT) 25 mg tablet, Take 1 tablet (25 mg total) by mouth every 8 (eight) hours as needed for dizziness, Disp: 30 tablet, Rfl: 0  •  pantoprazole (PROTONIX) 40 mg tablet, Take 1 tablet (40 mg total) by mouth daily, Disp: 90 tablet, Rfl: 1  •  sulindac (CLINORIL) 150 MG tablet, Take 1 tablet (150 mg total) by mouth 2 (two) times a day, Disp: 180 tablet, Rfl: 0  •  gabapentin (Neurontin) 100 mg capsule, Take 1 capsule (100 mg total) by mouth 3 (three) times a day (Patient not taking: Reported on 7/3/2023), Disp: 90 capsule, Rfl: 0    Allergies   Allergen Reactions   • Corticosteroids Other (See Comments)     Increased eye pressure   • Morphine GI Intolerance   • Fentanyl Rash       Physical Exam:    /76 (BP Location: Left arm, Patient Position: Sitting, Cuff Size: Adult)   Pulse 87   Ht 5' 6" (1.676 m)   Wt 88.9 kg (196 lb)   BMI 31.64 kg/m²     Constitutional:normal, well developed, well nourished, alert, in no distress and non-toxic and no overt pain behavior. Eyes:anicteric  HEENT:grossly intact  Neck:supple, symmetric, trachea midline and no masses   Pulmonary:even and unlabored  Cardiovascular:No edema or pitting edema present  Skin:Normal without rashes or lesions and well hydrated  Psychiatric:Mood and affect appropriate  Neurologic:Cranial Nerves II-XII grossly intact  Musculoskeletal:normal    Imaging  No orders to display       No orders of the defined types were placed in this encounter.

## 2023-07-19 ENCOUNTER — OFFICE VISIT (OUTPATIENT)
Dept: FAMILY MEDICINE CLINIC | Facility: CLINIC | Age: 73
End: 2023-07-19
Payer: COMMERCIAL

## 2023-07-19 VITALS
HEIGHT: 66 IN | WEIGHT: 196 LBS | OXYGEN SATURATION: 97 % | HEART RATE: 74 BPM | DIASTOLIC BLOOD PRESSURE: 84 MMHG | BODY MASS INDEX: 31.5 KG/M2 | SYSTOLIC BLOOD PRESSURE: 122 MMHG

## 2023-07-19 DIAGNOSIS — E55.9 VITAMIN D DEFICIENCY: ICD-10-CM

## 2023-07-19 DIAGNOSIS — L65.9 HAIR LOSS: ICD-10-CM

## 2023-07-19 DIAGNOSIS — Z00.00 MEDICARE ANNUAL WELLNESS VISIT, SUBSEQUENT: Primary | ICD-10-CM

## 2023-07-19 DIAGNOSIS — Z91.09 ENVIRONMENTAL ALLERGIES: ICD-10-CM

## 2023-07-19 PROCEDURE — G0439 PPPS, SUBSEQ VISIT: HCPCS | Performed by: PHYSICIAN ASSISTANT

## 2023-07-19 RX ORDER — OLOPATADINE HYDROCHLORIDE 1 MG/ML
1 SOLUTION/ DROPS OPHTHALMIC 2 TIMES DAILY
Qty: 15 ML | Refills: 3 | Status: SHIPPED | OUTPATIENT
Start: 2023-07-19

## 2023-07-19 NOTE — PROGRESS NOTES
Assessment and Plan:     Problem List Items Addressed This Visit        Other    Vitamin D deficiency    Relevant Orders    Vitamin D 25 hydroxy   Other Visit Diagnoses     Medicare annual wellness visit, subsequent    -  Primary    Relevant Orders    Comprehensive metabolic panel    Environmental allergies        Relevant Medications    olopatadine (PATANOL) 0.1 % ophthalmic solution    Hair loss        Relevant Orders    TSH, 3rd generation with Free T4 reflex           Preventive health issues were discussed with patient, and age appropriate screening tests were ordered as noted in patient's After Visit Summary. Personalized health advice and appropriate referrals for health education or preventive services given if needed, as noted in patient's After Visit Summary. History of Present Illness:     Patient presents for a Medicare Wellness Visit    Yousuf Rivera is a pleasant 76-year-old female who is here today for her Medicare annual well visit. She is up-to-date with GYN visits and mammograms. She had a colonoscopy 1 year ago, which was normal.  She is up-to-date with her labs. She is up-to-date with eye exams. She is asking for a refill on her Patanol eyedrops. She has noticed that her hair is thinning, and she is more fatigued. Patient Care Team:  Laverne Ponce PA-C as PCP - General (Family Medicine)  Divya Avila MD (Pain Medicine)     Review of Systems:     Review of Systems   Constitutional: Positive for fatigue. Negative for chills, diaphoresis and fever. HENT: Negative for congestion, ear pain, postnasal drip, rhinorrhea, sneezing, sore throat and trouble swallowing. Eyes: Negative for pain and visual disturbance. Respiratory: Negative for apnea, cough, shortness of breath and wheezing. Cardiovascular: Negative for chest pain and palpitations. Gastrointestinal: Negative for abdominal pain, constipation, diarrhea, nausea and vomiting. Genitourinary: Negative for dysuria. Musculoskeletal: Negative for arthralgias, gait problem and myalgias. Skin:        +Hair thinning   Neurological: Negative for dizziness, syncope, weakness, light-headedness, numbness and headaches. Psychiatric/Behavioral: Negative for suicidal ideas. The patient is not nervous/anxious.          Problem List:     Patient Active Problem List   Diagnosis   • Chronic bilateral low back pain with bilateral sciatica   • Lumbar radiculopathy   • Chronic pain syndrome   • Lumbar spondylosis   • Lumbar disc disease with radiculopathy   • Neurogenic claudication due to lumbar spinal stenosis   • Vitamin D deficiency   • Polyp of colon   • Osteoarthritis of knee   • Hyperlipidemia   • Generalized osteoarthritis   • Gastroesophageal reflux disease      Past Medical and Surgical History:     Past Medical History:   Diagnosis Date   • Joint pain    • Urine incontinence      Past Surgical History:   Procedure Laterality Date   • BLADDER SURGERY     • BREAST SURGERY     • CHOLECYSTECTOMY     • COLONOSCOPY     • EPIDURAL BLOCK INJECTION Right 6/1/2023    Procedure: Right L3-4 and L4-5 transforaminal epidural steroid injection;  Surgeon: Neo Meza MD;  Location: [facility];  Service: Pain Management    • EYE SURGERY     • HYSTERECTOMY     • IR SPINE PROCEDURE  10/24/2019   • ROTATOR CUFF REPAIR     • SHOULDER SURGERY     • THROAT SURGERY     • TONSILLECTOMY        Family History:     Family History   Problem Relation Age of Onset   • Breast cancer Mother    • Cancer Mother    • Stroke Father    • Heart attack Father       Social History:     Social History     Socioeconomic History   • Marital status: /Civil Union     Spouse name: None   • Number of children: None   • Years of education: None   • Highest education level: None   Occupational History   • None   Tobacco Use   • Smoking status: Never   • Smokeless tobacco: Never   Vaping Use   • Vaping Use: Never used   Substance and Sexual Activity   • Alcohol use: Yes     Comment: social   • Drug use: No   • Sexual activity: Not Currently   Other Topics Concern   • None   Social History Narrative   • None     Social Determinants of Health     Financial Resource Strain: Low Risk  (7/19/2023)    Overall Financial Resource Strain (CARDIA)    • Difficulty of Paying Living Expenses: Not hard at all   Food Insecurity: Not on file   Transportation Needs: No Transportation Needs (7/19/2023)    PRAPARE - Transportation    • Lack of Transportation (Medical): No    • Lack of Transportation (Non-Medical): No   Physical Activity: Not on file   Stress: Not on file   Social Connections: Not on file   Intimate Partner Violence: Not on file   Housing Stability: Not on file      Medications and Allergies:     Current Outpatient Medications   Medication Sig Dispense Refill   • doxycycline (ORACEA) 40 MG capsule Take 1 capsule (40 mg total) by mouth every morning 90 capsule 0   • meclizine (ANTIVERT) 25 mg tablet Take 1 tablet (25 mg total) by mouth every 8 (eight) hours as needed for dizziness 30 tablet 0   • olopatadine (PATANOL) 0.1 % ophthalmic solution Administer 1 drop to both eyes 2 (two) times a day 15 mL 3   • pantoprazole (PROTONIX) 40 mg tablet Take 1 tablet (40 mg total) by mouth daily 90 tablet 1   • sulindac (CLINORIL) 150 MG tablet Take 1 tablet (150 mg total) by mouth 2 (two) times a day 180 tablet 0     No current facility-administered medications for this visit.      Allergies   Allergen Reactions   • Corticosteroids Other (See Comments)     Increased eye pressure   • Morphine GI Intolerance   • Fentanyl Rash      Immunizations:     Immunization History   Administered Date(s) Administered   • INFLUENZA 12/04/2014, 11/13/2015, 12/09/2016, 10/02/2020, 09/22/2021   • Pneumococcal Conjugate 13-Valent 11/04/2020   • Pneumococcal Polysaccharide PPV23 12/15/2021      Health Maintenance:         Topic Date Due   • Breast Cancer Screening: Mammogram  08/11/2023   • Colorectal Cancer Screening  01/19/2024 (Originally 5/6/1995)   • Hepatitis C Screening  Completed         Topic Date Due   • COVID-19 Vaccine (1) Never done   • Influenza Vaccine (1) 09/01/2023      Medicare Screening Tests and Risk Assessments:     Josseline Vickers is here for her Subsequent Wellness visit. Health Risk Assessment:   Patient rates overall health as good. Patient feels that their physical health rating is much better. Patient is satisfied with their life. Eyesight was rated as same. Hearing was rated as same. Patient feels that their emotional and mental health rating is same. Patients states they are never, rarely angry. Patient states they are often unusually tired/fatigued. Pain experienced in the last 7 days has been none. Patient states that she has experienced weight loss or gain in last 6 months. Fall Risk Screening: In the past year, patient has experienced: no history of falling in past year      Urinary Incontinence Screening:   Patient has not leaked urine accidently in the last six months. Home Safety:  Patient does not have trouble with stairs inside or outside of their home. Patient has working smoke alarms and has working carbon monoxide detector. Home safety hazards include: none. Nutrition:   Current diet is Regular. Medications:   Patient is not currently taking any over-the-counter supplements. Patient is able to manage medications. Activities of Daily Living (ADLs)/Instrumental Activities of Daily Living (IADLs):   Walk and transfer into and out of bed and chair?: Yes  Dress and groom yourself?: Yes    Bathe or shower yourself?: Yes    Feed yourself?  Yes  Do your laundry/housekeeping?: Yes  Manage your money, pay your bills and track your expenses?: Yes  Make your own meals?: Yes    Do your own shopping?: Yes    Previous Hospitalizations:   Any hospitalizations or ED visits within the last 12 months?: No      Advance Care Planning:   Living will: No    Durable POA for healthcare: No    Advanced directive: No    Advanced directive counseling given: Yes    Five wishes given: Yes    End of Life Decisions reviewed with patient: Yes      Cognitive Screening:   Provider or family/friend/caregiver concerned regarding cognition?: No    PREVENTIVE SCREENINGS      Cardiovascular Screening:    General: Screening Not Indicated, History Lipid Disorder and Risks and Benefits Discussed    Due for: Lipid Panel      Diabetes Screening:     General: Screening Current and Risks and Benefits Discussed    Due for: Blood Glucose      Colorectal Cancer Screening:     General: Risks and Benefits Discussed and Screening Current      Breast Cancer Screening:     General: Screening Current and Risks and Benefits Discussed      Cervical Cancer Screening:    General: Screening Not Indicated and Risks and Benefits Discussed      Osteoporosis Screening:    General: Risks and Benefits Discussed    Due for: DXA Axial      Abdominal Aortic Aneurysm (AAA) Screening:        General: Screening Not Indicated and Risks and Benefits Discussed      Lung Cancer Screening:     General: Screening Not Indicated and Risks and Benefits Discussed      Hepatitis C Screening:    General: Screening Current and Risks and Benefits Discussed    Screening, Brief Intervention, and Referral to Treatment (SBIRT)      Brief Intervention  Alcohol & drug use screenings were reviewed. No concerns regarding substance use disorder identified. Other Counseling Topics:   Car/seat belt/driving safety, skin self-exam, sunscreen and calcium and vitamin D intake and regular weightbearing exercise. No results found. Physical Exam:     /84   Pulse 74   Ht 5' 6" (1.676 m)   Wt 88.9 kg (196 lb)   SpO2 97%   BMI 31.64 kg/m²     Physical Exam  Vitals and nursing note reviewed. Constitutional:       General: She is not in acute distress. Appearance: She is well-developed. She is not diaphoretic.    HENT:      Head: Normocephalic and atraumatic. Right Ear: Hearing, tympanic membrane, ear canal and external ear normal.      Left Ear: Hearing, tympanic membrane, ear canal and external ear normal.      Nose: Nose normal. No mucosal edema or rhinorrhea. Mouth/Throat:      Pharynx: No oropharyngeal exudate or posterior oropharyngeal erythema. Cardiovascular:      Rate and Rhythm: Normal rate and regular rhythm. Heart sounds: Normal heart sounds. No murmur heard. No friction rub. No gallop. Pulmonary:      Effort: Pulmonary effort is normal. No respiratory distress. Breath sounds: Normal breath sounds. No wheezing or rales. Abdominal:      General: Bowel sounds are normal. There is no distension. Palpations: Abdomen is soft. Tenderness: There is no abdominal tenderness. There is no guarding. Musculoskeletal:         General: Normal range of motion. Cervical back: Normal range of motion and neck supple. Lymphadenopathy:      Cervical: No cervical adenopathy. Skin:     General: Skin is warm and dry. Findings: No rash. Neurological:      Mental Status: She is alert and oriented to person, place, and time. Psychiatric:         Behavior: Behavior normal.         Thought Content:  Thought content normal.         Judgment: Judgment normal.          Milady Steel PA-C

## 2023-07-19 NOTE — PATIENT INSTRUCTIONS
Medicare Preventive Visit Patient Instructions  Thank you for completing your Welcome to Medicare Visit or Medicare Annual Wellness Visit today. Your next wellness visit will be due in one year (7/19/2024). The screening/preventive services that you may require over the next 5-10 years are detailed below. Some tests may not apply to you based off risk factors and/or age. Screening tests ordered at today's visit but not completed yet may show as past due. Also, please note that scanned in results may not display below. Preventive Screenings:  Service Recommendations Previous Testing/Comments   Colorectal Cancer Screening  * Colonoscopy    * Fecal Occult Blood Test (FOBT)/Fecal Immunochemical Test (FIT)  * Fecal DNA/Cologuard Test  * Flexible Sigmoidoscopy Age: 43-73 years old   Colonoscopy: every 10 years (may be performed more frequently if at higher risk)  OR  FOBT/FIT: every 1 year  OR  Cologuard: every 3 years  OR  Sigmoidoscopy: every 5 years  Screening may be recommended earlier than age 39 if at higher risk for colorectal cancer. Also, an individualized decision between you and your healthcare provider will decide whether screening between the ages of 77-80 would be appropriate. Colonoscopy: Not on file  FOBT/FIT: Not on file  Cologuard: Not on file  Sigmoidoscopy: Not on file          Breast Cancer Screening Age: 36 years old  Frequency: every 1-2 years  Not required if history of left and right mastectomy Mammogram: 08/11/2022    Screening Current   Cervical Cancer Screening Between the ages of 21-29, pap smear recommended once every 3 years. Between the ages of 32-69, can perform pap smear with HPV co-testing every 5 years.    Recommendations may differ for women with a history of total hysterectomy, cervical cancer, or abnormal pap smears in past. Pap Smear: Not on file    Screening Not Indicated   Hepatitis C Screening Once for adults born between 1945 and 1965  More frequently in patients at high risk for Hepatitis C Hep C Antibody: 03/02/2023    Screening Current   Diabetes Screening 1-2 times per year if you're at risk for diabetes or have pre-diabetes Fasting glucose: 97 mg/dL (3/2/2023)  A1C: No results in last 5 years (No results in last 5 years)  Screening Current   Cholesterol Screening Once every 5 years if you don't have a lipid disorder. May order more often based on risk factors. Lipid panel: 03/02/2023    Screening Not Indicated  History Lipid Disorder     Other Preventive Screenings Covered by Medicare:  1. Abdominal Aortic Aneurysm (AAA) Screening: covered once if your at risk. You're considered to be at risk if you have a family history of AAA. 2. Lung Cancer Screening: covers low dose CT scan once per year if you meet all of the following conditions: (1) Age 48-67; (2) No signs or symptoms of lung cancer; (3) Current smoker or have quit smoking within the last 15 years; (4) You have a tobacco smoking history of at least 20 pack years (packs per day multiplied by number of years you smoked); (5) You get a written order from a healthcare provider. 3. Glaucoma Screening: covered annually if you're considered high risk: (1) You have diabetes OR (2) Family history of glaucoma OR (3)  aged 48 and older OR (3)  American aged 72 and older  3. Osteoporosis Screening: covered every 2 years if you meet one of the following conditions: (1) You're estrogen deficient and at risk for osteoporosis based off medical history and other findings; (2) Have a vertebral abnormality; (3) On glucocorticoid therapy for more than 3 months; (4) Have primary hyperparathyroidism; (5) On osteoporosis medications and need to assess response to drug therapy. · Last bone density test (DXA Scan): 02/08/2023.  5. HIV Screening: covered annually if you're between the age of 15-65. Also covered annually if you are younger than 13 and older than 72 with risk factors for HIV infection.  For pregnant patients, it is covered up to 3 times per pregnancy. Immunizations:  Immunization Recommendations   Influenza Vaccine Annual influenza vaccination during flu season is recommended for all persons aged >= 6 months who do not have contraindications   Pneumococcal Vaccine   * Pneumococcal conjugate vaccine = PCV13 (Prevnar 13), PCV15 (Vaxneuvance), PCV20 (Prevnar 20)  * Pneumococcal polysaccharide vaccine = PPSV23 (Pneumovax) Adults 20-63 years old: 1-3 doses may be recommended based on certain risk factors  Adults 72 years old: 1-2 doses may be recommended based off what pneumonia vaccine you previously received   Hepatitis B Vaccine 3 dose series if at intermediate or high risk (ex: diabetes, end stage renal disease, liver disease)   Tetanus (Td) Vaccine - COST NOT COVERED BY MEDICARE PART B Following completion of primary series, a booster dose should be given every 10 years to maintain immunity against tetanus. Td may also be given as tetanus wound prophylaxis. Tdap Vaccine - COST NOT COVERED BY MEDICARE PART B Recommended at least once for all adults. For pregnant patients, recommended with each pregnancy. Shingles Vaccine (Shingrix) - COST NOT COVERED BY MEDICARE PART B  2 shot series recommended in those aged 48 and above     Health Maintenance Due:      Topic Date Due   • Breast Cancer Screening: Mammogram  08/11/2023   • Colorectal Cancer Screening  01/19/2024 (Originally 5/6/1993)   • Hepatitis C Screening  Completed     Immunizations Due:      Topic Date Due   • COVID-19 Vaccine (1) Never done   • Influenza Vaccine (1) 09/01/2023     Advance Directives   What are advance directives? Advance directives are legal documents that state your wishes and plans for medical care. These plans are made ahead of time in case you lose your ability to make decisions for yourself. Advance directives can apply to any medical decision, such as the treatments you want, and if you want to donate organs.    What are the types of advance directives? There are many types of advance directives, and each state has rules about how to use them. You may choose a combination of any of the following:  · Living will: This is a written record of the treatment you want. You can also choose which treatments you do not want, which to limit, and which to stop at a certain time. This includes surgery, medicine, IV fluid, and tube feedings. · Durable power of  for [facility]): This is a written record that states who you want to make healthcare choices for you when you are unable to make them for yourself. This person, called a proxy, is usually a family member or a friend. You may choose more than 1 proxy. · Do not resuscitate (DNR) order:  A DNR order is used in case your heart stops beating or you stop breathing. It is a request not to have certain forms of treatment, such as CPR. A DNR order may be included in other types of advance directives. · Medical directive: This covers the care that you want if you are in a coma, near death, or unable to make decisions for yourself. You can list the treatments you want for each condition. Treatment may include pain medicine, surgery, blood transfusions, dialysis, IV or tube feedings, and a ventilator (breathing machine). · Values history: This document has questions about your views, beliefs, and how you feel and think about life. This information can help others choose the care that you would choose. Why are advance directives important? An advance directive helps you control your care. Although spoken wishes may be used, it is better to have your wishes written down. Spoken wishes can be misunderstood, or not followed. Treatments may be given even if you do not want them. An advance directive may make it easier for your family to make difficult choices about your care.    Weight Management   Why it is important to manage your weight:  Being overweight increases your risk of health conditions such as heart disease, high blood pressure, type 2 diabetes, and certain types of cancer. It can also increase your risk for osteoarthritis, sleep apnea, and other respiratory problems. Aim for a slow, steady weight loss. Even a small amount of weight loss can lower your risk of health problems. How to lose weight safely:  A safe and healthy way to lose weight is to eat fewer calories and get regular exercise. You can lose up about 1 pound a week by decreasing the number of calories you eat by 500 calories each day. Healthy meal plan for weight management:  A healthy meal plan includes a variety of foods, contains fewer calories, and helps you stay healthy. A healthy meal plan includes the following:  · Eat whole-grain foods more often. A healthy meal plan should contain fiber. Fiber is the part of grains, fruits, and vegetables that is not broken down by your body. Whole-grain foods are healthy and provide extra fiber in your diet. Some examples of whole-grain foods are whole-wheat breads and pastas, oatmeal, brown rice, and bulgur. · Eat a variety of vegetables every day. Include dark, leafy greens such as spinach, kale, len greens, and mustard greens. Eat yellow and orange vegetables such as carrots, sweet potatoes, and winter squash. · Eat a variety of fruits every day. Choose fresh or canned fruit (canned in its own juice or light syrup) instead of juice. Fruit juice has very little or no fiber. · Eat low-fat dairy foods. Drink fat-free (skim) milk or 1% milk. Eat fat-free yogurt and low-fat cottage cheese. Try low-fat cheeses such as mozzarella and other reduced-fat cheeses. · Choose meat and other protein foods that are low in fat. Choose beans or other legumes such as split peas or lentils. Choose fish, skinless poultry (chicken or turkey), or lean cuts of red meat (beef or pork). Before you cook meat or poultry, cut off any visible fat. · Use less fat and oil.   Try baking foods instead of frying them. Add less fat, such as margarine, sour cream, regular salad dressing and mayonnaise to foods. Eat fewer high-fat foods. Some examples of high-fat foods include french fries, doughnuts, ice cream, and cakes. · Eat fewer sweets. Limit foods and drinks that are high in sugar. This includes candy, cookies, regular soda, and sweetened drinks. Exercise:  Exercise at least 30 minutes per day on most days of the week. Some examples of exercise include walking, biking, dancing, and swimming. You can also fit in more physical activity by taking the stairs instead of the elevator or parking farther away from stores. Ask your healthcare provider about the best exercise plan for you. © Copyright [employer] 2018 Information is for End User's use only and may not be sold, redistributed or otherwise used for commercial purposes.  All illustrations and images included in CareNotes® are the copyrighted property of A.D.A.M., Inc. or  [address]

## 2023-07-23 DIAGNOSIS — M19.90 ARTHRITIS: ICD-10-CM

## 2023-07-24 RX ORDER — SULINDAC 150 MG/1
150 TABLET ORAL 2 TIMES DAILY
Qty: 180 TABLET | Refills: 0 | Status: SHIPPED | OUTPATIENT
Start: 2023-07-24

## 2023-07-25 ENCOUNTER — APPOINTMENT (OUTPATIENT)
Dept: LAB | Facility: MEDICAL CENTER | Age: 73
End: 2023-07-25
Payer: COMMERCIAL

## 2023-07-25 DIAGNOSIS — L65.9 HAIR LOSS: ICD-10-CM

## 2023-07-25 DIAGNOSIS — Z00.00 MEDICARE ANNUAL WELLNESS VISIT, SUBSEQUENT: ICD-10-CM

## 2023-07-25 DIAGNOSIS — E55.9 VITAMIN D DEFICIENCY: ICD-10-CM

## 2023-07-25 LAB
25(OH)D3 SERPL-MCNC: 16.5 NG/ML (ref 30–100)
ALBUMIN SERPL BCP-MCNC: 4 G/DL (ref 3.5–5)
ALP SERPL-CCNC: 80 U/L (ref 46–116)
ALT SERPL W P-5'-P-CCNC: 36 U/L (ref 12–78)
ANION GAP SERPL CALCULATED.3IONS-SCNC: 4 MMOL/L
AST SERPL W P-5'-P-CCNC: 21 U/L (ref 5–45)
BILIRUB SERPL-MCNC: 0.4 MG/DL (ref 0.2–1)
BUN SERPL-MCNC: 14 MG/DL (ref 5–25)
CALCIUM SERPL-MCNC: 9.2 MG/DL (ref 8.3–10.1)
CHLORIDE SERPL-SCNC: 114 MMOL/L (ref 96–108)
CO2 SERPL-SCNC: 28 MMOL/L (ref 21–32)
CREAT SERPL-MCNC: 0.77 MG/DL (ref 0.6–1.3)
GFR SERPL CREATININE-BSD FRML MDRD: 75 ML/MIN/1.73SQ M
GLUCOSE SERPL-MCNC: 114 MG/DL (ref 65–140)
POTASSIUM SERPL-SCNC: 4.1 MMOL/L (ref 3.5–5.3)
PROT SERPL-MCNC: 6.9 G/DL (ref 6.4–8.4)
SODIUM SERPL-SCNC: 146 MMOL/L (ref 135–147)
TSH SERPL DL<=0.05 MIU/L-ACNC: 0.72 UIU/ML (ref 0.45–4.5)

## 2023-07-25 PROCEDURE — 80053 COMPREHEN METABOLIC PANEL: CPT

## 2023-07-25 PROCEDURE — 84443 ASSAY THYROID STIM HORMONE: CPT

## 2023-07-25 PROCEDURE — 82306 VITAMIN D 25 HYDROXY: CPT

## 2023-07-25 PROCEDURE — 36415 COLL VENOUS BLD VENIPUNCTURE: CPT

## 2023-07-26 DIAGNOSIS — E55.9 VITAMIN D DEFICIENCY: Primary | ICD-10-CM

## 2023-07-26 DIAGNOSIS — E55.9 VITAMIN D DEFICIENCY: ICD-10-CM

## 2023-07-26 RX ORDER — ERGOCALCIFEROL 1.25 MG/1
50000 CAPSULE ORAL WEEKLY
Qty: 12 CAPSULE | Refills: 0 | Status: SHIPPED | OUTPATIENT
Start: 2023-07-26 | End: 2023-07-26 | Stop reason: SDUPTHER

## 2023-07-26 RX ORDER — ERGOCALCIFEROL 1.25 MG/1
50000 CAPSULE ORAL WEEKLY
Qty: 12 CAPSULE | Refills: 0 | Status: SHIPPED | OUTPATIENT
Start: 2023-07-26

## 2023-09-07 DIAGNOSIS — K21.9 GASTROESOPHAGEAL REFLUX DISEASE, UNSPECIFIED WHETHER ESOPHAGITIS PRESENT: ICD-10-CM

## 2023-09-08 RX ORDER — PANTOPRAZOLE SODIUM 40 MG/1
40 TABLET, DELAYED RELEASE ORAL DAILY
Qty: 90 TABLET | Refills: 1 | Status: SHIPPED | OUTPATIENT
Start: 2023-09-08

## 2023-12-17 DIAGNOSIS — M19.90 ARTHRITIS: ICD-10-CM

## 2023-12-18 RX ORDER — SULINDAC 150 MG/1
150 TABLET ORAL 2 TIMES DAILY
Qty: 180 TABLET | Refills: 0 | Status: SHIPPED | OUTPATIENT
Start: 2023-12-18

## 2024-01-23 ENCOUNTER — RA CDI HCC (OUTPATIENT)
Dept: OTHER | Facility: HOSPITAL | Age: 74
End: 2024-01-23

## 2024-01-30 ENCOUNTER — OFFICE VISIT (OUTPATIENT)
Dept: FAMILY MEDICINE CLINIC | Facility: CLINIC | Age: 74
End: 2024-01-30
Payer: COMMERCIAL

## 2024-01-30 VITALS
SYSTOLIC BLOOD PRESSURE: 120 MMHG | BODY MASS INDEX: 31.57 KG/M2 | DIASTOLIC BLOOD PRESSURE: 82 MMHG | HEIGHT: 66 IN | WEIGHT: 196.4 LBS | OXYGEN SATURATION: 98 % | TEMPERATURE: 72 F

## 2024-01-30 DIAGNOSIS — G89.29 CHRONIC PAIN OF LEFT KNEE: ICD-10-CM

## 2024-01-30 DIAGNOSIS — M19.90 ARTHRITIS: ICD-10-CM

## 2024-01-30 DIAGNOSIS — E55.9 VITAMIN D DEFICIENCY: ICD-10-CM

## 2024-01-30 DIAGNOSIS — M25.562 CHRONIC PAIN OF LEFT KNEE: ICD-10-CM

## 2024-01-30 DIAGNOSIS — M77.51 RIGHT ANKLE TENDONITIS: ICD-10-CM

## 2024-01-30 DIAGNOSIS — K21.9 GASTROESOPHAGEAL REFLUX DISEASE, UNSPECIFIED WHETHER ESOPHAGITIS PRESENT: ICD-10-CM

## 2024-01-30 DIAGNOSIS — R07.9 CHEST PAIN, UNSPECIFIED TYPE: Primary | ICD-10-CM

## 2024-01-30 DIAGNOSIS — M15.9 GENERALIZED OSTEOARTHRITIS: ICD-10-CM

## 2024-01-30 PROCEDURE — 1160F RVW MEDS BY RX/DR IN RCRD: CPT | Performed by: PHYSICIAN ASSISTANT

## 2024-01-30 PROCEDURE — 1159F MED LIST DOCD IN RCRD: CPT | Performed by: PHYSICIAN ASSISTANT

## 2024-01-30 PROCEDURE — 99214 OFFICE O/P EST MOD 30 MIN: CPT | Performed by: PHYSICIAN ASSISTANT

## 2024-01-30 RX ORDER — MELOXICAM 15 MG/1
15 TABLET ORAL DAILY PRN
Qty: 90 TABLET | Refills: 1 | Status: SHIPPED | OUTPATIENT
Start: 2024-01-30

## 2024-01-30 RX ORDER — IPRATROPIUM BROMIDE 42 UG/1
1 SPRAY, METERED NASAL ONCE
COMMUNITY
Start: 2024-01-26

## 2024-01-30 RX ORDER — PANTOPRAZOLE SODIUM 40 MG/1
40 TABLET, DELAYED RELEASE ORAL DAILY
Qty: 90 TABLET | Refills: 1 | Status: SHIPPED | OUTPATIENT
Start: 2024-01-30

## 2024-01-30 NOTE — ASSESSMENT & PLAN NOTE
Will get nuclear stress test to further evaluate.  Advised patient that if she experiences an episode of chest pain, she should go to the emergency room for evaluation.

## 2024-01-30 NOTE — ASSESSMENT & PLAN NOTE
Prescription for meloxicam.  Recommended ice, stretching, and rest.  Offered referral to podiatry if symptoms do not improve or worsen.

## 2024-01-30 NOTE — ASSESSMENT & PLAN NOTE
Will get x-ray of left knee.  Explained that she will likely need a referral to orthopedics.  She would like to wait on x-ray.  Recommended meloxicam, ice, and elevation.

## 2024-01-30 NOTE — PROGRESS NOTES
Name: Rosita Soriano      : 1950      MRN: [id number]  Encounter Provider: Brittany Webb PA-C  Encounter Date: 2024   Encounter department: [facility]    Assessment & Plan     1. Chest pain, unspecified type  Assessment & Plan:  Will get nuclear stress test to further evaluate.  Advised patient that if she experiences an episode of chest pain, she should go to the emergency room for evaluation.    Orders:  -     NM myocardial perfusion spect (rx stress and/or rest); Future; Expected date: 2024    2. Chronic pain of left knee  Assessment & Plan:  Will get x-ray of left knee.  Explained that she will likely need a referral to orthopedics.  She would like to wait on x-ray.  Recommended meloxicam, ice, and elevation.    Orders:  -     XR knee 3 vw left non injury; Future; Expected date: 2024    3. Generalized osteoarthritis  Assessment & Plan:  Prescription for meloxicam.  Recommended ice, stretching, and exercise.      4. Right ankle tendonitis  Assessment & Plan:  Prescription for meloxicam.  Recommended ice, stretching, and rest.  Offered referral to podiatry if symptoms do not improve or worsen.      5. Arthritis  -     meloxicam (MOBIC) 15 mg tablet; Take 1 tablet (15 mg total) by mouth daily as needed for moderate pain    6. Vitamin D deficiency  Assessment & Plan:  Labs ordered to evaluate.  Patient is currently not taking vitamin D supplementation      7. Gastroesophageal reflux disease, unspecified whether esophagitis present  Assessment & Plan:  Stable.  Continue Protonix 40 mg daily    Orders:  -     pantoprazole (PROTONIX) 40 mg tablet; Take 1 tablet (40 mg total) by mouth daily           Clarice Becker is a pleasant 73-year-old female who is here today with a few complaints.  First, she is complaining of chronic left knee pain.  She admits that it is worse with weightbearing.  She denies any recent injuries.  She has been taking anti-inflammatories, but is not  providing much relief.  She did have a scope on that knee many years ago.  She does have osteoarthritis.  She is also complaining about pain in her right ankle.  She denies any recent injuries to her ankle.  It is worse with plantarflexion and dorsiflexion.  She has tried ice and rest, which does provide temporary relief.  Lastly, she is complaining of intermittent episodes of chest pain.  She admits that the pain radiates into her jaw.  The episodes last 10 to 30 minutes.  She has not been evaluated in the ER during an acute episode.  She admits that she had a stress test in the past, which was normal.  She admits that she does get short of breath with a flight of stairs.  She denies any dizziness, lightheadedness, or presyncopal episodes.      Review of Systems   Constitutional:  Negative for chills, diaphoresis, fatigue and fever.   HENT:  Negative for congestion, ear pain, postnasal drip, rhinorrhea, sneezing, sore throat and trouble swallowing.    Eyes:  Negative for pain and visual disturbance.   Respiratory:  Positive for shortness of breath. Negative for apnea, cough and wheezing.    Cardiovascular:  Positive for chest pain. Negative for palpitations.   Gastrointestinal:  Negative for abdominal pain, constipation, diarrhea, nausea and vomiting.   Genitourinary:  Negative for dysuria and hematuria.   Musculoskeletal:  Positive for arthralgias and gait problem. Negative for myalgias.   Neurological:  Negative for dizziness, syncope, weakness, light-headedness, numbness and headaches.   Psychiatric/Behavioral:  Negative for suicidal ideas. The patient is not nervous/anxious.        Current Outpatient Medications on File Prior to Visit   Medication Sig   • doxycycline (ORACEA) 40 MG capsule Take 1 capsule (40 mg total) by mouth every morning   • ipratropium (ATROVENT) 0.06 % nasal spray 1 spray into each nostril once   • olopatadine (PATANOL) 0.1 % ophthalmic solution Administer 1 drop to both eyes 2 (two) times  "a day   • [DISCONTINUED] pantoprazole (PROTONIX) 40 mg tablet TAKE 1 TABLET DAILY   • [DISCONTINUED] sulindac (CLINORIL) 150 MG tablet Take 1 tablet (150 mg total) by mouth 2 (two) times a day   • ergocalciferol (VITAMIN D2) 50,000 units Take 1 capsule (50,000 Units total) by mouth once a week   • meclizine (ANTIVERT) 25 mg tablet Take 1 tablet (25 mg total) by mouth every 8 (eight) hours as needed for dizziness       Objective     /82   Temp (!) 72 °F (22.2 °C)   Ht 5' 6\" (1.676 m)   Wt 89.1 kg (196 lb 6.4 oz)   SpO2 98%   BMI 31.70 kg/m²     Physical Exam  Vitals and nursing note reviewed.   Constitutional:       Appearance: She is well-developed.   HENT:      Head: Normocephalic and atraumatic.      Right Ear: Tympanic membrane, ear canal and external ear normal.      Left Ear: Tympanic membrane, ear canal and external ear normal.      Nose: Nose normal.      Mouth/Throat:      Pharynx: No oropharyngeal exudate or posterior oropharyngeal erythema.   Eyes:      Pupils: Pupils are equal, round, and reactive to light.   Cardiovascular:      Rate and Rhythm: Normal rate and regular rhythm.      Heart sounds: Normal heart sounds. No murmur heard.     No friction rub. No gallop.   Pulmonary:      Effort: Pulmonary effort is normal. No respiratory distress.      Breath sounds: Normal breath sounds. No wheezing or rales.   Musculoskeletal:      Cervical back: Normal range of motion and neck supple.      Right knee: Deformity (Arthritic) present.      Left knee: Deformity (Arthritic) present. No effusion. Decreased range of motion. Tenderness present over the medial joint line.      Left ankle: Tenderness present over the medial malleolus.   Lymphadenopathy:      Cervical: No cervical adenopathy.   Skin:     General: Skin is warm and dry.   Neurological:      Mental Status: She is alert and oriented to person, place, and time.   Psychiatric:         Behavior: Behavior normal.         Thought Content: Thought " content normal.         Judgment: Judgment normal.       Brittany Webb PA-C

## 2024-02-06 ENCOUNTER — LAB (OUTPATIENT)
Dept: LAB | Facility: HOSPITAL | Age: 74
End: 2024-02-06
Payer: COMMERCIAL

## 2024-02-06 ENCOUNTER — HOSPITAL ENCOUNTER (OUTPATIENT)
Dept: RADIOLOGY | Facility: HOSPITAL | Age: 74
Discharge: HOME | End: 2024-02-06
Payer: COMMERCIAL

## 2024-02-06 DIAGNOSIS — M17.12 ARTHRITIS OF LEFT KNEE: Primary | ICD-10-CM

## 2024-02-06 DIAGNOSIS — G89.29 CHRONIC PAIN OF LEFT KNEE: ICD-10-CM

## 2024-02-06 DIAGNOSIS — M25.562 CHRONIC PAIN OF LEFT KNEE: ICD-10-CM

## 2024-02-06 DIAGNOSIS — E55.9 VITAMIN D DEFICIENCY: ICD-10-CM

## 2024-02-06 LAB — 25(OH)D3 SERPL-MCNC: 20.7 NG/ML (ref 30–100)

## 2024-02-06 PROCEDURE — 73562 X-RAY EXAM OF KNEE 3: CPT

## 2024-02-06 PROCEDURE — 82306 VITAMIN D 25 HYDROXY: CPT

## 2024-02-06 PROCEDURE — 36415 COLL VENOUS BLD VENIPUNCTURE: CPT

## 2024-02-07 DIAGNOSIS — E55.9 VITAMIN D DEFICIENCY: ICD-10-CM

## 2024-02-07 RX ORDER — ERGOCALCIFEROL 1.25 MG/1
50000 CAPSULE ORAL WEEKLY
Qty: 12 CAPSULE | Refills: 0 | Status: SHIPPED | OUTPATIENT
Start: 2024-02-07

## 2024-02-09 ENCOUNTER — TELEPHONE (OUTPATIENT)
Dept: FAMILY MEDICINE CLINIC | Facility: CLINIC | Age: 74
End: 2024-02-09

## 2024-02-09 DIAGNOSIS — M17.12 ARTHRITIS OF LEFT KNEE: Primary | ICD-10-CM

## 2024-02-09 NOTE — TELEPHONE ENCOUNTER
Patient asking if you can do a referral to Dr. Tovar at [facility] instead of where you sent her before.

## 2024-02-12 ENCOUNTER — OFFICE VISIT (OUTPATIENT)
Dept: URGENT CARE | Facility: MEDICAL CENTER | Age: 74
End: 2024-02-12
Payer: COMMERCIAL

## 2024-02-12 ENCOUNTER — APPOINTMENT (OUTPATIENT)
Dept: RADIOLOGY | Facility: MEDICAL CENTER | Age: 74
End: 2024-02-12
Payer: COMMERCIAL

## 2024-02-12 VITALS
HEART RATE: 73 BPM | OXYGEN SATURATION: 98 % | TEMPERATURE: 98.7 F | BODY MASS INDEX: 31.79 KG/M2 | HEIGHT: 66 IN | WEIGHT: 197.8 LBS | SYSTOLIC BLOOD PRESSURE: 128 MMHG | RESPIRATION RATE: 20 BRPM | DIASTOLIC BLOOD PRESSURE: 88 MMHG

## 2024-02-12 DIAGNOSIS — M25.572 ACUTE LEFT ANKLE PAIN: ICD-10-CM

## 2024-02-12 DIAGNOSIS — S93.402A SPRAIN OF LEFT ANKLE, UNSPECIFIED LIGAMENT, INITIAL ENCOUNTER: Primary | ICD-10-CM

## 2024-02-12 PROCEDURE — S9083 URGENT CARE CENTER GLOBAL: HCPCS | Performed by: PHYSICIAN ASSISTANT

## 2024-02-12 PROCEDURE — 73610 X-RAY EXAM OF ANKLE: CPT

## 2024-02-12 PROCEDURE — 99213 OFFICE O/P EST LOW 20 MIN: CPT | Performed by: PHYSICIAN ASSISTANT

## 2024-02-12 NOTE — PATIENT INSTRUCTIONS
Tylenol for pain  Wear brace, splint or ACE wrap +/- crutches for support (Remove braces and ACE bandages every 3 hours)  Wear shoe arch support for foot injuries (ex: superfeet insoles)  Ice 20 minutes 3-4 times per day for 3 days  Insulate the skin from the ice to prevent frostbite  Rest and Elevate  Follow up with orthopedic   Follow up with PCP un 3-5 days.  Proceed to  ER if symptoms worsen.    Remove splint/brace and go straight to ER if you experience sudden increase in pain, swelling, change in color/temperature/sensation, chest pain, shortness or breath, or if you start coughing up blood.

## 2024-02-12 NOTE — PROGRESS NOTES
[facility]        NAME: Rosita Soriano is a 73 y.o. female  : 1950    MRN: [id number]  DATE: 2024  TIME: 1:07 PM    Assessment and Plan   Sprain of left ankle, unspecified ligament, initial encounter [S93.402A]  1. Sprain of left ankle, unspecified ligament, initial encounter  XR ankle 3+ vw right    Ambulatory referral to Orthopedic Surgery    Orthopedic injury treatment        XR provider read: surgical pin noted. Calcaneal spur noted. No acute fracture or dislocation.     Patient Instructions     Tylenol for pain  Wear brace, splint or ACE wrap +/- crutches for support (Remove braces and ACE bandages every 3 hours)  Wear shoe arch support for foot injuries (ex: superfeet insoles)  Ice 20 minutes 3-4 times per day for 3 days  Insulate the skin from the ice to prevent frostbite  Rest and Elevate  Follow up with orthopedic   Follow up with PCP un 3-5 days.  Proceed to  ER if symptoms worsen.    Remove splint/brace and go straight to ER if you experience sudden increase in pain, swelling, change in color/temperature/sensation, chest pain, shortness or breath, or if you start coughing up blood.        Chief Complaint     Chief Complaint   Patient presents with    Ankle Pain     R ankle pain, burning and swelling. Can not put weight on. Symptoms started about 1 month ago. She has been icing and elevating her ankle.          History of Present Illness       Ankle Pain   Incident onset: over 1 month. There was no injury mechanism. Pain location: R ankle. The quality of the pain is described as burning. The pain is moderate. Pertinent negatives include no inability to bear weight (pain), numbness or tingling. The symptoms are aggravated by weight bearing, palpation and movement. She has tried elevation and ice for the symptoms.       Review of Systems   Review of Systems   Musculoskeletal:  Positive for gait problem and joint swelling.   Neurological:  Negative for tingling, weakness and  numbness.         Current Medications       Current Outpatient Medications:     doxycycline (ORACEA) 40 MG capsule, Take 1 capsule (40 mg total) by mouth every morning, Disp: 90 capsule, Rfl: 0    ergocalciferol (VITAMIN D2) 50,000 units, Take 1 capsule (50,000 Units total) by mouth once a week, Disp: 12 capsule, Rfl: 0    ipratropium (ATROVENT) 0.06 % nasal spray, 1 spray into each nostril once, Disp: , Rfl:     meloxicam (MOBIC) 15 mg tablet, Take 1 tablet (15 mg total) by mouth daily as needed for moderate pain, Disp: 90 tablet, Rfl: 1    olopatadine (PATANOL) 0.1 % ophthalmic solution, Administer 1 drop to both eyes 2 (two) times a day, Disp: 15 mL, Rfl: 3    pantoprazole (PROTONIX) 40 mg tablet, Take 1 tablet (40 mg total) by mouth daily, Disp: 90 tablet, Rfl: 1    meclizine (ANTIVERT) 25 mg tablet, Take 1 tablet (25 mg total) by mouth every 8 (eight) hours as needed for dizziness, Disp: 30 tablet, Rfl: 0    Current Allergies     Allergies as of 02/12/2024 - Reviewed 02/12/2024   Allergen Reaction Noted    Corticosteroids Other (See Comments) 10/24/2019    Morphine GI Intolerance 11/20/2017    Fentanyl Rash 11/20/2017            The following portions of the patient's history were reviewed and updated as appropriate: allergies, current medications, past family history, past medical history, past social history, past surgical history and problem list.     Past Medical History:   Diagnosis Date    Joint pain     Urine incontinence        Past Surgical History:   Procedure Laterality Date    BLADDER SURGERY      BREAST SURGERY      CHOLECYSTECTOMY      COLONOSCOPY      EPIDURAL BLOCK INJECTION Right 6/1/2023    Procedure: Right L3-4 and L4-5 transforaminal epidural steroid injection;  Surgeon: Soumya Prasad MD;  Location: [facility];  Service: Pain Management     EYE SURGERY      HYSTERECTOMY      IR SPINE PROCEDURE  10/24/2019    ROTATOR CUFF REPAIR      SHOULDER SURGERY      THROAT SURGERY       "TONSILLECTOMY         Family History   Problem Relation Age of Onset    Breast cancer Mother     Cancer Mother     Stroke Father     Heart attack Father          Medications have been verified.        Objective   /88   Pulse 73   Temp 98.7 °F (37.1 °C)   Resp 20   Ht 5' 6\" (1.676 m)   Wt 89.7 kg (197 lb 12.8 oz)   SpO2 98%   BMI 31.93 kg/m²   No LMP recorded. Patient has had a hysterectomy.       Physical Exam     Physical Exam  Vitals reviewed.   Constitutional:       General: She is not in acute distress.     Appearance: She is well-developed.   Cardiovascular:      Rate and Rhythm: Normal rate and regular rhythm.      Pulses: Normal pulses.      Heart sounds: Normal heart sounds. No murmur heard.     No friction rub. No gallop.   Pulmonary:      Effort: Pulmonary effort is normal. No respiratory distress.      Breath sounds: Normal breath sounds. No wheezing, rhonchi or rales.   Musculoskeletal:         General: Swelling and tenderness present. No deformity. Normal range of motion.      Comments: R medial malleolus swelling. TTP just posterior to R medial malleolus. Full but painful ROM of R ankle in all fields.    Skin:     General: Skin is warm.      Findings: No bruising or erythema.   Neurological:      Mental Status: She is alert and oriented to person, place, and time.      Sensory: No sensory deficit.      Deep Tendon Reflexes: Reflexes are normal and symmetric.   Psychiatric:         Behavior: Behavior normal.         Thought Content: Thought content normal.         Judgment: Judgment normal.         Orthopedic injury treatment    Date/Time: 2/12/2024 12:30 PM    Performed by: Ale Valadez PA-C  Authorized by: Ale Valadez PA-C  Universal Protocol:  Consent: Verbal consent obtained.  Risks and benefits: risks, benefits and alternatives were discussed  Consent given by: patient  Patient identity confirmed: verbally with patient    Injury location: R ankle.  Neurovascular " status: Neurovascularly intact    Distal perfusion: normal    Neurological function: normal    Range of motion: normal    Immobilization:  Ace wrap  Supplies used:  Elastic bandage  Neurovascular status: Neurovascularly intact    Distal perfusion: normal    Neurological function: normal    Range of motion: unchanged    Patient tolerance:  Patient tolerated the procedure well with no immediate complications

## 2024-02-19 ENCOUNTER — HOSPITAL ENCOUNTER (OUTPATIENT)
Dept: NUCLEAR MEDICINE | Facility: HOSPITAL | Age: 74
Discharge: HOME | End: 2024-02-19
Payer: COMMERCIAL

## 2024-02-19 DIAGNOSIS — R07.9 CHEST PAIN, UNSPECIFIED TYPE: ICD-10-CM

## 2024-02-19 LAB
MAX HR: 88 BPM
NUC STRESS EJECTION FRACTION: 70 %
RATE PRESSURE PRODUCT: NORMAL
SL CV REST NUCLEAR ISOTOPE DOSE: 10.6 MCI
SL CV STRESS NUCLEAR ISOTOPE DOSE: 33 MCI
STRESS BASELINE HR: 65 BPM
STRESS PEAK HR: 88 BPM
STRESS POST PEAK BP: 164 MMHG
STRESS/REST PERFUSION RATIO: 0.97

## 2024-02-19 PROCEDURE — G1004 CDSM NDSC: HCPCS

## 2024-02-19 PROCEDURE — 93018 CV STRESS TEST I&R ONLY: CPT | Performed by: INTERNAL MEDICINE

## 2024-02-19 PROCEDURE — 93017 CV STRESS TEST TRACING ONLY: CPT

## 2024-02-19 PROCEDURE — A9502 TC99M TETROFOSMIN: HCPCS

## 2024-02-19 PROCEDURE — 93016 CV STRESS TEST SUPVJ ONLY: CPT | Performed by: INTERNAL MEDICINE

## 2024-02-19 PROCEDURE — 78452 HT MUSCLE IMAGE SPECT MULT: CPT

## 2024-02-19 PROCEDURE — 78452 HT MUSCLE IMAGE SPECT MULT: CPT | Performed by: INTERNAL MEDICINE

## 2024-02-19 RX ORDER — REGADENOSON 0.08 MG/ML
0.4 INJECTION, SOLUTION INTRAVENOUS ONCE
Status: COMPLETED | OUTPATIENT
Start: 2024-02-19 | End: 2024-02-19

## 2024-02-19 RX ADMIN — REGADENOSON 0.4 MG: 0.08 INJECTION, SOLUTION INTRAVENOUS at 09:34

## 2024-02-20 LAB
CHEST PAIN STATEMENT: NORMAL
MAX DIASTOLIC BP: 86 MMHG
MAX PREDICTED HEART RATE: 145 BPM
PROTOCOL NAME: NORMAL
REASON FOR TERMINATION: NORMAL
STRESS POST EXERCISE DUR MIN: 3 MIN
STRESS POST EXERCISE DUR SEC: 1 SEC
STRESS POST PEAK HR: 218 BPM
STRESS POST PEAK SYSTOLIC BP: 164 MMHG
TARGET HR FORMULA: NORMAL
TEST INDICATION: NORMAL

## 2024-02-21 ENCOUNTER — APPOINTMENT (RX ONLY)
Dept: URBAN - NONMETROPOLITAN AREA CLINIC 4 | Facility: CLINIC | Age: 74
Setting detail: DERMATOLOGY
End: 2024-02-21

## 2024-02-21 DIAGNOSIS — L82.0 INFLAMED SEBORRHEIC KERATOSIS: ICD-10-CM

## 2024-02-21 PROCEDURE — ? LIQUID NITROGEN

## 2024-02-21 PROCEDURE — ? COUNSELING

## 2024-02-21 PROCEDURE — 17111 DESTRUCTION B9 LESIONS 15/>: CPT

## 2024-02-21 ASSESSMENT — LOCATION DETAILED DESCRIPTION DERM
LOCATION DETAILED: LEFT MEDIAL UPPER BACK
LOCATION DETAILED: RIGHT INFERIOR UPPER BACK
LOCATION DETAILED: RIGHT SUPERIOR UPPER BACK
LOCATION DETAILED: LEFT LATERAL UPPER BACK
LOCATION DETAILED: RIGHT INFERIOR LATERAL FOREHEAD
LOCATION DETAILED: RIGHT POSTERIOR SHOULDER
LOCATION DETAILED: INFERIOR THORACIC SPINE
LOCATION DETAILED: LEFT SUPERIOR MEDIAL MIDBACK
LOCATION DETAILED: LEFT MEDIAL MALAR CHEEK
LOCATION DETAILED: LEFT SUPERIOR UPPER BACK
LOCATION DETAILED: LEFT SUPERIOR LATERAL UPPER BACK
LOCATION DETAILED: RIGHT MID-UPPER BACK
LOCATION DETAILED: LEFT MID-UPPER BACK
LOCATION DETAILED: LEFT CENTRAL MALAR CHEEK

## 2024-02-21 ASSESSMENT — LOCATION SIMPLE DESCRIPTION DERM
LOCATION SIMPLE: RIGHT UPPER BACK
LOCATION SIMPLE: UPPER BACK
LOCATION SIMPLE: LEFT LOWER BACK
LOCATION SIMPLE: LEFT UPPER BACK
LOCATION SIMPLE: RIGHT SHOULDER
LOCATION SIMPLE: LEFT CHEEK
LOCATION SIMPLE: RIGHT FOREHEAD

## 2024-02-21 ASSESSMENT — LOCATION ZONE DERM
LOCATION ZONE: TRUNK
LOCATION ZONE: FACE
LOCATION ZONE: ARM

## 2024-02-21 NOTE — PROCEDURE: LIQUID NITROGEN
Spray Paint Text: The liquid nitrogen was applied to the skin utilizing a spray paint frosting technique.
Include Z78.9 (Other Specified Conditions Influencing Health Status) As An Associated Diagnosis?: No
Post-Care Instructions: I reviewed with the patient in detail post-care instructions. Patient is to wear sunprotection, and avoid picking at any of the treated lesions. Pt may apply Vaseline to crusted or scabbing areas.
Render Post-Care Instructions In Note?: yes
Consent: The patient's consent was obtained including but not limited to risks of crusting, scabbing, blistering, scarring, darker or lighter pigmentary change, recurrence, incomplete removal and infection.
Medical Necessity Clause: This procedure was medically necessary because the lesions that were treated were:
Duration Of Freeze Thaw-Cycle (Seconds): 5-10
Number Of Freeze-Thaw Cycles: 1 freeze-thaw cycle
Detail Level: Zone
Medical Necessity Information: It is in your best interest to select a reason for this procedure from the list below. All of these items fulfill various CMS LCD requirements except the new and changing color options.

## 2024-02-26 ENCOUNTER — TELEPHONE (OUTPATIENT)
Age: 74
End: 2024-02-26

## 2024-02-26 NOTE — TELEPHONE ENCOUNTER
Caller: Marilin    Doctor: Fahad    Reason for call: schedule an appointment with podiatry    Call back#: N/A

## 2024-02-27 ENCOUNTER — TELEPHONE (OUTPATIENT)
Dept: FAMILY MEDICINE CLINIC | Facility: CLINIC | Age: 74
End: 2024-02-27

## 2024-02-27 NOTE — TELEPHONE ENCOUNTER
Spoke with pt and made her aware of Drs that come to [place] but told her it is fine to keep her appointment   She verbalized understanding

## 2024-02-27 NOTE — TELEPHONE ENCOUNTER
"She contacted Dr Echols's office for appt, was told \"you need a podiatrist not ortho, and the podiatrists are in [place]\". She made appt with Dr Mo for Monday, that was the first available.  "

## 2024-02-27 NOTE — TELEPHONE ENCOUNTER
That is fine. Podiatry does come to [place] as well. There is Dr. Strong and Dr. Waddell. However, if she is already scheduled with Dr. Mo, that is fine too.

## 2024-02-29 ENCOUNTER — TELEPHONE (OUTPATIENT)
Dept: FAMILY MEDICINE CLINIC | Facility: CLINIC | Age: 74
End: 2024-02-29

## 2024-02-29 NOTE — TELEPHONE ENCOUNTER
Why are they unable to fill it?        ----- Message from Samaria Monahan sent at 2/29/2024  7:49 AM EST -----  Regarding: FW: olopatadine hcl emiliano  Contact: [phone]    ----- Message -----  From: Rosita Soriano  Sent: 2/29/2024   5:08 AM EST  To: [facility]  Subject: olopatadine hcl emiliano                              [facility] is unable to fill this prescription  can  send a different script  thank you

## 2024-03-05 DIAGNOSIS — H10.13 ALLERGIC CONJUNCTIVITIS OF BOTH EYES: ICD-10-CM

## 2024-03-05 DIAGNOSIS — Z91.09 ENVIRONMENTAL ALLERGIES: Primary | ICD-10-CM

## 2024-03-05 RX ORDER — KETOTIFEN FUMARATE 0.25 MG/ML
1 SOLUTION/ DROPS OPHTHALMIC 2 TIMES DAILY
Qty: 5 ML | Refills: 0 | Status: SHIPPED | OUTPATIENT
Start: 2024-03-05

## 2024-04-19 ENCOUNTER — TELEPHONE (OUTPATIENT)
Dept: FAMILY MEDICINE CLINIC | Facility: CLINIC | Age: 74
End: 2024-04-19

## 2024-04-19 NOTE — TELEPHONE ENCOUNTER
----- Message from Michelle Valle sent at 4/19/2024  8:26 AM EDT -----  Regarding: FW: blood work  Contact: [phone]    ----- Message -----  From: Rosita Soriano  Sent: 4/19/2024   8:25 AM EDT  To: [facility]  Subject: blood work                                       I finished vitamin D  yesterday  when should I go for blood work

## 2024-05-15 ENCOUNTER — APPOINTMENT (OUTPATIENT)
Dept: LAB | Facility: HOSPITAL | Age: 74
End: 2024-05-15
Payer: COMMERCIAL

## 2024-05-15 DIAGNOSIS — E55.9 VITAMIN D DEFICIENCY: ICD-10-CM

## 2024-05-15 LAB — 25(OH)D3 SERPL-MCNC: 30.2 NG/ML (ref 30–100)

## 2024-05-15 PROCEDURE — 36415 COLL VENOUS BLD VENIPUNCTURE: CPT

## 2024-05-15 PROCEDURE — 82306 VITAMIN D 25 HYDROXY: CPT

## 2024-05-16 DIAGNOSIS — E55.9 VITAMIN D DEFICIENCY: ICD-10-CM

## 2024-05-16 RX ORDER — ERGOCALCIFEROL 1.25 MG/1
50000 CAPSULE ORAL WEEKLY
Qty: 12 CAPSULE | Refills: 0 | Status: SHIPPED | OUTPATIENT
Start: 2024-05-16

## 2024-06-03 ENCOUNTER — LAB (OUTPATIENT)
Dept: LAB | Facility: HOSPITAL | Age: 74
End: 2024-06-03
Payer: COMMERCIAL

## 2024-06-03 ENCOUNTER — APPOINTMENT (OUTPATIENT)
Dept: LAB | Facility: HOSPITAL | Age: 74
End: 2024-06-03
Payer: COMMERCIAL

## 2024-06-03 DIAGNOSIS — E55.9 VITAMIN D DEFICIENCY: ICD-10-CM

## 2024-06-03 DIAGNOSIS — Z01.812 PRE-OPERATIVE LABORATORY EXAMINATION: ICD-10-CM

## 2024-06-03 LAB
25(OH)D3 SERPL-MCNC: 39 NG/ML (ref 30–100)
ALBUMIN SERPL BCP-MCNC: 4.4 G/DL (ref 3.5–5)
ALP SERPL-CCNC: 63 U/L (ref 34–104)
ALT SERPL W P-5'-P-CCNC: 24 U/L (ref 7–52)
ANION GAP SERPL CALCULATED.3IONS-SCNC: 6 MMOL/L (ref 4–13)
APTT PPP: 28 SECONDS (ref 23–37)
AST SERPL W P-5'-P-CCNC: 19 U/L (ref 13–39)
BASOPHILS # BLD AUTO: 0.04 THOUSANDS/ÂΜL (ref 0–0.1)
BASOPHILS NFR BLD AUTO: 1 % (ref 0–1)
BILIRUB SERPL-MCNC: 0.66 MG/DL (ref 0.2–1)
BUN SERPL-MCNC: 20 MG/DL (ref 5–25)
CALCIUM SERPL-MCNC: 9.8 MG/DL (ref 8.4–10.2)
CHLORIDE SERPL-SCNC: 106 MMOL/L (ref 96–108)
CO2 SERPL-SCNC: 30 MMOL/L (ref 21–32)
CREAT SERPL-MCNC: 0.66 MG/DL (ref 0.6–1.3)
EOSINOPHIL # BLD AUTO: 0.2 THOUSAND/ÂΜL (ref 0–0.61)
EOSINOPHIL NFR BLD AUTO: 3 % (ref 0–6)
ERYTHROCYTE [DISTWIDTH] IN BLOOD BY AUTOMATED COUNT: 13.2 % (ref 11.6–15.1)
GFR SERPL CREATININE-BSD FRML MDRD: 86 ML/MIN/1.73SQ M
GLUCOSE P FAST SERPL-MCNC: 108 MG/DL (ref 65–99)
HCT VFR BLD AUTO: 40.9 % (ref 34.8–46.1)
HGB BLD-MCNC: 13.5 G/DL (ref 11.5–15.4)
IMM GRANULOCYTES # BLD AUTO: 0.02 THOUSAND/UL (ref 0–0.2)
IMM GRANULOCYTES NFR BLD AUTO: 0 % (ref 0–2)
INR PPP: 1.09 (ref 0.84–1.19)
LYMPHOCYTES # BLD AUTO: 1.76 THOUSANDS/ÂΜL (ref 0.6–4.47)
LYMPHOCYTES NFR BLD AUTO: 22 % (ref 14–44)
MCH RBC QN AUTO: 31.9 PG (ref 26.8–34.3)
MCHC RBC AUTO-ENTMCNC: 33 G/DL (ref 31.4–37.4)
MCV RBC AUTO: 97 FL (ref 82–98)
MONOCYTES # BLD AUTO: 0.35 THOUSAND/ÂΜL (ref 0.17–1.22)
MONOCYTES NFR BLD AUTO: 4 % (ref 4–12)
NEUTROPHILS # BLD AUTO: 5.78 THOUSANDS/ÂΜL (ref 1.85–7.62)
NEUTS SEG NFR BLD AUTO: 70 % (ref 43–75)
NRBC BLD AUTO-RTO: 0 /100 WBCS
PLATELET # BLD AUTO: 227 THOUSANDS/UL (ref 149–390)
PMV BLD AUTO: 11 FL (ref 8.9–12.7)
POTASSIUM SERPL-SCNC: 4.4 MMOL/L (ref 3.5–5.3)
PROT SERPL-MCNC: 6.8 G/DL (ref 6.4–8.4)
PROTHROMBIN TIME: 14 SECONDS (ref 11.6–14.5)
RBC # BLD AUTO: 4.23 MILLION/UL (ref 3.81–5.12)
SODIUM SERPL-SCNC: 142 MMOL/L (ref 135–147)
WBC # BLD AUTO: 8.15 THOUSAND/UL (ref 4.31–10.16)

## 2024-06-03 PROCEDURE — 36415 COLL VENOUS BLD VENIPUNCTURE: CPT

## 2024-06-03 PROCEDURE — 80053 COMPREHEN METABOLIC PANEL: CPT

## 2024-06-03 PROCEDURE — 93005 ELECTROCARDIOGRAM TRACING: CPT

## 2024-06-03 PROCEDURE — 82306 VITAMIN D 25 HYDROXY: CPT

## 2024-06-03 PROCEDURE — 85730 THROMBOPLASTIN TIME PARTIAL: CPT

## 2024-06-03 PROCEDURE — 85025 COMPLETE CBC W/AUTO DIFF WBC: CPT

## 2024-06-03 PROCEDURE — 85610 PROTHROMBIN TIME: CPT

## 2024-06-05 LAB
ATRIAL RATE: 61 BPM
ATRIAL RATE: 63 BPM
P AXIS: 55 DEGREES
P AXIS: 65 DEGREES
PR INTERVAL: 166 MS
PR INTERVAL: 166 MS
QRS AXIS: 15 DEGREES
QRS AXIS: 18 DEGREES
QRSD INTERVAL: 80 MS
QRSD INTERVAL: 82 MS
QT INTERVAL: 432 MS
QT INTERVAL: 432 MS
QTC INTERVAL: 434 MS
QTC INTERVAL: 442 MS
T WAVE AXIS: 37 DEGREES
T WAVE AXIS: 43 DEGREES
VENTRICULAR RATE: 61 BPM
VENTRICULAR RATE: 63 BPM

## 2024-06-05 PROCEDURE — 93010 ELECTROCARDIOGRAM REPORT: CPT | Performed by: INTERNAL MEDICINE

## 2024-06-06 ENCOUNTER — RA CDI HCC (OUTPATIENT)
Dept: OTHER | Facility: HOSPITAL | Age: 74
End: 2024-06-06

## 2024-06-13 ENCOUNTER — CONSULT (OUTPATIENT)
Dept: FAMILY MEDICINE CLINIC | Facility: CLINIC | Age: 74
End: 2024-06-13
Payer: COMMERCIAL

## 2024-06-13 VITALS
HEART RATE: 76 BPM | DIASTOLIC BLOOD PRESSURE: 64 MMHG | TEMPERATURE: 96.6 F | OXYGEN SATURATION: 96 % | SYSTOLIC BLOOD PRESSURE: 132 MMHG | BODY MASS INDEX: 31.18 KG/M2 | WEIGHT: 194 LBS | HEIGHT: 66 IN

## 2024-06-13 DIAGNOSIS — M17.12 PRIMARY OSTEOARTHRITIS OF LEFT KNEE: ICD-10-CM

## 2024-06-13 DIAGNOSIS — Z01.818 PRE-OP EXAMINATION: Primary | ICD-10-CM

## 2024-06-13 PROCEDURE — 99214 OFFICE O/P EST MOD 30 MIN: CPT | Performed by: PHYSICIAN ASSISTANT

## 2024-06-13 NOTE — PROGRESS NOTES
PRE-OPERATIVE EVALUATION  [facility]    NAME: Rosita Soriano  AGE: 74 y.o. SEX: female  : 1950     DATE: 2024     Assessment/Plan:     1. Pre-op examination        2. Primary osteoarthritis of left knee            74 y.o. female with planned surgery: left total knee replacement.      1. Further preoperative workup as follows:   - None; no further preoperative work-up is required    2. Medication Management/Recommendations:   - Patient has been instructed to avoid herbs or non-directed vitamins the week prior to surgery to ensure no drug interactions with perioperative surgical and anesthetic medications.  - Patient has been instructed to avoid aspirin containing medications or non-steroidal anti-inflammatory drugs for the week preceding surgery.    3. Prophylaxis for cardiac events with perioperative beta-blockers: not indicated.    4. Patient requires further consultation with: None    Clearance  Patient is CLEARED for surgery without any additional cardiac testing.      History of Present Illness:     Rosita Soriano is a 74 y.o. female who presents to the office today for a preoperative consultation.    Surgery: Left total knee replacement  Anticipated Date of Surgery: 24  Referring Provider: Dr. Farr    Planned anesthesia is spinal. Patient has a bleeding risk of: no recent abnormal bleeding. Patient does not have objections to receiving blood products if needed. Current anti-platelet/anti-coagulation medications that the patient is prescribed includes:  None .      Assessment of Chronic Conditions:   - GERD: stable     Assessment of Cardiac Risk:  Denies unstable or severe angina or MI in the last 6 weeks or history of stent placement in the last year   Denies decompensated heart failure (e.g. New onset heart failure, NYHA functional class IV heart failure, or worsening existing heart failure)  Denies significant arrhythmias such as high grade AV  block, symptomatic ventricular arrhythmia, newly recognized ventricular tachycardia, supraventricular tachycardia with resting heart rate >100, or symptomatic bradycardia  Denies severe heart valve disease including aortic stenosis or symptomatic mitral stenosis     Duke Activity Status Index:       Prior Anesthesia Reactions: Yes, nausea following anesthesia     Personal history of venous thromboembolic disease? No    History of steroid use for >2 weeks within last year? No     Review of Systems:     Review of Systems   Constitutional:  Negative for chills, diaphoresis, fatigue and fever.   HENT:  Negative for congestion, ear pain, postnasal drip, rhinorrhea, sneezing, sore throat and trouble swallowing.    Eyes:  Negative for pain and visual disturbance.   Respiratory:  Negative for apnea, cough, shortness of breath and wheezing.    Cardiovascular:  Negative for chest pain and palpitations.   Gastrointestinal:  Negative for abdominal pain, constipation, diarrhea, nausea and vomiting.   Genitourinary:  Negative for dysuria and hematuria.   Musculoskeletal:  Positive for arthralgias and gait problem. Negative for myalgias.   Neurological:  Negative for dizziness, syncope, weakness, light-headedness, numbness and headaches.   Psychiatric/Behavioral:  Negative for suicidal ideas. The patient is not nervous/anxious.         Problem List:     Patient Active Problem List   Diagnosis   • Chronic bilateral low back pain with bilateral sciatica   • Lumbar radiculopathy   • Chronic pain syndrome   • Lumbar spondylosis   • Lumbar disc disease with radiculopathy   • Neurogenic claudication due to lumbar spinal stenosis   • Vitamin D deficiency   • Polyp of colon   • Osteoarthritis of knee   • Hyperlipidemia   • Generalized osteoarthritis   • Gastroesophageal reflux disease   • Chest pain   • Right ankle tendonitis   • Chronic pain of left knee        Allergies:     Allergies   Allergen Reactions   • Corticosteroids Other (See  Comments)     Increased eye pressure   • Morphine GI Intolerance   • Fentanyl Rash        Current Medications:     Current Outpatient Medications on File Prior to Visit   Medication Sig   • doxycycline (ORACEA) 40 MG capsule Take 1 capsule (40 mg total) by mouth every morning   • ergocalciferol (VITAMIN D2) 50,000 units Take 1 capsule (50,000 Units total) by mouth once a week   • ipratropium (ATROVENT) 0.06 % nasal spray 1 spray into each nostril once   • meclizine (ANTIVERT) 25 mg tablet Take 1 tablet (25 mg total) by mouth every 8 (eight) hours as needed for dizziness   • meloxicam (MOBIC) 15 mg tablet Take 1 tablet (15 mg total) by mouth daily as needed for moderate pain   • pantoprazole (PROTONIX) 40 mg tablet Take 1 tablet (40 mg total) by mouth daily   • ketotifen (ZADITOR) 0.025 % ophthalmic solution Administer 1 drop to both eyes 2 (two) times a day (Patient not taking: Reported on 6/13/2024)        Past History:     Past Medical History:   Diagnosis Date   • Joint pain    • Urine incontinence         Past Surgical History:   Procedure Laterality Date   • BLADDER SURGERY     • BREAST SURGERY     • CHOLECYSTECTOMY     • COLONOSCOPY     • EPIDURAL BLOCK INJECTION Right 6/1/2023    Procedure: Right L3-4 and L4-5 transforaminal epidural steroid injection;  Surgeon: Soumya Prasad MD;  Location: [facility];  Service: Pain Management    • EYE SURGERY     • HYSTERECTOMY     • IR SPINE PROCEDURE  10/24/2019   • ROTATOR CUFF REPAIR     • SHOULDER SURGERY     • THROAT SURGERY     • TONSILLECTOMY          Family History   Problem Relation Age of Onset   • Breast cancer Mother    • Cancer Mother    • Stroke Father    • Heart attack Father         Social History     Occupational History   • Not on file   Tobacco Use   • Smoking status: Never   • Smokeless tobacco: Never   Vaping Use   • Vaping status: Never Used   Substance and Sexual Activity   • Alcohol use: Yes     Comment: social   • Drug use: No   • Sexual  "activity: Not Currently        Physical Exam:      /64   Pulse 76   Temp (!) 96.6 °F (35.9 °C) (Temporal)   Ht 5' 6\" (1.676 m)   Wt 88 kg (194 lb)   SpO2 96%   BMI 31.31 kg/m²     Physical Exam  Vitals and nursing note reviewed.   Constitutional:       General: She is not in acute distress.     Appearance: She is well-developed. She is not diaphoretic.   HENT:      Head: Normocephalic and atraumatic.      Right Ear: Hearing, tympanic membrane, ear canal and external ear normal.      Left Ear: Hearing, tympanic membrane, ear canal and external ear normal.      Nose: Nose normal. No mucosal edema or rhinorrhea.      Mouth/Throat:      Pharynx: No oropharyngeal exudate or posterior oropharyngeal erythema.   Eyes:      Extraocular Movements: Extraocular movements intact.   Cardiovascular:      Rate and Rhythm: Normal rate and regular rhythm.      Heart sounds: Normal heart sounds. No murmur heard.     No friction rub. No gallop.   Pulmonary:      Effort: Pulmonary effort is normal. No respiratory distress.      Breath sounds: Normal breath sounds. No wheezing or rales.   Abdominal:      General: Bowel sounds are normal. There is no distension.      Palpations: Abdomen is soft.      Tenderness: There is no abdominal tenderness. There is no guarding.   Musculoskeletal:      Cervical back: Normal range of motion and neck supple.      Right knee: Deformity (arthritic) present.      Left knee: Deformity (arthritic) present. Decreased range of motion. Tenderness present.   Lymphadenopathy:      Cervical: No cervical adenopathy.   Skin:     General: Skin is warm and dry.      Findings: No rash.   Neurological:      Mental Status: She is alert and oriented to person, place, and time.      Gait: Gait abnormal.   Psychiatric:         Behavior: Behavior normal.         Thought Content: Thought content normal.         Judgment: Judgment normal.         Pre-operative work-up:  Laboratory Results: I have personally " reviewed the pertinent laboratory results/reports   EKG: I have personally reviewed pertinent reports.    Chest x-ray: I have personally reviewed pertinent reports.      Brittany Webb PA-C  [facility]  [facility] [address]  Phone#  [phone]  Fax#  [phone]    Pre-operative note faxed to Dr. Farr's office on 6/13/24

## 2024-06-14 ENCOUNTER — TELEPHONE (OUTPATIENT)
Age: 74
End: 2024-06-14

## 2024-06-14 NOTE — TELEPHONE ENCOUNTER
[facility]. She stated the patient is having surgery with them and she is requesting a copy of the patients EKG. Best fax # [phone]    If we have any questions she left a direct contact.   P: [phone]    Please advise, thank you.

## 2024-06-17 ENCOUNTER — EVALUATION (OUTPATIENT)
Dept: PHYSICAL THERAPY | Facility: CLINIC | Age: 74
End: 2024-06-17
Payer: COMMERCIAL

## 2024-06-17 DIAGNOSIS — M17.12 UNILATERAL PRIMARY OSTEOARTHRITIS, LEFT KNEE: Primary | ICD-10-CM

## 2024-06-17 PROCEDURE — 97161 PT EVAL LOW COMPLEX 20 MIN: CPT

## 2024-06-17 PROCEDURE — 97535 SELF CARE MNGMENT TRAINING: CPT

## 2024-06-17 PROCEDURE — 97110 THERAPEUTIC EXERCISES: CPT

## 2024-06-17 NOTE — LETTER
2024    Anderson Farr MD  [address] [address]    Patient: Rosita Soriano   YOB: 1950   Date of Visit: 2024     Encounter Diagnosis     ICD-10-CM    1. Unilateral primary osteoarthritis, left knee  M17.12           Dear Dr. Farr:    Thank you for your recent referral of Rosita Soriano. Please review the attached evaluation summary from Rosita's recent visit.     Please verify that you agree with the plan of care by signing the attached order.     If you have any questions or concerns, please do not hesitate to call.     I sincerely appreciate the opportunity to share in the care of one of your patients and hope to have another opportunity to work with you in the near future.       Sincerely,    Chente Echols, PT      Referring Provider:      I certify that I have read the below Plan of Care and certify the need for these services furnished under this plan of treatment while under my care.                    Anderson Farr MD  [address] [address]  Via Fax: [phone]          PT Evaluation     Today's date: 2024  Patient name: Rosita Soriano  : 1950  MRN: [id number]  Referring provider: Anderson Farr MD  Dx:   Encounter Diagnosis     ICD-10-CM    1. Unilateral primary osteoarthritis, left knee  M17.12           Start Time: 1300  Stop Time: 1400  Total time in clinic (min): 60 minutes    Assessment  Impairments: abnormal or restricted ROM, abnormal movement, activity intolerance, impaired physical strength, lacks appropriate home exercise program, pain with function, poor posture , activity limitations and endurance  Symptom irritability: high    Assessment details: The patient is a very pleasant 74 y.o. female presenting to Physical Therapy today pre-operative L TKA scheduled for  with Dr. Farr. Upon completion of the initial PT evaluation the patient is demonstrating with limitations in L knee ROM, L knee strength, gait  mechanics, L hip mobility, L hip strength, and pain. Patient is currently having difficulty with functional activities such as ambulation, standing tolerance, lifting, pushing, pulling, and carrying due to symptomatology. Patient would benefit from a course of skilled Physical Therapy services post-operatively to address functional limitations to return to prior level of function. Thank you for your referral.   Understanding of Dx/Px/POC: good     Prognosis: good    Goals  STG: (6 weeks)  1.) Patient will initiate HEP Independently   2.) Patient will have a 50% reduction in overall symptoms   3.) Patient will demonstrate improved L knee strength evidenced by a 1-2 MMT grade increase  4.) Patient will demonstrate L knee flexion AROM 0-90 deg  5.) Patient will ambulate 300ft w/ SPC  6.) Patient will demonstrate improved standing tolerance >/= 1 hour   7.) Patient will demonstrate improved L hip strength evidenced by a 1-2 MMT grade increase    LTG: (12 weeks)   1.) Patient will be d/c to an HEP independently  2.) Patient will improve functional abilities evidenced by FOTO scores  3.) Eliminate pain with functional activity   4.) Patient will demonstrate improved ability to lift, push, pull, and carry safely  5.) Return to PLOF   6.) Patient will demonstrate L knee flexion AROM 0- >/= 110 deg  7.) Patient will ambulate 300ft w/o AD      Plan  Patient would benefit from: PT eval and skilled physical therapy  Referral necessary: No  Planned modality interventions: cryotherapy and thermotherapy: hydrocollator packs    Planned therapy interventions: functional ROM exercises, graded activity, graded exercise, graded motor, home exercise program, flexibility, joint mobilization, manual therapy, neuromuscular re-education, patient education, postural training, self care, strengthening, stretching, therapeutic activities, therapeutic exercise, therapeutic training and gait training    Frequency: 2x week  Duration in weeks:  8  Plan of Care beginning date: 2024  Plan of Care expiration date: 2024  Treatment plan discussed with: patient  Plan details: Plan of care was reviewed and discussed thoroughly with the patient on their current condition. Patient was instructed on a HEP with written instructions. Patient is in agreement with PT recommendations and will attend Physical Therapy 2x/week for 8 weeks post-operatively to address functional deficits.      Subjective Evaluation    History of Present Illness  Mechanism of injury: The patient reports today with L knee pain that started approximately 10 years ago. She notes no inciting injury or trauma to the L knee region. Patient reports receiving L knee injections with no relief in symptoms. She states having difficulty with activities such as lifting, carrying, stair navigation, bending, and ambulation due to symptoms. She had X-ray imaging of the L knee that revealed tricompartmental OA. She followed up with Dr. Farr and will be undergoing L TKA surgery on 24. She is now referred to OPPT for pre and post operative L knee rehabilitation.           Recurrent probem    Quality of life: good    Patient Goals  Patient goals for therapy: decreased pain, increased strength, independence with ADLs/IADLs, improved balance, increased motion and return to sport/leisure activities    Pain  Current pain ratin  At best pain ratin  At worst pain ratin  Location: L Knee  Quality: constant.  Relieving factors: rest and relaxation  Aggravating factors: sitting, standing, stair climbing, walking, lifting and overhead activity (carrying, pushing, pulling)    Social Support  Steps to enter house: yes  1  Stairs in house: yes   Lives in: multiple-level home    Employment status: working    Diagnostic Tests  X-ray: abnormal  Treatments  Previous treatment: injection treatment  Current treatment: physical therapy      Objective     Observations   Left Knee   Positive for deformity  and effusion.     Additional Observation Details  L Genu Varus. Moderate effusion.    Tenderness   Left Knee   Tenderness in the lateral joint line and medial joint line.     Neurological Testing     Sensation     Knee   Left Knee   Intact: Light touch    Right Knee   Intact: light touch     Active Range of Motion   Left Hip   Normal active range of motion    Right Hip   Normal active range of motion  Left Knee   Flexion: 110 degrees   Extension: -16 degrees     Right Knee   Normal active range of motion  Left Ankle/Foot   Normal active range of motion    Right Ankle/Foot   Normal active range of motion    Passive Range of Motion   Left Knee   Flexion: 113 degrees   Extension: -12 degrees     Mobility   Patellar Mobility:   Left Knee   Hypomobile: left superior and left inferior    Patellar Static Positioning   Left Knee: WFL    Strength/Myotome Testing     Left Hip   Planes of Motion   Flexion: 3+  Extension: 3+  Abduction: 3+  Adduction: 4-    Right Hip   Planes of Motion   Flexion: 3+  Extension: 4-  Abduction: 3+  Adduction: 4    Left Knee   Flexion: 3  Extension: 3  Quadriceps contraction: poor    Right Knee   Flexion: 4  Extension: 4  Quadriceps contraction: fair    Left Ankle/Foot   Normal strength    Right Ankle/Foot   Normal strength    Additional Strength Details  Notable L Knee extensor lag.     Ambulation     Comments   Patient ambulates w/o an AD. She demonstrates decreased step length, decreased stride length, decreased step width, decreased renuka, and antalgic gait pattern with gait mechanics. She ascends/descends stairs non-reciprocally with utilization of UE support.    General Comments:      Knee Comments  Patient was educated on post operative L TKA management regarding pain management, utilization of AD, stair navigation, gait mechanics, wound care, home exercise program, swelling management, and L knee positioning.              Precautions: L TKA (6/25) , CPS       6/17            Manuals       "       L Knee PROM             L Knee Patellar Mobs             L Knee STM              L Stretching: Hams, Gastroc              L Knee JM             Neuro Re-Ed             SLS             Tandem Stance                                                                               Ther Ex             Ankle Pumps  2x10             Quad Sets 5\" Hold x10             Gastroc Stretch  10\" Hold x10             Hamstring Stretch  10\" Hold x10             Glute Sets 5\" Hold x10             Heel Slides X10 w/ sliding board & strap             Seated Assisted Knee Flexion Stretch              LAQ             SAQ             TKE             NU step or Bike for muscular endurance & ROM             HEP Instruction  BM             Ther Activity             Forward/Backward Walking              Side Stepping              Forward Step Ups             Step Downs              Sit to Stands              Gait Training                                       Modalities             Ice                                               "

## 2024-06-17 NOTE — PROGRESS NOTES
PT Evaluation     Today's date: 2024  Patient name: Rosita Soriano  : 1950  MRN: [id number]  Referring provider: Anderson Farr MD  Dx:   Encounter Diagnosis     ICD-10-CM    1. Unilateral primary osteoarthritis, left knee  M17.12           Start Time: 1300  Stop Time: 1400  Total time in clinic (min): 60 minutes    Assessment  Impairments: abnormal or restricted ROM, abnormal movement, activity intolerance, impaired physical strength, lacks appropriate home exercise program, pain with function, poor posture , activity limitations and endurance  Symptom irritability: high    Assessment details: The patient is a very pleasant 74 y.o. female presenting to Physical Therapy today pre-operative L TKA scheduled for  with Dr. Farr. Upon completion of the initial PT evaluation the patient is demonstrating with limitations in L knee ROM, L knee strength, gait mechanics, L hip mobility, L hip strength, and pain. Patient is currently having difficulty with functional activities such as ambulation, standing tolerance, lifting, pushing, pulling, and carrying due to symptomatology. Patient would benefit from a course of skilled Physical Therapy services post-operatively to address functional limitations to return to prior level of function. Thank you for your referral.   Understanding of Dx/Px/POC: good     Prognosis: good    Goals  STG: (6 weeks)  1.) Patient will initiate HEP Independently   2.) Patient will have a 50% reduction in overall symptoms   3.) Patient will demonstrate improved L knee strength evidenced by a 1-2 MMT grade increase  4.) Patient will demonstrate L knee flexion AROM 0-90 deg  5.) Patient will ambulate 300ft w/ SPC  6.) Patient will demonstrate improved standing tolerance >/= 1 hour   7.) Patient will demonstrate improved L hip strength evidenced by a 1-2 MMT grade increase    LTG: (12 weeks)   1.) Patient will be d/c to an HEP independently  2.) Patient will improve functional  abilities evidenced by FOTO scores  3.) Eliminate pain with functional activity   4.) Patient will demonstrate improved ability to lift, push, pull, and carry safely  5.) Return to PLOF   6.) Patient will demonstrate L knee flexion AROM 0- >/= 110 deg  7.) Patient will ambulate 300ft w/o AD      Plan  Patient would benefit from: PT eval and skilled physical therapy  Referral necessary: No  Planned modality interventions: cryotherapy and thermotherapy: hydrocollator packs    Planned therapy interventions: functional ROM exercises, graded activity, graded exercise, graded motor, home exercise program, flexibility, joint mobilization, manual therapy, neuromuscular re-education, patient education, postural training, self care, strengthening, stretching, therapeutic activities, therapeutic exercise, therapeutic training and gait training    Frequency: 2x week  Duration in weeks: 8  Plan of Care beginning date: 6/17/2024  Plan of Care expiration date: 8/12/2024  Treatment plan discussed with: patient  Plan details: Plan of care was reviewed and discussed thoroughly with the patient on their current condition. Patient was instructed on a HEP with written instructions. Patient is in agreement with PT recommendations and will attend Physical Therapy 2x/week for 8 weeks post-operatively to address functional deficits.      Subjective Evaluation    History of Present Illness  Mechanism of injury: The patient reports today with L knee pain that started approximately 10 years ago. She notes no inciting injury or trauma to the L knee region. Patient reports receiving L knee injections with no relief in symptoms. She states having difficulty with activities such as lifting, carrying, stair navigation, bending, and ambulation due to symptoms. She had X-ray imaging of the L knee that revealed tricompartmental OA. She followed up with Dr. Farr and will be undergoing L TKA surgery on 6/25/24. She is now referred to [facility]PPT for pre and  post operative L knee rehabilitation.           Recurrent probem    Quality of life: good    Patient Goals  Patient goals for therapy: decreased pain, increased strength, independence with ADLs/IADLs, improved balance, increased motion and return to sport/leisure activities    Pain  Current pain ratin  At best pain ratin  At worst pain ratin  Location: L Knee  Quality: constant.  Relieving factors: rest and relaxation  Aggravating factors: sitting, standing, stair climbing, walking, lifting and overhead activity (carrying, pushing, pulling)    Social Support  Steps to enter house: yes  1  Stairs in house: yes   Lives in: multiple-level home    Employment status: working    Diagnostic Tests  X-ray: abnormal  Treatments  Previous treatment: injection treatment  Current treatment: physical therapy      Objective     Observations   Left Knee   Positive for deformity and effusion.     Additional Observation Details  L Genu Varus. Moderate effusion.    Tenderness   Left Knee   Tenderness in the lateral joint line and medial joint line.     Neurological Testing     Sensation     Knee   Left Knee   Intact: Light touch    Right Knee   Intact: light touch     Active Range of Motion   Left Hip   Normal active range of motion    Right Hip   Normal active range of motion  Left Knee   Flexion: 110 degrees   Extension: -16 degrees     Right Knee   Normal active range of motion  Left Ankle/Foot   Normal active range of motion    Right Ankle/Foot   Normal active range of motion    Passive Range of Motion   Left Knee   Flexion: 113 degrees   Extension: -12 degrees     Mobility   Patellar Mobility:   Left Knee   Hypomobile: left superior and left inferior    Patellar Static Positioning   Left Knee: WFL    Strength/Myotome Testing     Left Hip   Planes of Motion   Flexion: 3+  Extension: 3+  Abduction: 3+  Adduction: 4-    Right Hip   Planes of Motion   Flexion: 3+  Extension: 4-  Abduction: 3+  Adduction: 4    Left Knee  "  Flexion: 3  Extension: 3  Quadriceps contraction: poor    Right Knee   Flexion: 4  Extension: 4  Quadriceps contraction: fair    Left Ankle/Foot   Normal strength    Right Ankle/Foot   Normal strength    Additional Strength Details  Notable L Knee extensor lag.     Ambulation     Comments   Patient ambulates w/o an AD. She demonstrates decreased step length, decreased stride length, decreased step width, decreased renuka, and antalgic gait pattern with gait mechanics. She ascends/descends stairs non-reciprocally with utilization of UE support.    General Comments:      Knee Comments  Patient was educated on post operative L TKA management regarding pain management, utilization of AD, stair navigation, gait mechanics, wound care, home exercise program, swelling management, and L knee positioning.              Precautions: L TKA (6/25) , CPS       6/17            Manuals             L Knee PROM             L Knee Patellar Mobs             L Knee STM              L Stretching: Hams, Gastroc              L Knee JM             Neuro Re-Ed             SLS             Tandem Stance                                                                               Ther Ex             Ankle Pumps  2x10             Quad Sets 5\" Hold x10             Gastroc Stretch  10\" Hold x10             Hamstring Stretch  10\" Hold x10             Glute Sets 5\" Hold x10             Heel Slides X10 w/ sliding board & strap             Seated Assisted Knee Flexion Stretch              LAQ             SAQ             TKE             NU step or Bike for muscular endurance & ROM             HEP Instruction  BM             Ther Activity             Forward/Backward Walking              Side Stepping              Forward Step Ups             Step Downs              Sit to Stands              Gait Training                                       Modalities             Ice                               "

## 2024-06-26 ENCOUNTER — APPOINTMENT (OUTPATIENT)
Dept: PHYSICAL THERAPY | Facility: CLINIC | Age: 74
End: 2024-06-26
Payer: COMMERCIAL

## 2024-06-26 NOTE — PROGRESS NOTES
PT Evaluation     Today's date: 2024  Patient name: Rosita Soriano  : 1950  MRN: [id number]  Referring provider: Anderson Farr MD  Dx:   No diagnosis found.                 Assessment  Impairments: abnormal or restricted ROM, abnormal movement, activity intolerance, impaired physical strength, lacks appropriate home exercise program, pain with function, poor posture , activity limitations and endurance  Symptom irritability: high    Assessment details: The patient is a very pleasant 74 y.o. female presenting to Physical Therapy today pre-operative L TKA scheduled for  with Dr. Farr. Upon completion of the initial PT evaluation the patient is demonstrating with limitations in L knee ROM, L knee strength, gait mechanics, L hip mobility, L hip strength, and pain. Patient is currently having difficulty with functional activities such as ambulation, standing tolerance, lifting, pushing, pulling, and carrying due to symptomatology. Patient would benefit from a course of skilled Physical Therapy services post-operatively to address functional limitations to return to prior level of function. Thank you for your referral.   Understanding of Dx/Px/POC: good     Prognosis: good    Goals  STG: (6 weeks)  1.) Patient will initiate HEP Independently   2.) Patient will have a 50% reduction in overall symptoms   3.) Patient will demonstrate improved L knee strength evidenced by a 1-2 MMT grade increase  4.) Patient will demonstrate L knee flexion AROM 0-90 deg  5.) Patient will ambulate 300ft w/ SPC  6.) Patient will demonstrate improved standing tolerance >/= 1 hour   7.) Patient will demonstrate improved L hip strength evidenced by a 1-2 MMT grade increase    LTG: (12 weeks)   1.) Patient will be d/c to an HEP independently  2.) Patient will improve functional abilities evidenced by FOTO scores  3.) Eliminate pain with functional activity   4.) Patient will demonstrate improved ability to lift, push, pull,  and carry safely  5.) Return to PLOF   6.) Patient will demonstrate L knee flexion AROM 0- >/= 110 deg  7.) Patient will ambulate 300ft w/o AD      Plan  Patient would benefit from: PT eval and skilled physical therapy  Referral necessary: No  Planned modality interventions: cryotherapy and thermotherapy: hydrocollator packs    Planned therapy interventions: functional ROM exercises, graded activity, graded exercise, graded motor, home exercise program, flexibility, joint mobilization, manual therapy, neuromuscular re-education, patient education, postural training, self care, strengthening, stretching, therapeutic activities, therapeutic exercise, therapeutic training and gait training    Frequency: 2x week  Duration in weeks: 8  Plan of Care beginning date: 6/17/2024  Plan of Care expiration date: 8/12/2024  Treatment plan discussed with: patient  Plan details: Plan of care was reviewed and discussed thoroughly with the patient on their current condition. Patient was instructed on a HEP with written instructions. Patient is in agreement with PT recommendations and will attend Physical Therapy 2x/week for 8 weeks post-operatively to address functional deficits.      Subjective Evaluation    History of Present Illness  Mechanism of injury: The patient reports today with L knee pain that started approximately 10 years ago. She notes no inciting injury or trauma to the L knee region. Patient reports receiving L knee injections with no relief in symptoms. She states having difficulty with activities such as lifting, carrying, stair navigation, bending, and ambulation due to symptoms. She had X-ray imaging of the L knee that revealed tricompartmental OA. She followed up with Dr. Farr and will be undergoing L TKA surgery on 6/25/24. She is now referred to [facility]PP[facility] for pre and post operative L knee rehabilitation.           Recurrent probem    Quality of life: good    Patient Goals  Patient goals for therapy: decreased  pain, increased strength, independence with ADLs/IADLs, improved balance, increased motion and return to sport/leisure activities    Pain  Current pain ratin  At best pain ratin  At worst pain ratin  Location: L Knee  Quality: constant.  Relieving factors: rest and relaxation  Aggravating factors: sitting, standing, stair climbing, walking, lifting and overhead activity (carrying, pushing, pulling)    Social Support  Steps to enter house: yes  1  Stairs in house: yes   Lives in: multiple-level home    Employment status: working    Diagnostic Tests  X-ray: abnormal  Treatments  Previous treatment: injection treatment  Current treatment: physical therapy      Objective     Observations   Left Knee   Positive for deformity and effusion.     Additional Observation Details  L Genu Varus. Moderate effusion.    Tenderness   Left Knee   Tenderness in the lateral joint line and medial joint line.     Neurological Testing     Sensation     Knee   Left Knee   Intact: Light touch    Right Knee   Intact: light touch     Active Range of Motion   Left Hip   Normal active range of motion    Right Hip   Normal active range of motion  Left Knee   Flexion: 110 degrees   Extension: -16 degrees     Right Knee   Normal active range of motion  Left Ankle/Foot   Normal active range of motion    Right Ankle/Foot   Normal active range of motion    Passive Range of Motion   Left Knee   Flexion: 113 degrees   Extension: -12 degrees     Mobility   Patellar Mobility:   Left Knee   Hypomobile: left superior and left inferior    Patellar Static Positioning   Left Knee: WFL    Strength/Myotome Testing     Left Hip   Planes of Motion   Flexion: 3+  Extension: 3+  Abduction: 3+  Adduction: 4-    Right Hip   Planes of Motion   Flexion: 3+  Extension: 4-  Abduction: 3+  Adduction: 4    Left Knee   Flexion: 3  Extension: 3  Quadriceps contraction: poor    Right Knee   Flexion: 4  Extension: 4  Quadriceps contraction: fair    Left Ankle/Foot  "  Normal strength    Right Ankle/Foot   Normal strength    Additional Strength Details  Notable L Knee extensor lag.     Ambulation     Comments   Patient ambulates w/o an AD. She demonstrates decreased step length, decreased stride length, decreased step width, decreased renuka, and antalgic gait pattern with gait mechanics. She ascends/descends stairs non-reciprocally with utilization of UE support.    General Comments:      Knee Comments  Patient was educated on post operative L TKA management regarding pain management, utilization of AD, stair navigation, gait mechanics, wound care, home exercise program, swelling management, and L knee positioning.              Precautions: L TKA (6/25) , CPS       6/17            Manuals             L Knee PROM             L Knee Patellar Mobs             L Knee STM              L Stretching: Hams, Gastroc              L Knee JM             Neuro Re-Ed             SLS             Tandem Stance                                                                               Ther Ex             Ankle Pumps  2x10             Quad Sets 5\" Hold x10             Gastroc Stretch  10\" Hold x10             Hamstring Stretch  10\" Hold x10             Glute Sets 5\" Hold x10             Heel Slides X10 w/ sliding board & strap             Seated Assisted Knee Flexion Stretch              LAQ             SAQ             TKE             NU step or Bike for muscular endurance & ROM             HEP Instruction  BM             Ther Activity             Forward/Backward Walking              Side Stepping              Forward Step Ups             Step Downs              Sit to Stands              Gait Training                                       Modalities             Ice                               "

## 2024-06-27 ENCOUNTER — OFFICE VISIT (OUTPATIENT)
Dept: PHYSICAL THERAPY | Facility: CLINIC | Age: 74
End: 2024-06-27
Payer: COMMERCIAL

## 2024-06-27 DIAGNOSIS — M17.12 UNILATERAL PRIMARY OSTEOARTHRITIS, LEFT KNEE: Primary | ICD-10-CM

## 2024-06-27 PROCEDURE — 97110 THERAPEUTIC EXERCISES: CPT

## 2024-06-27 PROCEDURE — 97140 MANUAL THERAPY 1/> REGIONS: CPT

## 2024-06-27 PROCEDURE — 97164 PT RE-EVAL EST PLAN CARE: CPT

## 2024-06-27 NOTE — PROGRESS NOTES
PT Re-Evaluation     Today's date: 2024  Patient name: Rosita Soriano  : 1950  MRN: [id number]  Referring provider: Anderson Farr MD  Dx:   Encounter Diagnosis     ICD-10-CM    1. Unilateral primary osteoarthritis, left knee  M17.12             Start Time: 1545  Stop Time: 1645  Total time in clinic (min): 60 minutes    Assessment  Impairments: abnormal or restricted ROM, abnormal movement, activity intolerance, impaired physical strength, lacks appropriate home exercise program, pain with function, poor posture , activity limitations and endurance  Symptom irritability: high    Assessment details: The patient is a very pleasant 74 y.o. female presenting to Physical Therapy today s/p L TKA performed on  with Dr. Farr. Upon completion of the PT re-evaluation the patient is demonstrating with limitations in L knee ROM, L knee strength, gait mechanics, L hip mobility, L hip strength, and pain. Patient is currently having difficulty with functional activities such as ambulation, standing tolerance, sleeping, stair navigation, lifting, pushing, pulling, and carrying due to symptomatology. Patient would benefit from a course of skilled Physical Therapy services post-operatively to address functional limitations to return to prior level of function. Thank you for your referral.   Understanding of Dx/Px/POC: good     Prognosis: good    Goals  STG: (6 weeks) ONGOING  1.) Patient will initiate HEP Independently   2.) Patient will have a 50% reduction in overall symptoms   3.) Patient will demonstrate improved L knee strength evidenced by a 1-2 MMT grade increase  4.) Patient will demonstrate L knee flexion AROM 0-90 deg  5.) Patient will ambulate 300ft w/ SPC  6.) Patient will demonstrate improved standing tolerance >/= 1 hour   7.) Patient will demonstrate improved L hip strength evidenced by a 1-2 MMT grade increase    LTG: (12 weeks) ONGOING  1.) Patient will be d/c to an HEP independently  2.)  Patient will improve functional abilities evidenced by FOTO scores  3.) Eliminate pain with functional activity   4.) Patient will demonstrate improved ability to lift, push, pull, and carry safely  5.) Return to PLOF   6.) Patient will demonstrate L knee flexion AROM 0- >/= 110 deg  7.) Patient will ambulate 300ft w/o AD      Plan  Patient would benefit from: PT eval and skilled physical therapy  Referral necessary: No  Planned modality interventions: cryotherapy and thermotherapy: hydrocollator packs    Planned therapy interventions: functional ROM exercises, graded activity, graded exercise, graded motor, home exercise program, flexibility, joint mobilization, manual therapy, neuromuscular re-education, patient education, postural training, self care, strengthening, stretching, therapeutic activities, therapeutic exercise, therapeutic training and gait training    Frequency: 2x week (2-3x/week)  Duration in weeks: 8  Plan of Care beginning date: 6/17/2024  Plan of Care expiration date: 8/12/2024  Treatment plan discussed with: patient  Plan details: Plan of care was reviewed and discussed thoroughly with the patient on their current condition. Patient was instructed on a HEP with written instructions. Patient is in agreement with PT recommendations and will attend Physical Therapy 2-3x/week post-operatively to address functional deficits.      Subjective Evaluation    History of Present Illness  Mechanism of injury: The patient reports today with L knee pain that started approximately 10 years ago. She notes no inciting injury or trauma to the L knee region. Patient reports receiving L knee injections with no relief in symptoms. She states having difficulty with activities such as lifting, carrying, stair navigation, bending, and ambulation due to symptoms. She had X-ray imaging of the L knee that revealed tricompartmental OA. She followed up with Dr. Farr and will be undergoing L TKA surgery on 6/25/24. She  is now referred to [facility] for pre and post operative L knee rehabilitation.     Update 24: The patient reports today s/p L TKA surgery performed on 24 with Dr. Farr. She notes utilizing a RW since surgery and has been compliant with performance of HEP as well as stationary bike. She reports experiencing pain in the L medial knee with movement. She has been applying ice, utilizing compression socks, and has been elevating LLE for swelling management.           Recurrent probem    Quality of life: good    Patient Goals  Patient goals for therapy: decreased pain, increased strength, independence with ADLs/IADLs, improved balance, increased motion and return to sport/leisure activities    Pain  Current pain ratin  At best pain ratin  At worst pain rating: 10  Location: L medial knee  Quality: discomfort  Relieving factors: rest and relaxation  Aggravating factors: sitting, standing, stair climbing, walking, lifting and overhead activity (carrying, pushing, pulling)    Social Support  Steps to enter house: yes  1  Stairs in house: yes   Lives in: multiple-level home    Employment status: working    Diagnostic Tests  X-ray: abnormal  Treatments  Previous treatment: injection treatment  Current treatment: physical therapy      Objective     Observations   Left Knee   Positive for edema and incision.     Additional Observation Details  Standard L midline TKA incision. No warmth. No redness. No drainage. Moderate edema. Compression socks are appropriately worn.    Palpation   Left   Hypertonic in the distal biceps femoris, distal semimembranosus, distal semitendinosus, lateral gastrocnemius, medial gastrocnemius and rectus femoris.     Tenderness   Left Knee   Tenderness in the ITB, lateral joint line, medial joint line and quadriceps tendon.     Neurological Testing     Sensation     Knee   Left Knee   Intact: Light touch    Right Knee   Intact: light touch     Active Range of Motion   Left Hip   Normal  active range of motion    Right Hip   Normal active range of motion  Left Knee   Flexion: 76 degrees with pain  Extension: -11 degrees with pain    Right Knee   Normal active range of motion  Left Ankle/Foot   Normal active range of motion    Right Ankle/Foot   Normal active range of motion    Passive Range of Motion   Left Knee   Flexion: 78 degrees with pain  Extension: -10 degrees with pain    Mobility   Patellar Mobility:   Left Knee   Hypomobile: left superior and left inferior    Patellar Static Positioning   Left Knee: WFL    Strength/Myotome Testing     Left Hip   Planes of Motion   Flexion: 2  Extension: 2+  Abduction: 3-  Adduction: 3-    Right Hip   Planes of Motion   Flexion: 3+  Extension: 4-  Abduction: 3+  Adduction: 4    Left Knee   Flexion: 2-  Extension: 2-  Quadriceps contraction: poor    Right Knee   Flexion: 4  Extension: 4  Quadriceps contraction: fair    Left Ankle/Foot   Normal strength    Right Ankle/Foot   Normal strength    Additional Strength Details  L Knee Extensor Lag  L active SLR : Unable    Tests     Additional Tests Details  No special tests were performed.    Ambulation     Comments   Patient ambulates with a RW. She demonstrates decreased step length, decreased stride length, decreased step width, and decreased renuka with gait mechanics. She ascends/descends stairs non-reciprocally with utilization of UE support.    General Comments:      Knee Comments  Patient was educated on post operative L TKA management regarding pain management, utilization of AD, stair navigation, gait mechanics, wound care, home exercise program, swelling management, and L knee positioning. Tubi  was given to patient for swelling management.      Flowsheet Rows      Flowsheet Row Most Recent Value   PT/OT G-Codes    Current Score 36   Projected Score 54               Precautions: L TKA (6/25) , CPS       6/17 6/27           Manuals             L Knee PROM  BM           L Knee Patellar Mobs            "  L Knee STM   BM           L Stretching: Hams, Gastroc   BM Gastroc            L Knee JM             Neuro Re-Ed             SLS             Tandem Stance                                                                               Ther Ex             Ankle Pumps  2x10  2x10            Quad Sets 5\" Hold x10  5\" Hold x10            Gastroc Stretch  10\" Hold x10  10\" Hold x5           Hamstring Stretch  10\" Hold x10  10\" Hold x5            Glute Sets 5\" Hold x10  HEP            Heel Slides X10 w/ sliding board & strap  X10 w/ sliding board & strap            Seated Assisted Knee Flexion Stretch              LAQ             SAQ             TKE             NU step or Bike for muscular endurance & ROM  L1 5'           HEP Instruction  BM  BM           Ther Activity             Forward/Backward Walking              Side Stepping              Forward Step Ups             Step Downs              Sit to Stands              Gait Training                                       Modalities             Ice  10'                              "

## 2024-07-01 ENCOUNTER — OFFICE VISIT (OUTPATIENT)
Dept: PHYSICAL THERAPY | Facility: CLINIC | Age: 74
End: 2024-07-01
Payer: COMMERCIAL

## 2024-07-01 DIAGNOSIS — M17.12 UNILATERAL PRIMARY OSTEOARTHRITIS, LEFT KNEE: Primary | ICD-10-CM

## 2024-07-01 PROCEDURE — 97140 MANUAL THERAPY 1/> REGIONS: CPT

## 2024-07-01 PROCEDURE — 97110 THERAPEUTIC EXERCISES: CPT

## 2024-07-01 NOTE — PROGRESS NOTES
"Daily Note     Today's date: 2024  Patient name: Rosita Soriano  : 1950  MRN: [id number]  Referring provider: Anderson Farr MD  Dx:   Encounter Diagnosis     ICD-10-CM    1. Unilateral primary osteoarthritis, left knee  M17.12           Start Time: 1522  Stop Time: 1620  Total time in clinic (min): 58 minutes    Subjective: Patient reports increased L knee pain on Saturday. She notes increased bruising in the back of the L knee over the weekend.       Objective: See treatment diary below      Assessment: Tolerated treatment well. Moderate ecchymosis posterior L knee. Moderate effusion L knee joint. Patient demonstrated fatigue post treatment, exhibited good technique with therapeutic exercises, and would benefit from continued PT at this time.      Plan: Continue per plan of care.      Precautions: L TKA () , CPS              Manuals          L Knee PROM  BM BM       L Knee Patellar Mobs            L Knee STM   BM BM         L Stretching: Hams, Gastroc   BM Gastroc  BM Hams/Gastroc       L Knee JM          Neuro Re-Ed          SLS          Tandem Stance                                                             Ther Ex          Ankle Pumps  2x10  2x10  HEP        Quad Sets 5\" Hold x10  5\" Hold x10  5\" Hold 2x10       Gastroc Stretch  10\" Hold x10  10\" Hold x5 10\" Hold x5        Hamstring Stretch  10\" Hold x10  10\" Hold x5  10\" Hold x5       Heel Slides X10 w/ sliding board & strap  X10 w/ sliding board & strap  X10 w/ sliding board & strap        Seated Assisted Knee Flexion Stretch    10\" hold x10        LAQ            SAQ          TKE          NU step or Bike for muscular endurance & ROM  L1 5' L3 10'        HEP Instruction  BM  BM        Ther Activity          Forward/Backward Walking    4 laps in //        Side Stepping    4 laps in //       Forward Step Ups          Step Downs           Sit to Stands           Gait Training                              Modalities          Ice  " 10'  10'

## 2024-07-03 ENCOUNTER — OFFICE VISIT (OUTPATIENT)
Dept: PHYSICAL THERAPY | Facility: CLINIC | Age: 74
End: 2024-07-03
Payer: COMMERCIAL

## 2024-07-03 DIAGNOSIS — M17.12 UNILATERAL PRIMARY OSTEOARTHRITIS, LEFT KNEE: Primary | ICD-10-CM

## 2024-07-03 PROCEDURE — 97530 THERAPEUTIC ACTIVITIES: CPT

## 2024-07-03 PROCEDURE — 97110 THERAPEUTIC EXERCISES: CPT

## 2024-07-03 PROCEDURE — 97140 MANUAL THERAPY 1/> REGIONS: CPT

## 2024-07-03 NOTE — PROGRESS NOTES
"Daily Note     Today's date: 7/3/2024  Patient name: Rosita Soriano  : 1950  MRN: [id number]  Referring provider: Anderson Farr MD  Dx:   Encounter Diagnosis     ICD-10-CM    1. Unilateral primary osteoarthritis, left knee  M17.12                      Subjective: Pt reports she is doing well and eager to start ambulating stairs.       Objective: See treatment diary below. Instructed and demonstrated one flight of stairs with SPC. Demonstrates 110 degrees of knee flx PROM.       Assessment: Tolerated treatment well. Patient exhibited good technique with therapeutic exercises. Pt demonstrates safe ambulation of stairs with SPC today. Pt lara program with cont improved ROM.       Plan: Continue per plan of care.      Precautions: L TKA () , CPS       6/17 6/27 7/1 7/3      Manuals          L Knee PROM  BM BM JV      L Knee Patellar Mobs            L Knee STM   BM BM         L Stretching: Hams, Gastroc   BM Gastroc  BM Hams/Gastroc JV      L Knee JM          Neuro Re-Ed          SLS          Tandem Stance                                                             Ther Ex          Ankle Pumps  2x10  2x10  HEP        Quad Sets 5\" Hold x10  5\" Hold x10  5\" Hold 2x10 5\" 2x10      Gastroc Stretch  10\" Hold x10  10\" Hold x5 10\" Hold x5  10\" x5      Hamstring Stretch  10\" Hold x10  10\" Hold x5  10\" Hold x5 10\" x 5      Heel Slides X10 w/ sliding board & strap  X10 w/ sliding board & strap  X10 w/ sliding board & strap  X10       Seated Assisted Knee Flexion Stretch    10\" hold x10  10\"x10      LAQ            SAQ          TKE          NU step or Bike for muscular endurance & ROM  L1 5' L3 10'  L3 10'      HEP Instruction  BM  BM        Ther Activity          Forward/Backward Walking    4 laps in //  5 laps // bars      Side Stepping    4 laps in // 5 laps // bars      Forward Step Ups          Step Downs     One flight SPC      Sit to Stands           Gait Training                              Modalities        "   Ice  10'  10'  10'

## 2024-07-06 DIAGNOSIS — K21.9 GASTROESOPHAGEAL REFLUX DISEASE, UNSPECIFIED WHETHER ESOPHAGITIS PRESENT: ICD-10-CM

## 2024-07-06 RX ORDER — PANTOPRAZOLE SODIUM 40 MG/1
40 TABLET, DELAYED RELEASE ORAL DAILY
Qty: 90 TABLET | Refills: 0 | Status: SHIPPED | OUTPATIENT
Start: 2024-07-06

## 2024-07-08 ENCOUNTER — OFFICE VISIT (OUTPATIENT)
Dept: PHYSICAL THERAPY | Facility: CLINIC | Age: 74
End: 2024-07-08
Payer: COMMERCIAL

## 2024-07-08 DIAGNOSIS — M17.12 UNILATERAL PRIMARY OSTEOARTHRITIS, LEFT KNEE: Primary | ICD-10-CM

## 2024-07-08 PROCEDURE — 97140 MANUAL THERAPY 1/> REGIONS: CPT

## 2024-07-08 PROCEDURE — 97530 THERAPEUTIC ACTIVITIES: CPT

## 2024-07-08 PROCEDURE — 97116 GAIT TRAINING THERAPY: CPT

## 2024-07-08 PROCEDURE — 97110 THERAPEUTIC EXERCISES: CPT

## 2024-07-08 NOTE — PROGRESS NOTES
"Daily Note     Today's date: 2024  Patient name: Rosita Soriano  : 1950  MRN: [id number]  Referring provider: Anderson Farr MD  Dx:   Encounter Diagnosis     ICD-10-CM    1. Unilateral primary osteoarthritis, left knee  M17.12           Start Time: 1015  Stop Time: 1110  Total time in clinic (min): 55 minutes    Subjective: Patient reports medial and lateral L knee pain after last treatment session. She notes continued performance of home exercise program.      Objective: See treatment diary below      Assessment: Tolerated treatment well. We initiated gait training with SPC this morning. Patient demonstrated a moderate loss in proprioceptive control with gait mechanics. We progressed the current program with the addition of forward step ups. Patient demonstrated good tolerance to progression with a minimal increase in symptomatology. Patient exhibited good technique with therapeutic exercises and would benefit from continued PT.       Plan: Continue per plan of care.      Precautions: L TKA () , CPS       6/17 6/27 7/1 7/3 7/8     Manuals          L Knee PROM  BM BM JV BM     L Knee Patellar Mobs            L Knee STM   BM BM    BM     L Stretching: Hams, Gastroc   BM Gastroc  BM Hams/Gastroc JV BM     L Knee JM          Neuro Re-Ed          SLS          Tandem Stance                                                             Ther Ex          Quad Sets 5\" Hold x10  5\" Hold x10  5\" Hold 2x10 5\" 2x10 5\" Hold 2x10      Gastroc Stretch  10\" Hold x10  10\" Hold x5 10\" Hold x5  10\" x5 NV     Hamstring Stretch  10\" Hold x10  10\" Hold x5  10\" Hold x5 10\" x 5 NV     Heel Slides X10 w/ sliding board & strap  X10 w/ sliding board & strap  X10 w/ sliding board & strap  X10  X10 w/ sliding board & strap      Seated Assisted Knee Flexion Stretch    10\" hold x10  10\"x10 10\" Hold x10      LAQ            SAQ          TKE          NU step or Bike for muscular endurance & ROM  L1 5' L3 10'  L3 10' L4 10'      HEP " "Instruction  BM  BM        Ther Activity          Forward/Backward Walking    4 laps in //  5 laps // bars 5 laps // bars     Side Stepping    4 laps in // 5 laps // bars 5 laps // bars      Forward Step Ups     2\" step 2x10      Step Downs     One flight SPC      Sit to Stands           Gait Training               10' SPC               Modalities          Ice  10'  10'  10' NT                         "

## 2024-07-10 ENCOUNTER — OFFICE VISIT (OUTPATIENT)
Dept: PHYSICAL THERAPY | Facility: CLINIC | Age: 74
End: 2024-07-10
Payer: COMMERCIAL

## 2024-07-10 DIAGNOSIS — M17.12 UNILATERAL PRIMARY OSTEOARTHRITIS, LEFT KNEE: Primary | ICD-10-CM

## 2024-07-10 PROCEDURE — 97116 GAIT TRAINING THERAPY: CPT

## 2024-07-10 PROCEDURE — 97110 THERAPEUTIC EXERCISES: CPT

## 2024-07-10 PROCEDURE — 97140 MANUAL THERAPY 1/> REGIONS: CPT

## 2024-07-10 PROCEDURE — 97530 THERAPEUTIC ACTIVITIES: CPT

## 2024-07-10 NOTE — PROGRESS NOTES
"Daily Note     Today's date: 7/10/2024  Patient name: Rosita Soriano  : 1950  MRN: [id number]  Referring provider: Anderson Farr MD  Dx:   Encounter Diagnosis     ICD-10-CM    1. Unilateral primary osteoarthritis, left knee  M17.12           Start Time: 1100  Stop Time: 1200  Total time in clinic (min): 60 minutes    Subjective: Patient reports lateral L knee soreness has decreased. She states still feeling instability when walking with the SPC.      Objective: See treatment diary below      Assessment: Tolerated treatment well. We progressed the current program with the addition of quad strengthening and full revolutions on the stationary bike. Patient demonstrated good tolerance to progressions with a minimal increase in symptomatology. Patient is continuing to make steady progressions in L knee sagittal plane mobility. Patient exhibited good technique with therapeutic exercises and would benefit from continued PT.      Plan: Continue per plan of care.      Precautions: L TKA () , CPS       6/17 6/27 7/1 7/3 7/8 7/10    Manuals          L Knee PROM  BM BM JV BM BM     L Knee Patellar Mobs            L Knee STM   BM BM    BM BM     L Stretching: Hams, Gastroc   BM Gastroc  BM Hams/Gastroc JV BM BM     L Knee JM          Neuro Re-Ed          SLS          Tandem Stance                                                             Ther Ex          Quad Sets 5\" Hold x10  5\" Hold x10  5\" Hold 2x10 5\" 2x10 5\" Hold 2x10  HEP     Gastroc Stretch  10\" Hold x10  10\" Hold x5 10\" Hold x5  10\" x5 NV 20\" Hold x3 w/ 1/2 foam     Hamstring Stretch  10\" Hold x10  10\" Hold x5  10\" Hold x5 10\" x 5 NV 20\" Hold x3 L     Heel Slides X10 w/ sliding board & strap  X10 w/ sliding board & strap  X10 w/ sliding board & strap  X10  X10 w/ sliding board & strap  HEP     Seated Assisted Knee Flexion Stretch    10\" hold x10  10\"x10 10\" Hold x10  10\" Hold x10     HR/TR      2x10     LAQ        2x10     SLR          SAQ      2x10   " "  TKE          Hip Flx/ABD/Ext           NU step or Bike for muscular endurance & ROM  L1 5' L3 10'  L3 10' L4 10'  Bike Full Rev L1    HEP Instruction  BM  BM        Ther Activity          Forward/Backward Walking    4 laps in //  5 laps // bars 5 laps // bars D/C    Side Stepping    4 laps in // 5 laps // bars 5 laps // bars  D/C    Forward Step Ups     2\" step 2x10  4\" step 2x10     Step Downs     One flight SPC  2\" step 2x10     Sit to Stands           Gait Training               10' SPC 10' SPC              Modalities          Ice  10'  10'  10' NT  5'                         "

## 2024-07-12 ENCOUNTER — OFFICE VISIT (OUTPATIENT)
Dept: PHYSICAL THERAPY | Facility: CLINIC | Age: 74
End: 2024-07-12
Payer: COMMERCIAL

## 2024-07-12 DIAGNOSIS — M17.12 UNILATERAL PRIMARY OSTEOARTHRITIS, LEFT KNEE: Primary | ICD-10-CM

## 2024-07-12 PROCEDURE — 97530 THERAPEUTIC ACTIVITIES: CPT

## 2024-07-12 PROCEDURE — 97110 THERAPEUTIC EXERCISES: CPT

## 2024-07-12 PROCEDURE — 97140 MANUAL THERAPY 1/> REGIONS: CPT

## 2024-07-12 NOTE — PROGRESS NOTES
"Daily Note     Today's date: 2024  Patient name: Rosita Soriano  : 1950  MRN: [id number]  Referring provider: Anderson Farr MD  Dx:   Encounter Diagnosis     ICD-10-CM    1. Unilateral primary osteoarthritis, left knee  M17.12           Start Time: 0840  Stop Time: 09  Total time in clinic (min): 55 minutes    Subjective: Patient reports experiencing lateral L knee discomfort today and clicking in the L knee joint.      Objective: See treatment diary below      Assessment: Tolerated treatment well. Patient demonstrated fatigue post treatment, exhibited good technique with therapeutic exercises, and would benefit from continued PT to address L knee AROM, L knee joint effusion, L knee strength, and L knee symptomatology to return to prior level of function.       Plan: Continue per plan of care.      Precautions: L TKA () , CPS       6/17 6/27 7/1 7/3 7/8 7/10 7/12   Manuals          L Knee PROM  BM BM JV BM BM  BM    L Knee Patellar Mobs            L Knee STM   BM BM    BM BM  BM    L Stretching: Hams, Gastroc   BM Gastroc  BM Hams/Gastroc JV BM BM  BM    L Knee JM          Neuro Re-Ed          SLS          Tandem Stance                                                             Ther Ex          Gastroc Stretch  10\" Hold x10  10\" Hold x5 10\" Hold x5  10\" x5 NV 20\" Hold x3 w/ 1/2 foam  20\" Hold x3 w/ 1/2 foam    Hamstring Stretch  10\" Hold x10  10\" Hold x5  10\" Hold x5 10\" x 5 NV 20\" Hold x3 L  20\" Hold x3 L    Seated Assisted Knee Flexion Stretch    10\" hold x10  10\"x10 10\" Hold x10  10\" Hold x10  10\" Hold x10    HR/TR      2x10  2x10    LAQ        2x10  2x10    SLR       X10 w/ quad set    SAQ      2x10  2x10    TKE          Hip Flx/ABD/Ext           NU step or Bike for muscular endurance & ROM  L1 5' L3 10'  L3 10' L4 10'  Bike Full Rev L1 Bike Full Rev L1   HEP Instruction  BM  BM        Ther Activity          Forward Step Ups     2\" step 2x10  4\" step 2x10  4\" step 2x10    Step Downs     " "One flight SPC  2\" step 2x10  4\" step 2x10    Sit to Stands           Gait Training               10' SPC 10' SPC              Modalities          Ice  10'  10'  10' NT  5' 5'                           "

## 2024-07-15 ENCOUNTER — OFFICE VISIT (OUTPATIENT)
Dept: PHYSICAL THERAPY | Facility: CLINIC | Age: 74
End: 2024-07-15
Payer: COMMERCIAL

## 2024-07-15 DIAGNOSIS — M17.12 UNILATERAL PRIMARY OSTEOARTHRITIS, LEFT KNEE: Primary | ICD-10-CM

## 2024-07-15 PROCEDURE — 97530 THERAPEUTIC ACTIVITIES: CPT

## 2024-07-15 PROCEDURE — 97110 THERAPEUTIC EXERCISES: CPT

## 2024-07-15 PROCEDURE — 97140 MANUAL THERAPY 1/> REGIONS: CPT

## 2024-07-15 NOTE — PROGRESS NOTES
"Daily Note     Today's date: 7/15/2024  Patient name: Rosita Soriano  : 1950  MRN: [id number]  Referring provider: Anderson Farr MD  Dx:   Encounter Diagnosis     ICD-10-CM    1. Unilateral primary osteoarthritis, left knee  M17.12           Start Time: 0756  Stop Time: 0850  Total time in clinic (min): 54 minutes    Subjective: Patient reports soreness in the medial L knee this morning. She notes weaning from utilizing her SPC with household ambulation.      Objective: See treatment diary below      Assessment: Tolerated treatment well. Patient continues to demonstrate limitations in L knee extension AROM. PT is addressing this with static L knee extension stretching with good tolerance from patient. Patient demonstrated fatigue post treatment, exhibited good technique with therapeutic exercises, and would benefit from continued PT to address L knee AROM, L knee joint effusion, as well as L knee strength to meet functional goals.      Plan: Continue per plan of care.      Precautions: L TKA () , CPS       7/15 6/27 7/1 7/3 7/8 7/10 7/12   Manuals          L Knee PROM BM BM BM JV BM BM  BM    L Knee Patellar Mobs BM           L Knee STM  BM BM BM    BM BM  BM    L Stretching: Hams, Gastroc  BM  BM Gastroc  BM Hams/Gastroc JV BM BM  BM    L Knee JM          Neuro Re-Ed          SLS          Tandem Stance                                                             Ther Ex          Gastroc Stretch  20\" Hold x3  10\" Hold x5 10\" Hold x5  10\" x5 NV 20\" Hold x3 w/ 1/2 foam  20\" Hold x3 w/ 1/2 foam    Hamstring Stretch  20\" Hold x3  10\" Hold x5  10\" Hold x5 10\" x 5 NV 20\" Hold x3 L  20\" Hold x3 L    Seated Assisted Knee Flexion Stretch  10\" Hold x10   10\" hold x10  10\"x10 10\" Hold x10  10\" Hold x10  10\" Hold x10    HR/TR HEP      2x10  2x10    LAQ 2x10       2x10  2x10    SLR 2x10 w/ quad set       X10 w/ quad set    SAQ 2x10     2x10  2x10    TKE          Prone Hangs L Knee Extension  3'          Hip " "Flx/ABD/Ext           NU step or Bike for muscular endurance & ROM Bike Full Rev L2 L1 5' L3 10'  L3 10' L4 10'  Bike Full Rev L1 Bike Full Rev L1   HEP Instruction  BM  BM        Ther Activity          Forward Step Ups 6\" step 2x10     2\" step 2x10  4\" step 2x10  4\" step 2x10    Step Downs  4\" Step 2x10    One flight SPC  2\" step 2x10  4\" step 2x10    Sit to Stands  X10 1AE x5 no AE          Gait Training               10' SPC 10' SPC              Modalities          Ice NT 10'  10'  10' NT  5' 5'                             "

## 2024-07-16 ENCOUNTER — EVALUATION (OUTPATIENT)
Dept: PHYSICAL THERAPY | Facility: CLINIC | Age: 74
End: 2024-07-16
Payer: COMMERCIAL

## 2024-07-16 DIAGNOSIS — M17.12 UNILATERAL PRIMARY OSTEOARTHRITIS, LEFT KNEE: Primary | ICD-10-CM

## 2024-07-16 PROCEDURE — 97140 MANUAL THERAPY 1/> REGIONS: CPT

## 2024-07-16 PROCEDURE — 97110 THERAPEUTIC EXERCISES: CPT

## 2024-07-16 PROCEDURE — 97530 THERAPEUTIC ACTIVITIES: CPT

## 2024-07-16 NOTE — PROGRESS NOTES
PT Re-Evaluation     Today's date: 2024  Patient name: Rosita Soriano  : 1950  MRN: [id number]  Referring provider: Anderson Farr MD  Dx:   Encounter Diagnosis     ICD-10-CM    1. Unilateral primary osteoarthritis, left knee  M17.12               Start Time: 0758  Stop Time: 0855  Total time in clinic (min): 57 minutes    Assessment  Impairments: abnormal or restricted ROM, abnormal movement, activity intolerance, impaired physical strength, lacks appropriate home exercise program, pain with function, poor posture , activity limitations and endurance  Symptom irritability: low    Assessment details: The patient is a very pleasant 74 y.o. female presenting to Physical Therapy today s/p L TKA performed on  with Dr. Farr. Patient continues to make steady progressions in the L knee with Physical Therapy treatment. Upon completion of the PT re-evaluation the patient is demonstrating with limitations in L knee AROM, L knee strength, gait mechanics, L hip mobility, L hip strength, and pain. Patient is currently having difficulty with functional activities such as ambulation, standing tolerance, stair navigation, lifting, pushing, pulling, and carrying due to symptomatology. Patient would continue to benefit from skilled Physical Therapy services post-operatively to address functional limitations to return to prior level of function.  Understanding of Dx/Px/POC: good     Prognosis: good    Goals  STG: (6 weeks)   1.) Patient will initiate HEP Independently MET   2.) Patient will have a 50% reduction in overall symptoms MET  3.) Patient will demonstrate improved L knee strength evidenced by a 1-2 MMT grade increase PROGRESSING  4.) Patient will demonstrate L knee flexion AROM 0-90 deg MET  5.) Patient will ambulate 300ft w/ SPC MET  6.) Patient will demonstrate improved standing tolerance >/= 1 hour PROGRESSING  7.) Patient will demonstrate improved L hip strength evidenced by a 1-2 MMT grade increase  PROGRESSING    LTG: (12 weeks)   1.) Patient will be d/c to an HEP independently PROGRESSING  2.) Patient will improve functional abilities evidenced by FOTO scores MET  3.) Eliminate pain with functional activity PROGRESSING  4.) Patient will demonstrate improved ability to lift, push, pull, and carry safely PROGRESSING  5.) Return to PLOF PROGRESSING  6.) Patient will demonstrate L knee flexion AROM 0- >/= 110 deg MET  7.) Patient will ambulate 300ft w/o AD PROGRESSING    Plan  Patient would benefit from: PT eval and skilled physical therapy  Referral necessary: No  Planned modality interventions: cryotherapy and thermotherapy: hydrocollator packs    Planned therapy interventions: functional ROM exercises, graded activity, graded exercise, graded motor, home exercise program, flexibility, joint mobilization, manual therapy, neuromuscular re-education, patient education, postural training, self care, strengthening, stretching, therapeutic activities, therapeutic exercise, therapeutic training and gait training    Frequency: 2x week (2-3x/week)  Duration in weeks: 8  Plan of Care beginning date: 6/17/2024  Plan of Care expiration date: 8/12/2024  Treatment plan discussed with: patient  Plan details: Plan of care was reviewed and discussed thoroughly with the patient on their current condition. Patient was instructed on a HEP with written instructions. Patient is in agreement with PT recommendations and will attend Physical Therapy 2-3x/week post-operatively to address functional deficits.      Subjective Evaluation    History of Present Illness  Mechanism of injury: The patient reports today with L knee pain that started approximately 10 years ago. She notes no inciting injury or trauma to the L knee region. Patient reports receiving L knee injections with no relief in symptoms. She states having difficulty with activities such as lifting, carrying, stair navigation, bending, and ambulation due to symptoms. She had  X-ray imaging of the L knee that revealed tricompartmental OA. She followed up with Dr. Farr and will be undergoing L TKA surgery on 24. She is now referred to OPPT for pre and post operative L knee rehabilitation.     Update 24: The patient reports today s/p L TKA surgery performed on 24 with Dr. Farr. She notes utilizing a RW since surgery and has been compliant with performance of HEP as well as stationary bike. She reports experiencing pain in the L medial knee with movement. She has been applying ice, utilizing compression socks, and has been elevating LLE for swelling management.     Update 24: The patient reports today with a 50-60% improvement. Patient notes still having difficulty with activities such as walking, lifting, stair navigation, standing tolerance, and squatting. She is happy with her progress to this point.          Recurrent probem    Quality of life: good    Patient Goals  Patient goals for therapy: decreased pain, increased strength, independence with ADLs/IADLs, improved balance, increased motion and return to sport/leisure activities    Pain  Current pain ratin  At best pain ratin  At worst pain rating: 3  Location: L medial knee, L posterior knee  Quality: discomfort  Relieving factors: rest and relaxation  Aggravating factors: sitting, standing, stair climbing, walking, lifting and overhead activity (carrying, pushing, pulling)    Social Support  Steps to enter house: yes  1  Stairs in house: yes   Lives in: multiple-level home    Employment status: working    Diagnostic Tests  X-ray: abnormal  Treatments  Previous treatment: injection treatment  Current treatment: physical therapy      Objective     Observations   Left Knee   Positive for effusion and incision.     Additional Observation Details  Standard L midline TKA incision. No warmth. No redness. No drainage. Moderate edema.    Palpation   Left   Hypertonic in the distal biceps femoris, distal  semimembranosus, distal semitendinosus, lateral gastrocnemius and rectus femoris.     Tenderness   Left Knee   Tenderness in the ITB, medial joint line and quadriceps tendon. No tenderness in the lateral joint line.     Neurological Testing     Sensation     Knee   Left Knee   Intact: Light touch    Right Knee   Intact: light touch     Active Range of Motion   Left Hip   Normal active range of motion    Right Hip   Normal active range of motion  Left Knee   Flexion: 122 degrees with pain  Extension: -7 degrees with pain    Right Knee   Normal active range of motion  Left Ankle/Foot   Normal active range of motion    Right Ankle/Foot   Normal active range of motion    Passive Range of Motion   Left Knee   Flexion: 125 degrees with pain  Extension: -6 degrees with pain    Patellar Static Positioning   Left Knee: WFL    Strength/Myotome Testing     Left Hip   Planes of Motion   Flexion: 3  Extension: 3  Abduction: 3  Adduction: 3    Right Hip   Planes of Motion   Flexion: 4-  Extension: 4-  Abduction: 4-  Adduction: 4    Left Knee   Flexion: 3+  Extension: 3+  Quadriceps contraction: fair    Right Knee   Flexion: 4  Extension: 4  Quadriceps contraction: fair    Left Ankle/Foot   Normal strength    Right Ankle/Foot   Normal strength    Additional Strength Details  Dynamometer:  R Hip Strength   Flx 16 lbs  ABD 15 lbs  ADD 10 lbs  Ext 14 lbs    L Hip Strength   Flx 8.5 lbs   ABD 10 lbs  ADD 7.5  lbs  Ext 9.5 lbs  Mild L Knee Extensor Lag  L active SLR : 30 deg  Dynamometer:   L Knee Strength     Ext 23.5 lbs     Flx 15 lbs   R Knee Strength     Ext 29 lbs     Flx 17.5 lbs    Tests     Additional Tests Details  No special tests were performed.    Ambulation     Comments   Patient ambulates with a SPC. She demonstrates decreased step length, decreased stride length, decreased step width, and decreased renuka with gait mechanics. She ascends/descends stairs non-reciprocally with utilization of UE support.      Flowsheet  "Rows      Flowsheet Row Most Recent Value   PT/OT G-Codes    Current Score 36   Projected Score 54                 Precautions: L TKA (6/25) , CPS     7/15 7/16 7/1 7/3 7/8 7/10 7/12   Manuals          L Knee PROM BM BM BM JV BM BM  BM    L Knee Patellar Mobs BM           L Knee STM  BM BM BM    BM BM  BM    L Stretching: Hams, Gastroc  BM  BM Gastroc  BM Hams/Gastroc JV BM BM  BM    L Knee JM          Neuro Re-Ed          SLS          Tandem Stance                                                             Ther Ex          Gastroc Stretch  20\" Hold x3  20\" Hold x3 10\" Hold x5  10\" x5 NV 20\" Hold x3 w/ 1/2 foam  20\" Hold x3 w/ 1/2 foam    Hamstring Stretch  20\" Hold x3  20\" Hold x3  10\" Hold x5 10\" x 5 NV 20\" Hold x3 L  20\" Hold x3 L    Seated Assisted Knee Flexion Stretch  10\" Hold x10  10\" Hold x10  10\" hold x10  10\"x10 10\" Hold x10  10\" Hold x10  10\" Hold x10    LAQ 2x10 2x10       2x10  2x10    SLR 2x10 w/ quad set  2x10 w/ quad set      X10 w/ quad set    SAQ 2x10 NV     2x10  2x10    TKE          Prone Hangs L Knee Extension  3'  HEP         Hip Flx/ABD/Ext   X10 ea        NU step or Bike for muscular endurance & ROM Bike Full Rev L2 Bike Full Rev L2  L3 10'  L3 10' L4 10'  Bike Full Rev L1 Bike Full Rev L1   HEP Instruction  BM  BM        Ther Activity          Forward Step Ups 6\" step 2x10  8\" step 2x10    2\" step 2x10  4\" step 2x10  4\" step 2x10    Step Downs  4\" Step 2x10  4\" step 2x10   One flight SPC  2\" step 2x10  4\" step 2x10    Sit to Stands  X10 1AE x5 no AE  X10 1 AE no UE support         Gait Training               10' SPC 10' SPC              Modalities          Ice NT  10'  10' NT  5' 5'                     "

## 2024-07-17 ENCOUNTER — APPOINTMENT (OUTPATIENT)
Dept: PHYSICAL THERAPY | Facility: CLINIC | Age: 74
End: 2024-07-17
Payer: COMMERCIAL

## 2024-07-19 ENCOUNTER — OFFICE VISIT (OUTPATIENT)
Dept: PHYSICAL THERAPY | Facility: CLINIC | Age: 74
End: 2024-07-19
Payer: COMMERCIAL

## 2024-07-19 ENCOUNTER — RA CDI HCC (OUTPATIENT)
Dept: OTHER | Facility: HOSPITAL | Age: 74
End: 2024-07-19

## 2024-07-19 DIAGNOSIS — M17.12 UNILATERAL PRIMARY OSTEOARTHRITIS, LEFT KNEE: Primary | ICD-10-CM

## 2024-07-19 PROCEDURE — 97112 NEUROMUSCULAR REEDUCATION: CPT

## 2024-07-19 PROCEDURE — 97140 MANUAL THERAPY 1/> REGIONS: CPT

## 2024-07-19 PROCEDURE — 97110 THERAPEUTIC EXERCISES: CPT

## 2024-07-19 NOTE — PROGRESS NOTES
"Daily Note     Today's date: 2024  Patient name: Rosita Soriano  : 1950  MRN: [id number]  Referring provider: Anderson Farr MD  Dx:   Encounter Diagnosis     ICD-10-CM    1. Unilateral primary osteoarthritis, left knee  M17.12                      Subjective: Patient reports following up with Dr. Farr yesterday. She notes being able to ascend/descend her stairs with less difficulty. She states walking mechanics are continuing to improve.       Objective: See treatment diary below      Assessment: Tolerated treatment well. Patient is continuing to make steady progressions with Physical Therapy treatment and the current directed therapeutic exercise program. We progressed the current program with the addition of proprioceptive control exercises, lateral step ups, and increased resistance. Patient demonstrated a moderate loss in proprioceptive control with balance exercises requiring UE support for stability. Patient demonstrated fatigue post treatment and would benefit from continued PT at this time.       Plan: Continue per plan of care.      Precautions: L TKA () , CPS     7/15 7/16 7/19 7/3 7/8 7/10 7/12   Manuals          L Knee PROM BM BM BM JV BM BM  BM    L Knee Patellar Mobs BM           L Knee STM  BM BM BM    BM BM  BM    L Stretching: Hams, Gastroc  BM  BM Gastroc  BM Hams/Gastroc JV BM BM  BM    L Knee JM          Neuro Re-Ed          SLS   15\" Hold x3        Tandem Amb    4 laps in //                                                         Ther Ex          Gastroc Stretch  20\" Hold x3  20\" Hold x3 20\" Hold x3  10\" x5 NV 20\" Hold x3 w/ 1/2 foam  20\" Hold x3 w/ 1/2 foam    Hamstring Stretch  20\" Hold x3  20\" Hold x3  20\" Hold x3 10\" x 5 NV 20\" Hold x3 L  20\" Hold x3 L    Seated Assisted Knee Flexion Stretch  10\" Hold x10  10\" Hold x10  10\" hold x10  10\"x10 10\" Hold x10  10\" Hold x10  10\" Hold x10    LAQ 2x10 2x10  1.5# 2x10    2x10  2x10    SLR 2x10 w/ quad set  2x10 w/ quad set  1.5# " "2x10     X10 w/ quad set    SAQ 2x10 NV  HEP    2x10  2x10    TKE   L5 2x10 3\" Hold        Hip Flx/ABD/Ext   X10 ea X10 ea       NU step or Bike for muscular endurance & ROM Bike Full Rev L2 Bike Full Rev L2  Bike Full Rev L4 L3 10' L4 10'  Bike Full Rev L1 Bike Full Rev L1   HEP Instruction  BM  BM        Ther Activity          Forward Step Ups 6\" step 2x10  8\" step 2x10  8\" step 2x10   2\" step 2x10  4\" step 2x10  4\" step 2x10    Step Downs  4\" Step 2x10  4\" step 2x10  4\" step 2x10  One flight SPC  2\" step 2x10  4\" step 2x10    Lateral Step Ups    8\" step x10        Sit to Stands  X10 1AE x5 no AE  X10 1 AE no UE support  X10 no UE support        Gait Training               10' SPC 10' SPC              Modalities          Ice NT  5'  10' NT  5' 5'                     "

## 2024-07-22 ENCOUNTER — OFFICE VISIT (OUTPATIENT)
Dept: PHYSICAL THERAPY | Facility: CLINIC | Age: 74
End: 2024-07-22
Payer: COMMERCIAL

## 2024-07-22 DIAGNOSIS — M17.12 UNILATERAL PRIMARY OSTEOARTHRITIS, LEFT KNEE: Primary | ICD-10-CM

## 2024-07-22 PROCEDURE — 97140 MANUAL THERAPY 1/> REGIONS: CPT

## 2024-07-22 PROCEDURE — 97530 THERAPEUTIC ACTIVITIES: CPT

## 2024-07-22 PROCEDURE — 97112 NEUROMUSCULAR REEDUCATION: CPT

## 2024-07-22 PROCEDURE — 97110 THERAPEUTIC EXERCISES: CPT

## 2024-07-22 NOTE — PROGRESS NOTES
"Daily Note     Today's date: 2024  Patient name: Rosita Soriano  : 1950  MRN: [id number]  Referring provider: Anderson Farr MD  Dx:   Encounter Diagnosis     ICD-10-CM    1. Unilateral primary osteoarthritis, left knee  M17.12           Start Time: 1515  Stop Time: 1615  Total time in clinic (min): 60 minutes    Subjective: Patient reports experiencing stiffness in her L knee over the weekend.       Objective: See treatment diary below      Assessment: Tolerated treatment well. We progressed the current program with the addition of balance exercise and mini squats. Patient demonstrated good tolerance to progressions with a minimal increase in symptomatology. We will continue to progress patient within tolerance to address functional limitations. Patient would benefit from continued PT      Plan: Continue per plan of care.      Precautions: L TKA () , CPS     7/15 7/16 7/19 7/22 7/8 7/10 7/12   Manuals          L Knee PROM BM BM BM BM  BM BM  BM    L Knee Patellar Mobs BM         L Knee STM  BM BM BM BM  BM BM  BM    L Stretching: Hams, Gastroc  BM  BM Gastroc  BM Hams/Gastroc BM Hams/Gastroc  BM BM  BM    L Knee JM          Neuro Re-Ed          SLS   15\" Hold x3  15\" Hold x3       Tandem Amb    4 laps in // 4 laps in //      Tandem Stance     15\" Hold x3                                               Ther Ex          Gastroc Stretch  20\" Hold x3  20\" Hold x3 20\" Hold x3  HEP  NV 20\" Hold x3 w/ 1/2 foam  20\" Hold x3 w/ 1/2 foam    Hamstring Stretch  20\" Hold x3  20\" Hold x3  20\" Hold x3 3x20\" Hold  NV 20\" Hold x3 L  20\" Hold x3 L    Seated Assisted Knee Flexion Stretch  10\" Hold x10  10\" Hold x10  10\" hold x10  HEP  10\" Hold x10  10\" Hold x10  10\" Hold x10    LAQ 2x10 2x10  1.5# 2x10  2# 2x10   2x10  2x10    SLR 2x10 w/ quad set  2x10 w/ quad set  1.5# 2x10  2# 2x10    X10 w/ quad set    TKE   L5 2x10 3\" Hold  CC P1 3\" Hold 2x10       Hip Flx/ABD/Ext   X10 ea X10 ea 2x10 ea      TRX Squats    Mini " "Squats 2x10       NU step or Bike for muscular endurance & ROM Bike Full Rev L2 Bike Full Rev L2  Bike Full Rev L4 Bike Full Rev L4  L4 10'  Bike Full Rev L1 Bike Full Rev L1   HEP Instruction  BM  BM        Ther Activity          Forward Step Ups 6\" step 2x10  8\" step 2x10  8\" step 2x10  8\" step 2x10  2\" step 2x10  4\" step 2x10  4\" step 2x10    Step Downs  4\" Step 2x10  4\" step 2x10  4\" step 2x10  4\" step 2x10   2\" step 2x10  4\" step 2x10    Lateral Step Ups    8\" step x10  8\" step 2x10       Sit to Stands  X10 1AE x5 no AE  X10 1 AE no UE support  X10 no UE support  NV       Gait Training               10' SPC 10' SPC              Modalities          Ice NT  5'  4' NT  5' 5'                       "

## 2024-07-24 ENCOUNTER — OFFICE VISIT (OUTPATIENT)
Dept: PHYSICAL THERAPY | Facility: CLINIC | Age: 74
End: 2024-07-24
Payer: COMMERCIAL

## 2024-07-24 DIAGNOSIS — M17.12 UNILATERAL PRIMARY OSTEOARTHRITIS, LEFT KNEE: Primary | ICD-10-CM

## 2024-07-24 PROCEDURE — 97112 NEUROMUSCULAR REEDUCATION: CPT

## 2024-07-24 PROCEDURE — 97110 THERAPEUTIC EXERCISES: CPT

## 2024-07-24 PROCEDURE — 97530 THERAPEUTIC ACTIVITIES: CPT

## 2024-07-24 PROCEDURE — 97140 MANUAL THERAPY 1/> REGIONS: CPT

## 2024-07-24 NOTE — PROGRESS NOTES
"Daily Note     Today's date: 2024  Patient name: Rosita Soriano  : 1950  MRN: [id number]  Referring provider: Anderson Farr MD  Dx:   Encounter Diagnosis     ICD-10-CM    1. Unilateral primary osteoarthritis, left knee  M17.12           Start Time: 920  Stop Time: 1020  Total time in clinic (min): 60 minutes    Subjective: Patient reports experiencing L knee stiffness this morning.       Objective: See treatment diary below      Assessment: Tolerated treatment well. We progressed the current program with the addition of forward mini lunges with good tolerance from patient. She demonstrated L knee flexion AROM -5-130 deg. She presents with mild L knee joint effusion. Good quad control. Patient demonstrated fatigue post treatment, exhibited good technique with therapeutic exercises, and would benefit from continued PT at this time.       Plan: Continue per plan of care.      Precautions: L TKA () , CPS     7/15 7/16 7/19 7/22 7/24 7/10 7/12   Manuals          L Knee PROM BM BM BM BM  BM BM  BM    L Knee Patellar Mobs BM         L Knee STM  BM BM BM BM  BM BM  BM    L Stretching: Hams, Gastroc  BM  BM Gastroc  BM Hams/Gastroc BM Hams/Gastroc  BM BM  BM    L Knee JM          Neuro Re-Ed          SLS   15\" Hold x3  15\" Hold x3  On AE 15\" Hold x3      Tandem Amb    4 laps in // 4 laps in // 4 laps in //     Tandem Stance     15\" Hold x3  15\" Hold x3                                              Ther Ex          Hamstring Stretch  20\" Hold x3  20\" Hold x3  20\" Hold x3 3x20\" Hold  3x20\" Hold  20\" Hold x3 L  20\" Hold x3 L    LAQ 2x10 2x10  1.5# 2x10  2# 2x10  3# 2x10  2x10  2x10    SLR 2x10 w/ quad set  2x10 w/ quad set  1.5# 2x10  2# 2x10  3# 2x10   X10 w/ quad set    TKE   L5 2x10 3\" Hold  CC P1 3\" Hold 2x10  CC P3 2x10 3\" Hold      Hip Flx/ABD/Ext   X10 ea X10 ea 2x10 ea 2x10 ea     TRX Squats    Mini Squats 2x10  Mini Squats 2x10      Lunge      Mini Lunge Forward x10      NU step or Bike for muscular " "endurance & ROM Bike Full Rev L2 Bike Full Rev L2  Bike Full Rev L4 Bike Full Rev L4  Bike L4 10'  Bike Full Rev L1 Bike Full Rev L1   HEP Instruction  BM  BM        Ther Activity          Forward Step Ups 6\" step 2x10  8\" step 2x10  8\" step 2x10  8\" step 2x10  8\" step 2x10  4\" step 2x10  4\" step 2x10    Step Downs  4\" Step 2x10  4\" step 2x10  4\" step 2x10  4\" step 2x10  4\" step 2x10  2\" step 2x10  4\" step 2x10    Lateral Step Ups    8\" step x10  8\" step 2x10  8\" step 2x10      Sit to Stands  X10 1AE x5 no AE  X10 1 AE no UE support  X10 no UE support  NV       Gait Training                10' SPC              Modalities          Ice NT  5'  4' 5' 5' 5'                         "

## 2024-07-25 ENCOUNTER — APPOINTMENT (OUTPATIENT)
Dept: PHYSICAL THERAPY | Facility: CLINIC | Age: 74
End: 2024-07-25
Payer: COMMERCIAL

## 2024-07-29 ENCOUNTER — OFFICE VISIT (OUTPATIENT)
Dept: PHYSICAL THERAPY | Facility: CLINIC | Age: 74
End: 2024-07-29
Payer: COMMERCIAL

## 2024-07-29 DIAGNOSIS — M17.12 UNILATERAL PRIMARY OSTEOARTHRITIS, LEFT KNEE: Primary | ICD-10-CM

## 2024-07-29 PROCEDURE — 97110 THERAPEUTIC EXERCISES: CPT

## 2024-07-29 PROCEDURE — 97112 NEUROMUSCULAR REEDUCATION: CPT

## 2024-07-29 PROCEDURE — 97140 MANUAL THERAPY 1/> REGIONS: CPT

## 2024-07-29 PROCEDURE — 97530 THERAPEUTIC ACTIVITIES: CPT

## 2024-07-29 NOTE — PROGRESS NOTES
"Daily Note     Today's date: 2024  Patient name: Rosita Soriano  : 1950  MRN: [id number]  Referring provider: Anderson Farr MD  Dx:   Encounter Diagnosis     ICD-10-CM    1. Unilateral primary osteoarthritis, left knee  M17.12           Start Time: 1515  Stop Time: 1620  Total time in clinic (min): 65 minutes    Subjective: Patient reports today with L sided knee stiffness and posterior L knee discomfort.      Objective: See treatment diary below      Assessment: Tolerated treatment well. We progressed the current program with the addition of b/l hamstring strengthening with good tolerance from patient. She demonstrated a moderate loss in proprioceptive control with the performance of balance exercises requiring UE support. She continues to demonstrate with limitations in L knee extension AROM. Patient demonstrated fatigue post treatment, exhibited good technique with therapeutic exercises, and would benefit from continued PT at this time.      Plan: Continue per plan of care.      Precautions: L TKA () , CPS     7/15 7/16 7/19 7/22 7/24 7/29 7/12   Manuals          L Knee PROM BM BM BM BM  BM BM BM    L Knee Patellar Mobs BM         L Knee STM  BM BM BM BM  BM BM  BM    L Stretching: Hams, Gastroc  BM  BM Gastroc  BM Hams/Gastroc BM Hams/Gastroc  BM BM BM    L Knee JM          Neuro Re-Ed          SLS   15\" Hold x3  15\" Hold x3  On AE 15\" Hold x3  On AE 15\" Hold x3     Tandem Amb    4 laps in // 4 laps in // 4 laps in // 4 laps in //    Tandem Stance     15\" Hold x3  15\" Hold x3  15\" Hold x3                                             Ther Ex          Hamstring Stretch  20\" Hold x3  20\" Hold x3  20\" Hold x3 3x20\" Hold  3x20\" Hold  3x20\" Hold  20\" Hold x3 L    LAQ 2x10 2x10  1.5# 2x10  2# 2x10  3# 2x10  4# 2x10  2x10    SLR 2x10 w/ quad set  2x10 w/ quad set  1.5# 2x10  2# 2x10  3# 2x10  3# 2x10 X10 w/ quad set    TKE   L5 2x10 3\" Hold  CC P1 3\" Hold 2x10  CC P3 2x10 3\" Hold  CC P4 2x10 3\" Hold  " "  Hip Flx/ABD/Ext   X10 ea X10 ea 2x10 ea 2x10 ea 2x10 ea    TRX Squats    Mini Squats 2x10  Mini Squats 2x10  Mini Squats 2x10     Lunge      Mini Lunge Forward x10  Mini Lunge Forward 2x10     Hamstring Curl Machine       P6 2x10     NU step or Bike for muscular endurance & ROM Bike Full Rev L2 Bike Full Rev L2  Bike Full Rev L4 Bike Full Rev L4  Bike L4 10'  Bike L4 10'  Bike Full Rev L1   HEP Instruction  BM  BM        Ther Activity          Forward Step Ups 6\" step 2x10  8\" step 2x10  8\" step 2x10  8\" step 2x10  8\" step 2x10  8\" step 2x10  4\" step 2x10    Step Downs  4\" Step 2x10  4\" step 2x10  4\" step 2x10  4\" step 2x10  4\" step 2x10  4\" step 2x10  4\" step 2x10    Lateral Step Ups    8\" step x10  8\" step 2x10  8\" step 2x10  8\" step 2x10     Sit to Stands  X10 1AE x5 no AE  X10 1 AE no UE support  X10 no UE support  NV       Gait Training                              Modalities          Ice NT  5'  4' 5' 5' 5'                           "

## 2024-07-30 ENCOUNTER — OFFICE VISIT (OUTPATIENT)
Dept: FAMILY MEDICINE CLINIC | Facility: CLINIC | Age: 74
End: 2024-07-30
Payer: COMMERCIAL

## 2024-07-30 VITALS
HEART RATE: 74 BPM | TEMPERATURE: 98.1 F | DIASTOLIC BLOOD PRESSURE: 78 MMHG | BODY MASS INDEX: 29.8 KG/M2 | WEIGHT: 185.4 LBS | HEIGHT: 66 IN | OXYGEN SATURATION: 95 % | SYSTOLIC BLOOD PRESSURE: 118 MMHG

## 2024-07-30 DIAGNOSIS — Z00.00 MEDICARE ANNUAL WELLNESS VISIT, SUBSEQUENT: Primary | ICD-10-CM

## 2024-07-30 DIAGNOSIS — K21.9 GASTROESOPHAGEAL REFLUX DISEASE, UNSPECIFIED WHETHER ESOPHAGITIS PRESENT: ICD-10-CM

## 2024-07-30 DIAGNOSIS — R73.09 ELEVATED GLUCOSE: ICD-10-CM

## 2024-07-30 DIAGNOSIS — E55.9 VITAMIN D DEFICIENCY: ICD-10-CM

## 2024-07-30 DIAGNOSIS — E78.5 HYPERLIPIDEMIA, UNSPECIFIED HYPERLIPIDEMIA TYPE: ICD-10-CM

## 2024-07-30 DIAGNOSIS — M17.0 PRIMARY OSTEOARTHRITIS OF BOTH KNEES: ICD-10-CM

## 2024-07-30 PROCEDURE — 3288F FALL RISK ASSESSMENT DOCD: CPT | Performed by: PHYSICIAN ASSISTANT

## 2024-07-30 PROCEDURE — 1160F RVW MEDS BY RX/DR IN RCRD: CPT | Performed by: PHYSICIAN ASSISTANT

## 2024-07-30 PROCEDURE — 1090F PRES/ABSN URINE INCON ASSESS: CPT | Performed by: PHYSICIAN ASSISTANT

## 2024-07-30 PROCEDURE — 99214 OFFICE O/P EST MOD 30 MIN: CPT | Performed by: PHYSICIAN ASSISTANT

## 2024-07-30 PROCEDURE — 1125F AMNT PAIN NOTED PAIN PRSNT: CPT | Performed by: PHYSICIAN ASSISTANT

## 2024-07-30 PROCEDURE — 1003F LEVEL OF ACTIVITY ASSESS: CPT | Performed by: PHYSICIAN ASSISTANT

## 2024-07-30 PROCEDURE — G0439 PPPS, SUBSEQ VISIT: HCPCS | Performed by: PHYSICIAN ASSISTANT

## 2024-07-30 PROCEDURE — 1170F FXNL STATUS ASSESSED: CPT | Performed by: PHYSICIAN ASSISTANT

## 2024-07-30 PROCEDURE — 1159F MED LIST DOCD IN RCRD: CPT | Performed by: PHYSICIAN ASSISTANT

## 2024-07-30 RX ORDER — ASPIRIN 81 MG/1
81 TABLET ORAL 2 TIMES DAILY
COMMUNITY
Start: 2024-06-25 | End: 2024-08-06

## 2024-07-30 RX ORDER — DOXYCYCLINE 50 MG/1
CAPSULE ORAL
COMMUNITY
Start: 2024-05-11

## 2024-07-30 RX ORDER — ALCAFTADINE 2.5 MG/ML
SOLUTION/ DROPS OPHTHALMIC
COMMUNITY

## 2024-07-30 NOTE — PROGRESS NOTES
Ambulatory Visit  Name: Rosita Soriano      : 1950      MRN: [id number]  Encounter Provider: Brittany Webb PA-C  Encounter Date: 2024   Encounter department: [facility]    Assessment & Plan   1. Medicare annual wellness visit, subsequent  Assessment & Plan:  Routine labs ordered  Up-to-date with eye exams  Up-to-date with mammograms  Up-to-date with colon cancer screenings  Up-to-date with DEXA scans  Orders:  -     Comprehensive metabolic panel; Future  -     CBC and differential; Future  -     Lipid panel; Future  -     Hemoglobin A1C; Future  -     Vitamin D 25 hydroxy; Future  2. Gastroesophageal reflux disease, unspecified whether esophagitis present  Assessment & Plan:  Stable.  Continue Protonix 40 mg daily  3. Hyperlipidemia, unspecified hyperlipidemia type  Assessment & Plan:  Labs ordered to evaluate.  Recommended low-fat diet.  Orders:  -     Comprehensive metabolic panel; Future  -     Lipid panel; Future  4. Vitamin D deficiency  Assessment & Plan:  Labs ordered to evaluate.  Continue vitamin D supplementation  Orders:  -     Vitamin D 25 hydroxy; Future  5. Elevated glucose  -     Comprehensive metabolic panel; Future  -     Hemoglobin A1C; Future  6. Primary osteoarthritis of both knees  Assessment & Plan:  Continue to follow with orthopedics         Preventive health issues were discussed with patient, and age appropriate screening tests were ordered as noted in patient's After Visit Summary. Personalized health advice and appropriate referrals for health education or preventive services given if needed, as noted in patient's After Visit Summary.    History of Present Illness     Rosita is a very pleasant 74-year-old female who is here today for her Medicare annual wellness visit and routine follow-up.  She admits that her acid reflux is well-controlled with pantoprazole.  She recently had her left knee replaced, and has been recovering well.  She is currently going to  physical therapy.  She will return to work in a few weeks.  She admits that her other chronic problems are stable.  She is up-to-date with eye exams.  She is due for routine labs.  She is up-to-date with mammograms and DEXA scans.  She denies any other acute problems at this time.       Patient Care Team:  Brittany Webb PA-C as PCP - General (Family Medicine)  Soumya Prasad MD (Pain Medicine)    Review of Systems   Constitutional:  Negative for chills, diaphoresis, fatigue and fever.   HENT:  Negative for congestion, ear pain, postnasal drip, rhinorrhea, sneezing, sore throat and trouble swallowing.    Eyes:  Negative for pain and visual disturbance.   Respiratory:  Negative for apnea, cough, shortness of breath and wheezing.    Cardiovascular:  Negative for chest pain and palpitations.   Gastrointestinal:  Negative for abdominal pain, constipation, diarrhea, nausea and vomiting.   Genitourinary:  Negative for dysuria and hematuria.   Musculoskeletal:  Positive for arthralgias. Negative for gait problem and myalgias.   Neurological:  Negative for dizziness, syncope, weakness, light-headedness, numbness and headaches.   Psychiatric/Behavioral:  Negative for suicidal ideas. The patient is not nervous/anxious.      Medical History Reviewed by provider this encounter:  Tobacco  Allergies  Meds  Problems  Med Hx  Surg Hx  Fam Hx       Annual Wellness Visit Questionnaire   Rosita is here for her Subsequent Wellness visit.     Health Risk Assessment:   Patient rates overall health as good. Patient feels that their physical health rating is same. Patient is satisfied with their life. Eyesight was rated as same. Hearing was rated as same. Patient feels that their emotional and mental health rating is same. Patients states they are never, rarely angry. Patient states they are sometimes unusually tired/fatigued. Pain experienced in the last 7 days has been some. Patient's pain rating has been 2/10. Patient states  that she has experienced no weight loss or gain in last 6 months. She was watching    Fall Risk Screening:   In the past year, patient has experienced: history of falling in past year    Number of falls: 1  Injured during fall?: No    Feels unsteady when standing or walking?: No    Worried about falling?: Yes      Urinary Incontinence Screening:   Patient has not leaked urine accidently in the last six months.     Home Safety:  Patient has trouble with stairs inside or outside of their home. Patient has working smoke alarms and has working carbon monoxide detector. Home safety hazards include: none.     Nutrition:   Current diet is Regular.     Medications:   Patient is not currently taking any over-the-counter supplements. Patient is able to manage medications.     Activities of Daily Living (ADLs)/Instrumental Activities of Daily Living (IADLs):   Walk and transfer into and out of bed and chair?: Yes  Dress and groom yourself?: Yes    Bathe or shower yourself?: Yes    Feed yourself? Yes  Do your laundry/housekeeping?: Yes  Manage your money, pay your bills and track your expenses?: Yes  Make your own meals?: Yes    Do your own shopping?: Yes    Previous Hospitalizations:   Any hospitalizations or ED visits within the last 12 months?: Yes    How many hospitalizations have you had in the last year?: 1-2    Advance Care Planning:   Living will: No      Cognitive Screening:   Provider or family/friend/caregiver concerned regarding cognition?: No    PREVENTIVE SCREENINGS      Cardiovascular Screening:    General: Screening Not Indicated, History Lipid Disorder and Risks and Benefits Discussed    Due for: Lipid Panel      Diabetes Screening:     General: Screening Current and Risks and Benefits Discussed    Due for: Blood Glucose      Colorectal Cancer Screening:     General: Risks and Benefits Discussed      Breast Cancer Screening:     General: Screening Current and Risks and Benefits Discussed      Cervical Cancer  Screening:    General: Screening Not Indicated and Risks and Benefits Discussed      Osteoporosis Screening:    General: Risks and Benefits Discussed      Abdominal Aortic Aneurysm (AAA) Screening:        General: Screening Not Indicated      Lung Cancer Screening:     General: Screening Not Indicated      Hepatitis C Screening:    General: Screening Current    Screening, Brief Intervention, and Referral to Treatment (SBIRT)    Screening  Typical number of drinks in a day: 0  Typical number of drinks in a week: 0  Interpretation: Low risk drinking behavior.    Single Item Drug Screening:  How often have you used an illegal drug (including marijuana) or a prescription medication for non-medical reasons in the past year? never    Single Item Drug Screen Score: 0  Interpretation: Negative screen for possible drug use disorder    Brief Intervention  Alcohol & drug use screenings were reviewed. No concerns regarding substance use disorder identified.     Other Counseling Topics:   Car/seat belt/driving safety, skin self-exam, sunscreen and regular weightbearing exercise and calcium and vitamin D intake.     Social Determinants of Health     Financial Resource Strain: Low Risk  (7/19/2023)    Overall Financial Resource Strain (CARDIA)    • Difficulty of Paying Living Expenses: Not hard at all   Food Insecurity: No Food Insecurity (7/30/2024)    Hunger Vital Sign    • Worried About Running Out of Food in the Last Year: Never true    • Ran Out of Food in the Last Year: Never true   Transportation Needs: No Transportation Needs (7/30/2024)    PRAPARE - Transportation    • Lack of Transportation (Medical): No    • Lack of Transportation (Non-Medical): No   Housing Stability: Low Risk  (7/30/2024)    Housing Stability Vital Sign    • Unable to Pay for Housing in the Last Year: No    • Number of Times Moved in the Last Year: 0    • Homeless in the Last Year: No   Utilities: Not At Risk (7/30/2024)    [facility] Utilities    •  "Threatened with loss of utilities: No     No results found.    Objective     /78   Pulse 74   Temp 98.1 °F (36.7 °C)   Ht 5' 6\" (1.676 m)   Wt 84.1 kg (185 lb 6.4 oz)   SpO2 95%   BMI 29.92 kg/m²     Physical Exam  Vitals and nursing note reviewed.   Constitutional:       General: She is not in acute distress.     Appearance: She is well-developed. She is not diaphoretic.   HENT:      Head: Normocephalic and atraumatic.      Right Ear: Hearing, tympanic membrane, ear canal and external ear normal.      Left Ear: Hearing, tympanic membrane, ear canal and external ear normal.      Nose: Nose normal. No mucosal edema or rhinorrhea.      Mouth/Throat:      Pharynx: No oropharyngeal exudate or posterior oropharyngeal erythema.   Eyes:      Extraocular Movements: Extraocular movements intact.   Cardiovascular:      Rate and Rhythm: Normal rate and regular rhythm.      Heart sounds: Normal heart sounds. No murmur heard.     No friction rub. No gallop.   Pulmonary:      Effort: Pulmonary effort is normal. No respiratory distress.      Breath sounds: Normal breath sounds. No wheezing or rales.   Abdominal:      Palpations: Abdomen is soft.      Tenderness: There is no abdominal tenderness.   Musculoskeletal:         General: Normal range of motion.      Cervical back: Normal range of motion and neck supple.   Lymphadenopathy:      Cervical: No cervical adenopathy.   Skin:     General: Skin is warm and dry.      Findings: No rash.   Neurological:      Mental Status: She is alert and oriented to person, place, and time.   Psychiatric:         Behavior: Behavior normal.         Thought Content: Thought content normal.         Judgment: Judgment normal.           "

## 2024-07-30 NOTE — PATIENT INSTRUCTIONS
Medicare Preventive Visit Patient Instructions  Thank you for completing your Welcome to Medicare Visit or Medicare Annual Wellness Visit today. Your next wellness visit will be due in one year (7/31/2025).  The screening/preventive services that you may require over the next 5-10 years are detailed below. Some tests may not apply to you based off risk factors and/or age. Screening tests ordered at today's visit but not completed yet may show as past due. Also, please note that scanned in results may not display below.  Preventive Screenings:  Service Recommendations Previous Testing/Comments   Colorectal Cancer Screening  * Colonoscopy    * Fecal Occult Blood Test (FOBT)/Fecal Immunochemical Test (FIT)  * Fecal DNA/Cologuard Test  * Flexible Sigmoidoscopy Age: 45-75 years old   Colonoscopy: every 10 years (may be performed more frequently if at higher risk)  OR  FOBT/FIT: every 1 year  OR  Cologuard: every 3 years  OR  Sigmoidoscopy: every 5 years  Screening may be recommended earlier than age 45 if at higher risk for colorectal cancer. Also, an individualized decision between you and your healthcare provider will decide whether screening between the ages of 76-85 would be appropriate. Colonoscopy: Not on file  FOBT/FIT: Not on file  Cologuard: Not on file  Sigmoidoscopy: Not on file          Breast Cancer Screening Age: 40+ years old  Frequency: every 1-2 years  Not required if history of left and right mastectomy Mammogram: 08/25/2023    Screening Current   Cervical Cancer Screening Between the ages of 21-29, pap smear recommended once every 3 years.   Between the ages of 30-65, can perform pap smear with HPV co-testing every 5 years.   Recommendations may differ for women with a history of total hysterectomy, cervical cancer, or abnormal pap smears in past. Pap Smear: Not on file    Screening Not Indicated   Hepatitis C Screening Once for adults born between 1945 and 1965  More frequently in patients at high  risk for Hepatitis C Hep C Antibody: 03/02/2023    Screening Current   Diabetes Screening 1-2 times per year if you're at risk for diabetes or have pre-diabetes Fasting glucose: 108 mg/dL (6/3/2024)  A1C: No results in last 5 years (No results in last 5 years)  Screening Current   Cholesterol Screening Once every 5 years if you don't have a lipid disorder. May order more often based on risk factors. Lipid panel: 03/02/2023    Screening Not Indicated  History Lipid Disorder     Other Preventive Screenings Covered by Medicare:  Abdominal Aortic Aneurysm (AAA) Screening: covered once if your at risk. You're considered to be at risk if you have a family history of AAA.  Lung Cancer Screening: covers low dose CT scan once per year if you meet all of the following conditions: (1) Age 55-77; (2) No signs or symptoms of lung cancer; (3) Current smoker or have quit smoking within the last 15 years; (4) You have a tobacco smoking history of at least 20 pack years (packs per day multiplied by number of years you smoked); (5) You get a written order from a healthcare provider.  Glaucoma Screening: covered annually if you're considered high risk: (1) You have diabetes OR (2) Family history of glaucoma OR (3)  aged 50 and older OR (4)  American aged 65 and older  Osteoporosis Screening: covered every 2 years if you meet one of the following conditions: (1) You're estrogen deficient and at risk for osteoporosis based off medical history and other findings; (2) Have a vertebral abnormality; (3) On glucocorticoid therapy for more than 3 months; (4) Have primary hyperparathyroidism; (5) On osteoporosis medications and need to assess response to drug therapy.   Last bone density test (DXA Scan): 02/08/2023.  HIV Screening: covered annually if you're between the age of 15-65. Also covered annually if you are younger than 15 and older than 65 with risk factors for HIV infection. For pregnant patients, it is  covered up to 3 times per pregnancy.    Immunizations:  Immunization Recommendations   Influenza Vaccine Annual influenza vaccination during flu season is recommended for all persons aged >= 6 months who do not have contraindications   Pneumococcal Vaccine   * Pneumococcal conjugate vaccine = PCV13 (Prevnar 13), PCV15 (Vaxneuvance), PCV20 (Prevnar 20)  * Pneumococcal polysaccharide vaccine = PPSV23 (Pneumovax) Adults 19-63 yo with certain risk factors or if 65+ yo  If never received any pneumonia vaccine: recommend Prevnar 20 (PCV20)  Give PCV20 if previously received 1 dose of PCV13 or PPSV23   Hepatitis B Vaccine 3 dose series if at intermediate or high risk (ex: diabetes, end stage renal disease, liver disease)   Respiratory syncytial virus (RSV) Vaccine - COVERED BY MEDICARE PART D  * RSVPreF3 (Arexvy) CDC recommends that adults 60 years of age and older may receive a single dose of RSV vaccine using shared clinical decision-making (SCDM)   Tetanus (Td) Vaccine - COST NOT COVERED BY MEDICARE PART B Following completion of primary series, a booster dose should be given every 10 years to maintain immunity against tetanus. Td may also be given as tetanus wound prophylaxis.   Tdap Vaccine - COST NOT COVERED BY MEDICARE PART B Recommended at least once for all adults. For pregnant patients, recommended with each pregnancy.   Shingles Vaccine (Shingrix) - COST NOT COVERED BY MEDICARE PART B  2 shot series recommended in those 19 years and older who have or will have weakened immune systems or those 50 years and older     Health Maintenance Due:      Topic Date Due   • Breast Cancer Screening: Mammogram  07/30/2025 (Originally 8/25/2024)   • Hepatitis C Screening  Completed     Immunizations Due:      Topic Date Due   • COVID-19 Vaccine (1 - 2023-24 season) Never done   • Influenza Vaccine (1) 09/01/2024     Advance Directives   What are advance directives?  Advance directives are legal documents that state your wishes  and plans for medical care. These plans are made ahead of time in case you lose your ability to make decisions for yourself. Advance directives can apply to any medical decision, such as the treatments you want, and if you want to donate organs.   What are the types of advance directives?  There are many types of advance directives, and each state has rules about how to use them. You may choose a combination of any of the following:  Living will:  This is a written record of the treatment you want. You can also choose which treatments you do not want, which to limit, and which to stop at a certain time. This includes surgery, medicine, IV fluid, and tube feedings.   Durable power of  for healthcare (DPAHC):  This is a written record that states who you want to make healthcare choices for you when you are unable to make them for yourself. This person, called a proxy, is usually a family member or a friend. You may choose more than 1 proxy.  Do not resuscitate (DNR) order:  A DNR order is used in case your heart stops beating or you stop breathing. It is a request not to have certain forms of treatment, such as CPR. A DNR order may be included in other types of advance directives.  Medical directive:  This covers the care that you want if you are in a coma, near death, or unable to make decisions for yourself. You can list the treatments you want for each condition. Treatment may include pain medicine, surgery, blood transfusions, dialysis, IV or tube feedings, and a ventilator (breathing machine).  Values history:  This document has questions about your views, beliefs, and how you feel and think about life. This information can help others choose the care that you would choose.  Why are advance directives important?  An advance directive helps you control your care. Although spoken wishes may be used, it is better to have your wishes written down. Spoken wishes can be misunderstood, or not followed.  Treatments may be given even if you do not want them. An advance directive may make it easier for your family to make difficult choices about your care.   Fall Prevention    Fall prevention  includes ways to make your home and other areas safer. It also includes ways you can move more carefully to prevent a fall. Health conditions that cause changes in your blood pressure, vision, or muscle strength and coordination may increase your risk for falls. Medicines may also increase your risk for falls if they make you dizzy, weak, or sleepy.   Fall prevention tips:   Stand or sit up slowly.    Use assistive devices as directed.    Wear shoes that fit well and have soles that .    Wear a personal alarm.    Stay active.    Manage your medical conditions.    Home Safety Tips:  Add items to prevent falls in the bathroom.    Keep paths clear.    Install bright lights in your home.    Keep items you use often on shelves within reach.    Paint or place reflective tape on the edges of your stairs.    Weight Management   Why it is important to manage your weight:  Being overweight increases your risk of health conditions such as heart disease, high blood pressure, type 2 diabetes, and certain types of cancer. It can also increase your risk for osteoarthritis, sleep apnea, and other respiratory problems. Aim for a slow, steady weight loss. Even a small amount of weight loss can lower your risk of health problems.  How to lose weight safely:  A safe and healthy way to lose weight is to eat fewer calories and get regular exercise. You can lose up about 1 pound a week by decreasing the number of calories you eat by 500 calories each day.   Healthy meal plan for weight management:  A healthy meal plan includes a variety of foods, contains fewer calories, and helps you stay healthy. A healthy meal plan includes the following:  Eat whole-grain foods more often.  A healthy meal plan should contain fiber. Fiber is the part of grains,  fruits, and vegetables that is not broken down by your body. Whole-grain foods are healthy and provide extra fiber in your diet. Some examples of whole-grain foods are whole-wheat breads and pastas, oatmeal, brown rice, and bulgur.  Eat a variety of vegetables every day.  Include dark, leafy greens such as spinach, kale, len greens, and mustard greens. Eat yellow and orange vegetables such as carrots, sweet potatoes, and winter squash.   Eat a variety of fruits every day.  Choose fresh or canned fruit (canned in its own juice or light syrup) instead of juice. Fruit juice has very little or no fiber.  Eat low-fat dairy foods.  Drink fat-free (skim) milk or 1% milk. Eat fat-free yogurt and low-fat cottage cheese. Try low-fat cheeses such as mozzarella and other reduced-fat cheeses.  Choose meat and other protein foods that are low in fat.  Choose beans or other legumes such as split peas or lentils. Choose fish, skinless poultry (chicken or turkey), or lean cuts of red meat (beef or pork). Before you cook meat or poultry, cut off any visible fat.   Use less fat and oil.  Try baking foods instead of frying them. Add less fat, such as margarine, sour cream, regular salad dressing and mayonnaise to foods. Eat fewer high-fat foods. Some examples of high-fat foods include french fries, doughnuts, ice cream, and cakes.  Eat fewer sweets.  Limit foods and drinks that are high in sugar. This includes candy, cookies, regular soda, and sweetened drinks.  Exercise:  Exercise at least 30 minutes per day on most days of the week. Some examples of exercise include walking, biking, dancing, and swimming. You can also fit in more physical activity by taking the stairs instead of the elevator or parking farther away from stores. Ask your healthcare provider about the best exercise plan for you.      © Copyright [employer] 2018 Information is for End User's use only and may not be sold, redistributed or otherwise used for  commercial purposes. All illustrations and images included in CareNotes® are the copyrighted property of [employer] or [employer]

## 2024-07-30 NOTE — ASSESSMENT & PLAN NOTE
Routine labs ordered  Up-to-date with eye exams  Up-to-date with mammograms  Up-to-date with colon cancer screenings  Up-to-date with DEXA scans

## 2024-08-01 ENCOUNTER — APPOINTMENT (OUTPATIENT)
Dept: PHYSICAL THERAPY | Facility: CLINIC | Age: 74
End: 2024-08-01
Payer: COMMERCIAL

## 2024-08-02 ENCOUNTER — OFFICE VISIT (OUTPATIENT)
Dept: PHYSICAL THERAPY | Facility: CLINIC | Age: 74
End: 2024-08-02
Payer: COMMERCIAL

## 2024-08-02 DIAGNOSIS — M17.12 UNILATERAL PRIMARY OSTEOARTHRITIS, LEFT KNEE: Primary | ICD-10-CM

## 2024-08-02 PROCEDURE — 97112 NEUROMUSCULAR REEDUCATION: CPT

## 2024-08-02 PROCEDURE — 97110 THERAPEUTIC EXERCISES: CPT

## 2024-08-02 PROCEDURE — 97530 THERAPEUTIC ACTIVITIES: CPT

## 2024-08-02 PROCEDURE — 97140 MANUAL THERAPY 1/> REGIONS: CPT

## 2024-08-02 NOTE — PROGRESS NOTES
"Daily Note     Today's date: 2024  Patient name: Rosita Soriano  : 1950  MRN: [id number]  Referring provider: Anderson Farr MD  Dx:   Encounter Diagnosis     ICD-10-CM    1. Unilateral primary osteoarthritis, left knee  M17.12           Start Time: 0800  Stop Time: 0858  Total time in clinic (min): 58 minutes    Subjective: Patient reports doing a lot of walking yesterday. She notes the L knee is continuing to improve.      Objective: See treatment diary below      Assessment: Tolerated treatment well. We progressed the current program with the addition of b/l quad strengthening this morning. Patient demonstrated good tolerance to progressions with a minimal increase in symptomatology in the L knee. Patient demonstrated fatigue post treatment, exhibited good technique with therapeutic exercises, and would benefit from continued PT.      Plan: Continue per plan of care.      Precautions: L TKA () , CPS     7/15 7/16 7/19 7/22 7/24 7/29 8/2   Manuals          L Knee PROM BM BM BM BM  BM BM BM    L Knee Patellar Mobs BM         L Knee STM  BM BM BM BM  BM BM  BM    L Stretching: Hams, Gastroc  BM  BM Gastroc  BM Hams/Gastroc BM Hams/Gastroc  BM BM BM    L Knee JM          Neuro Re-Ed          SLS   15\" Hold x3  15\" Hold x3  On AE 15\" Hold x3  On AE 15\" Hold x3  On AE 15\" Hold x3    Tandem Amb    4 laps in // 4 laps in // 4 laps in // 4 laps in // 4 laps in //   Tandem Stance     15\" Hold x3  15\" Hold x3  15\" Hold x3  15\" Hold x3    SLS w/ hip drills                                         Ther Ex          Hamstring Stretch  20\" Hold x3  20\" Hold x3  20\" Hold x3 3x20\" Hold  3x20\" Hold  3x20\" Hold  20\" Hold x3 L    LAQ 2x10 2x10  1.5# 2x10  2# 2x10  3# 2x10  4# 2x10  HEP    SLR 2x10 w/ quad set  2x10 w/ quad set  1.5# 2x10  2# 2x10  3# 2x10  3# 2x10 X10 w/ quad set    TKE   L5 2x10 3\" Hold  CC P1 3\" Hold 2x10  CC P3 2x10 3\" Hold  CC P4 2x10 3\" Hold CC P4 2x10 3\" Hold    Hip Flx/ABD/Ext   X10 ea X10 ea " "2x10 ea 2x10 ea 2x10 ea 2x10 ea   TRX Squats    Mini Squats 2x10  Mini Squats 2x10  Mini Squats 2x10  Mini Squats 2x10    Lunge      Mini Lunge Forward x10  Mini Lunge Forward 2x10  Mini Lunge Forward 2x10    Hamstring Curl Machine       P6 2x10  P7 2x10   Knee Extension Machine        P1 2x10    NU step or Bike for muscular endurance & ROM Bike Full Rev L2 Bike Full Rev L2  Bike Full Rev L4 Bike Full Rev L4  Bike L4 10'  Bike L4 10'  Bike Full Rev L4   HEP Instruction  BM  BM        Ther Activity          Forward Step Ups 6\" step 2x10  8\" step 2x10  8\" step 2x10  8\" step 2x10  8\" step 2x10  8\" step 2x10  8\" step 2x10    Step Downs  4\" Step 2x10  4\" step 2x10  4\" step 2x10  4\" step 2x10  4\" step 2x10  4\" step 2x10  4\" step 2x10    Lateral Step Ups    8\" step x10  8\" step 2x10  8\" step 2x10  8\" step 2x10  8\" step 2x10    Sit to Stands  X10 1AE x5 no AE  X10 1 AE no UE support  X10 no UE support  NV       Gait Training                              Modalities          Ice NT  5'  4' 5' 5' 5'                             "

## 2024-08-06 DIAGNOSIS — E55.9 VITAMIN D DEFICIENCY: ICD-10-CM

## 2024-08-07 ENCOUNTER — OFFICE VISIT (OUTPATIENT)
Dept: PHYSICAL THERAPY | Facility: CLINIC | Age: 74
End: 2024-08-07
Payer: COMMERCIAL

## 2024-08-07 DIAGNOSIS — M17.12 UNILATERAL PRIMARY OSTEOARTHRITIS, LEFT KNEE: Primary | ICD-10-CM

## 2024-08-07 PROCEDURE — 97110 THERAPEUTIC EXERCISES: CPT

## 2024-08-07 PROCEDURE — 97530 THERAPEUTIC ACTIVITIES: CPT

## 2024-08-07 PROCEDURE — 97112 NEUROMUSCULAR REEDUCATION: CPT

## 2024-08-07 PROCEDURE — 97140 MANUAL THERAPY 1/> REGIONS: CPT

## 2024-08-07 RX ORDER — ERGOCALCIFEROL 1.25 MG/1
50000 CAPSULE ORAL WEEKLY
Qty: 12 CAPSULE | Refills: 0 | Status: SHIPPED | OUTPATIENT
Start: 2024-08-07

## 2024-08-07 NOTE — PROGRESS NOTES
"Daily Note     Today's date: 2024  Patient name: Rosita Soriano  : 1950  MRN: [id number]  Referring provider: Anderson Farr MD  Dx:   Encounter Diagnosis     ICD-10-CM    1. Unilateral primary osteoarthritis, left knee  M17.12           Start Time: 0758  Stop Time: 0900  Total time in clinic (min): 62 minutes    Subjective: Patient reports she is doing well overall but feels that she can continue to progress in LLE strength.       Objective: See treatment diary below      Assessment: Tolerated treatment well. We will look to transition patient to an independent home exercise program next visit to continue managing her condition. She is continuing to respond well to Physical Therapy treatment and is making appropriate progressions in L knee function. Patient exhibited good technique with therapeutic exercises and would benefit from continued PT.      Plan: Continue per plan of care.      Precautions: L TKA () , CPS        Manuals          L Knee PROM BM BM BM BM  BM BM BM    L Knee Patellar Mobs          L Knee STM  BM BM BM BM  BM BM  BM    L Stretching: Hams, Gastroc  BM  BM Gastroc  BM Hams/Gastroc BM Hams/Gastroc  BM BM BM    L Knee JM          Neuro Re-Ed          SLS On AE 15\" Hold x3   15\" Hold x3  15\" Hold x3  On AE 15\" Hold x3  On AE 15\" Hold x3  On AE 15\" Hold x3    Tandem Amb  4 laps in //  4 laps in // 4 laps in // 4 laps in // 4 laps in // 4 laps in //   Tandem Stance  15\" Hold x3 on AE    15\" Hold x3  15\" Hold x3  15\" Hold x3  15\" Hold x3    SLS w/ hip drills                                         Ther Ex          Hamstring Stretch  20\" Hold x3 L  20\" Hold x3  20\" Hold x3 3x20\" Hold  3x20\" Hold  3x20\" Hold  20\" Hold x3 L    SLR 2x10 w/ quad set  2x10 w/ quad set  1.5# 2x10  2# 2x10  3# 2x10  3# 2x10 X10 w/ quad set    TKE CC P4 2x10 3\" Hold   L5 2x10 3\" Hold  CC P1 3\" Hold 2x10  CC P3 2x10 3\" Hold  CC P4 2x10 3\" Hold CC P4 2x10 3\" Hold    Hip " "Flx/ABD/Ext  2x10 ea X10 ea X10 ea 2x10 ea 2x10 ea 2x10 ea 2x10 ea   TRX Squats Mini Squats 2x10    Mini Squats 2x10  Mini Squats 2x10  Mini Squats 2x10  Mini Squats 2x10    Lunge  Mini Forward Lunge 2x10     Mini Lunge Forward x10  Mini Lunge Forward 2x10  Mini Lunge Forward 2x10    Hamstring Curl Machine  P7 2x10      P6 2x10  P7 2x10   Knee Extension Machine  P1 2x10       P1 2x10    NU step or Bike for muscular endurance & ROM Bike Full Rev L4 Bike Full Rev L2  Bike Full Rev L4 Bike Full Rev L4  Bike L4 10'  Bike L4 10'  Bike Full Rev L4   HEP Instruction  BM  BM        Ther Activity          Forward Step Ups 8\" step 2x10  8\" step 2x10  8\" step 2x10  8\" step 2x10  8\" step 2x10  8\" step 2x10  8\" step 2x10    Step Downs  8\" Step 2x10  4\" step 2x10  4\" step 2x10  4\" step 2x10  4\" step 2x10  4\" step 2x10  4\" step 2x10    Lateral Step Ups  8\" step 2x10   8\" step x10  8\" step 2x10  8\" step 2x10  8\" step 2x10  8\" step 2x10    Sit to Stands  D/C X10 1 AE no UE support  X10 no UE support  NV       Gait Training                              Modalities          Ice 5'  5'  4' 5' 5' 5'                               "

## 2024-08-09 ENCOUNTER — OFFICE VISIT (OUTPATIENT)
Dept: PHYSICAL THERAPY | Facility: CLINIC | Age: 74
End: 2024-08-09
Payer: COMMERCIAL

## 2024-08-09 DIAGNOSIS — M17.12 UNILATERAL PRIMARY OSTEOARTHRITIS, LEFT KNEE: Primary | ICD-10-CM

## 2024-08-09 PROCEDURE — 97110 THERAPEUTIC EXERCISES: CPT

## 2024-08-09 PROCEDURE — 97140 MANUAL THERAPY 1/> REGIONS: CPT

## 2024-08-09 PROCEDURE — 97112 NEUROMUSCULAR REEDUCATION: CPT

## 2024-08-09 NOTE — PROGRESS NOTES
PT Discharge    Today's date: 2024  Patient name: Rosita Soriano  : 1950  MRN: [id number]  Referring provider: Anderson Farr MD  Dx:   Encounter Diagnosis     ICD-10-CM    1. Unilateral primary osteoarthritis, left knee  M17.12                 Start Time: 0840  Stop Time: 0930  Total time in clinic (min): 50 minutes    Assessment  Symptom irritability: low    Assessment details: The patient presents to Physical Therapy today with a notable improvement in symptomatology and function in the L knee. Patient is happy with her progress to this point and feels that her condition is much more manageable. We will d/c patient today from Physical Therapy services and transition to an independent HEP to continue managing her condition. Patient was encouraged to call the office if she has any questions or concerns.   Understanding of Dx/Px/POC: good     Prognosis: good    Goals  STG: (6 weeks)   1.) Patient will initiate HEP Independently MET   2.) Patient will have a 50% reduction in overall symptoms MET  3.) Patient will demonstrate improved L knee strength evidenced by a 1-2 MMT grade increase MET   4.) Patient will demonstrate L knee flexion AROM 0-90 deg MET  5.) Patient will ambulate 300ft w/ SPC MET  6.) Patient will demonstrate improved standing tolerance >/= 1 hour MET   7.) Patient will demonstrate improved L hip strength evidenced by a 1-2 MMT grade increase MET     LTG: (12 weeks)   1.) Patient will be d/c to an HEP independently MET   2.) Patient will improve functional abilities evidenced by FOTO scores MET  3.) Eliminate pain with functional activity MET   4.) Patient will demonstrate improved ability to lift, push, pull, and carry safely MET   5.) Return to PLOF MET   6.) Patient will demonstrate L knee flexion AROM 0- >/= 110 deg MET  7.) Patient will ambulate 300ft w/o AD MET     Plan    Frequency: 2x week (2-3x/week)  Duration in weeks: 8  Plan of Care beginning date: 2024  Plan of Care  expiration date: 8/12/2024  Treatment plan discussed with: patient  Plan details: Plan of care was reviewed and discussed thoroughly with the patient on their current condition. Patient was instructed on a HEP with written instructions. We will d/c patient today from Physical Therapy services and transition to an independent HEP to continue managing her condition. Patient was encouraged to call the office if she has any questions or concerns    Subjective Evaluation    History of Present Illness  Mechanism of injury: The patient reports today with L knee pain that started approximately 10 years ago. She notes no inciting injury or trauma to the L knee region. Patient reports receiving L knee injections with no relief in symptoms. She states having difficulty with activities such as lifting, carrying, stair navigation, bending, and ambulation due to symptoms. She had X-ray imaging of the L knee that revealed tricompartmental OA. She followed up with Dr. Farr and will be undergoing L TKA surgery on 6/25/24. She is now referred to OPPT for pre and post operative L knee rehabilitation.     Update 6-27-24: The patient reports today s/p L TKA surgery performed on 6/25/24 with Dr. Farr. She notes utilizing a RW since surgery and has been compliant with performance of HEP as well as stationary bike. She reports experiencing pain in the L medial knee with movement. She has been applying ice, utilizing compression socks, and has been elevating LLE for swelling management.     Update 7-16-24: The patient reports today with a 50-60% improvement. Patient notes still having difficulty with activities such as walking, lifting, stair navigation, standing tolerance, and squatting. She is happy with her progress to this point.    Update 8-9-24: The patient reports today with a notable improvement in symptoms and function in the L knee. She is happy with her progress to this point and feels that her condition is much more  manageable. She is ready to transition to an independent home exercise program.           Recurrent probem    Quality of life: good    Patient Goals  Patient goals for therapy: decreased pain, increased strength, independence with ADLs/IADLs, improved balance, increased motion and return to sport/leisure activities  Patient goal: Goals have been met.  Pain  Current pain ratin  At best pain ratin  At worst pain ratin      Diagnostic Tests  X-ray: abnormal  Treatments  Previous treatment: injection treatment  Current treatment: physical therapy      Objective     Observations   Left Knee   Positive for effusion and incision.     Additional Observation Details  Standard L midline TKA incision. No warmth. No redness. No drainage. Minimal effusion.    Tenderness   Left Knee   No tenderness in the ITB, lateral joint line, medial joint line and quadriceps tendon.     Neurological Testing     Sensation     Knee   Left Knee   Intact: Light touch    Right Knee   Intact: light touch     Active Range of Motion   Left Hip   Normal active range of motion    Right Hip   Normal active range of motion  Left Knee   Flexion: 130 degrees   Extension: -3 degrees     Right Knee   Normal active range of motion  Left Ankle/Foot   Normal active range of motion    Right Ankle/Foot   Normal active range of motion    Passive Range of Motion   Left Knee   Flexion: 130 degrees   Extension: 0 degrees     Patellar Static Positioning   Left Knee: WFL    Strength/Myotome Testing     Left Hip   Planes of Motion   Flexion: WFL  Extension: WFL  Abduction: WFL  Adduction: WFL    Right Hip   Planes of Motion   Flexion: 4-  Extension: 4-  Abduction: 4-  Adduction: 4    Left Knee   Flexion: 5  Extension: 5  Quadriceps contraction: good    Right Knee   Flexion: 4  Extension: 4  Quadriceps contraction: fair    Left Ankle/Foot   Normal strength    Right Ankle/Foot   Normal strength    Additional Strength Details  L active SLR : 3WNL   Dynamometer:  "  L Knee Strength     Ext 36  lbs     Flx 29 lbs   R Knee Strength     Ext 29 lbs     Flx 17.5 lbs    Tests     Additional Tests Details  No special tests were performed.    Ambulation     Comments   Patient ambulates w/o an AD. She ascends/descends stairs reciprocally with utilization of UE support. Gait mechanics are WNL.                  Precautions: L TKA (6/25) , CPS     8/7 8/9 7/19 7/22 7/24 7/29 8/2   Manuals          L Knee PROM BM BM BM BM  BM BM BM    L Knee Patellar Mobs          L Knee STM  BM BM BM BM  BM BM  BM    L Stretching: Hams, Gastroc  BM  BM BM Hams/Gastroc BM Hams/Gastroc  BM BM BM    L Knee JM          Neuro Re-Ed          SLS On AE 15\" Hold x3  On AE 15\" Hold x3  15\" Hold x3  15\" Hold x3  On AE 15\" Hold x3  On AE 15\" Hold x3  On AE 15\" Hold x3    Tandem Amb  4 laps in // 4 laps in //  4 laps in // 4 laps in // 4 laps in // 4 laps in // 4 laps in //   Tandem Stance  15\" Hold x3 on AE  15\" Hold x3 on AE   15\" Hold x3  15\" Hold x3  15\" Hold x3  15\" Hold x3                                            Ther Ex          Hamstring Stretch  20\" Hold x3 L  20\" Hold x3 L  20\" Hold x3 3x20\" Hold  3x20\" Hold  3x20\" Hold  20\" Hold x3 L    SLR 2x10 w/ quad set  2x10 w/ quad set  1.5# 2x10  2# 2x10  3# 2x10  3# 2x10 X10 w/ quad set    TKE CC P4 2x10 3\" Hold  CC P4 2x10 3\" Hold  L5 2x10 3\" Hold  CC P1 3\" Hold 2x10  CC P3 2x10 3\" Hold  CC P4 2x10 3\" Hold CC P4 2x10 3\" Hold    Hip Flx/ABD/Ext  2x10 ea 2X10 ea X10 ea 2x10 ea 2x10 ea 2x10 ea 2x10 ea   TRX Squats Mini Squats 2x10  Mini Squats 2x10   Mini Squats 2x10  Mini Squats 2x10  Mini Squats 2x10  Mini Squats 2x10    Lunge  Mini Forward Lunge 2x10  Mini Forward Lunge 2x10    Mini Lunge Forward x10  Mini Lunge Forward 2x10  Mini Lunge Forward 2x10    Hamstring Curl Machine  P7 2x10  P7 2x10     P6 2x10  P7 2x10   Knee Extension Machine  P1 2x10  P2 2x10      P1 2x10    NU step or Bike for muscular endurance & ROM Bike Full Rev L4 Bike Full Rev L4  Bike Full " "Rev L4 Bike Full Rev L4  Bike L4 10'  Bike L4 10'  Bike Full Rev L4   HEP Instruction  BM  BM        Ther Activity          Forward Step Ups 8\" step 2x10  8\" step 2x10  8\" step 2x10  8\" step 2x10  8\" step 2x10  8\" step 2x10  8\" step 2x10    Step Downs  8\" Step 2x10  8\" step 2x10  4\" step 2x10  4\" step 2x10  4\" step 2x10  4\" step 2x10  4\" step 2x10    Lateral Step Ups  8\" step 2x10  8\" step 2x10  8\" step x10  8\" step 2x10  8\" step 2x10  8\" step 2x10  8\" step 2x10    Gait Training                              Modalities          Ice 5'  5'  4' 5' 5' 5'                     "

## 2024-08-11 DIAGNOSIS — M19.90 ARTHRITIS: ICD-10-CM

## 2024-08-12 RX ORDER — MELOXICAM 15 MG/1
15 TABLET ORAL DAILY PRN
Qty: 90 TABLET | Refills: 1 | Status: SHIPPED | OUTPATIENT
Start: 2024-08-12

## 2024-08-29 PROBLEM — Z00.00 MEDICARE ANNUAL WELLNESS VISIT, SUBSEQUENT: Status: RESOLVED | Noted: 2024-07-30 | Resolved: 2024-08-29

## 2024-09-06 ENCOUNTER — APPOINTMENT (OUTPATIENT)
Dept: LAB | Facility: HOSPITAL | Age: 74
End: 2024-09-06
Payer: COMMERCIAL

## 2024-09-06 DIAGNOSIS — E78.5 HYPERLIPIDEMIA, UNSPECIFIED HYPERLIPIDEMIA TYPE: ICD-10-CM

## 2024-09-06 DIAGNOSIS — Z00.00 MEDICARE ANNUAL WELLNESS VISIT, SUBSEQUENT: ICD-10-CM

## 2024-09-06 DIAGNOSIS — R73.09 ELEVATED GLUCOSE: ICD-10-CM

## 2024-09-06 DIAGNOSIS — E55.9 VITAMIN D DEFICIENCY: ICD-10-CM

## 2024-09-06 LAB
25(OH)D3 SERPL-MCNC: 33.5 NG/ML (ref 30–100)
ALBUMIN SERPL BCG-MCNC: 4.4 G/DL (ref 3.5–5)
ALP SERPL-CCNC: 78 U/L (ref 34–104)
ALT SERPL W P-5'-P-CCNC: 18 U/L (ref 7–52)
ANION GAP SERPL CALCULATED.3IONS-SCNC: 6 MMOL/L (ref 4–13)
AST SERPL W P-5'-P-CCNC: 18 U/L (ref 13–39)
BASOPHILS # BLD AUTO: 0.04 THOUSANDS/ÂΜL (ref 0–0.1)
BASOPHILS NFR BLD AUTO: 1 % (ref 0–1)
BILIRUB SERPL-MCNC: 0.66 MG/DL (ref 0.2–1)
BUN SERPL-MCNC: 15 MG/DL (ref 5–25)
CALCIUM SERPL-MCNC: 9.5 MG/DL (ref 8.4–10.2)
CHLORIDE SERPL-SCNC: 105 MMOL/L (ref 96–108)
CHOLEST SERPL-MCNC: 161 MG/DL
CO2 SERPL-SCNC: 30 MMOL/L (ref 21–32)
CREAT SERPL-MCNC: 0.59 MG/DL (ref 0.6–1.3)
EOSINOPHIL # BLD AUTO: 0.17 THOUSAND/ÂΜL (ref 0–0.61)
EOSINOPHIL NFR BLD AUTO: 2 % (ref 0–6)
ERYTHROCYTE [DISTWIDTH] IN BLOOD BY AUTOMATED COUNT: 13.3 % (ref 11.6–15.1)
EST. AVERAGE GLUCOSE BLD GHB EST-MCNC: 108 MG/DL
GFR SERPL CREATININE-BSD FRML MDRD: 89 ML/MIN/1.73SQ M
GLUCOSE P FAST SERPL-MCNC: 115 MG/DL (ref 65–99)
HBA1C MFR BLD: 5.4 %
HCT VFR BLD AUTO: 42.5 % (ref 34.8–46.1)
HDLC SERPL-MCNC: 60 MG/DL
HGB BLD-MCNC: 13.5 G/DL (ref 11.5–15.4)
IMM GRANULOCYTES # BLD AUTO: 0.02 THOUSAND/UL (ref 0–0.2)
IMM GRANULOCYTES NFR BLD AUTO: 0 % (ref 0–2)
LDLC SERPL CALC-MCNC: 86 MG/DL (ref 0–100)
LYMPHOCYTES # BLD AUTO: 1.63 THOUSANDS/ÂΜL (ref 0.6–4.47)
LYMPHOCYTES NFR BLD AUTO: 24 % (ref 14–44)
MCH RBC QN AUTO: 31.5 PG (ref 26.8–34.3)
MCHC RBC AUTO-ENTMCNC: 31.8 G/DL (ref 31.4–37.4)
MCV RBC AUTO: 99 FL (ref 82–98)
MONOCYTES # BLD AUTO: 0.31 THOUSAND/ÂΜL (ref 0.17–1.22)
MONOCYTES NFR BLD AUTO: 5 % (ref 4–12)
NEUTROPHILS # BLD AUTO: 4.78 THOUSANDS/ÂΜL (ref 1.85–7.62)
NEUTS SEG NFR BLD AUTO: 68 % (ref 43–75)
NONHDLC SERPL-MCNC: 101 MG/DL
NRBC BLD AUTO-RTO: 0 /100 WBCS
PLATELET # BLD AUTO: 224 THOUSANDS/UL (ref 149–390)
PMV BLD AUTO: 10.8 FL (ref 8.9–12.7)
POTASSIUM SERPL-SCNC: 4.5 MMOL/L (ref 3.5–5.3)
PROT SERPL-MCNC: 6.7 G/DL (ref 6.4–8.4)
RBC # BLD AUTO: 4.29 MILLION/UL (ref 3.81–5.12)
SODIUM SERPL-SCNC: 141 MMOL/L (ref 135–147)
TRIGL SERPL-MCNC: 74 MG/DL
WBC # BLD AUTO: 6.95 THOUSAND/UL (ref 4.31–10.16)

## 2024-09-06 PROCEDURE — 36415 COLL VENOUS BLD VENIPUNCTURE: CPT

## 2024-09-06 PROCEDURE — 80053 COMPREHEN METABOLIC PANEL: CPT

## 2024-09-06 PROCEDURE — 80061 LIPID PANEL: CPT

## 2024-09-06 PROCEDURE — 82306 VITAMIN D 25 HYDROXY: CPT

## 2024-09-06 PROCEDURE — 85025 COMPLETE CBC W/AUTO DIFF WBC: CPT

## 2024-09-06 PROCEDURE — 83036 HEMOGLOBIN GLYCOSYLATED A1C: CPT

## 2024-09-09 ENCOUNTER — APPOINTMENT (RX ONLY)
Dept: URBAN - NONMETROPOLITAN AREA CLINIC 4 | Facility: CLINIC | Age: 74
Setting detail: DERMATOLOGY
End: 2024-09-09

## 2024-09-09 DIAGNOSIS — L82.0 INFLAMED SEBORRHEIC KERATOSIS: ICD-10-CM

## 2024-09-09 PROCEDURE — 17111 DESTRUCTION B9 LESIONS 15/>: CPT

## 2024-09-09 PROCEDURE — ? COUNSELING

## 2024-09-09 PROCEDURE — ? LIQUID NITROGEN

## 2024-09-09 ASSESSMENT — LOCATION SIMPLE DESCRIPTION DERM
LOCATION SIMPLE: LEFT UPPER BACK
LOCATION SIMPLE: LEFT ANTERIOR NECK
LOCATION SIMPLE: LEFT POPLITEAL SKIN
LOCATION SIMPLE: LEFT CHEEK
LOCATION SIMPLE: RIGHT ANTERIOR NECK
LOCATION SIMPLE: RIGHT ANTERIOR AXILLA
LOCATION SIMPLE: LEFT CLAVICULAR SKIN
LOCATION SIMPLE: RIGHT UPPER BACK
LOCATION SIMPLE: RIGHT BREAST

## 2024-09-09 ASSESSMENT — LOCATION DETAILED DESCRIPTION DERM
LOCATION DETAILED: LEFT POPLITEAL SKIN
LOCATION DETAILED: LEFT LATERAL MANDIBULAR CHEEK
LOCATION DETAILED: LEFT INFERIOR ANTERIOR NECK
LOCATION DETAILED: RIGHT MID-UPPER BACK
LOCATION DETAILED: LEFT LATERAL UPPER BACK
LOCATION DETAILED: RIGHT SUPERIOR MEDIAL UPPER BACK
LOCATION DETAILED: LEFT MID-UPPER BACK
LOCATION DETAILED: RIGHT AXILLARY TAIL OF BREAST
LOCATION DETAILED: RIGHT INFERIOR LATERAL NECK
LOCATION DETAILED: RIGHT SUPERIOR UPPER BACK
LOCATION DETAILED: LEFT CLAVICULAR SKIN
LOCATION DETAILED: RIGHT ANTERIOR AXILLA

## 2024-09-09 ASSESSMENT — LOCATION ZONE DERM
LOCATION ZONE: NECK
LOCATION ZONE: TRUNK
LOCATION ZONE: FACE
LOCATION ZONE: AXILLAE
LOCATION ZONE: LEG

## 2024-09-09 NOTE — PROCEDURE: LIQUID NITROGEN
Medical Necessity Information: It is in your best interest to select a reason for this procedure from the list below. All of these items fulfill various CMS LCD requirements except the new and changing color options.
Show Spray Paint Technique Variable?: Yes
Spray Paint Text: The liquid nitrogen was applied to the skin utilizing a spray paint frosting technique.
Post-Care Instructions: I reviewed with the patient in detail post-care instructions. Patient is to wear sunprotection, and avoid picking at any of the treated lesions. Pt may apply Vaseline to crusted or scabbing areas.
Include Z78.9 (Other Specified Conditions Influencing Health Status) As An Associated Diagnosis?: No
Consent: The patient's consent was obtained including but not limited to risks of crusting, scabbing, blistering, scarring, darker or lighter pigmentary change, recurrence, incomplete removal and infection.
Medical Necessity Clause: This procedure was medically necessary because the lesions that were treated were:
Number Of Freeze-Thaw Cycles: 1 freeze-thaw cycle
Detail Level: Zone
Duration Of Freeze Thaw-Cycle (Seconds): 5-10

## 2024-09-16 DIAGNOSIS — K21.9 GASTROESOPHAGEAL REFLUX DISEASE, UNSPECIFIED WHETHER ESOPHAGITIS PRESENT: ICD-10-CM

## 2024-09-17 RX ORDER — PANTOPRAZOLE SODIUM 40 MG/1
40 TABLET, DELAYED RELEASE ORAL DAILY
Qty: 90 TABLET | Refills: 1 | Status: SHIPPED | OUTPATIENT
Start: 2024-09-17

## 2024-10-09 ENCOUNTER — OFFICE VISIT (OUTPATIENT)
Dept: FAMILY MEDICINE CLINIC | Facility: CLINIC | Age: 74
End: 2024-10-09
Payer: COMMERCIAL

## 2024-10-09 ENCOUNTER — APPOINTMENT (OUTPATIENT)
Dept: LAB | Facility: MEDICAL CENTER | Age: 74
End: 2024-10-09
Payer: COMMERCIAL

## 2024-10-09 ENCOUNTER — TELEPHONE (OUTPATIENT)
Age: 74
End: 2024-10-09

## 2024-10-09 VITALS
HEART RATE: 71 BPM | HEIGHT: 66 IN | BODY MASS INDEX: 30.44 KG/M2 | WEIGHT: 189.4 LBS | DIASTOLIC BLOOD PRESSURE: 74 MMHG | OXYGEN SATURATION: 97 % | SYSTOLIC BLOOD PRESSURE: 126 MMHG

## 2024-10-09 DIAGNOSIS — R42 WEAKNESS WITH DIZZINESS: ICD-10-CM

## 2024-10-09 DIAGNOSIS — H81.10 BENIGN PAROXYSMAL POSITIONAL VERTIGO, UNSPECIFIED LATERALITY: ICD-10-CM

## 2024-10-09 DIAGNOSIS — R53.1 WEAKNESS WITH DIZZINESS: ICD-10-CM

## 2024-10-09 DIAGNOSIS — R42 LIGHTHEADEDNESS: ICD-10-CM

## 2024-10-09 DIAGNOSIS — R42 LIGHTHEADEDNESS: Primary | ICD-10-CM

## 2024-10-09 LAB
ALBUMIN SERPL BCG-MCNC: 4.5 G/DL (ref 3.5–5)
ALP SERPL-CCNC: 72 U/L (ref 34–104)
ALT SERPL W P-5'-P-CCNC: 19 U/L (ref 7–52)
ANION GAP SERPL CALCULATED.3IONS-SCNC: 8 MMOL/L (ref 4–13)
AST SERPL W P-5'-P-CCNC: 19 U/L (ref 13–39)
BASOPHILS # BLD AUTO: 0.06 THOUSANDS/ΜL (ref 0–0.1)
BASOPHILS NFR BLD AUTO: 1 % (ref 0–1)
BILIRUB SERPL-MCNC: 0.57 MG/DL (ref 0.2–1)
BUN SERPL-MCNC: 16 MG/DL (ref 5–25)
CALCIUM SERPL-MCNC: 9.4 MG/DL (ref 8.4–10.2)
CHLORIDE SERPL-SCNC: 104 MMOL/L (ref 96–108)
CO2 SERPL-SCNC: 30 MMOL/L (ref 21–32)
CREAT SERPL-MCNC: 0.69 MG/DL (ref 0.6–1.3)
EOSINOPHIL # BLD AUTO: 0.19 THOUSAND/ΜL (ref 0–0.61)
EOSINOPHIL NFR BLD AUTO: 3 % (ref 0–6)
ERYTHROCYTE [DISTWIDTH] IN BLOOD BY AUTOMATED COUNT: 13.2 % (ref 11.6–15.1)
GFR SERPL CREATININE-BSD FRML MDRD: 84 ML/MIN/1.73SQ M
GLUCOSE P FAST SERPL-MCNC: 79 MG/DL (ref 65–99)
HCT VFR BLD AUTO: 43 % (ref 34.8–46.1)
HGB BLD-MCNC: 14 G/DL (ref 11.5–15.4)
IMM GRANULOCYTES # BLD AUTO: 0.02 THOUSAND/UL (ref 0–0.2)
IMM GRANULOCYTES NFR BLD AUTO: 0 % (ref 0–2)
LYMPHOCYTES # BLD AUTO: 2.35 THOUSANDS/ΜL (ref 0.6–4.47)
LYMPHOCYTES NFR BLD AUTO: 33 % (ref 14–44)
MCH RBC QN AUTO: 31.9 PG (ref 26.8–34.3)
MCHC RBC AUTO-ENTMCNC: 32.6 G/DL (ref 31.4–37.4)
MCV RBC AUTO: 98 FL (ref 82–98)
MONOCYTES # BLD AUTO: 0.43 THOUSAND/ΜL (ref 0.17–1.22)
MONOCYTES NFR BLD AUTO: 6 % (ref 4–12)
NEUTROPHILS # BLD AUTO: 4.16 THOUSANDS/ΜL (ref 1.85–7.62)
NEUTS SEG NFR BLD AUTO: 57 % (ref 43–75)
NRBC BLD AUTO-RTO: 0 /100 WBCS
PLATELET # BLD AUTO: 226 THOUSANDS/UL (ref 149–390)
PMV BLD AUTO: 11.5 FL (ref 8.9–12.7)
POTASSIUM SERPL-SCNC: 4.2 MMOL/L (ref 3.5–5.3)
PROT SERPL-MCNC: 6.9 G/DL (ref 6.4–8.4)
RBC # BLD AUTO: 4.39 MILLION/UL (ref 3.81–5.12)
SL AMB  POCT GLUCOSE, UA: NORMAL
SL AMB LEUKOCYTE ESTERASE,UA: NORMAL
SL AMB POCT BILIRUBIN,UA: NORMAL
SL AMB POCT BLOOD,UA: NORMAL
SL AMB POCT CLARITY,UA: CLEAR
SL AMB POCT COLOR,UA: YELLOW
SL AMB POCT KETONES,UA: NORMAL
SL AMB POCT NITRITE,UA: NORMAL
SL AMB POCT PH,UA: 6
SL AMB POCT SPECIFIC GRAVITY,UA: 1.02
SL AMB POCT URINE PROTEIN: NORMAL
SL AMB POCT UROBILINOGEN: NORMAL
SODIUM SERPL-SCNC: 142 MMOL/L (ref 135–147)
TSH SERPL DL<=0.05 MIU/L-ACNC: 1.06 UIU/ML (ref 0.45–4.5)
WBC # BLD AUTO: 7.21 THOUSAND/UL (ref 4.31–10.16)

## 2024-10-09 PROCEDURE — 85025 COMPLETE CBC W/AUTO DIFF WBC: CPT

## 2024-10-09 PROCEDURE — 84443 ASSAY THYROID STIM HORMONE: CPT

## 2024-10-09 PROCEDURE — 36415 COLL VENOUS BLD VENIPUNCTURE: CPT

## 2024-10-09 PROCEDURE — 87086 URINE CULTURE/COLONY COUNT: CPT | Performed by: PHYSICIAN ASSISTANT

## 2024-10-09 PROCEDURE — 99214 OFFICE O/P EST MOD 30 MIN: CPT | Performed by: PHYSICIAN ASSISTANT

## 2024-10-09 PROCEDURE — 80053 COMPREHEN METABOLIC PANEL: CPT

## 2024-10-09 PROCEDURE — 81002 URINALYSIS NONAUTO W/O SCOPE: CPT | Performed by: PHYSICIAN ASSISTANT

## 2024-10-09 NOTE — TELEPHONE ENCOUNTER
Had vertigo on Sunday, did exercises and was better. Monday/Tuesday was better, today when she stands up she gets very lightheaded. Tried meclizine was helping and zyrtec. Offered appt today with fátima and patient accepted

## 2024-10-09 NOTE — TELEPHONE ENCOUNTER
Can we please call patient and triage her? I am more than happy to squeeze her in if she needs to be seen. She does have a history of vertigo, but can't be sure of this without evaluating her.

## 2024-10-09 NOTE — PROGRESS NOTES
Ambulatory Visit  Name: Rosita Soriano      : 1950      MRN: [id number]  Encounter Provider: Brittany Webb PA-C  Encounter Date: 10/9/2024   Encounter department: [facility]    Assessment & Plan  Lightheadedness  Blood pressure is stable.  Orthostatic blood pressures were stable.    Urinalysis in office was normal  We will check labs  Encouraged proper hydration and regular meals.  I do believe that symptoms are consistent with episode of vertigo.  She may continue meclizine as needed.  Referral placed to physical therapy.  She will notify us if symptoms do not improve or worsen  Orders:    Comprehensive metabolic panel; Future    CBC and differential; Future    TSH, 3rd generation with Free T4 reflex; Future    Urine culture; Future    POCT urine dip    Urine culture    Benign paroxysmal positional vertigo, unspecified laterality  Referral placed to physical therapy.  Continue meclizine as needed.  I believe that symptoms are more consistent with vertigo.  Unable to reproduce symptoms with Dana-Hallpike maneuver in office today.  I think this may be due to patient taking 2 doses of meclizine today.  She will notify us if symptoms do not improve or worsen.  Orders:    Ambulatory Referral to Physical Therapy; Future    Weakness with dizziness    Orders:    TSH, 3rd generation with Free T4 reflex; Future    Urine culture; Future    POCT urine dip    Urine culture       History of Present Illness     Rosita is a very pleasant 74-year-old female who is here today accompanied by her daughter for lightheadedness and dizziness.  She admits that she started on  with an episode of dizziness.  She admits that this felt similar to her previous vertigo episodes.  She took meclizine, and the symptoms did improve.  She admits that she was okay Monday and Tuesday, but today around 1130 she started to feel lightheaded.  She admits that the symptoms are worse with changing positions.  She admits that when  "she walks, she feels like she is going to fall over.  She denies any palpitations, chest pains, shortness of breath, unilateral weakness, facial numbness, slurring, or vomiting.  She did have some associated nausea on Sunday.  She checked her blood sugar this morning approximately 4 hours after breakfast, and it was 65.  She did not feel better after eating some chocolate.  She took a meclizine and Zyrtec this morning, but that did not provide much relief.          Review of Systems   Constitutional:  Negative for chills, diaphoresis, fatigue and fever.   HENT:  Negative for congestion, ear pain, postnasal drip, rhinorrhea, sneezing, sore throat and trouble swallowing.    Eyes:  Negative for pain and visual disturbance.   Respiratory:  Negative for apnea, cough, shortness of breath and wheezing.    Cardiovascular:  Negative for chest pain and palpitations.   Gastrointestinal:  Negative for abdominal pain, constipation, diarrhea, nausea and vomiting.   Genitourinary:  Negative for dysuria and hematuria.   Musculoskeletal:  Negative for arthralgias, gait problem and myalgias.   Neurological:  Positive for dizziness and light-headedness. Negative for syncope, weakness, numbness and headaches.   Psychiatric/Behavioral:  Negative for suicidal ideas. The patient is not nervous/anxious.            Objective     /74   Pulse 71   Ht 5' 6\" (1.676 m)   Wt 85.9 kg (189 lb 6.4 oz)   SpO2 97%   BMI 30.57 kg/m²     Physical Exam  Vitals and nursing note reviewed.   Constitutional:       General: She is not in acute distress.     Appearance: She is well-developed. She is not diaphoretic.   HENT:      Head: Normocephalic and atraumatic.      Right Ear: Hearing, tympanic membrane, ear canal and external ear normal.      Left Ear: Hearing, tympanic membrane, ear canal and external ear normal.      Nose: Nose normal. No mucosal edema or rhinorrhea.      Mouth/Throat:      Pharynx: No oropharyngeal exudate or posterior " oropharyngeal erythema.   Eyes:      Extraocular Movements: Extraocular movements intact.   Cardiovascular:      Rate and Rhythm: Normal rate and regular rhythm.      Heart sounds: Normal heart sounds. No murmur heard.     No friction rub. No gallop.   Pulmonary:      Effort: Pulmonary effort is normal. No respiratory distress.      Breath sounds: Normal breath sounds. No wheezing or rales.   Musculoskeletal:         General: Normal range of motion.      Cervical back: Normal range of motion and neck supple.   Lymphadenopathy:      Cervical: No cervical adenopathy.   Skin:     General: Skin is warm and dry.      Findings: No rash.   Neurological:      Mental Status: She is alert and oriented to person, place, and time.      Cranial Nerves: No cranial nerve deficit or facial asymmetry.      Motor: No weakness or pronator drift.      Coordination: Romberg sign negative. Coordination normal. Finger-Nose-Finger Test and Heel to Shin Test normal.      Gait: Gait normal.      Comments: Dimas-Hallpike maneuver negative bilaterally-examination may be inaccurate due to patient taking 2 doses of meclizine earlier today   Psychiatric:         Behavior: Behavior normal.         Thought Content: Thought content normal.         Judgment: Judgment normal.

## 2024-10-09 NOTE — TELEPHONE ENCOUNTER
Pt called and said she had vertigo on Sunday.  She was feeling a little better on Monday and Tuesday but today she is feeling light headed when she stands up.  She's asking if Brittany Webb can call her for a minute to discuss this.  I offered appointment openings for today with other providers but she refused and again asked if Brittany can call her.

## 2024-10-10 DIAGNOSIS — H81.10 BENIGN PAROXYSMAL POSITIONAL VERTIGO, UNSPECIFIED LATERALITY: ICD-10-CM

## 2024-10-10 LAB — BACTERIA UR CULT: ABNORMAL

## 2024-10-10 RX ORDER — MECLIZINE HYDROCHLORIDE 25 MG/1
25 TABLET ORAL EVERY 8 HOURS PRN
Qty: 30 TABLET | Refills: 0 | Status: SHIPPED | OUTPATIENT
Start: 2024-10-10 | End: 2024-10-14 | Stop reason: SDUPTHER

## 2024-10-14 DIAGNOSIS — H81.10 BENIGN PAROXYSMAL POSITIONAL VERTIGO, UNSPECIFIED LATERALITY: ICD-10-CM

## 2024-10-15 RX ORDER — MECLIZINE HYDROCHLORIDE 25 MG/1
25 TABLET ORAL EVERY 8 HOURS PRN
Qty: 30 TABLET | Refills: 1 | Status: SHIPPED | OUTPATIENT
Start: 2024-10-15 | End: 2025-10-15

## 2024-10-15 NOTE — PROGRESS NOTES
PT Evaluation     Today's date: 10/16/2024  Patient name: Rosita Soriano  : 1950  MRN: [id number]  Referring provider: Brittany Webb PA-C  Dx:   Encounter Diagnosis     ICD-10-CM    1. Benign paroxysmal positional vertigo, unspecified laterality  H81.10 Ambulatory Referral to Physical Therapy          Start Time: 1000  Stop Time: 1100  Total time in clinic (min): 60 minutes    Assessment  Impairments: abnormal gait, abnormal movement and impaired balance  Symptom irritability: low    Assessment details: Rosita Soriano is a 74 y.o. female presenting to Physical Therapy today with the chief complaints of Vertigo related symptoms. Upon completion of the initial PT evaluation the patient demonstrated with negative as well as asymptomatic Dimas-Hallpike maneuver testing and Supine to Roll testing bilaterally.  We will follow up with patient and monitor symptoms to determine need for further canalith repositioning techniques. Thank you for your referral.   Understanding of Dx/Px/POC: excellent     Prognosis: good    Goals  ST.) Patient will initiate HEP Independently   2.) Patient will have a 50% reduction in dizziness symptoms with functional activity     LT.) Patient will be d/c to an HEP independently  2.) Eliminate dizziness symptoms with functional activity to improve quality of life  3.) Return to PLOF     Plan  Patient would benefit from: PT eval and skilled physical therapy  Referral necessary: No    Planned therapy interventions: neuromuscular re-education, balance, home exercise program, canalith repositioning, patient education and self care    Frequency: 1-2x week  Duration in weeks: 4  Plan of Care beginning date: 10/16/2024  Plan of Care expiration date: 2024  Treatment plan discussed with: patient  Plan details: Plan of care was reviewed and discussed thoroughly with the patient on their current condition.     Subjective Evaluation    History of Present Illness  Mechanism of  injury: The patient reports today with the chief complaints of lightheadedness and dizziness that started on 10/6. She notes a similar feeling with her previous episodes of vertigo. She took meclizine when symptoms first started with improvement in dizziness and has been taking medication regularly with her last dose being yesterday morning. She also has been taking Zyrtec. She states symptoms are worse when changing positions and looking down. She notes when walking feeling like she is going to fall over to her right side. Patient reports trying exercises she learned in the past that added some benefit when symptoms first started. She followed up with her PCP and is now referred to OPPT.           Recurrent probem    Quality of life: good    Patient Goals  Patient goal: decrease feeling of lightheadedness/dizziness  Pain  Current pain ratin  At best pain ratin  At worst pain ratin  Exacerbated by: change of position, looking towards the ground.      Objective     Concurrent Complaints  Positive for nausea/motion sickness. Negative for headaches, tinnitus, visual change, hearing loss, memory loss and aural fullness    Static Posture     Comments  Vitals:  BP: 146/80  SpO2 99%  Pulse. 60    Active Range of Motion   Cervical/Thoracic Spine       Cervical    Flexion: Neck active flexion: WNL.   Extension: Neck active extension: WNL.      Left lateral flexion: Neck active lateral bend left: WNL.      Right lateral flexion: Neck active lateral bend right: WNL.      Left rotation: Neck active rotation left: WNL.  Right rotation: Neck active rotation right: WNL.       Functional Assessment        Comments  Patient ambulates w/o an AD. She demonstrates decreased stride length, decreased step length, and decreased renuka with gait mechanics.  Neuro Exam:     Dizziness  Positive for disequilibrium, vertigo, motion sickness, light-headedness and rocking or swaying.   Negative for oscillopsia, diplopia and floating  or swimming.     Exacerbating factors  Positive for bending over, rolling in bed, walking and supine to/from sitting.   Negative for looking up and turning head.     Symptoms   Intensity at best: 0/10  Intensity at worst: 10/10 (prior to medication)  Average intensity: 5/10 (prior to medication)    Headaches   Patient reports headaches: No.   Intensity at best: 0/10  Intensity at worst: 0/10  Average intensity: 0/10    Cervical exam   Modified VBI   Left: asymptomatic  Right: asymptomatic  Seated posture: forward head posture    Oculomotor exam   Oculomotor ROM: WNL  Resting nystagmus: not present   Gaze holding nystagmus: not present left  and not present right  Smooth pursuits: within normal limits  Vertical saccades: normal  Horizontal saccades: normal  Convergence: normal  Cover test: normal  Crossover test: normal  Head thrust: left normal    Positional testing   Dimas-Hallpike   Left posterior canal: WNL  Right posterior canal: WNL  Roll test   Left horizontal canal: WNL  Right horizontal canal: WNL             Precautions: Hx of Vertigo,       Manuals 10/16/24                                       Neuro Re-Ed                                                                Ther Ex                                                                HEP/Self-Care Instruction  BM        Ther Activity                        Gait Training                        Modalities

## 2024-10-16 ENCOUNTER — EVALUATION (OUTPATIENT)
Dept: PHYSICAL THERAPY | Facility: CLINIC | Age: 74
End: 2024-10-16
Payer: COMMERCIAL

## 2024-10-16 ENCOUNTER — TELEPHONE (OUTPATIENT)
Age: 74
End: 2024-10-16

## 2024-10-16 DIAGNOSIS — J30.9 ALLERGIC RHINITIS, UNSPECIFIED SEASONALITY, UNSPECIFIED TRIGGER: Primary | ICD-10-CM

## 2024-10-16 DIAGNOSIS — H81.10 BENIGN PAROXYSMAL POSITIONAL VERTIGO, UNSPECIFIED LATERALITY: Primary | ICD-10-CM

## 2024-10-16 PROCEDURE — 97535 SELF CARE MNGMENT TRAINING: CPT

## 2024-10-16 PROCEDURE — 97162 PT EVAL MOD COMPLEX 30 MIN: CPT

## 2024-10-16 RX ORDER — AZELASTINE 1 MG/ML
2 SPRAY, METERED NASAL 2 TIMES DAILY
Qty: 1 ML | Refills: 2 | Status: SHIPPED | OUTPATIENT
Start: 2024-10-16 | End: 2024-10-16 | Stop reason: SDUPTHER

## 2024-10-16 RX ORDER — CETIRIZINE HYDROCHLORIDE 10 MG/1
10 TABLET ORAL DAILY
Qty: 30 TABLET | Refills: 2 | Status: SHIPPED | OUTPATIENT
Start: 2024-10-16

## 2024-10-16 RX ORDER — CETIRIZINE HYDROCHLORIDE 10 MG/1
10 TABLET ORAL DAILY
Qty: 30 TABLET | Refills: 2 | Status: SHIPPED | OUTPATIENT
Start: 2024-10-16 | End: 2024-10-16 | Stop reason: SDUPTHER

## 2024-10-16 RX ORDER — AZELASTINE 1 MG/ML
2 SPRAY, METERED NASAL 2 TIMES DAILY
Qty: 1 ML | Refills: 2 | Status: SHIPPED | OUTPATIENT
Start: 2024-10-16

## 2024-10-16 NOTE — TELEPHONE ENCOUNTER
Patient calling inquiring how often should she be taking Zyrtec.      Please review and advise.  Thank you

## 2024-10-16 NOTE — TELEPHONE ENCOUNTER
Please send patient a Mychart message that it is okay to take Zyrtec. I would also recommend that she start Astelin Nasal Spray. 2 squirts in each nostril 2 times daily. I will send script for both medications.

## 2024-10-16 NOTE — TELEPHONE ENCOUNTER
Patient called with medication questions. She was advised by PT to stop taking her Antivert for her vertigo and to just continue taking her Zertec.  She had concerns stating the being over 65 she was told to be cautious taking Zertec at that age.  She stated that PT felt her vertigo might be allergy/stuffed ears related.  Please review and advise.  Patient would be preferred to be contacted via phone vs My Chart.

## 2024-10-18 ENCOUNTER — OFFICE VISIT (OUTPATIENT)
Dept: PHYSICAL THERAPY | Facility: CLINIC | Age: 74
End: 2024-10-18
Payer: COMMERCIAL

## 2024-10-18 DIAGNOSIS — H81.10 BENIGN PAROXYSMAL POSITIONAL VERTIGO, UNSPECIFIED LATERALITY: Primary | ICD-10-CM

## 2024-10-18 PROCEDURE — 97110 THERAPEUTIC EXERCISES: CPT

## 2024-10-18 NOTE — PROGRESS NOTES
PT Discharge    Today's date: 10/18/2024  Patient name: Rosita Soriano  : 1950  MRN: [id number]  Referring provider: Brittany Webb PA-C  Dx:   Encounter Diagnosis     ICD-10-CM    1. Benign paroxysmal positional vertigo, unspecified laterality  H81.10             Start Time: 1105  Stop Time: 1127  Total time in clinic (min): 22 minutes    Assessment    Assessment details: Rosita Soriano is a 74 y.o. female presenting to Physical Therapy today with improvement in Vertigo related symptoms. We will d/c patient from Physical Therapy services today and transition to an independent HEP to manage her symptoms. She was encouraged to call the office if she has any questions or concerns.   Understanding of Dx/Px/POC: excellent     Prognosis: good    Goals  ST.) Patient will initiate HEP Independently MET  2.) Patient will have a 50% reduction in dizziness symptoms with functional activity MET    LT.) Patient will be d/c to an HEP independently MET  2.) Eliminate dizziness symptoms with functional activity to improve quality of life MET  3.) Return to PLOF MET     Plan  Patient would benefit from: PT eval and skilled physical therapy  Referral necessary: No    Plan of Care beginning date: 10/16/2024  Plan of Care expiration date: 2024  Treatment plan discussed with: patient  Plan details: Plan of care was reviewed and discussed thoroughly with the patient on their current condition. Patient was instructed on an independent HEP to address symptoms.    Subjective Evaluation    History of Present Illness  Mechanism of injury: The patient reports today with the chief complaints of lightheadedness and dizziness that started on 10/6. She notes a similar feeling with her previous episodes of vertigo. She took meclizine when symptoms first started with improvement in dizziness and has been taking medication regularly with her last dose being yesterday morning. She also has been taking Zyrtec. She states  symptoms are worse when changing positions and looking down. She notes when walking feeling like she is going to fall over to her right side. Patient reports trying exercises she learned in the past that added some benefit when symptoms first started. She followed up with her PCP and is now referred to [facility].     Update 10-18-24: Patient reports since the initial evaluation she has had no occurrence of lightheadedness or dizziness. She feels that her condition is much more manageable and would like to be d/c from Physical Therapy services today.          Recurrent probem    Quality of life: good    Patient Goals  Patient goal: decrease feeling of lightheadedness/dizziness- Goals have been met.  Pain  Current pain ratin  At best pain ratin  At worst pain ratin      Objective     Concurrent Complaints  Negative for headaches    Static Posture     Comments  Vitals:  BP: 146/80  SpO2 99%  Pulse. 60    Active Range of Motion   Cervical/Thoracic Spine       Cervical    Flexion: Neck active flexion: WNL.   Extension: Neck active extension: WNL.      Left lateral flexion: Neck active lateral bend left: WNL.      Right lateral flexion: Neck active lateral bend right: WNL.      Left rotation: Neck active rotation left: WNL.  Right rotation: Neck active rotation right: WNL.       Functional Assessment        Comments  Patient ambulates w/o an AD. Gait mechanics are WNL.  Neuro Exam:     Symptoms   Intensity at best: 0/10  Intensity at worst: 0/10  Average intensity: 0/10    Headaches   Patient reports headaches: No.   Intensity at best: 0/10  Intensity at worst: 0/10  Average intensity: 0/10    Cervical exam   Modified VBI   Left: asymptomatic  Right: asymptomatic  Seated posture: forward head posture    Positional testing   Dimas-Hallpike   Left posterior canal: WNL  Right posterior canal: WNL  Roll test   Left horizontal canal: WNL  Right horizontal canal: WNL             Precautions: Hx of Vertigo,       Manuals  10/16/24 10/18/24                                      Neuro Re-Ed                                                                Ther Ex        Dave Vestibular Exercise   X1 ea                                                      HEP/Self-Care Instruction  BM  BM      Ther Activity                        Gait Training                        Modalities

## 2024-12-03 ENCOUNTER — EVALUATION (OUTPATIENT)
Dept: PHYSICAL THERAPY | Facility: CLINIC | Age: 74
End: 2024-12-03
Payer: COMMERCIAL

## 2024-12-03 DIAGNOSIS — M62.81 MUSCLE WEAKNESS (GENERALIZED): ICD-10-CM

## 2024-12-03 DIAGNOSIS — M48.061 SPINAL STENOSIS, LUMBAR REGION, WITHOUT NEUROGENIC CLAUDICATION: Primary | ICD-10-CM

## 2024-12-03 PROCEDURE — 97162 PT EVAL MOD COMPLEX 30 MIN: CPT

## 2024-12-03 PROCEDURE — 97110 THERAPEUTIC EXERCISES: CPT

## 2024-12-03 NOTE — PROGRESS NOTES
PT Evaluation     Today's date: 12/3/2024  Patient name: Rosita Soriano  : 1950  MRN: [id number]  Referring provider: Jaylen Waddell DO  Dx:   Encounter Diagnosis     ICD-10-CM    1. Spinal stenosis, lumbar region, without neurogenic claudication  M48.061       2. Muscle weakness (generalized)  M62.81           Start Time: 1300  Stop Time: 1400  Total time in clinic (min): 60 minutes    Assessment  Impairments: abnormal or restricted ROM, abnormal movement, activity intolerance, impaired physical strength, lacks appropriate home exercise program, pain with function, poor posture  and activity limitations  Symptom irritability: moderate    Assessment details: Rosita Soriano is a 74 y.o. female presenting to Physical Therapy today with the chief complaints of lumbar spine pain with radicular symptoms into BLE's. Upon completion of the initial PT evaluation the patient is demonstrating with limitations in thoracolumbar mobility, core conditioning, b/l hip mobility, b/l hip strength, and pain. She is currently having difficulty with the performance of functional activities such as standing tolerance, ambulation, stair navigation, lifting, pushing, pulling, and carrying due to symptomatology. Patient would benefit from a course of skilled Physical Therapy services to address functional limitations to return to prior level of function. Thank you for your referral.   Understanding of Dx/Px/POC: excellent     Prognosis: good    Goals  STG: (6 weeks)  1.) Patient will initiate HEP Independently   2.) Patient will have a 50% reduction in overall symptoms   3.) Patient will demonstrate improved b/l hip strength by 50% in all planes of mobility   4.) Patient will demonstrate improved ability to perform overhead activity >/= 2 minutes   5.) Patient will demonstrate improved thoracolumbar mobility by 50% in all planes of motion     LTG: (12 weeks)  1.) Patient will be d/c to an HEP independently  2.) Patient will  improve functional abilities evidenced by FOTO scores  3.) Eliminate pain with functional activity   4.) Patient will demonstrate improved ability to lift, push, pull, and carry safely w/o symptoms   5.) Patient will demonstrate improved thoracolumbar mobility by 90% in all planes of motion as evidence of restored function   6.) Patient will demonstrate improved b/l hip strength by 90% in all planes of mobility as evidence of restored function    Plan  Patient would benefit from: PT eval and skilled physical therapy  Referral necessary: No  Planned modality interventions: cryotherapy and thermotherapy: hydrocollator packs    Planned therapy interventions: functional ROM exercises, graded activity, graded exercise, graded motor, home exercise program, flexibility, joint mobilization, manual therapy, neuromuscular re-education, patient education, postural training, self care, strengthening, stretching, therapeutic activities, therapeutic exercise and therapeutic training    Frequency: 1-2x week  Duration in weeks: 8  Plan of Care beginning date: 12/3/2024  Plan of Care expiration date: 1/28/2025  Treatment plan discussed with: patient  Plan details: Plan of care was reviewed and discussed thoroughly with the patient on their current condition. Patient was instructed on a HEP with written instructions. Patient is in agreement with PT recommendations and will attend Physical Therapy 2x/week for the next 8 weeks to address current deficits.        Subjective Evaluation    History of Present Illness  Mechanism of injury: The patient reports today with low back pain that started over 20 years ago with no precipitating injury or trauma to the lumbar spine region. She states more recently experiencing intermittent numbness and tingling into bilateral lower extremities. She notes symptoms are aggravated by prolonged walking, standing, work duties, lifting, pushing, pulling, and carrying. She followed up with [facility] and had X-ray  imaging that revealed a grade I anterolisthesis of L2 on L3 and L3 on L4 with dynamic instability of L3 on L4 with flexion-extension. She will follow up with Dr. Waddell on 12/10. She is now referred to [facility].           Recurrent probem    Quality of life: good    Patient Goals  Patient goals for therapy: decreased pain, increased motion, increased strength, independence with ADLs/IADLs and return to sport/leisure activities    Pain  Current pain ratin  At best pain ratin  At worst pain ratin  Location: Lumbar Spine radiating into bilateral lower extremities  Quality: needle-like, sharp and radiating  Relieving factors: medications and rest (injections)  Aggravating factors: stair climbing, standing, walking, lifting, overhead activity and sitting (pushing, pulling, carrying)  Progression: worsening    Social Support    Employment status: working    Diagnostic Tests  X-ray: abnormal  Treatments  Current treatment: physical therapy      Objective     Concurrent Complaints  Negative for night pain, disturbed sleep, bladder dysfunction, bowel dysfunction and saddle (S4) numbness    Postural Observations  Seated posture: poor  Standing posture: poor    Additional Postural Observation Details  Patient demonstrates increased thoracic kyphosis, rounded shoulders, and forward head with postural mechanics.    Palpation   Left   Hypertonic in the erector spinae, lumbar paraspinals and quadratus lumborum.     Right   Hypertonic in the erector spinae, lumbar paraspinals and quadratus lumborum.     Tenderness     Lumbar Spine  No tenderness in the spinous process, left transverse process and right transverse process.     Left Hip   No tenderness in the ASIS and PSIS.     Right Hip   No tenderness in the ASIS and PSIS.     Neurological Testing     Sensation     Lumbar   Left   Hyposensation: light touch    Right   Hyposensation: light touch    Active Range of Motion     Lumbar   Flexion:  Restriction level:  "moderate  Extension:  Restriction level: moderate  Left lateral flexion:  with pain Restriction level: maximal  Right lateral flexion:  Restriction level: minimal  Left rotation:  Restriction level: moderate  Right rotation:  Restriction level: moderate    Strength/Myotome Testing     Left Hip   Planes of Motion   Flexion: 3+  Extension: 3+  Abduction: 3  Adduction: 4-  External rotation: 3+  Internal rotation: 3+    Right Hip   Planes of Motion   Flexion: 3+  Extension: 3+  Abduction: 3+  Adduction: 4-  External rotation: 3+  Internal rotation: 3+    Left Knee   Flexion: 5  Extension: 5    Right Knee   Flexion: 5  Extension: 5    Left Ankle/Foot   Dorsiflexion: 5  Plantar flexion: 5  Inversion: 5  Eversion: 5    Right Ankle/Foot   Dorsiflexion: 5  Plantar flexion: 5  Inversion: 5  Eversion: 5    Muscle Activation     Additional Muscle Activation Details  Patient demonstrates decreased transverse abdominal activation.    Tests     Lumbar     Left   Negative passive SLR.     Right   Negative passive SLR.     Left Pelvic Girdle/Sacrum   Negative: active SLR test.     Right Pelvic Girdle/Sacrum   Negative: active SLR test.     Left Hip   Positive BRENDA.   Negative FADIR.     Right Hip   Positive BRENDA.   Negative FADIR.              Precautions: OA, Neurogenic Claudication due to lumbar spinal stenosis    Manuals 12/3        Stretch: B Hams, Hip IR/ER, Hip ABD                                    Neuro Re-Ed         TA Draw-in  5\" Hold x10         TA Draw-in w/ Hip Marches          TA Draw-in w/ PB Press Down          TA Draw-in w/ CC Anti Rot          TA Draw-in w/ Stir the Pots          TA Draw-in w/ Alt Arm + Leg          TA Draw-in w/ LTP          TA Draw-in w/ MTP         Ther Ex         HL Clam Shells  3\" Hold x10         LTR's  10\" Hold x5        SKC  10\" Hold x5        Iso Hip ADD          Seated Thoracolumbar  Flexion w/ PB          Hip Flx/Ext/ABD          Hamstring Stretch          Trunk Rotation Machine       "    Trunk Extension Machine          Trunk Flexion Machine         NU Step for muscular endurance          HEP Instruction  BM         Ther Activity                           Gait Training                           Modalities         P  5'

## 2024-12-03 NOTE — LETTER
2024    Jaylen Waddell DO  [address] [facility] [address]    Patient: Rosita Soriano   YOB: 1950   Date of Visit: 12/3/2024     Encounter Diagnosis     ICD-10-CM    1. Spinal stenosis, lumbar region, without neurogenic claudication  M48.061       2. Muscle weakness (generalized)  M62.81           Dear Dr. Waddell:    Thank you for your recent referral of Rosita Soriano. Please review the attached evaluation summary from Rosita's recent visit.     Please verify that you agree with the plan of care by signing the attached order.     If you have any questions or concerns, please do not hesitate to call.     I sincerely appreciate the opportunity to share in the care of one of your patients and hope to have another opportunity to work with you in the near future.       Sincerely,    Chente Echols, PT      Referring Provider:      I certify that I have read the below Plan of Care and certify the need for these services furnished under this plan of treatment while under my care.                    Jaylen Waddell DO  [address] [facility] [address]  Via Fax: [phone]          PT Evaluation     Today's date: 12/3/2024  Patient name: Rosita Soriano  : 1950  MRN: [id number]  Referring provider: Jaylen Waddell DO  Dx:   Encounter Diagnosis     ICD-10-CM    1. Spinal stenosis, lumbar region, without neurogenic claudication  M48.061       2. Muscle weakness (generalized)  M62.81           Start Time: 1300  Stop Time: 1400  Total time in clinic (min): 60 minutes    Assessment  Impairments: abnormal or restricted ROM, abnormal movement, activity intolerance, impaired physical strength, lacks appropriate home exercise program, pain with function, poor posture  and activity limitations  Symptom irritability: moderate    Assessment details: Rosita Soriano is a 74 y.o. female presenting to Physical Therapy today with the chief complaints of lumbar spine pain with radicular  symptoms into BLE's. Upon completion of the initial PT evaluation the patient is demonstrating with limitations in thoracolumbar mobility, core conditioning, b/l hip mobility, b/l hip strength, and pain. She is currently having difficulty with the performance of functional activities such as standing tolerance, ambulation, stair navigation, lifting, pushing, pulling, and carrying due to symptomatology. Patient would benefit from a course of skilled Physical Therapy services to address functional limitations to return to prior level of function. Thank you for your referral.   Understanding of Dx/Px/POC: excellent     Prognosis: good    Goals  STG: (6 weeks)  1.) Patient will initiate HEP Independently   2.) Patient will have a 50% reduction in overall symptoms   3.) Patient will demonstrate improved b/l hip strength by 50% in all planes of mobility   4.) Patient will demonstrate improved ability to perform overhead activity >/= 2 minutes   5.) Patient will demonstrate improved thoracolumbar mobility by 50% in all planes of motion     LTG: (12 weeks)  1.) Patient will be d/c to an HEP independently  2.) Patient will improve functional abilities evidenced by FOTO scores  3.) Eliminate pain with functional activity   4.) Patient will demonstrate improved ability to lift, push, pull, and carry safely w/o symptoms   5.) Patient will demonstrate improved thoracolumbar mobility by 90% in all planes of motion as evidence of restored function   6.) Patient will demonstrate improved b/l hip strength by 90% in all planes of mobility as evidence of restored function    Plan  Patient would benefit from: PT eval and skilled physical therapy  Referral necessary: No  Planned modality interventions: cryotherapy and thermotherapy: hydrocollator packs    Planned therapy interventions: functional ROM exercises, graded activity, graded exercise, graded motor, home exercise program, flexibility, joint mobilization, manual therapy,  neuromuscular re-education, patient education, postural training, self care, strengthening, stretching, therapeutic activities, therapeutic exercise and therapeutic training    Frequency: 1-2x week  Duration in weeks: 8  Plan of Care beginning date: 12/3/2024  Plan of Care expiration date: 2025  Treatment plan discussed with: patient  Plan details: Plan of care was reviewed and discussed thoroughly with the patient on their current condition. Patient was instructed on a HEP with written instructions. Patient is in agreement with PT recommendations and will attend Physical Therapy 2x/week for the next 8 weeks to address current deficits.        Subjective Evaluation    History of Present Illness  Mechanism of injury: The patient reports today with low back pain that started over 20 years ago with no precipitating injury or trauma to the lumbar spine region. She states more recently experiencing intermittent numbness and tingling into bilateral lower extremities. She notes symptoms are aggravated by prolonged walking, standing, work duties, lifting, pushing, pulling, and carrying. She followed up with [facility] and had X-ray imaging that revealed a grade I anterolisthesis of L2 on L3 and L3 on L4 with dynamic instability of L3 on L4 with flexion-extension. She will follow up with Dr. Waddell on 12/10. She is now referred to [facility].           Recurrent probem    Quality of life: good    Patient Goals  Patient goals for therapy: decreased pain, increased motion, increased strength, independence with ADLs/IADLs and return to sport/leisure activities    Pain  Current pain ratin  At best pain ratin  At worst pain ratin  Location: Lumbar Spine radiating into bilateral lower extremities  Quality: needle-like, sharp and radiating  Relieving factors: medications and rest (injections)  Aggravating factors: stair climbing, standing, walking, lifting, overhead activity and sitting (pushing, pulling,  carrying)  Progression: worsening    Social Support    Employment status: working    Diagnostic Tests  X-ray: abnormal  Treatments  Current treatment: physical therapy      Objective     Concurrent Complaints  Negative for night pain, disturbed sleep, bladder dysfunction, bowel dysfunction and saddle (S4) numbness    Postural Observations  Seated posture: poor  Standing posture: poor    Additional Postural Observation Details  Patient demonstrates increased thoracic kyphosis, rounded shoulders, and forward head with postural mechanics.    Palpation   Left   Hypertonic in the erector spinae, lumbar paraspinals and quadratus lumborum.     Right   Hypertonic in the erector spinae, lumbar paraspinals and quadratus lumborum.     Tenderness     Lumbar Spine  No tenderness in the spinous process, left transverse process and right transverse process.     Left Hip   No tenderness in the ASIS and PSIS.     Right Hip   No tenderness in the ASIS and PSIS.     Neurological Testing     Sensation     Lumbar   Left   Hyposensation: light touch    Right   Hyposensation: light touch    Active Range of Motion     Lumbar   Flexion:  Restriction level: moderate  Extension:  Restriction level: moderate  Left lateral flexion:  with pain Restriction level: maximal  Right lateral flexion:  Restriction level: minimal  Left rotation:  Restriction level: moderate  Right rotation:  Restriction level: moderate    Strength/Myotome Testing     Left Hip   Planes of Motion   Flexion: 3+  Extension: 3+  Abduction: 3  Adduction: 4-  External rotation: 3+  Internal rotation: 3+    Right Hip   Planes of Motion   Flexion: 3+  Extension: 3+  Abduction: 3+  Adduction: 4-  External rotation: 3+  Internal rotation: 3+    Left Knee   Flexion: 5  Extension: 5    Right Knee   Flexion: 5  Extension: 5    Left Ankle/Foot   Dorsiflexion: 5  Plantar flexion: 5  Inversion: 5  Eversion: 5    Right Ankle/Foot   Dorsiflexion: 5  Plantar flexion: 5  Inversion:  "5  Eversion: 5    Muscle Activation     Additional Muscle Activation Details  Patient demonstrates decreased transverse abdominal activation.    Tests     Lumbar     Left   Negative passive SLR.     Right   Negative passive SLR.     Left Pelvic Girdle/Sacrum   Negative: active SLR test.     Right Pelvic Girdle/Sacrum   Negative: active SLR test.     Left Hip   Positive BRENDA.   Negative FADIR.     Right Hip   Positive BRENDA.   Negative FADIR.              Precautions: OA, Neurogenic Claudication due to lumbar spinal stenosis    Manuals 12/3        Stretch: B Hams, Hip IR/ER, Hip ABD                                    Neuro Re-Ed         TA Draw-in  5\" Hold x10         TA Draw-in w/ Hip Marches          TA Draw-in w/ PB Press Down          TA Draw-in w/ CC Anti Rot          TA Draw-in w/ Stir the Pots          TA Draw-in w/ Alt Arm + Leg          TA Draw-in w/ LTP          TA Draw-in w/ MTP         Ther Ex         HL Clam Shells  3\" Hold x10         LTR's  10\" Hold x5        SKC  10\" Hold x5        Iso Hip ADD          Seated Thoracolumbar  Flexion w/ PB          Hip Flx/Ext/ABD          Hamstring Stretch          Trunk Rotation Machine          Trunk Extension Machine          Trunk Flexion Machine         NU Step for muscular endurance          HEP Instruction  BM         Ther Activity                           Gait Training                           Modalities         P  5'                                       "

## 2024-12-09 ENCOUNTER — OFFICE VISIT (OUTPATIENT)
Dept: PHYSICAL THERAPY | Facility: CLINIC | Age: 74
End: 2024-12-09
Payer: COMMERCIAL

## 2024-12-09 DIAGNOSIS — M48.061 SPINAL STENOSIS, LUMBAR REGION, WITHOUT NEUROGENIC CLAUDICATION: Primary | ICD-10-CM

## 2024-12-09 DIAGNOSIS — M62.81 MUSCLE WEAKNESS (GENERALIZED): ICD-10-CM

## 2024-12-09 PROCEDURE — 97110 THERAPEUTIC EXERCISES: CPT

## 2024-12-09 PROCEDURE — 97140 MANUAL THERAPY 1/> REGIONS: CPT

## 2024-12-09 NOTE — PROGRESS NOTES
"Daily Note     Today's date: 2024  Patient name: Rosita Soriano  : 1950  MRN: [id number]  Referring provider: Jaylen Waddell DO  Dx:   Encounter Diagnosis     ICD-10-CM    1. Spinal stenosis, lumbar region, without neurogenic claudication  M48.061       2. Muscle weakness (generalized)  M62.81           Start Time: 1533  Stop Time: 1620  Total time in clinic (min): 47 minutes    Subjective: Patient reports soreness in her low back this afternoon. She states having a follow up appointment with her MD tomorrow.       Objective: See treatment diary below      Assessment: Tolerated treatment well. We progressed the current program with the addition of thoracolumbar mobility and aerobic exercise with good tolerance from patient. Patient demonstrated fatigue post treatment and would benefit from continued PT.      Plan: Continue per plan of care.      Precautions: OA, Neurogenic Claudication due to lumbar spinal stenosis    Manuals 12/3 12/9        Stretch: B Hams, Hip IR/ER, Hip ABD  BM                                   Neuro Re-Ed         TA Draw-in  5\" Hold x10  5\" Hold x10        TA Draw-in w/ Hip Marches          TA Draw-in w/ PB Press Down          TA Draw-in w/ CC Anti Rot          TA Draw-in w/ Stir the Pots          TA Draw-in w/ Alt Arm + Leg          TA Draw-in w/ LTP          TA Draw-in w/ MTP         Ther Ex         HL Clam Shells  3\" Hold x10  L2 3\" Hold x10        LTR's  10\" Hold x5 10\" Hold x5 ea       SKC  10\" Hold x5 DKC 10\" Hold x10 w/ PB ea       Iso Hip ADD          Seated Thoracolumbar  Flexion w/ PB   5\" Hold x10        Hip Flx/Ext/ABD          Hamstring Stretch   10\" Hold x5 ea       Trunk Rotation Machine          Trunk Extension Machine          Trunk Flexion Machine         NU Step for muscular endurance   L4 5'       HEP Instruction  BM         Ther Activity                           Gait Training                           Modalities         MHP  5'  8'                      "

## 2024-12-11 ENCOUNTER — APPOINTMENT (OUTPATIENT)
Dept: URBAN - NONMETROPOLITAN AREA CLINIC 4 | Facility: CLINIC | Age: 74
Setting detail: DERMATOLOGY
End: 2024-12-11

## 2024-12-11 ENCOUNTER — TELEPHONE (OUTPATIENT)
Age: 74
End: 2024-12-11

## 2024-12-11 DIAGNOSIS — L82.0 INFLAMED SEBORRHEIC KERATOSIS: ICD-10-CM

## 2024-12-11 PROCEDURE — ? COUNSELING

## 2024-12-11 PROCEDURE — ? TREATMENT REGIMEN

## 2024-12-11 PROCEDURE — ? LIQUID NITROGEN

## 2024-12-11 PROCEDURE — 17110 DESTRUCTION B9 LES UP TO 14: CPT

## 2024-12-11 ASSESSMENT — LOCATION SIMPLE DESCRIPTION DERM
LOCATION SIMPLE: UPPER BACK
LOCATION SIMPLE: ABDOMEN
LOCATION SIMPLE: LEFT BREAST
LOCATION SIMPLE: LEFT UPPER BACK

## 2024-12-11 ASSESSMENT — LOCATION DETAILED DESCRIPTION DERM
LOCATION DETAILED: LEFT INFRAMAMMARY CREASE (INNER QUADRANT)
LOCATION DETAILED: INFERIOR THORACIC SPINE
LOCATION DETAILED: XIPHOID
LOCATION DETAILED: LEFT INFERIOR MEDIAL UPPER BACK

## 2024-12-11 ASSESSMENT — LOCATION ZONE DERM: LOCATION ZONE: TRUNK

## 2024-12-11 ASSESSMENT — PAIN INTENSITY VAS: HOW INTENSE IS YOUR PAIN 0 BEING NO PAIN, 10 BEING THE MOST SEVERE PAIN POSSIBLE?: NO PAIN

## 2024-12-11 NOTE — PROCEDURE: LIQUID NITROGEN
Include Z78.9 (Other Specified Conditions Influencing Health Status) As An Associated Diagnosis?: No
Consent: The patient's consent was obtained including but not limited to risks of crusting, scabbing, blistering, scarring, darker or lighter pigmentary change, recurrence, incomplete removal and infection.
Number Of Freeze-Thaw Cycles: 1 freeze-thaw cycle
Spray Paint Text: The liquid nitrogen was applied to the skin utilizing a spray paint frosting technique.
Post-Care Instructions: I reviewed with the patient in detail post-care instructions. Patient is to wear sunprotection, and avoid picking at any of the treated lesions. Pt may apply Vaseline to crusted or scabbing areas.
Duration Of Freeze Thaw-Cycle (Seconds): 5-10
Show Applicator Variable?: Yes
Medical Necessity Clause: This procedure was medically necessary because the lesions that were treated were:
Detail Level: Zone
Medical Necessity Information: It is in your best interest to select a reason for this procedure from the list below. All of these items fulfill various CMS LCD requirements except the new and changing color options.

## 2024-12-11 NOTE — TELEPHONE ENCOUNTER
Caller: Rosita    Doctor: SHANE    Reason for call: pt has a referral on file to have a procedure in media tab form [facility]     Call back#: [phone]

## 2024-12-12 ENCOUNTER — OFFICE VISIT (OUTPATIENT)
Dept: PHYSICAL THERAPY | Facility: CLINIC | Age: 74
End: 2024-12-12
Payer: COMMERCIAL

## 2024-12-12 DIAGNOSIS — M48.061 SPINAL STENOSIS, LUMBAR REGION, WITHOUT NEUROGENIC CLAUDICATION: Primary | ICD-10-CM

## 2024-12-12 DIAGNOSIS — M62.81 MUSCLE WEAKNESS (GENERALIZED): ICD-10-CM

## 2024-12-12 PROCEDURE — 97140 MANUAL THERAPY 1/> REGIONS: CPT

## 2024-12-12 PROCEDURE — 97110 THERAPEUTIC EXERCISES: CPT

## 2024-12-12 NOTE — PROGRESS NOTES
"Daily Note     Today's date: 2024  Patient name: Rosita Soriano  : 1950  MRN: [id number]  Referring provider: Jaylen Waddell DO  Dx:   Encounter Diagnosis     ICD-10-CM    1. Spinal stenosis, lumbar region, without neurogenic claudication  M48.061       2. Muscle weakness (generalized)  M62.81         Start Time: 1545  Stop Time: 1635  Total time in clinic (min): 50 minutes    Subjective: Patient reports following up with her MD. She states soreness in her low back this afternoon with minimal radicular symptoms into her lower extremities.       Objective: See treatment diary below      Assessment: Tolerated treatment well. We progressed the current program with the addition of core conditioning and trunk extension strengthening with good tolerance from patient. Patient demonstrated fatigue post treatment, exhibited good technique with therapeutic exercises, and would benefit from continued PT.       Plan: Continue per plan of care.      Precautions: OA, Neurogenic Claudication due to lumbar spinal stenosis    Manuals 12/3 12/9  12/12      Stretch: B Hams, Hip IR/ER, Hip ABD  BM  BM                                  Neuro Re-Ed         TA Draw-in  5\" Hold x10  5\" Hold x10  HEP       TA Draw-in w/ Hip Marches          TA Draw-in w/ PB Press Down          TA Draw-in w/ CC Anti Rot          TA Draw-in w/ Stir the Pots          TA Draw-in w/ Alt Arm + Leg          TA Draw-in w/ LTP          TA Draw-in w/ MTP         Ther Ex         HL Clam Shells  3\" Hold x10  L2 3\" Hold x10  L2 3\" Hold x10       LTR's  10\" Hold x5 10\" Hold x5 ea 10\" Hold x5 ea      SKC  10\" Hold x5 DKC 10\" Hold x10 w/ PB ea DKC 10\" Hold x10 w/ PB       Iso Hip ADD          Seated Thoracolumbar  Flexion w/ PB   5\" Hold x10  5\" Hold x10       Hip Flx/Ext/ABD          Hamstring Stretch   10\" Hold x5 ea 20\" Hold x5 ea      Trunk Rotation Machine          Trunk Extension Machine    P40 3x10       Trunk Flexion Machine   P40 3x10      NU Step " for muscular endurance   L4 5' L5 5'      HEP Instruction  BM         Ther Activity                           Gait Training                           Modalities         MHP  5'  8'  8'

## 2024-12-13 NOTE — TELEPHONE ENCOUNTER
Caller: amanda Becker     Doctor: Osmar    Reason for call: pt was calling to follow up with the referral to have injection. Please Advise     Call back#: [phone]

## 2024-12-16 ENCOUNTER — OFFICE VISIT (OUTPATIENT)
Dept: PHYSICAL THERAPY | Facility: CLINIC | Age: 74
End: 2024-12-16
Payer: COMMERCIAL

## 2024-12-16 ENCOUNTER — PREP FOR PROCEDURE (OUTPATIENT)
Dept: PAIN MEDICINE | Facility: CLINIC | Age: 74
End: 2024-12-16

## 2024-12-16 ENCOUNTER — TELEPHONE (OUTPATIENT)
Dept: OBGYN CLINIC | Facility: HOSPITAL | Age: 74
End: 2024-12-16

## 2024-12-16 DIAGNOSIS — M47.816 LUMBAR SPONDYLOSIS: Primary | ICD-10-CM

## 2024-12-16 DIAGNOSIS — M48.061 SPINAL STENOSIS, LUMBAR REGION, WITHOUT NEUROGENIC CLAUDICATION: Primary | ICD-10-CM

## 2024-12-16 DIAGNOSIS — M62.81 MUSCLE WEAKNESS (GENERALIZED): ICD-10-CM

## 2024-12-16 PROCEDURE — 97140 MANUAL THERAPY 1/> REGIONS: CPT

## 2024-12-16 PROCEDURE — 97110 THERAPEUTIC EXERCISES: CPT

## 2024-12-16 NOTE — TELEPHONE ENCOUNTER
Caller: Patient     Doctor:      Reason for call: Patient calling to schedule procedure.    She will be free until 2:30 or after 3:00 pm.     Please advise     Call back#: [phone]

## 2024-12-16 NOTE — TELEPHONE ENCOUNTER
Caller: Rosita    Doctor/Office: [facility]    Call regarding :  Concerning Spinal injection- scheduled appointment.      Call was transferred to: Warm Transferred to [facility]

## 2024-12-16 NOTE — PROGRESS NOTES
"Daily Note     Today's date: 2024  Patient name: Rosita Soriano  : 1950  MRN: [id number]  Referring provider: Jaylen Waddell DO  Dx:   Encounter Diagnosis     ICD-10-CM    1. Spinal stenosis, lumbar region, without neurogenic claudication  M48.061       2. Muscle weakness (generalized)  M62.81           Start Time: 1410  Stop Time: 1505  Total time in clinic (min): 55 minutes    Subjective: Patient reports low back soreness this afternoon and attributes this to shoveling today.       Objective: See treatment diary below      Assessment: Tolerated treatment well. We progressed the current program with the addition of core conditioning with good tolerance from patient. We will continue to progress patient within tolerance to address functional deficits. Patient would benefit from continued PT.      Plan: Continue per plan of care.      Precautions: OA, Neurogenic Claudication due to lumbar spinal stenosis    Manuals 12/3 12/9  12/12 12/16     Stretch: B Hams, Hip IR/ER, Hip ABD, Piri   BM  BM  BM                                 Neuro Re-Ed         TA Draw-in  5\" Hold x10  5\" Hold x10  HEP       TA Draw-in w/ Hip Marches     X10 ea     TA Draw-in w/ PB Press Down     5\" Hold x10      TA Draw-in w/ CC Anti Rot     P1 x10 ea     TA Draw-in w/ Stir the Pots          TA Draw-in w/ Alt Arm + Leg          TA Draw-in w/ LTP          TA Draw-in w/ MTP         Ther Ex         HL Clam Shells  3\" Hold x10  L2 3\" Hold x10  L2 3\" Hold x10  NT      LTR's  10\" Hold x5 10\" Hold x5 ea 10\" Hold x5 ea HEP      SKC  10\" Hold x5 DKC 10\" Hold x10 w/ PB ea DKC 10\" Hold x10 w/ PB  DKC 10\" Hold x10 w/ PB      Iso Hip ADD          Seated Thoracolumbar  Flexion w/ PB   5\" Hold x10  5\" Hold x10  5\" Hold x10      Hip Flx/Ext/ABD          Hamstring Stretch   10\" Hold x5 ea 20\" Hold x5 ea 20\" Hold x5 ea     Trunk Rotation Machine          Trunk Extension Machine    P40 3x10  P40 3x10      Trunk Flexion Machine   P40 3x10 P40 3x10   "    NU Step for muscular endurance   L4 5' L5 5' L5 5'      HEP Instruction  BM         Ther Activity                           Gait Training                           Modalities         MHP  5'  8'  8'  8'                        Dapsone Pregnancy And Lactation Text: This medication is Pregnancy Category C and is not considered safe during pregnancy or breast feeding.

## 2024-12-19 ENCOUNTER — OFFICE VISIT (OUTPATIENT)
Dept: PHYSICAL THERAPY | Facility: CLINIC | Age: 74
End: 2024-12-19
Payer: COMMERCIAL

## 2024-12-19 DIAGNOSIS — M62.81 MUSCLE WEAKNESS (GENERALIZED): ICD-10-CM

## 2024-12-19 DIAGNOSIS — M48.061 SPINAL STENOSIS, LUMBAR REGION, WITHOUT NEUROGENIC CLAUDICATION: Primary | ICD-10-CM

## 2024-12-19 PROCEDURE — 97110 THERAPEUTIC EXERCISES: CPT

## 2024-12-19 PROCEDURE — 97112 NEUROMUSCULAR REEDUCATION: CPT

## 2024-12-19 PROCEDURE — 97140 MANUAL THERAPY 1/> REGIONS: CPT

## 2024-12-19 NOTE — PROGRESS NOTES
"Daily Note     Today's date: 2024  Patient name: Rosita Soriano  : 1950  MRN: [id number]  Referring provider: Jaylen Waddell DO  Dx:   Encounter Diagnosis     ICD-10-CM    1. Spinal stenosis, lumbar region, without neurogenic claudication  M48.061       2. Muscle weakness (generalized)  M62.81           Start Time: 1345  Stop Time: 1435  Total time in clinic (min): 50 minutes    Subjective: Patient reports soreness in her low back this afternoon. She notes numbness and tingling into her lower extremities has reduced slightly.       Objective: See treatment diary below      Assessment: Tolerated treatment well with a mild increase in lumbar spine symptomatology. We progressed the current program with the addition of core conditioning with good tolerance. Patient exhibited good technique with therapeutic exercises and would benefit from continued PT.      Plan: Continue per plan of care.      Precautions: OA, Neurogenic Claudication due to lumbar spinal stenosis    Manuals 12/3 12/9  12/12 12/16 12/19     Stretch: B Hams, Hip IR/ER, Hip ABD, Piri   BM  BM  BM  BM                                Neuro Re-Ed         TA Draw-in  5\" Hold x10  5\" Hold x10  HEP       TA Draw-in w/ Hip Marches     X10 ea X10 ea    TA Draw-in w/ PB Press Down     5\" Hold x10  5\" Hold x10     TA Draw-in w/ CC Anti Rot     P1 x10 ea P1 x10 ea    TA Draw-in w/ Stir the Pots          TA Draw-in w/ Alt Arm + Leg          TA Draw-in w/ LTP      L4 x10     TA Draw-in w/ MTP     L4 x10     Ther Ex         HL Clam Shells  3\" Hold x10  L2 3\" Hold x10  L2 3\" Hold x10  NT      LTR's  10\" Hold x5 10\" Hold x5 ea 10\" Hold x5 ea HEP      SKC  10\" Hold x5 DKC 10\" Hold x10 w/ PB ea DKC 10\" Hold x10 w/ PB  DKC 10\" Hold x10 w/ PB  DKC 10\" Hold x10 w/ PB     Iso Hip ADD          Seated Thoracolumbar  Flexion w/ PB   5\" Hold x10  5\" Hold x10  5\" Hold x10  5\" Hold x10     Hip Flx/Ext/ABD          Hamstring Stretch   10\" Hold x5 ea 20\" Hold x5 ea " "20\" Hold x5 ea 20\" Hold x5 ea    Trunk Rotation Machine          Trunk Extension Machine    P40 3x10  P40 3x10  P40 3x10     Trunk Flexion Machine   P40 3x10 P40 3x10  P40 3x10     NU Step for muscular endurance   L4 5' L5 5' L5 5'  L5 5'     HEP Instruction  BM         Ther Activity                           Gait Training                           Modalities         P  5'  8'  8'  8' 8'                         "

## 2024-12-24 ENCOUNTER — OFFICE VISIT (OUTPATIENT)
Dept: PHYSICAL THERAPY | Facility: CLINIC | Age: 74
End: 2024-12-24
Payer: COMMERCIAL

## 2024-12-24 DIAGNOSIS — M48.061 SPINAL STENOSIS, LUMBAR REGION, WITHOUT NEUROGENIC CLAUDICATION: Primary | ICD-10-CM

## 2024-12-24 DIAGNOSIS — M62.81 MUSCLE WEAKNESS (GENERALIZED): ICD-10-CM

## 2024-12-24 PROCEDURE — 97140 MANUAL THERAPY 1/> REGIONS: CPT

## 2024-12-24 PROCEDURE — 97110 THERAPEUTIC EXERCISES: CPT

## 2024-12-24 PROCEDURE — 97112 NEUROMUSCULAR REEDUCATION: CPT

## 2024-12-24 NOTE — PROGRESS NOTES
"Daily Note     Today's date: 2024  Patient name: Rosita Soriano  : 1950  MRN: [id number]  Referring provider: Jaylen Waddell DO  Dx:   Encounter Diagnosis     ICD-10-CM    1. Spinal stenosis, lumbar region, without neurogenic claudication  M48.061       2. Muscle weakness (generalized)  M62.81         Start Time: 1300  Stop Time: 1350  Total time in clinic (min): 50 minutes    Subjective: Patient reports numbness and tingling in her legs is not as often. She states still experiencing low back pain with certain activities.       Objective: See treatment diary below      Assessment: Tolerated treatment well with a minimal increase in lumbar spine symptomatology. Patient demonstrated fatigue post treatment, exhibited good technique with therapeutic exercises, and would benefit from continued PT.      Plan: Continue per plan of care.      Precautions: OA, Neurogenic Claudication due to lumbar spinal stenosis    Manuals 12/3 12/9  12/12 12/16 12/19  12/24   Stretch: B Hams, Hip IR/ER, Hip ABD, Piri   BM  BM  BM  BM  BM                              Neuro Re-Ed         TA Draw-in  5\" Hold x10  5\" Hold x10  HEP       TA Draw-in w/ Hip Marches     X10 ea X10 ea X10  ea   TA Draw-in w/ PB Press Down     5\" Hold x10  5\" Hold x10  5\" Hold x10    TA Draw-in w/ CC Anti Rot     P1 x10 ea P1 x10 ea TB x10 ea   TA Draw-in w/ Stir the Pots          TA Draw-in w/ Alt Arm + Leg          TA Draw-in w/ LTP      L4 x10  L4 x10    TA Draw-in w/ MTP     L4 x10  L4 x10    Ther Ex         HL Clam Shells  3\" Hold x10  L2 3\" Hold x10  L2 3\" Hold x10  NT      LTR's  10\" Hold x5 10\" Hold x5 ea 10\" Hold x5 ea HEP      SKC  10\" Hold x5 DKC 10\" Hold x10 w/ PB ea DKC 10\" Hold x10 w/ PB  DKC 10\" Hold x10 w/ PB  DKC 10\" Hold x10 w/ PB  DKC 10\" Hold x10 w/ PB    Iso Hip ADD          Seated Thoracolumbar  Flexion w/ PB   5\" Hold x10  5\" Hold x10  5\" Hold x10  5\" Hold x10  5\" Hold x10    Hip Flx/Ext/ABD          Hamstring Stretch   10\" " "Hold x5 ea 20\" Hold x5 ea 20\" Hold x5 ea 20\" Hold x5 ea 20\" Hold x5 ea   Trunk Rotation Machine          Trunk Extension Machine    P40 3x10  P40 3x10  P40 3x10  P40 3x10    Trunk Flexion Machine   P40 3x10 P40 3x10  P40 3x10  P40 3x10    NU Step for muscular endurance   L4 5' L5 5' L5 5'  L5 5'  L5 5'    HEP Instruction  BM         Ther Activity                           Gait Training                           Modalities         MHP  5'  8'  8'  8' 8'  8'                          "

## 2024-12-26 ENCOUNTER — OFFICE VISIT (OUTPATIENT)
Dept: PHYSICAL THERAPY | Facility: CLINIC | Age: 74
End: 2024-12-26
Payer: COMMERCIAL

## 2024-12-26 DIAGNOSIS — M62.81 MUSCLE WEAKNESS (GENERALIZED): ICD-10-CM

## 2024-12-26 DIAGNOSIS — M48.061 SPINAL STENOSIS, LUMBAR REGION, WITHOUT NEUROGENIC CLAUDICATION: Primary | ICD-10-CM

## 2024-12-26 PROCEDURE — 97140 MANUAL THERAPY 1/> REGIONS: CPT

## 2024-12-26 PROCEDURE — 97110 THERAPEUTIC EXERCISES: CPT

## 2024-12-26 PROCEDURE — 97112 NEUROMUSCULAR REEDUCATION: CPT

## 2024-12-26 NOTE — PROGRESS NOTES
"Daily Note     Today's date: 2024  Patient name: Rosita Soriano  : 1950  MRN: [id number]  Referring provider: Jaylen Waddell DO  Dx:   Encounter Diagnosis     ICD-10-CM    1. Spinal stenosis, lumbar region, without neurogenic claudication  M48.061       2. Muscle weakness (generalized)  M62.81           Start Time: 1430  Stop Time: 1520  Total time in clinic (min): 50 minutes    Subjective: Patient reports soreness in her low back this afternoon.       Objective: See treatment diary below      Assessment: Tolerated treatment well with a minimal increase in symptomatology. We progressed the current program with the addition of increased resistance with good tolerance from patient. We will continue to progress patient within tolerance to address functional limitations. Patient exhibited good technique with therapeutic exercises and would benefit from continued PT.      Plan: Continue per plan of care.      Precautions: OA, Neurogenic Claudication due to lumbar spinal stenosis    Manuals    Stretch: B Hams, Hip IR/ER, Hip ABD, Piri  BM  BM  BM  BM  BM  BM                              Neuro Re-Ed         TA Draw-in   5\" Hold x10  HEP       TA Draw-in w/ Hip Marches  L2 X10 ea   X10 ea X10 ea X10  ea   TA Draw-in w/ PB Press Down  5\" Hold x10    5\" Hold x10  5\" Hold x10  5\" Hold x10    TA Draw-in w/ CC Anti Rot  TB L2 x10 ea   P1 x10 ea P1 x10 ea TB x10 ea   TA Draw-in w/ Stir the Pots          TA Draw-in w/ Alt Arm + Leg          TA Draw-in w/ LTP  L4 x10     L4 x10  L4 x10    TA Draw-in w/ MTP L4 x10     L4 x10  L4 x10    Ther Ex         HL Clam Shells  3\" Hold x10  L2 3\" Hold x10  L2 3\" Hold x10  NT      LTR's   10\" Hold x5 ea 10\" Hold x5 ea HEP      SKC  DKC 10\" Hold x10 w/ PB DKC 10\" Hold x10 w/ PB ea DKC 10\" Hold x10 w/ PB  DKC 10\" Hold x10 w/ PB  DKC 10\" Hold x10 w/ PB  DKC 10\" Hold x10 w/ PB    Iso Hip ADD          Seated Thoracolumbar  Flexion w/ PB  5\" Hold " "x10  5\" Hold x10  5\" Hold x10  5\" Hold x10  5\" Hold x10  5\" Hold x10    Hip Flx/Ext/ABD          Hamstring Stretch  20\" Hold x5 ea 10\" Hold x5 ea 20\" Hold x5 ea 20\" Hold x5 ea 20\" Hold x5 ea 20\" Hold x5 ea   Trunk Rotation Machine          Trunk Extension Machine  P50 3x10   P40 3x10  P40 3x10  P40 3x10  P40 3x10    Trunk Flexion Machine P50 3x10   P40 3x10 P40 3x10  P40 3x10  P40 3x10    NU Step for muscular endurance  L5 5'  L4 5' L5 5' L5 5'  L5 5'  L5 5'    HEP Instruction  BM         Ther Activity                           Gait Training                           Modalities         MHP  5'  8'  8'  8' 8'  8'                            "

## 2024-12-31 ENCOUNTER — OFFICE VISIT (OUTPATIENT)
Dept: PHYSICAL THERAPY | Facility: CLINIC | Age: 74
End: 2024-12-31
Payer: COMMERCIAL

## 2024-12-31 DIAGNOSIS — M48.061 SPINAL STENOSIS, LUMBAR REGION, WITHOUT NEUROGENIC CLAUDICATION: Primary | ICD-10-CM

## 2024-12-31 DIAGNOSIS — M62.81 MUSCLE WEAKNESS (GENERALIZED): ICD-10-CM

## 2024-12-31 PROCEDURE — 97110 THERAPEUTIC EXERCISES: CPT

## 2024-12-31 PROCEDURE — 97112 NEUROMUSCULAR REEDUCATION: CPT

## 2024-12-31 PROCEDURE — 97140 MANUAL THERAPY 1/> REGIONS: CPT

## 2024-12-31 NOTE — PROGRESS NOTES
"Daily Note     Today's date: 2024  Patient name: Rosita Soriano  : 1950  MRN: [id number]  Referring provider: Jaylen Waddell DO  Dx:   Encounter Diagnosis     ICD-10-CM    1. Spinal stenosis, lumbar region, without neurogenic claudication  M48.061       2. Muscle weakness (generalized)  M62.81         Start Time: 1357  Stop Time: 1445  Total time in clinic (min): 48 minutes    Subjective: Patient reports continued reduction in numbness and tingling in bilateral lower extremities. She states low back soreness this afternoon.       Objective: See treatment diary below      Assessment: Tolerated treatment well. Patient is responding well to Physical Therapy treatment and is making appropriate progressions with the current program. She demonstrated fatigue post treatment, exhibited good technique with therapeutic exercises, and would benefit from continued PT.      Plan: Continue per plan of care.      Precautions: OA, Neurogenic Claudication due to lumbar spinal stenosis    Manuals    Stretch: B Hams, Hip IR/ER, Hip ABD, Piri  BM  BM  BM  BM  BM  BM                              Neuro Re-Ed         TA Draw-in    HEP       TA Draw-in w/ Hip Marches  L2 X10 ea L2 x10 ea   X10 ea X10 ea X10  ea   TA Draw-in w/ PB Press Down  5\" Hold x10  5\" Hold x10   5\" Hold x10  5\" Hold x10  5\" Hold x10    TA Draw-in w/ CC Anti Rot  TB L2 x10 ea TB L3 x10 ea  P1 x10 ea P1 x10 ea TB x10 ea   TA Draw-in w/ Stir the Pots          TA Draw-in w/ Alt Arm + Leg          TA Draw-in w/ LTP  L4 x10  L4 x10    L4 x10  L4 x10    TA Draw-in w/ MTP L4 x10  L4 x10    L4 x10  L4 x10    Ther Ex         HL Clam Shells  3\" Hold x10  HEP  L2 3\" Hold x10  NT      LTR's    10\" Hold x5 ea HEP      SKC  DKC 10\" Hold x10 w/ PB DKC 10\" Hold x10 w/ PB ea DKC 10\" Hold x10 w/ PB  DKC 10\" Hold x10 w/ PB  DKC 10\" Hold x10 w/ PB  DKC 10\" Hold x10 w/ PB    Iso Hip ADD          Seated Thoracolumbar  Flexion w/ PB  5\" " "Hold x10  5\" Hold x10  5\" Hold x10  5\" Hold x10  5\" Hold x10  5\" Hold x10    Hip Flx/Ext/ABD          Hamstring Stretch  20\" Hold x5 ea 10\" Hold x5 ea 20\" Hold x5 ea 20\" Hold x5 ea 20\" Hold x5 ea 20\" Hold x5 ea   Trunk Rotation Machine          Trunk Extension Machine  P50 3x10  P50 3x10  P40 3x10  P40 3x10  P40 3x10  P40 3x10    Trunk Flexion Machine P50 3x10  P50 3x10  P40 3x10 P40 3x10  P40 3x10  P40 3x10    NU Step for muscular endurance  L5 5'  L5 5' L5 5' L5 5'  L5 5'  L5 5'    HEP Instruction  BM         Ther Activity                           Gait Training                           Modalities         MHP  5'  8'  8'  8' 8'  8'                              "

## 2025-01-09 ENCOUNTER — OFFICE VISIT (OUTPATIENT)
Dept: PHYSICAL THERAPY | Facility: CLINIC | Age: 75
End: 2025-01-09
Payer: COMMERCIAL

## 2025-01-09 DIAGNOSIS — M62.81 MUSCLE WEAKNESS (GENERALIZED): ICD-10-CM

## 2025-01-09 DIAGNOSIS — M48.061 SPINAL STENOSIS, LUMBAR REGION, WITHOUT NEUROGENIC CLAUDICATION: Primary | ICD-10-CM

## 2025-01-09 PROCEDURE — 97140 MANUAL THERAPY 1/> REGIONS: CPT

## 2025-01-09 PROCEDURE — 97112 NEUROMUSCULAR REEDUCATION: CPT

## 2025-01-09 PROCEDURE — 97110 THERAPEUTIC EXERCISES: CPT

## 2025-01-09 NOTE — PROGRESS NOTES
"Daily Note     Today's date: 2025  Patient name: Rosita Soriano  : 1950  MRN: [id number]  Referring provider: Jaylen Waddell DO  Dx:   Encounter Diagnosis     ICD-10-CM    1. Spinal stenosis, lumbar region, without neurogenic claudication  M48.061       2. Muscle weakness (generalized)  M62.81           Start Time: 1600  Stop Time: 1650  Total time in clinic (min): 50 minutes    Subjective: Patient reports a continued reduction in numbness and tingling into her lower extremities. She still notes having low back soreness as well as stiffness.      Objective: See treatment diary below      Assessment: Tolerated treatment well. We progressed the current program with the addition of increased resistance with trunk extension strengthening. Patient demonstrated good tolerance to progression. She exhibited good technique with therapeutic exercises and would benefit from continued PT.      Plan: Continue per plan of care.      Precautions: OA, Neurogenic Claudication due to lumbar spinal stenosis    Manuals    Stretch: B Hams, Hip IR/ER, Hip ABD, Piri  BM  BM  BM  BM  BM  BM                              Neuro Re-Ed         TA Draw-in    HEP       TA Draw-in w/ Hip Marches  L2 X10 ea L2 x10 ea  L2 x10 ea X10 ea X10 ea X10  ea   TA Draw-in w/ PB Press Down  5\" Hold x10  5\" Hold x10  5\" Hold x10  5\" Hold x10  5\" Hold x10  5\" Hold x10    TA Draw-in w/ CC Anti Rot  TB L2 x10 ea TB L3 x10 ea TB L3 x10 ea P1 x10 ea P1 x10 ea TB x10 ea   TA Draw-in w/ Stir the Pots          TA Draw-in w/ Alt Arm + Leg          TA Draw-in w/ LTP  L4 x10  L4 x10  L5 x10   L4 x10  L4 x10    TA Draw-in w/ MTP L4 x10  L4 x10  L5 x10   L4 x10  L4 x10    Ther Ex         HL Clam Shells  3\" Hold x10  HEP   NT      LTR's     HEP      SKC  DKC 10\" Hold x10 w/ PB DKC 10\" Hold x10 w/ PB ea DKC 10\" Hold x10 w/ PB  DKC 10\" Hold x10 w/ PB  DKC 10\" Hold x10 w/ PB  DKC 10\" Hold x10 w/ PB    Iso Hip ADD        " "  Seated Thoracolumbar  Flexion w/ PB  5\" Hold x10  5\" Hold x10  5\" Hold x10  5\" Hold x10  5\" Hold x10  5\" Hold x10    Hip Flx/Ext/ABD          Hamstring Stretch  20\" Hold x5 ea 10\" Hold x5 ea 20\" Hold x5 ea 20\" Hold x5 ea 20\" Hold x5 ea 20\" Hold x5 ea   Trunk Rotation Machine          Trunk Extension Machine  P50 3x10  P50 3x10  P60 3x10  P40 3x10  P40 3x10  P40 3x10    Trunk Flexion Machine P50 3x10  P50 3x10  P50 3x10 P40 3x10  P40 3x10  P40 3x10    NU Step for muscular endurance  L5 5'  L5 5' L5 5' L5 5'  L5 5'  L5 5'    HEP Instruction  BM         Ther Activity                           Gait Training                           Modalities         P  5'  8'  8'  8' 8'  8'                                "

## 2025-01-13 ENCOUNTER — EVALUATION (OUTPATIENT)
Dept: PHYSICAL THERAPY | Facility: CLINIC | Age: 75
End: 2025-01-13
Payer: COMMERCIAL

## 2025-01-13 DIAGNOSIS — M62.81 MUSCLE WEAKNESS (GENERALIZED): ICD-10-CM

## 2025-01-13 DIAGNOSIS — M48.061 SPINAL STENOSIS, LUMBAR REGION, WITHOUT NEUROGENIC CLAUDICATION: Primary | ICD-10-CM

## 2025-01-13 PROCEDURE — 97110 THERAPEUTIC EXERCISES: CPT

## 2025-01-13 PROCEDURE — 97140 MANUAL THERAPY 1/> REGIONS: CPT

## 2025-01-13 NOTE — PROGRESS NOTES
PT Re-Evaluation     Today's date: 2025  Patient name: Rosita Soriano  : 1950  MRN: [id number]  Referring provider: Jaylen Waddell DO  Dx:   Encounter Diagnosis     ICD-10-CM    1. Spinal stenosis, lumbar region, without neurogenic claudication  M48.061       2. Muscle weakness (generalized)  M62.81                      Assessment  Impairments: abnormal or restricted ROM, abnormal movement, activity intolerance, impaired physical strength, lacks appropriate home exercise program, pain with function, poor posture  and activity limitations  Symptom irritability: moderate    Assessment details: Rosita Soriano is a 74 y.o. female presenting to Physical Therapy today with the chief complaints of lumbar spine pain with radicular symptoms into BLE's. Patient is making steady progress with Physical Therapy treatment this far. Upon completion of the PT re-evaluation the patient is demonstrating with limitations in thoracolumbar mobility, core conditioning, b/l hip mobility, b/l hip strength, and pain. She is currently having difficulty with the performance of activities such as standing tolerance, ambulation, stair navigation, lifting, pushing, pulling, and carrying due to symptomatology. Patient would continue to benefit from skilled Physical Therapy services to address functional limitations to return to prior level of function.  Understanding of Dx/Px/POC: excellent     Prognosis: good    Goals  STG: (6 weeks)  1.) Patient will initiate HEP Independently MET   2.) Patient will have a 50% reduction in overall symptoms PROGRESSING  3.) Patient will demonstrate improved b/l hip strength by 50% in all planes of mobility PROGRESSING  4.) Patient will demonstrate improved ability to perform overhead activity >/= 2 minutes  PROGRESSING  5.) Patient will demonstrate improved thoracolumbar mobility by 50% in all planes of motion  PROGRESSING    LTG: (12 weeks)  1.) Patient will be d/c to an HEP independently  PROGRESSING  2.) Patient will improve functional abilities evidenced by FOTO scores PROGRESSING  3.) Eliminate pain with functional activity PROGRESSING  4.) Patient will demonstrate improved ability to lift, push, pull, and carry safely w/o symptoms PROGRESSING  5.) Patient will demonstrate improved thoracolumbar mobility by 90% in all planes of motion as evidence of restored function PROGRESSING  6.) Patient will demonstrate improved b/l hip strength by 90% in all planes of mobility as evidence of restored function PROGRESSING    Plan  Patient would benefit from: PT eval and skilled physical therapy  Referral necessary: No  Planned modality interventions: cryotherapy and thermotherapy: hydrocollator packs    Planned therapy interventions: functional ROM exercises, graded activity, graded exercise, graded motor, home exercise program, flexibility, joint mobilization, manual therapy, neuromuscular re-education, patient education, postural training, self care, strengthening, stretching, therapeutic activities, therapeutic exercise and therapeutic training    Frequency: 1-2x week  Duration in weeks: 5  Plan of Care beginning date: 1/13/2025  Plan of Care expiration date: 2/17/2025  Treatment plan discussed with: patient  Plan details: Plan of care was reviewed and discussed thoroughly with the patient on their current condition. Patient was instructed on a HEP with written instructions. Patient is in agreement with PT recommendations and will attend Physical Therapy 1-2x/week for the next 5 weeks to address current deficits.      Subjective Evaluation    History of Present Illness  Mechanism of injury: The patient reports today with low back pain that started over 20 years ago with no precipitating injury or trauma to the lumbar spine region. She states more recently experiencing intermittent numbness and tingling into bilateral lower extremities. She notes symptoms are aggravated by prolonged walking, standing, work  duties, lifting, pushing, pulling, and carrying. She followed up with [facility] and had X-ray imaging that revealed a grade I anterolisthesis of L2 on L3 and L3 on L4 with dynamic instability of L3 on L4 with flexion-extension. She will follow up with Dr. Waddell on 12/10. She is now referred to OPPT.     Update 25: The patient reports today with a 50% improvement in low back function and symptoms since the start of Physical Therapy treatment. She notes a reduction in numbness and tingling into bilateral lower extremities. She states still having difficulty with activities such as standing tolerance, stair navigation, walking, lifting, pushing, pulling, and carrying.             Recurrent probem    Quality of life: good    Patient Goals  Patient goals for therapy: decreased pain, increased motion, increased strength, independence with ADLs/IADLs and return to sport/leisure activities    Pain  Current pain ratin  At best pain ratin  At worst pain ratin  Location: Lumbar Spine radiating into bilateral lower extremities  Quality: needle-like and radiating  Relieving factors: medications and rest (injections)  Aggravating factors: stair climbing, standing, walking and lifting (pushing, pulling, carrying)  Progression: improved    Social Support    Employment status: working    Diagnostic Tests  X-ray: abnormal  Treatments  Current treatment: physical therapy    Objective     Concurrent Complaints  Negative for night pain, disturbed sleep, bladder dysfunction, bowel dysfunction and saddle (S4) numbness    Postural Observations  Seated posture: poor  Standing posture: poor    Additional Postural Observation Details  Patient demonstrates increased thoracic kyphosis, rounded shoulders, and forward head with postural mechanics.    Palpation   Left   Hypertonic in the erector spinae, lumbar paraspinals and quadratus lumborum.     Right   Hypertonic in the erector spinae, lumbar paraspinals and quadratus lumborum.      Tenderness     Lumbar Spine  No tenderness in the spinous process, left transverse process and right transverse process.     Left Hip   No tenderness in the ASIS and PSIS.     Right Hip   No tenderness in the ASIS and PSIS.     Neurological Testing     Sensation     Lumbar   Left   Hyposensation: light touch    Right   Hyposensation: light touch    Active Range of Motion     Lumbar   Flexion:  Restriction level: minimal  Extension:  with pain Restriction level: moderate  Left lateral flexion:  with pain Restriction level: maximal  Right lateral flexion:  Restriction level: minimal  Left rotation:  Restriction level: minimal  Right rotation:  Restriction level: minimal    Strength/Myotome Testing     Left Hip   Planes of Motion   Flexion: 4-  Extension: 4-  Abduction: 3+  Adduction: 4-  Internal rotation: 4-    Right Hip   Planes of Motion   Flexion: 4-  Extension: 4-  Abduction: 3+  Adduction: 4-  External rotation: 4-  Internal rotation: 4-    Left Knee   Flexion: 5  Extension: 5    Right Knee   Flexion: 5  Extension: 5    Left Ankle/Foot   Dorsiflexion: 5  Plantar flexion: 5  Inversion: 5  Eversion: 5    Right Ankle/Foot   Dorsiflexion: 5  Plantar flexion: 5  Inversion: 5  Eversion: 5    Muscle Activation     Additional Muscle Activation Details  Patient demonstrates decreased transverse abdominal activation.    Tests     Lumbar     Left   Negative passive SLR.     Right   Negative passive SLR.     Left Pelvic Girdle/Sacrum   Negative: active SLR test.     Right Pelvic Girdle/Sacrum   Negative: active SLR test.     Left Hip   Positive BRENDA.   Negative FADIR.     Right Hip   Positive BRENDA.   Negative FADIR.              Precautions: OA, Neurogenic Claudication due to lumbar spinal stenosis    Manuals 12/26 12/31 1/9 1/13 12/19 12/24   Stretch: B Hams, Hip IR/ER, Hip ABD, Piri  BM  BM  BM  BM  BM  BM                              Neuro Re-Ed         TA Draw-in    HEP       TA Draw-in w/ Hip Marches  L2 X10 ea  "L2 x10 ea  L2 x10 ea L2 X10 ea X10 ea X10  ea   TA Draw-in w/ PB Press Down  5\" Hold x10  5\" Hold x10  5\" Hold x10  5\" Hold x10  5\" Hold x10  5\" Hold x10    TA Draw-in w/ CC Anti Rot  TB L2 x10 ea TB L3 x10 ea TB L3 x10 ea TB L3 x10 ea P1 x10 ea TB x10 ea   TA Draw-in w/ Stir the Pots          TA Draw-in w/ Alt Arm + Leg          TA Draw-in w/ LTP  L4 x10  L4 x10  L5 x10  L5 x10  L4 x10  L4 x10    TA Draw-in w/ MTP L4 x10  L4 x10  L5 x10  L5 x10  L4 x10  L4 x10    Ther Ex         SKC  DKC 10\" Hold x10 w/ PB DKC 10\" Hold x10 w/ PB ea DKC 10\" Hold x10 w/ PB  DKC 10\" Hold x10 w/ PB  DKC 10\" Hold x10 w/ PB  DKC 10\" Hold x10 w/ PB    Iso Hip ADD          Seated Thoracolumbar  Flexion w/ PB  5\" Hold x10  5\" Hold x10  5\" Hold x10  5\" Hold x10  5\" Hold x10  5\" Hold x10    Hip Flx/Ext/ABD          Hamstring Stretch  20\" Hold x5 ea 10\" Hold x5 ea 20\" Hold x5 ea 20\" Hold x5 ea 20\" Hold x5 ea 20\" Hold x5 ea   Trunk Rotation Machine          Trunk Extension Machine  P50 3x10  P50 3x10  P60 3x10  P60 3x10  P40 3x10  P40 3x10    Trunk Flexion Machine P50 3x10  P50 3x10  P50 3x10 P50 3x10  P40 3x10  P40 3x10    Seated Thoracolumbar Extension w/ 1/2 foam          NU Step for muscular endurance  L5 5'  L5 5' L5 5' L5 5'  L5 5'  L5 5'    HEP Instruction  BM         Ther Activity                           Gait Training                           Modalities         [facility]  5'  8'  8'  8' 8'  8'                  " No

## 2025-01-13 NOTE — LETTER
2025    Jaylen Waddell DO  [address] [facility] [address]    Patient: Rosita Soriano   YOB: 1950   Date of Visit: 2025     Encounter Diagnosis     ICD-10-CM    1. Spinal stenosis, lumbar region, without neurogenic claudication  M48.061       2. Muscle weakness (generalized)  M62.81           Dear Dr. Waddell:    Thank you for your recent referral of Rosita Soriano. Please review the attached re-evaluation summary from Rosita's recent visit.     Please verify that you agree with the plan of care by signing the attached order.     If you have any questions or concerns, please do not hesitate to call.     I sincerely appreciate the opportunity to share in the care of one of your patients and hope to have another opportunity to work with you in the near future.       Sincerely,    Chente Echols, PT      Referring Provider:      I certify that I have read the below Plan of Care and certify the need for these services furnished under this plan of treatment while under my care.                    Jaylen Waddell DO  [address] [facility] [address]  Via Fax: [phone]          PT Re-Evaluation     Today's date: 2025  Patient name: Rosita Soriano  : 1950  MRN: [id number]  Referring provider: Jaylen Waddell DO  Dx:   Encounter Diagnosis     ICD-10-CM    1. Spinal stenosis, lumbar region, without neurogenic claudication  M48.061       2. Muscle weakness (generalized)  M62.81                      Assessment  Impairments: abnormal or restricted ROM, abnormal movement, activity intolerance, impaired physical strength, lacks appropriate home exercise program, pain with function, poor posture  and activity limitations  Symptom irritability: moderate    Assessment details: Rosita Soriano is a 74 y.o. female presenting to Physical Therapy today with the chief complaints of lumbar spine pain with radicular symptoms into BLE's. Patient is making steady progress with  Physical Therapy treatment this far. Upon completion of the PT re-evaluation the patient is demonstrating with limitations in thoracolumbar mobility, core conditioning, b/l hip mobility, b/l hip strength, and pain. She is currently having difficulty with the performance of activities such as standing tolerance, ambulation, stair navigation, lifting, pushing, pulling, and carrying due to symptomatology. Patient would continue to benefit from skilled Physical Therapy services to address functional limitations to return to prior level of function.  Understanding of Dx/Px/POC: excellent     Prognosis: good    Goals  STG: (6 weeks)  1.) Patient will initiate HEP Independently MET   2.) Patient will have a 50% reduction in overall symptoms PROGRESSING  3.) Patient will demonstrate improved b/l hip strength by 50% in all planes of mobility PROGRESSING  4.) Patient will demonstrate improved ability to perform overhead activity >/= 2 minutes  PROGRESSING  5.) Patient will demonstrate improved thoracolumbar mobility by 50% in all planes of motion  PROGRESSING    LTG: (12 weeks)  1.) Patient will be d/c to an HEP independently PROGRESSING  2.) Patient will improve functional abilities evidenced by FOTO scores PROGRESSING  3.) Eliminate pain with functional activity PROGRESSING  4.) Patient will demonstrate improved ability to lift, push, pull, and carry safely w/o symptoms PROGRESSING  5.) Patient will demonstrate improved thoracolumbar mobility by 90% in all planes of motion as evidence of restored function PROGRESSING  6.) Patient will demonstrate improved b/l hip strength by 90% in all planes of mobility as evidence of restored function PROGRESSING    Plan  Patient would benefit from: PT eval and skilled physical therapy  Referral necessary: No  Planned modality interventions: cryotherapy and thermotherapy: hydrocollator packs    Planned therapy interventions: functional ROM exercises, graded activity, graded exercise,  graded motor, home exercise program, flexibility, joint mobilization, manual therapy, neuromuscular re-education, patient education, postural training, self care, strengthening, stretching, therapeutic activities, therapeutic exercise and therapeutic training    Frequency: 1-2x week  Duration in weeks: 5  Plan of Care beginning date: 1/13/2025  Plan of Care expiration date: 2/17/2025  Treatment plan discussed with: patient  Plan details: Plan of care was reviewed and discussed thoroughly with the patient on their current condition. Patient was instructed on a HEP with written instructions. Patient is in agreement with PT recommendations and will attend Physical Therapy 1-2x/week for the next 5 weeks to address current deficits.      Subjective Evaluation    History of Present Illness  Mechanism of injury: The patient reports today with low back pain that started over 20 years ago with no precipitating injury or trauma to the lumbar spine region. She states more recently experiencing intermittent numbness and tingling into bilateral lower extremities. She notes symptoms are aggravated by prolonged walking, standing, work duties, lifting, pushing, pulling, and carrying. She followed up with [facility] and had X-ray imaging that revealed a grade I anterolisthesis of L2 on L3 and L3 on L4 with dynamic instability of L3 on L4 with flexion-extension. She will follow up with Dr. Waddell on 12/10. She is now referred to OPPT.     Update 1-13-25: The patient reports today with a 50% improvement in low back function and symptoms since the start of Physical Therapy treatment. She notes a reduction in numbness and tingling into bilateral lower extremities. She states still having difficulty with activities such as standing tolerance, stair navigation, walking, lifting, pushing, pulling, and carrying.             Recurrent probem    Quality of life: good    Patient Goals  Patient goals for therapy: decreased pain, increased motion,  increased strength, independence with ADLs/IADLs and return to sport/leisure activities    Pain  Current pain ratin  At best pain ratin  At worst pain ratin  Location: Lumbar Spine radiating into bilateral lower extremities  Quality: needle-like and radiating  Relieving factors: medications and rest (injections)  Aggravating factors: stair climbing, standing, walking and lifting (pushing, pulling, carrying)  Progression: improved    Social Support    Employment status: working    Diagnostic Tests  X-ray: abnormal  Treatments  Current treatment: physical therapy    Objective     Concurrent Complaints  Negative for night pain, disturbed sleep, bladder dysfunction, bowel dysfunction and saddle (S4) numbness    Postural Observations  Seated posture: poor  Standing posture: poor    Additional Postural Observation Details  Patient demonstrates increased thoracic kyphosis, rounded shoulders, and forward head with postural mechanics.    Palpation   Left   Hypertonic in the erector spinae, lumbar paraspinals and quadratus lumborum.     Right   Hypertonic in the erector spinae, lumbar paraspinals and quadratus lumborum.     Tenderness     Lumbar Spine  No tenderness in the spinous process, left transverse process and right transverse process.     Left Hip   No tenderness in the ASIS and PSIS.     Right Hip   No tenderness in the ASIS and PSIS.     Neurological Testing     Sensation     Lumbar   Left   Hyposensation: light touch    Right   Hyposensation: light touch    Active Range of Motion     Lumbar   Flexion:  Restriction level: minimal  Extension:  with pain Restriction level: moderate  Left lateral flexion:  with pain Restriction level: maximal  Right lateral flexion:  Restriction level: minimal  Left rotation:  Restriction level: minimal  Right rotation:  Restriction level: minimal    Strength/Myotome Testing     Left Hip   Planes of Motion   Flexion: 4-  Extension: 4-  Abduction: 3+  Adduction: 4-  Internal  "rotation: 4-    Right Hip   Planes of Motion   Flexion: 4-  Extension: 4-  Abduction: 3+  Adduction: 4-  External rotation: 4-  Internal rotation: 4-    Left Knee   Flexion: 5  Extension: 5    Right Knee   Flexion: 5  Extension: 5    Left Ankle/Foot   Dorsiflexion: 5  Plantar flexion: 5  Inversion: 5  Eversion: 5    Right Ankle/Foot   Dorsiflexion: 5  Plantar flexion: 5  Inversion: 5  Eversion: 5    Muscle Activation     Additional Muscle Activation Details  Patient demonstrates decreased transverse abdominal activation.    Tests     Lumbar     Left   Negative passive SLR.     Right   Negative passive SLR.     Left Pelvic Girdle/Sacrum   Negative: active SLR test.     Right Pelvic Girdle/Sacrum   Negative: active SLR test.     Left Hip   Positive BRENDA.   Negative FADIR.     Right Hip   Positive BRENDA.   Negative FADIR.              Precautions: OA, Neurogenic Claudication due to lumbar spinal stenosis    Manuals 12/26 12/31 1/9 1/13 12/19 12/24   Stretch: B Hams, Hip IR/ER, Hip ABD, Piri  BM  BM  BM  BM  BM  BM                              Neuro Re-Ed         TA Draw-in    HEP       TA Draw-in w/ Hip Marches  L2 X10 ea L2 x10 ea  L2 x10 ea L2 X10 ea X10 ea X10  ea   TA Draw-in w/ PB Press Down  5\" Hold x10  5\" Hold x10  5\" Hold x10  5\" Hold x10  5\" Hold x10  5\" Hold x10    TA Draw-in w/ CC Anti Rot  TB L2 x10 ea TB L3 x10 ea TB L3 x10 ea TB L3 x10 ea P1 x10 ea TB x10 ea   TA Draw-in w/ Stir the Pots          TA Draw-in w/ Alt Arm + Leg          TA Draw-in w/ LTP  L4 x10  L4 x10  L5 x10  L5 x10  L4 x10  L4 x10    TA Draw-in w/ MTP L4 x10  L4 x10  L5 x10  L5 x10  L4 x10  L4 x10    Ther Ex         SKC  DKC 10\" Hold x10 w/ PB DKC 10\" Hold x10 w/ PB ea DKC 10\" Hold x10 w/ PB  DKC 10\" Hold x10 w/ PB  DKC 10\" Hold x10 w/ PB  DKC 10\" Hold x10 w/ PB    Iso Hip ADD          Seated Thoracolumbar  Flexion w/ PB  5\" Hold x10  5\" Hold x10  5\" Hold x10  5\" Hold x10  5\" Hold x10  5\" Hold x10    Hip Flx/Ext/ABD          Hamstring " "Stretch  20\" Hold x5 ea 10\" Hold x5 ea 20\" Hold x5 ea 20\" Hold x5 ea 20\" Hold x5 ea 20\" Hold x5 ea   Trunk Rotation Machine          Trunk Extension Machine  P50 3x10  P50 3x10  P60 3x10  P60 3x10  P40 3x10  P40 3x10    Trunk Flexion Machine P50 3x10  P50 3x10  P50 3x10 P50 3x10  P40 3x10  P40 3x10    Seated Thoracolumbar Extension w/ 1/2 foam          NU Step for muscular endurance  L5 5'  L5 5' L5 5' L5 5'  L5 5'  L5 5'    HEP Instruction  BM         Ther Activity                           Gait Training                           Modalities         P  5'  8'  8'  8' 8'  8'                                   "

## 2025-01-16 ENCOUNTER — OFFICE VISIT (OUTPATIENT)
Dept: PHYSICAL THERAPY | Facility: CLINIC | Age: 75
End: 2025-01-16
Payer: COMMERCIAL

## 2025-01-16 DIAGNOSIS — M48.061 SPINAL STENOSIS, LUMBAR REGION, WITHOUT NEUROGENIC CLAUDICATION: Primary | ICD-10-CM

## 2025-01-16 DIAGNOSIS — M62.81 MUSCLE WEAKNESS (GENERALIZED): ICD-10-CM

## 2025-01-16 PROCEDURE — 97112 NEUROMUSCULAR REEDUCATION: CPT

## 2025-01-16 PROCEDURE — 97110 THERAPEUTIC EXERCISES: CPT

## 2025-01-16 NOTE — PROGRESS NOTES
"Daily Note     Today's date: 2025  Patient name: Rosita Soriano  : 1950  MRN: [id number]  Referring provider: Jaylen Waddell DO  Dx:   Encounter Diagnosis     ICD-10-CM    1. Spinal stenosis, lumbar region, without neurogenic claudication  M48.061       2. Muscle weakness (generalized)  M62.81                      Subjective: Patient reports experiencing increased numbness/tingling last night in her R hip region. She notes increased low back pain with prolonged standing.        Objective: See treatment diary below      Assessment: Tolerated treatment well. We progressed the current program with the addition of seated and standing lumbar extension mobility exercises with good tolerance. Patient exhibited good technique with therapeutic exercises and would benefit from continued PT.      Plan: Continue per plan of care.      Precautions: OA, Neurogenic Claudication due to lumbar spinal stenosis    Manuals    Stretch: B Hams, Hip IR/ER, Hip ABD, Piri  BM  BM  BM  BM  BM  BM                              Neuro Re-Ed         TA Draw-in    HEP       TA Draw-in w/ Hip Marches  L2 X10 ea L2 x10 ea  L2 x10 ea L2 X10 ea L2 X10 ea X10  ea   TA Draw-in w/ PB Press Down  5\" Hold x10  5\" Hold x10  5\" Hold x10  5\" Hold x10  5\" Hold x10  5\" Hold x10    TA Draw-in w/ CC Anti Rot  TB L2 x10 ea TB L3 x10 ea TB L3 x10 ea TB L3 x10 ea TB L3 x10 ea TB x10 ea   TA Draw-in w/ Stir the Pots          TA Draw-in w/ Alt Arm + Leg          TA Draw-in w/ LTP  L4 x10  L4 x10  L5 x10  L5 x10  L5 x10  L4 x10    TA Draw-in w/ MTP L4 x10  L4 x10  L5 x10  L5 x10  L5 x10  L4 x10    Ther Ex         SKC  DKC 10\" Hold x10 w/ PB DKC 10\" Hold x10 w/ PB ea DKC 10\" Hold x10 w/ PB  DKC 10\" Hold x10 w/ PB  DKC 10\" Hold x10 w/ PB  DKC 10\" Hold x10 w/ PB    Iso Hip ADD          Seated Thoracolumbar  Flexion w/ PB  5\" Hold x10  5\" Hold x10  5\" Hold x10  5\" Hold x10  5\" Hold x10  5\" Hold x10    Hip Flx/Ext/ABD        " "  Hamstring Stretch  20\" Hold x5 ea 10\" Hold x5 ea 20\" Hold x5 ea 20\" Hold x5 ea 20\" Hold x5 ea 20\" Hold x5 ea   Trunk Rotation Machine          Trunk Extension Machine  P50 3x10  P50 3x10  P60 3x10  P60 3x10  P60 3x10  P40 3x10    Trunk Flexion Machine P50 3x10  P50 3x10  P50 3x10 P50 3x10  P50 3x10  P40 3x10    Seated Thoracolumbar Extension w/ 1/2 foam      X10 3\" Hold     Standing Lumbar Extension      X10 3\" Hold     NU Step for muscular endurance  L5 5'  L5 5' L5 5' L5 5'  L5 5'  L5 5'    HEP Instruction  BM         Ther Activity                           Gait Training                           Modalities         MHP  5'  8'  8'  8' 8'  8'                  "

## 2025-01-20 ENCOUNTER — OFFICE VISIT (OUTPATIENT)
Dept: PHYSICAL THERAPY | Facility: CLINIC | Age: 75
End: 2025-01-20
Payer: COMMERCIAL

## 2025-01-20 DIAGNOSIS — M62.81 MUSCLE WEAKNESS (GENERALIZED): ICD-10-CM

## 2025-01-20 DIAGNOSIS — M48.061 SPINAL STENOSIS, LUMBAR REGION, WITHOUT NEUROGENIC CLAUDICATION: Primary | ICD-10-CM

## 2025-01-20 PROCEDURE — 97112 NEUROMUSCULAR REEDUCATION: CPT

## 2025-01-20 PROCEDURE — 97110 THERAPEUTIC EXERCISES: CPT

## 2025-01-20 PROCEDURE — 97140 MANUAL THERAPY 1/> REGIONS: CPT

## 2025-01-20 NOTE — PROGRESS NOTES
"Daily Note     Today's date: 2025  Patient name: Rosita Soriano  : 1950  MRN: [id number]  Referring provider: Jaylen Waddell DO  Dx:   Encounter Diagnosis     ICD-10-CM    1. Spinal stenosis, lumbar region, without neurogenic claudication  M48.061       2. Muscle weakness (generalized)  M62.81                      Subjective: Patient reports her low back often feels tired. She notes clearing snow yesterday with minimal increase in low back pain and radicular symptoms. She will follow up with pain management this week regarding low back.      Objective: See treatment diary below      Assessment: Tolerated treatment well. Patient demonstrated fatigue post treatment, exhibited good technique with therapeutic exercises, and would benefit from continued PT to address functional limitations.      Plan: Continue per plan of care.      Precautions: OA, Neurogenic Claudication due to lumbar spinal stenosis    Manuals    Stretch: B Hams, Hip IR/ER, Hip ABD, Piri  BM  BM  BM  BM  BM  BM                              Neuro Re-Ed         TA Draw-in    HEP       TA Draw-in w/ Hip Marches  L2 X10 ea L2 x10 ea  L2 x10 ea L2 X10 ea L2 X10 ea L2 X10  ea   TA Draw-in w/ PB Press Down  5\" Hold x10  5\" Hold x10  5\" Hold x10  5\" Hold x10  5\" Hold x10  5\" Hold x10    TA Draw-in w/ CC Anti Rot  TB L2 x10 ea TB L3 x10 ea TB L3 x10 ea TB L3 x10 ea TB L3 x10 ea TB L3 x10 ea   TA Draw-in w/ Stir the Pots          TA Draw-in w/ Alt Arm + Leg          TA Draw-in w/ LTP  L4 x10  L4 x10  L5 x10  L5 x10  L5 x10  L5 x10    TA Draw-in w/ MTP L4 x10  L4 x10  L5 x10  L5 x10  L5 x10  L5 x10    Ther Ex         SKC  DKC 10\" Hold x10 w/ PB DKC 10\" Hold x10 w/ PB ea DKC 10\" Hold x10 w/ PB  DKC 10\" Hold x10 w/ PB  DKC 10\" Hold x10 w/ PB  DKC 10\" Hold x10 w/ PB    Iso Hip ADD          Seated Thoracolumbar  Flexion w/ PB  5\" Hold x10  5\" Hold x10  5\" Hold x10  5\" Hold x10  5\" Hold x10  5\" Hold x10    Hip " "Flx/Ext/ABD          Hamstring Stretch  20\" Hold x5 ea 10\" Hold x5 ea 20\" Hold x5 ea 20\" Hold x5 ea 20\" Hold x5 ea 20\" Hold x5 ea   Trunk Rotation Machine          Trunk Extension Machine  P50 3x10  P50 3x10  P60 3x10  P60 3x10  P60 3x10  P60 3x10    Trunk Flexion Machine P50 3x10  P50 3x10  P50 3x10 P50 3x10  P50 3x10  P50 3x10    Seated Thoracolumbar Extension w/ 1/2 foam      X10 3\" Hold  X10 3\" Hold    Standing Lumbar Extension      X10 3\" Hold  X10 3\" Hold    NU Step for muscular endurance  L5 5'  L5 5' L5 5' L5 5'  L5 5'  L5 5'    HEP Instruction  BM         Ther Activity                           Gait Training                           Modalities         [facility]  5'  8'  8'  8' 8'  8'                    "

## 2025-01-21 ENCOUNTER — OFFICE VISIT (OUTPATIENT)
Dept: FAMILY MEDICINE CLINIC | Facility: CLINIC | Age: 75
End: 2025-01-21
Payer: COMMERCIAL

## 2025-01-21 VITALS
SYSTOLIC BLOOD PRESSURE: 120 MMHG | DIASTOLIC BLOOD PRESSURE: 70 MMHG | TEMPERATURE: 97.8 F | HEIGHT: 66 IN | HEART RATE: 76 BPM | BODY MASS INDEX: 32.82 KG/M2 | OXYGEN SATURATION: 98 % | WEIGHT: 204.2 LBS

## 2025-01-21 DIAGNOSIS — E66.9 OBESITY (BMI 30-39.9): ICD-10-CM

## 2025-01-21 DIAGNOSIS — D22.9 MULTIPLE NEVI: Primary | ICD-10-CM

## 2025-01-21 PROCEDURE — G2211 COMPLEX E/M VISIT ADD ON: HCPCS | Performed by: PHYSICIAN ASSISTANT

## 2025-01-21 PROCEDURE — 99213 OFFICE O/P EST LOW 20 MIN: CPT | Performed by: PHYSICIAN ASSISTANT

## 2025-01-21 NOTE — PROGRESS NOTES
Name: Rosita Soriano      : 1950      MRN: [id number]  Encounter Provider: Brittany Webb PA-C  Encounter Date: 2025   Encounter department: [facility]  :  Assessment & Plan  Multiple nevi  Recommended that patient schedule an appointment with her dermatologist to have mole evaluated, and complete body check.       Obesity (BMI 30-39.9)  Reviewed diet and exercise.  Recommended that she try tracking her food, cutting back on portions, and cutting back on sweets.                History of Present Illness   Rosita is a pleasant 74-year-old female who is here today with concerns of a mole.  She admits that she found a mole on her left inguinal area.  She is unsure of how long it was there for.  She does follow with a dermatologist, but has not had a full body check in quite some time.  It is not painful or bleeding.  It has not changed in color.  She feels as though it has changed in size.  She is also concerned about her weight.  She admits that she does not follow a healthy diet.  She enjoys sweets and snacking.      Review of Systems   Constitutional:  Negative for chills, diaphoresis, fatigue and fever.   HENT:  Negative for congestion, ear pain, postnasal drip, rhinorrhea, sneezing, sore throat and trouble swallowing.    Eyes:  Negative for pain and visual disturbance.   Respiratory:  Negative for apnea, cough, shortness of breath and wheezing.    Cardiovascular:  Negative for chest pain and palpitations.   Gastrointestinal:  Negative for abdominal pain, constipation, diarrhea, nausea and vomiting.   Genitourinary:  Negative for dysuria and hematuria.   Musculoskeletal:  Negative for arthralgias, gait problem and myalgias.   Skin:  Positive for color change.   Neurological:  Negative for dizziness, syncope, weakness, light-headedness, numbness and headaches.   Psychiatric/Behavioral:  Negative for suicidal ideas. The patient is not nervous/anxious.        Objective   /70   Pulse 76  "  Temp 97.8 °F (36.6 °C)   Ht 5' 6\" (1.676 m)   Wt 92.6 kg (204 lb 3.2 oz)   SpO2 98%   BMI 32.96 kg/m²      Physical Exam  Vitals and nursing note reviewed.   Constitutional:       General: She is not in acute distress.     Appearance: She is well-developed. She is not diaphoretic.   HENT:      Head: Normocephalic and atraumatic.      Right Ear: Hearing and external ear normal.      Left Ear: Hearing and external ear normal.      Nose: Nose normal. No mucosal edema or rhinorrhea.      Mouth/Throat:      Pharynx: No oropharyngeal exudate or posterior oropharyngeal erythema.   Eyes:      Extraocular Movements: Extraocular movements intact.   Cardiovascular:      Rate and Rhythm: Normal rate and regular rhythm.      Heart sounds: Normal heart sounds. No murmur heard.     No friction rub. No gallop.   Pulmonary:      Effort: Pulmonary effort is normal. No respiratory distress.      Breath sounds: Normal breath sounds. No wheezing or rales.   Musculoskeletal:         General: Normal range of motion.      Cervical back: Normal range of motion and neck supple.   Skin:     General: Skin is warm and dry.      Findings: No rash.          Neurological:      Mental Status: She is alert and oriented to person, place, and time.   Psychiatric:         Behavior: Behavior normal.         Thought Content: Thought content normal.         Judgment: Judgment normal.         "

## 2025-01-22 ENCOUNTER — HOSPITAL ENCOUNTER (OUTPATIENT)
Facility: HOSPITAL | Age: 75
Setting detail: OUTPATIENT SURGERY
Discharge: HOME | End: 2025-01-22
Attending: ANESTHESIOLOGY | Admitting: ANESTHESIOLOGY
Payer: COMMERCIAL

## 2025-01-22 ENCOUNTER — APPOINTMENT (OUTPATIENT)
Dept: RADIOLOGY | Facility: HOSPITAL | Age: 75
End: 2025-01-22
Payer: COMMERCIAL

## 2025-01-22 VITALS
BODY MASS INDEX: 32.02 KG/M2 | RESPIRATION RATE: 18 BRPM | WEIGHT: 204 LBS | SYSTOLIC BLOOD PRESSURE: 158 MMHG | OXYGEN SATURATION: 97 % | HEART RATE: 69 BPM | HEIGHT: 67 IN | DIASTOLIC BLOOD PRESSURE: 73 MMHG | TEMPERATURE: 97.8 F

## 2025-01-22 PROCEDURE — 62323 NJX INTERLAMINAR LMBR/SAC: CPT | Performed by: ANESTHESIOLOGY

## 2025-01-22 RX ORDER — LIDOCAINE HYDROCHLORIDE 10 MG/ML
INJECTION, SOLUTION EPIDURAL; INFILTRATION; INTRACAUDAL; PERINEURAL AS NEEDED
Status: DISCONTINUED | OUTPATIENT
Start: 2025-01-22 | End: 2025-01-22 | Stop reason: HOSPADM

## 2025-01-22 RX ORDER — METHYLPREDNISOLONE ACETATE 80 MG/ML
INJECTION, SUSPENSION INTRA-ARTICULAR; INTRALESIONAL; INTRAMUSCULAR; SOFT TISSUE AS NEEDED
Status: DISCONTINUED | OUTPATIENT
Start: 2025-01-22 | End: 2025-01-22 | Stop reason: HOSPADM

## 2025-01-22 NOTE — H&P
Assessment:   Lumbar radiculopathy    Plan:  Rosita Soriano is a 74 y.o. female with complaints of low back pain and bilateral leg pain presents to surgical center for procedure.  We will perform a Procedure(s) (LRB):  L3-L4 IESI (N/A)   2. Follow-up 1 month after injection    Complete risks and benefits including bleeding, infection, tissue reaction, nerve injury and allergic reaction were discussed. The approach was demonstrated using models and literature was provided. Verbal and written consent was obtained.    My impressions and treatment recommendations were discussed in detail with the patient who verbalized understanding and had no further questions.  Discharge instructions were provided. I personally saw and examined the patient and I agree with the above discussed plan of care.    Orders Placed This Encounter   Procedures    FL spine and pain procedure     Standing Status:   Standing     Number of Occurrences:   1     Reason for Exam::   l3-l4 iesi     Anticoagulant hold needed?:   no     No orders of the defined types were placed in this encounter.      History of Present Illness:  Rosita Soriano is a 74 y.o. female who presents for a follow up office visit in regards to low back pain and bilateral leg pain.   The patient’s current symptoms include.  Sutures and simple throbbing procedure time..      I have personally reviewed and/or updated the patient's past medical history, past surgical history, family history, social history, current medications, allergies, and vital signs today.     Review of Systems   Musculoskeletal:  Positive for arthralgias and back pain.   Neurological:  Positive for weakness.   All other systems reviewed and are negative.      Patient Active Problem List   Diagnosis    Chronic bilateral low back pain with bilateral sciatica    Lumbar radiculopathy    Chronic pain syndrome    Lumbar spondylosis    Lumbar disc disease with radiculopathy    Neurogenic claudication due to  lumbar spinal stenosis    Vitamin D deficiency    Polyp of colon    Osteoarthritis of knee    Hyperlipidemia    Generalized osteoarthritis    Gastroesophageal reflux disease    Chest pain    Right ankle tendonitis    Chronic pain of left knee       Past Medical History:   Diagnosis Date    Joint pain     Urine incontinence        Past Surgical History:   Procedure Laterality Date    BLADDER SURGERY      BREAST SURGERY      CHOLECYSTECTOMY      COLONOSCOPY      EPIDURAL BLOCK INJECTION Right 06/01/2023    Procedure: Right L3-4 and L4-5 transforaminal epidural steroid injection;  Surgeon: Soumya Prasad MD;  Location: [facility];  Service: Pain Management     EYE SURGERY      HYSTERECTOMY      IR SPINE PROCEDURE  10/24/2019    KNEE ARTHROSCOPY      ROTATOR CUFF REPAIR      SHOULDER SURGERY      THROAT SURGERY      TONSILLECTOMY         Family History   Problem Relation Age of Onset    Breast cancer Mother     Cancer Mother     Stroke Father     Heart attack Father        Social History     Occupational History    Not on file   Tobacco Use    Smoking status: Never    Smokeless tobacco: Never   Vaping Use    Vaping status: Never Used   Substance and Sexual Activity    Alcohol use: Yes     Comment: social    Drug use: No    Sexual activity: Not Currently       No current facility-administered medications on file prior to encounter.     Current Outpatient Medications on File Prior to Encounter   Medication Sig    cetirizine (ZyrTEC) 10 mg tablet Take 1 tablet (10 mg total) by mouth daily    doxycycline monohydrate (MONODOX) 50 mg capsule     meloxicam (MOBIC) 15 mg tablet Take 1 tablet (15 mg total) by mouth daily as needed for moderate pain    pantoprazole (PROTONIX) 40 mg tablet Take 1 tablet (40 mg total) by mouth daily    Alcaftadine (Lastacaft) 0.25 % SOLN     aspirin (ECOTRIN LOW STRENGTH) 81 mg EC tablet Take 81 mg by mouth 2 (two) times a day    azelastine (ASTELIN) 0.1 % nasal spray 2 sprays into each  "nostril 2 (two) times a day Use in each nostril as directed (Patient not taking: Reported on 1/21/2025)    ergocalciferol (VITAMIN D2) 50,000 units take 1 capsule by mouth every week    ketotifen (ZADITOR) 0.025 % ophthalmic solution Administer 1 drop to both eyes 2 (two) times a day    meclizine (ANTIVERT) 25 mg tablet Take 1 tablet (25 mg total) by mouth every 8 (eight) hours as needed for dizziness       Allergies   Allergen Reactions    Corticosteroids Other (See Comments)     Increased eye pressure    Morphine GI Intolerance    Fentanyl Rash       Physical Exam:    /77   Pulse 73   Temp 97.9 °F (36.6 °C) (Tympanic)   Resp 18   Ht 5' 7\" (1.702 m)   Wt 92.5 kg (204 lb)   SpO2 96%   BMI 31.95 kg/m²     Constitutional:normal, well developed, well nourished, alert, in no distress and non-toxic and no overt pain behavior.  Eyes:anicteric  HEENT:grossly intact  Neck:supple, symmetric, trachea midline and no masses   Pulmonary:even and unlabored  Cardiovascular:No edema or pitting edema present  Skin:Normal without rashes or lesions and well hydrated  Psychiatric:Mood and affect appropriate  Neurologic:Cranial Nerves II-XII grossly intact  Musculoskeletal:normal        "

## 2025-01-22 NOTE — OP NOTE
OPERATIVE REPORT  PATIENT NAME: Rosita Soriano    :  1950  MRN: [id number]  Pt Location: [facility] IR    SURGERY DATE: 2025    Surgeons and Role:     * Soumya Prasad MD - Primary    Preop Diagnosis:  Lumbar radiculopathy    Post-Op Diagnosis Codes:     Lumbar radiculopathy    Procedure(s):  L3-L4 IESI    Specimen(s):  * No specimens in log *    Estimated Blood Loss:   Minimal    Drains:  * No LDAs found *    Anesthesia Type:   Local    Operative Indications:  Lumbar spondylosis [M47.816]      Operative Findings:  same      Complications:   None    Procedure and Technique:  Fluoroscopically-guided L3-L4 interlaminar epidural steroid injection under fluoroscopy      After discussing the risks, benefits, and alternatives to the procedure, the patient expressed understanding and wished to proceed.  The patient was brought to the fluoroscopy suite and placed in the prone position.  A procedural pause was conducted to verify:  correct patient identity, procedure to be performed and as applicable, correct side and site, correct patient position, and availability of implants, special equipment and special requirements.  After identifying the L3-L4 space fluoroscopically, the skin was sterilely prepped and draped in the usual fashion using Chloraprep skin prep.  The skin and subcutaneous tissue were anesthetized with 0.5 % lidocaine.  Utilizing a loss of resistance technique and intermittent fluoroscopic guidance, a 3.5” 20 gauge Tuohy needle was advanced into the epidural space.  Proper needle positioning was confirmed using multiple fluoroscopic views.  After negative aspiration, Omnipaque 300 contrast was injected confirming epidural spread without evidence of intravascular or intrathecal spread.  A 4 ml solution consisting of 80 mg of Medrol in sterile saline was injected slowly and incrementally into the epidural space.  Following the injection the needle was withdrawn slightly and flushed with  0.5 % lidocaine as it was fully extracted.  The patient tolerated the procedure well and there were no apparent complications.  After appropriate observation, the patient was dismissed from the clinic in good condition under their own power.    I was present for the entire procedure.    Patient Disposition:  hemodynamically stable             SIGNATURE: Soumya Prasad MD  DATE: January 22, 2025  TIME: 8:48 AM

## 2025-01-22 NOTE — DISCHARGE INSTR - AVS FIRST PAGE
Epidural Steroid Injection   WHAT YOU NEED TO KNOW:   An epidural steroid injection (JUAN) is a procedure to inject steroid medicine into the epidural space. The epidural space is between your spinal cord and vertebrae. Steroids reduce inflammation and fluid buildup in your spine that may be causing pain. You may be given pain medicine along with the steroids.          ACTIVITY  Do not drive or operate machinery today.  No strenuous activity today - bending, lifting, etc.  You may resume normal activites starting tomorrow - start slowly and as tolerated.  You may shower today, but no tub baths or hot tubs.  You may have numbness for several hours from the local anesthetic. Please use caution and common sense, especially with weight-bearing activities.    CARE OF THE INJECTION SITE  If you have soreness or pain, apply ice to the area today (20 minutes on/20 minutes off).  Starting tomorrow, you may use warm, moist heat or ice if needed.  You may have an increase or change in your discomfort for 36-48 hours after your treatment.  Apply ice and continue with any pain medication you have been prescribed.  Notify the [facility] if you have any of the following: redness, drainage, swelling, headache, stiff neck or fever above 100°F.    SPECIAL INSTRUCTIONS  Our office will contact you in approximately 14 days for a progress report.    MEDICATIONS  Continue to take all routine medications.  Our office may have instructed you to hold some medications.    As no general anesthesia was used in today's procedure, you should not experience any side effects related to anesthesia.     If you are diabetic, the steroids used in today's injection may temporarily increase your blood sugar levels after the first few days after your injection. Please keep a close eye on your sugars and alert the doctor who manages your diabetes if your sugars are significantly high from your baseline or you are symptomatic.     If you have a  problem specifically related to your procedure, please call our office at [phone].  Problems not related to your procedure should be directed to your primary care physician.

## 2025-01-23 ENCOUNTER — APPOINTMENT (OUTPATIENT)
Dept: PHYSICAL THERAPY | Facility: CLINIC | Age: 75
End: 2025-01-23
Payer: COMMERCIAL

## 2025-01-28 ENCOUNTER — APPOINTMENT (OUTPATIENT)
Dept: PHYSICAL THERAPY | Facility: CLINIC | Age: 75
End: 2025-01-28
Payer: COMMERCIAL

## 2025-01-29 ENCOUNTER — APPOINTMENT (OUTPATIENT)
Dept: URBAN - NONMETROPOLITAN AREA CLINIC 4 | Facility: CLINIC | Age: 75
Setting detail: DERMATOLOGY
End: 2025-01-29

## 2025-01-29 DIAGNOSIS — L82.0 INFLAMED SEBORRHEIC KERATOSIS: ICD-10-CM

## 2025-01-29 PROCEDURE — 17110 DESTRUCTION B9 LES UP TO 14: CPT

## 2025-01-29 PROCEDURE — ? TREATMENT REGIMEN

## 2025-01-29 PROCEDURE — ? COUNSELING

## 2025-01-29 PROCEDURE — ? LIQUID NITROGEN

## 2025-01-29 ASSESSMENT — LOCATION DETAILED DESCRIPTION DERM
LOCATION DETAILED: RIGHT MID-UPPER BACK
LOCATION DETAILED: RIGHT INFERIOR UPPER BACK
LOCATION DETAILED: LEFT INFERIOR MEDIAL UPPER BACK
LOCATION DETAILED: LEFT ANTERIOR PROXIMAL THIGH
LOCATION DETAILED: LEFT MID-UPPER BACK
LOCATION DETAILED: RIGHT SUPERIOR LATERAL MIDBACK

## 2025-01-29 ASSESSMENT — LOCATION ZONE DERM
LOCATION ZONE: LEG
LOCATION ZONE: TRUNK

## 2025-01-29 ASSESSMENT — LOCATION SIMPLE DESCRIPTION DERM
LOCATION SIMPLE: RIGHT UPPER BACK
LOCATION SIMPLE: RIGHT LOWER BACK
LOCATION SIMPLE: LEFT THIGH
LOCATION SIMPLE: LEFT UPPER BACK

## 2025-01-29 NOTE — PROCEDURE: LIQUID NITROGEN
Post-Care Instructions: I reviewed with the patient in detail post-care instructions. Patient is to wear sunprotection, and avoid picking at any of the treated lesions. Pt may apply Vaseline to crusted or scabbing areas.
Show Applicator Variable?: Yes
Add 52 Modifier (Optional): no
Spray Paint Text: The liquid nitrogen was applied to the skin utilizing a spray paint frosting technique.
Medical Necessity Information: It is in your best interest to select a reason for this procedure from the list below. All of these items fulfill various CMS LCD requirements except the new and changing color options.
Consent: The patient's consent was obtained including but not limited to risks of crusting, scabbing, blistering, scarring, darker or lighter pigmentary change, recurrence, incomplete removal and infection.
Application Tool (Optional): Cry-AC
Detail Level: Zone
Medical Necessity Clause: This procedure was medically necessary because the lesions that were treated were:
Number Of Freeze-Thaw Cycles: 1 freeze-thaw cycle

## 2025-01-30 ENCOUNTER — OFFICE VISIT (OUTPATIENT)
Dept: PHYSICAL THERAPY | Facility: CLINIC | Age: 75
End: 2025-01-30
Payer: COMMERCIAL

## 2025-01-30 DIAGNOSIS — M48.061 SPINAL STENOSIS, LUMBAR REGION, WITHOUT NEUROGENIC CLAUDICATION: Primary | ICD-10-CM

## 2025-01-30 DIAGNOSIS — M62.81 MUSCLE WEAKNESS (GENERALIZED): ICD-10-CM

## 2025-01-30 PROCEDURE — 97112 NEUROMUSCULAR REEDUCATION: CPT

## 2025-01-30 PROCEDURE — 97140 MANUAL THERAPY 1/> REGIONS: CPT

## 2025-01-30 PROCEDURE — 97110 THERAPEUTIC EXERCISES: CPT

## 2025-02-04 ENCOUNTER — OFFICE VISIT (OUTPATIENT)
Dept: PHYSICAL THERAPY | Facility: CLINIC | Age: 75
End: 2025-02-04
Payer: COMMERCIAL

## 2025-02-04 DIAGNOSIS — M62.81 MUSCLE WEAKNESS (GENERALIZED): ICD-10-CM

## 2025-02-04 DIAGNOSIS — M48.061 SPINAL STENOSIS, LUMBAR REGION, WITHOUT NEUROGENIC CLAUDICATION: Primary | ICD-10-CM

## 2025-02-04 PROCEDURE — 97110 THERAPEUTIC EXERCISES: CPT

## 2025-02-04 PROCEDURE — 97140 MANUAL THERAPY 1/> REGIONS: CPT

## 2025-02-04 PROCEDURE — 97112 NEUROMUSCULAR REEDUCATION: CPT

## 2025-02-04 NOTE — PROGRESS NOTES
"Daily Note     Today's date: 2025  Patient name: Rosita Soriano  : 1950  MRN: [id number]  Referring provider: Jaylen Waddell DO  Dx:   Encounter Diagnosis     ICD-10-CM    1. Spinal stenosis, lumbar region, without neurogenic claudication  M48.061       2. Muscle weakness (generalized)  M62.81                      Subjective: Patient reports experiencing numbness and tingling into her bilateral lower extremities once or twice a day. She notes this has improved significantly.      Objective: See treatment diary below      Assessment: Tolerated treatment well. Patient is continuing to respond well to Physical Therapy treatment and is making appropriate progressions with the directed therapeutic exercise program. She exhibited good technique with therapeutic exercises and would benefit from continued PT to address functional limitations.       Plan: Continue per plan of care.      Precautions: OA, Neurogenic Claudication due to lumbar spinal stenosis    Manuals    Stretch: B Hams, Hip IR/ER, Hip ABD, Piri  BM  BM  BM  BM  BM  BM                              Neuro Re-Ed         TA Draw-in    HEP       TA Draw-in w/ Hip Marches  L2 X10 ea L2 x10 ea  L2 x10 ea L2 X10 ea L2 X10 ea L2 X10  ea   TA Draw-in w/ PB Press Down  5\" Hold x10  5\" Hold x10  5\" Hold x10  5\" Hold x10  5\" Hold x10  5\" Hold x10    TA Draw-in w/ CC Anti Rot  TB L3 x10 ea TB L3 x10 ea TB L3 x10 ea TB L3 x10 ea TB L3 x10 ea TB L3 x10 ea   TA Draw-in w/ Stir the Pots          TA Draw-in w/ Alt Arm + Leg          TA Draw-in w/ LTP  L5 x10  L5 x10  L5 x10  L5 x10  L5 x10  L5 x10    TA Draw-in w/ MTP L5 x10  L5 x10  L5 x10  L5 x10  L5 x10  L5 x10    Ther Ex         SKC  DKC 10\" Hold x10 w/ PB DKC 10\" Hold x10 w/ PB ea DKC 10\" Hold x10 w/ PB  DKC 10\" Hold x10 w/ PB  DKC 10\" Hold x10 w/ PB  DKC 10\" Hold x10 w/ PB    Iso Hip ADD          Seated Thoracolumbar  Flexion w/ PB  5\" Hold x10  5\" Hold x10  5\" Hold x10  5\" Hold " "x10  5\" Hold x10  5\" Hold x10    Hip Flx/Ext/ABD          Hamstring Stretch  20\" Hold x5 ea 10\" Hold x5 ea 20\" Hold x5 ea 20\" Hold x5 ea 20\" Hold x5 ea 20\" Hold x5 ea   Trunk Rotation Machine          Trunk Extension Machine  P60 3x10  P60 3x10  P60 3x10  P60 3x10  P60 3x10  P60 3x10    Trunk Flexion Machine P50 3x10  P60 3x10  P50 3x10 P50 3x10  P50 3x10  P50 3x10    Seated Thoracolumbar Extension w/ 1/2 foam  X10 3\" Hold  X10 3\" Hold    X10 3\" Hold  X10 3\" Hold    Standing Lumbar Extension  X10 3\" Hold  X10 3\" Hold    X10 3\" Hold  X10 3\" Hold    NU Step for muscular endurance  L5 5'  L5 5' L5 5' L5 5'  L5 5'  L5 5'    HEP Instruction  BM         Ther Activity                           Gait Training                           Modalities         [facility]  5'  8'  8'  8' 8'  8'                        "

## 2025-02-05 ENCOUNTER — TELEPHONE (OUTPATIENT)
Dept: PAIN MEDICINE | Facility: CLINIC | Age: 75
End: 2025-02-05

## 2025-02-11 ENCOUNTER — OFFICE VISIT (OUTPATIENT)
Dept: PHYSICAL THERAPY | Facility: CLINIC | Age: 75
End: 2025-02-11
Payer: COMMERCIAL

## 2025-02-11 DIAGNOSIS — M62.81 MUSCLE WEAKNESS (GENERALIZED): ICD-10-CM

## 2025-02-11 DIAGNOSIS — M48.061 SPINAL STENOSIS, LUMBAR REGION, WITHOUT NEUROGENIC CLAUDICATION: Primary | ICD-10-CM

## 2025-02-11 PROCEDURE — 97110 THERAPEUTIC EXERCISES: CPT

## 2025-02-11 PROCEDURE — 97140 MANUAL THERAPY 1/> REGIONS: CPT

## 2025-02-11 PROCEDURE — 97112 NEUROMUSCULAR REEDUCATION: CPT

## 2025-02-11 NOTE — PROGRESS NOTES
"Daily Note     Today's date: 2025  Patient name: Rosita Soriano  : 1950  MRN: [id number]  Referring provider: Jaylen Waddell DO  Dx:   Encounter Diagnosis     ICD-10-CM    1. Spinal stenosis, lumbar region, without neurogenic claudication  M48.061       2. Muscle weakness (generalized)  M62.81                      Subjective: Patient reports increased low back pain this afternoon and attributes this to shoveling over the weekend.       Objective: See treatment diary below      Assessment: Tolerated treatment well. We will look to transition patient to an independent home exercise program next treatment session to continue managing her condition as she will be undergoing a R TKA procedure on 3/3. Patient would benefit from continued PT.      Plan: Continue per plan of care.      Precautions: OA, Neurogenic Claudication due to lumbar spinal stenosis    Manuals    Stretch: B Hams, Hip IR/ER, Hip ABD, Piri  BM  BM  BM  BM  BM  BM                              Neuro Re-Ed         TA Draw-in    HEP       TA Draw-in w/ Hip Marches  L2 X10 ea L2 x10 ea  L2 x10 ea L2 X10 ea L2 X10 ea L2 X10  ea   TA Draw-in w/ PB Press Down  5\" Hold x10  5\" Hold x10  5\" Hold x10  5\" Hold x10  5\" Hold x10  5\" Hold x10    TA Draw-in w/ CC Anti Rot  TB L3 x10 ea TB L3 x10 ea D/C  TB L3 x10 ea TB L3 x10 ea TB L3 x10 ea   TA Draw-in w/ Stir the Pots          TA Draw-in w/ Alt Arm + Leg          TA Draw-in w/ LTP  L5 x10  L5 x10  L5 x10  L5 x10  L5 x10  L5 x10    TA Draw-in w/ MTP L5 x10  L5 x10  L5 x10  L5 x10  L5 x10  L5 x10    Ther Ex         SKC  DKC 10\" Hold x10 w/ PB DKC 10\" Hold x10 w/ PB ea DKC 10\" Hold x10 w/ PB  DKC 10\" Hold x10 w/ PB  DKC 10\" Hold x10 w/ PB  DKC 10\" Hold x10 w/ PB    Iso Hip ADD          Seated Thoracolumbar  Flexion w/ PB  5\" Hold x10  5\" Hold x10  5\" Hold x10  5\" Hold x10  5\" Hold x10  5\" Hold x10    Hip Flx/Ext/ABD          Hamstring Stretch  20\" Hold x5 ea 10\" Hold x5 ea " "20\" Hold x5 ea 20\" Hold x5 ea 20\" Hold x5 ea 20\" Hold x5 ea   Trunk Rotation Machine          Trunk Extension Machine  P60 3x10  P60 3x10  P60 3x10  P60 3x10  P60 3x10  P60 3x10    Trunk Flexion Machine P50 3x10  P60 3x10  P60 3x10 P50 3x10  P50 3x10  P50 3x10    Seated Thoracolumbar Extension w/ 1/2 foam  X10 3\" Hold  X10 3\" Hold  X10 3\" Hold   X10 3\" Hold  X10 3\" Hold    Standing Lumbar Extension  X10 3\" Hold  X10 3\" Hold  X10 3\" Hold   X10 3\" Hold  X10 3\" Hold    NU Step for muscular endurance  L5 5'  L5 5' L5 5' L5 5'  L5 5'  L5 5'    HEP Instruction  BM         Ther Activity                           Gait Training                           Modalities         P  5'  8'  8'  8' 8'  8'                          "

## 2025-02-13 DIAGNOSIS — M19.90 ARTHRITIS: ICD-10-CM

## 2025-02-13 RX ORDER — MELOXICAM 15 MG/1
TABLET ORAL
Qty: 90 TABLET | Refills: 1 | Status: SHIPPED | OUTPATIENT
Start: 2025-02-13

## 2025-02-18 ENCOUNTER — APPOINTMENT (OUTPATIENT)
Dept: LAB | Facility: HOSPITAL | Age: 75
End: 2025-02-18
Payer: COMMERCIAL

## 2025-02-18 ENCOUNTER — HOSPITAL ENCOUNTER (OUTPATIENT)
Dept: RADIOLOGY | Facility: HOSPITAL | Age: 75
Discharge: HOME | End: 2025-02-18
Payer: COMMERCIAL

## 2025-02-18 ENCOUNTER — OFFICE VISIT (OUTPATIENT)
Dept: PHYSICAL THERAPY | Facility: CLINIC | Age: 75
End: 2025-02-18
Payer: COMMERCIAL

## 2025-02-18 DIAGNOSIS — Z01.818 PREOP EXAMINATION: ICD-10-CM

## 2025-02-18 DIAGNOSIS — M62.81 MUSCLE WEAKNESS (GENERALIZED): ICD-10-CM

## 2025-02-18 DIAGNOSIS — M48.061 SPINAL STENOSIS, LUMBAR REGION, WITHOUT NEUROGENIC CLAUDICATION: Primary | ICD-10-CM

## 2025-02-18 LAB
ALBUMIN SERPL BCG-MCNC: 4.8 G/DL (ref 3.5–5)
ALP SERPL-CCNC: 74 U/L (ref 34–104)
ALT SERPL W P-5'-P-CCNC: 31 U/L (ref 7–52)
ANION GAP SERPL CALCULATED.3IONS-SCNC: 5 MMOL/L (ref 4–13)
AST SERPL W P-5'-P-CCNC: 26 U/L (ref 13–39)
ATRIAL RATE: 65 BPM
BASOPHILS # BLD AUTO: 0.05 THOUSANDS/ΜL (ref 0–0.1)
BASOPHILS NFR BLD AUTO: 1 % (ref 0–1)
BILIRUB SERPL-MCNC: 0.72 MG/DL (ref 0.2–1)
BUN SERPL-MCNC: 18 MG/DL (ref 5–25)
CALCIUM SERPL-MCNC: 9.5 MG/DL (ref 8.4–10.2)
CHLORIDE SERPL-SCNC: 105 MMOL/L (ref 96–108)
CO2 SERPL-SCNC: 32 MMOL/L (ref 21–32)
CREAT SERPL-MCNC: 0.7 MG/DL (ref 0.6–1.3)
EOSINOPHIL # BLD AUTO: 0.16 THOUSAND/ΜL (ref 0–0.61)
EOSINOPHIL NFR BLD AUTO: 2 % (ref 0–6)
ERYTHROCYTE [DISTWIDTH] IN BLOOD BY AUTOMATED COUNT: 13.3 % (ref 11.6–15.1)
GFR SERPL CREATININE-BSD FRML MDRD: 84 ML/MIN/1.73SQ M
GLUCOSE P FAST SERPL-MCNC: 109 MG/DL (ref 65–99)
HCT VFR BLD AUTO: 42.9 % (ref 34.8–46.1)
HGB BLD-MCNC: 13.9 G/DL (ref 11.5–15.4)
IMM GRANULOCYTES # BLD AUTO: 0.03 THOUSAND/UL (ref 0–0.2)
IMM GRANULOCYTES NFR BLD AUTO: 0 % (ref 0–2)
LYMPHOCYTES # BLD AUTO: 1.65 THOUSANDS/ΜL (ref 0.6–4.47)
LYMPHOCYTES NFR BLD AUTO: 25 % (ref 14–44)
MCH RBC QN AUTO: 31.4 PG (ref 26.8–34.3)
MCHC RBC AUTO-ENTMCNC: 32.4 G/DL (ref 31.4–37.4)
MCV RBC AUTO: 97 FL (ref 82–98)
MONOCYTES # BLD AUTO: 0.3 THOUSAND/ΜL (ref 0.17–1.22)
MONOCYTES NFR BLD AUTO: 5 % (ref 4–12)
NEUTROPHILS # BLD AUTO: 4.52 THOUSANDS/ΜL (ref 1.85–7.62)
NEUTS SEG NFR BLD AUTO: 67 % (ref 43–75)
NRBC BLD AUTO-RTO: 0 /100 WBCS
P AXIS: 61 DEGREES
PLATELET # BLD AUTO: 240 THOUSANDS/UL (ref 149–390)
PMV BLD AUTO: 11.3 FL (ref 8.9–12.7)
POTASSIUM SERPL-SCNC: 4.3 MMOL/L (ref 3.5–5.3)
PR INTERVAL: 166 MS
PROT SERPL-MCNC: 7.3 G/DL (ref 6.4–8.4)
QRS AXIS: 15 DEGREES
QRSD INTERVAL: 76 MS
QT INTERVAL: 416 MS
QTC INTERVAL: 433 MS
RBC # BLD AUTO: 4.43 MILLION/UL (ref 3.81–5.12)
SODIUM SERPL-SCNC: 142 MMOL/L (ref 135–147)
T WAVE AXIS: 40 DEGREES
VENTRICULAR RATE: 65 BPM
WBC # BLD AUTO: 6.71 THOUSAND/UL (ref 4.31–10.16)

## 2025-02-18 PROCEDURE — 97140 MANUAL THERAPY 1/> REGIONS: CPT

## 2025-02-18 PROCEDURE — 93010 ELECTROCARDIOGRAM REPORT: CPT | Performed by: INTERNAL MEDICINE

## 2025-02-18 PROCEDURE — 71046 X-RAY EXAM CHEST 2 VIEWS: CPT

## 2025-02-18 PROCEDURE — 85025 COMPLETE CBC W/AUTO DIFF WBC: CPT

## 2025-02-18 PROCEDURE — 80053 COMPREHEN METABOLIC PANEL: CPT

## 2025-02-18 PROCEDURE — 97112 NEUROMUSCULAR REEDUCATION: CPT

## 2025-02-18 PROCEDURE — 36415 COLL VENOUS BLD VENIPUNCTURE: CPT

## 2025-02-18 PROCEDURE — 97110 THERAPEUTIC EXERCISES: CPT

## 2025-02-18 NOTE — PROGRESS NOTES
PT Re-Evaluation  and PT Discharge    Today's date: 2025  Patient name: Rosita Soriano  : 1950  MRN: [id number]  Referring provider: Jaylen Waddell DO  Dx:   Encounter Diagnosis     ICD-10-CM    1. Spinal stenosis, lumbar region, without neurogenic claudication  M48.061       2. Muscle weakness (generalized)  M62.81                        Assessment  Symptom irritability: low    Assessment details: The patient presents to Physical Therapy today with a notable improvement in symptomatology and function in the lumbar spine. Patient is happy with her progress to this point and feels that her condition is much more manageable. We will d/c patient today from Physical Therapy services and transition to an independent HEP to continue managing her condition. Patient was encouraged to call the office if she has any questions or concerns.   Understanding of Dx/Px/POC: excellent     Prognosis: good    Goals  STG: (6 weeks)  1.) Patient will initiate HEP Independently MET   2.) Patient will have a 50% reduction in overall symptoms MET  3.) Patient will demonstrate improved b/l hip strength by 50% in all planes of mobility MET  4.) Patient will demonstrate improved ability to perform overhead activity >/= 2 minutes  MET  5.) Patient will demonstrate improved thoracolumbar mobility by 50% in all planes of motion  MET    LTG: (12 weeks)  1.) Patient will be d/c to an HEP independently MET  2.) Patient will improve functional abilities evidenced by FOTO scores MET  3.) Eliminate pain with functional activity MET  4.) Patient will demonstrate improved ability to lift, push, pull, and carry safely w/o symptoms MET  5.) Patient will demonstrate improved thoracolumbar mobility by 90% in all planes of motion as evidence of restored function MET  6.) Patient will demonstrate improved b/l hip strength by 90% in all planes of mobility as evidence of restored function MET    Plan    Frequency: 1-2x week  Duration in weeks:  5  Plan of Care beginning date: 2025  Plan of Care expiration date: 2025  Treatment plan discussed with: patient  Plan details: Plan of care was reviewed and discussed thoroughly with the patient on their current condition. Patient was instructed on a HEP with written instructions.     Subjective Evaluation    History of Present Illness  Mechanism of injury: The patient reports today with low back pain that started over 20 years ago with no precipitating injury or trauma to the lumbar spine region. She states more recently experiencing intermittent numbness and tingling into bilateral lower extremities. She notes symptoms are aggravated by prolonged walking, standing, work duties, lifting, pushing, pulling, and carrying. She followed up with [facility] and had X-ray imaging that revealed a grade I anterolisthesis of L2 on L3 and L3 on L4 with dynamic instability of L3 on L4 with flexion-extension. She will follow up with Dr. Waddell on 12/10. She is now referred to OPPT.     Update 25: The patient reports today with a 50% improvement in low back function and symptoms since the start of Physical Therapy treatment. She notes a reduction in numbness and tingling into bilateral lower extremities. She states still having difficulty with activities such as standing tolerance, stair navigation, walking, lifting, pushing, pulling, and carrying.     Update 25: The patient reports today with an overall improvement in low back function and symptoms. She notes radicular symptoms have been abolished. She will be preparing for R TKA and would like to transition to an independent HEP.           Recurrent probem    Quality of life: good    Patient Goals  Patient goals for therapy: decreased pain, increased motion, increased strength, independence with ADLs/IADLs and return to sport/leisure activities  Patient goal: Goals have been met.  Pain  Current pain ratin  At best pain ratin  At worst pain rating:  2  Location: Lumbar Spine radiating into bilateral lower extremities  Quality: needle-like and radiating  Relieving factors: medications and rest (injections)  Progression: improved    Social Support    Employment status: working    Diagnostic Tests  X-ray: abnormal  Treatments  Current treatment: physical therapy    Objective     Concurrent Complaints  Negative for night pain, disturbed sleep, bladder dysfunction, bowel dysfunction and saddle (S4) numbness    Postural Observations  Seated posture: fair  Standing posture: fair    Additional Postural Observation Details  Patient demonstrates increased thoracic kyphosis, rounded shoulders, and forward head with postural mechanics.    Palpation   Left   Hypertonic in the erector spinae, lumbar paraspinals and quadratus lumborum.     Right   Hypertonic in the erector spinae, lumbar paraspinals and quadratus lumborum.     Tenderness     Lumbar Spine  No tenderness in the spinous process, left transverse process and right transverse process.     Left Hip   No tenderness in the ASIS and PSIS.     Right Hip   No tenderness in the ASIS and PSIS.     Neurological Testing     Sensation     Lumbar   Left   Hyposensation: light touch    Right   Hyposensation: light touch    Active Range of Motion     Lumbar   Flexion:  WFL  Extension:  Restriction level: minimal  Left lateral flexion:  Restriction level: minimal  Right lateral flexion:  Restriction level: minimal  Left rotation:  Restriction level: minimal  Right rotation:  Restriction level: minimal    Strength/Myotome Testing     Left Hip   Planes of Motion   Flexion: WFL  Extension: WFL  Abduction: WFL  Adduction: WFL  External rotation: WFL  Internal rotation: WFL    Right Hip   Planes of Motion   Flexion: WFL  Extension: WFL  Abduction: WFL  Adduction: WFL  External rotation: WFL  Internal rotation: WFL    Left Knee   Flexion: 5  Extension: 5    Right Knee   Flexion: 5  Extension: 5    Left Ankle/Foot   Dorsiflexion:  "5  Plantar flexion: 5  Inversion: 5  Eversion: 5    Right Ankle/Foot   Dorsiflexion: 5  Plantar flexion: 5  Inversion: 5  Eversion: 5    Tests     Lumbar     Left   Negative passive SLR.     Right   Negative passive SLR.     Left Pelvic Girdle/Sacrum   Negative: active SLR test.     Right Pelvic Girdle/Sacrum   Negative: active SLR test.     Left Hip   Negative BRENDA and FADIR.     Right Hip   Negative BRENDA and FADIR.              Precautions: OA, Neurogenic Claudication due to lumbar spinal stenosis    Manuals 1/30 2/4 2/11 2/18 1/16 1/20   Stretch: B Hams, Hip IR/ER, Hip ABD, Piri  BM  BM  BM  BM  BM  BM                              Neuro Re-Ed         TA Draw-in    HEP       TA Draw-in w/ Hip Marches  L2 X10 ea L2 x10 ea  L2 x10 ea L2 X10 ea L2 X10 ea L2 X10  ea   TA Draw-in w/ PB Press Down  5\" Hold x10  5\" Hold x10  5\" Hold x10  5\" Hold x10  5\" Hold x10  5\" Hold x10    TA Draw-in w/ CC Anti Rot  TB L3 x10 ea TB L3 x10 ea D/C   TB L3 x10 ea TB L3 x10 ea   TA Draw-in w/ Stir the Pots          TA Draw-in w/ Alt Arm + Leg          TA Draw-in w/ LTP  L5 x10  L5 x10  L5 x10  L5 x10  L5 x10  L5 x10    TA Draw-in w/ MTP L5 x10  L5 x10  L5 x10  L5 x10  L5 x10  L5 x10    Ther Ex         SKC  DKC 10\" Hold x10 w/ PB DKC 10\" Hold x10 w/ PB ea DKC 10\" Hold x10 w/ PB  DKC 10\" Hold x10 w/ PB  DKC 10\" Hold x10 w/ PB  DKC 10\" Hold x10 w/ PB    Iso Hip ADD          Seated Thoracolumbar  Flexion w/ PB  5\" Hold x10  5\" Hold x10  5\" Hold x10  5\" Hold x10  5\" Hold x10  5\" Hold x10    Hip Flx/Ext/ABD          Hamstring Stretch  20\" Hold x5 ea 10\" Hold x5 ea 20\" Hold x5 ea 20\" Hold x5 ea 20\" Hold x5 ea 20\" Hold x5 ea   Trunk Rotation Machine          Trunk Extension Machine  P60 3x10  P60 3x10  P60 3x10  P60 3x10  P60 3x10  P60 3x10    Trunk Flexion Machine P50 3x10  P60 3x10  P60 3x10 P60 3x10  P50 3x10  P50 3x10    Seated Thoracolumbar Extension w/ 1/2 foam  X10 3\" Hold  X10 3\" Hold  X10 3\" Hold  X10 3\" Hold  X10 3\" Hold  X10 3\" " "Hold    Standing Lumbar Extension  X10 3\" Hold  X10 3\" Hold  X10 3\" Hold  X10 3\" Hold  X10 3\" Hold  X10 3\" Hold    NU Step for muscular endurance  L5 5'  L5 5' L5 5' L5 5'  L5 5'  L5 5'    HEP Instruction  BM         Ther Activity                           Gait Training                           Modalities         [facility]  5'  8'  8'  8' 8'  8'                  "

## 2025-02-24 ENCOUNTER — EVALUATION (OUTPATIENT)
Dept: PHYSICAL THERAPY | Facility: CLINIC | Age: 75
End: 2025-02-24
Payer: COMMERCIAL

## 2025-02-24 DIAGNOSIS — M17.11 UNILATERAL PRIMARY OSTEOARTHRITIS, RIGHT KNEE: Primary | ICD-10-CM

## 2025-02-24 PROCEDURE — 97161 PT EVAL LOW COMPLEX 20 MIN: CPT

## 2025-02-24 PROCEDURE — 97110 THERAPEUTIC EXERCISES: CPT

## 2025-02-24 NOTE — PROGRESS NOTES
PT Evaluation     Today's date: 2025  Patient name: Rosita Soriano  : 1950  MRN: [id number]  Referring provider: Anderson Farr MD  Dx:   Encounter Diagnosis     ICD-10-CM    1. Unilateral primary osteoarthritis, right knee  M17.11                      Assessment  Impairments: abnormal gait, abnormal or restricted ROM, abnormal movement, activity intolerance, impaired balance, impaired physical strength, lacks appropriate home exercise program, pain with function and activity limitations  Symptom irritability: moderate    Assessment details: Rosita Soriano is a 74 y.o. female presenting to Physical Therapy today pre-operatively R TKA procedure scheduled on 3/3/25 with Dr. Farr.  Upon completion of the initial PT evaluation the patient is presenting with limitations in R knee mobility, R knee strength, R knee joint effusion, gait mechanics, balance, and pain. Patient would benefit from a course of skilled Physical Therapy services post-operatively to address functional limitations to return to prior level of function. Thank you for your referral.   Understanding of Dx/Px/POC: excellent     Prognosis: good    Goals  STG: (6 weeks)  1.) Patient will initiate HEP Independently   2.) Patient will have a 50% reduction in overall symptoms   3.) Patient will demonstrate improved L knee strength evidenced by a 1-2 MMT grade increase  4.) Patient will demonstrate L knee flexion AROM 0-90 deg  5.) Patient will ambulate 300ft w/ SPC  6.) Patient will demonstrate improved standing tolerance >/= 1 hour   7.) Patient will demonstrate improved L hip strength evidenced by a 1-2 MMT grade increase    LTG: (12 weeks)  1.) Patient will be d/c to an HEP independently  2.) Patient will improve functional abilities evidenced by FOTO scores  3.) Eliminate pain with functional activity   4.) Patient will demonstrate improved ability to lift, push, pull, and carry safely  5.) Return to PLOF   6.) Patient will demonstrate  L knee flexion AROM 0- >/= 110 deg  7.) Patient will ambulate 300ft w/o AD      Plan  Patient would benefit from: PT eval and skilled physical therapy  Referral necessary: No  Planned modality interventions: cryotherapy and thermotherapy: hydrocollator packs    Planned therapy interventions: functional ROM exercises, graded activity, graded exercise, graded motor, home exercise program, flexibility, joint mobilization, manual therapy, neuromuscular re-education, patient education, self care, strengthening, stretching, therapeutic activities, therapeutic exercise and therapeutic training    Frequency: 2-3x week  Duration in weeks: 10  Plan of Care beginning date: 2/24/2025  Plan of Care expiration date: 5/5/2025  Treatment plan discussed with: patient  Plan details: Plan of care was reviewed and discussed thoroughly with the patient on their current condition. Patient was instructed on a HEP with written instructions. Patient is in agreement with PT recommendations and will attend Physical Therapy 2-3x/week for the next 10 weeks to address current deficits.        Subjective Evaluation    History of Present Illness  Mechanism of injury: surgery  Mechanism of injury: The patient reports today pre-operative R knee TKA procedure scheduled on 3/3 with Dr. Farr. Patient notes experiencing R knee pain for over 10 years with no precipitating injury or trauma to the region. She notes associated R knee buckling and weakness. She notes pain is aggravated by walking, lifting, pushing, pulling, pulling, and carrying. Patient lives in a two story home with one step to enter. She has a RW and SPC. Her daughter lives close by and is able to help assist patient post-operatively. She is now referred to OPPT for pre and post operative R TKA rehabilitation.          Recurrent probem    Quality of life: good    Patient Goals  Patient goals for therapy: decreased pain, decreased edema, improved balance, increased motion, increased  strength, independence with ADLs/IADLs and return to sport/leisure activities    Pain  Current pain ratin  At best pain ratin  At worst pain ratin  Location: R knee medial/lateral joint line.  Quality: dull ache  Aggravating factors: sitting, standing, walking, stair climbing and lifting (transferring, carrying)    Social Support  Steps to enter house: yes  Stairs in house: yes   Lives in: multiple-level home  Lives with: Daughter lives close.    Employment status: working    Diagnostic Tests  X-ray: abnormal  Treatments  Current treatment: physical therapy      Objective     Observations     Right Knee   Positive for effusion.     Palpation     Right   Hypertonic in the distal biceps femoris, distal semimembranosus, distal semitendinosus, lateral gastrocnemius and medial gastrocnemius.     Tenderness     Right Knee   Tenderness in the lateral joint line and medial joint line.     Neurological Testing     Sensation     Knee   Left Knee   Intact: Light touch    Right Knee   Intact: light touch     Active Range of Motion   Left Knee   Flexion: 130 degrees   Extensor la degrees     Right Knee   Flexion: 110 degrees   Extension: 22 degrees with pain    Passive Range of Motion     Right Knee   Flexion: 115 degrees   Extension: 18 degrees with pain    Mobility   Patellar Mobility:     Right Knee   Hypomobile: superior and inferior     Patellar Static Positioning   Left Knee: WFL  Right Knee: WFL    Strength/Myotome Testing     Left Knee   Flexion: 5  Extension: 5  Quadriceps contraction: good    Right Knee   Flexion: 3-  Extension: 3-  Quadriceps contraction: poor    General Comments:      Knee Comments  Discussed DOS and patient's questions were answered. Patient was educated on post-operative management regarding; swelling, wound care, pain, AD utilization, car transfers, stair navigation, positioning, ROM, ice application, gait mechanics, and HEP.              Precautions: R TKA (3/3)      Manuals    "    R Knee PROM        R Knee Patellar Mobs       R Hamstring/Gastroc Stretch         R knee STM        Neuro Re-Ed                                                        Ther Ex       Ankle Pumps X30       Heel Slides  2x10 w/ sliding board & strap       Quad Sets 5\" Hold x10       Hamstring Stretch  Reviewed      Gastroc Stretch  Reviewed       Seated Assisted Knee Flexion Stretch  10\" Hold x10       Standing Knee Flexion Stretch        LAQ       SAQ       SLR       TKE       Mini Squats       Mini Forward Lunges       NU Step for muscular endurance & ROM       HEP Instruction  BM      Ther Activity       Fwd/Bwd Walking       Forward Step Ups        Forward Step Downs        Side Stepping        Sit to Stands        Gait Training       SPC        No AD       Modalities       Ice                    "

## 2025-02-24 NOTE — LETTER
2025    Anderson Farr MD  [address] [address]    Patient: Rosita Soriano   YOB: 1950   Date of Visit: 2025     Encounter Diagnosis     ICD-10-CM    1. Unilateral primary osteoarthritis, right knee  M17.11           Dear Dr. Farr:    Thank you for your recent referral of Rosita Soriano. Please review the attached evaluation summary from Rosita's recent visit.     Please verify that you agree with the plan of care by signing the attached order.     If you have any questions or concerns, please do not hesitate to call.     I sincerely appreciate the opportunity to share in the care of one of your patients and hope to have another opportunity to work with you in the near future.       Sincerely,    Chente Echols, PT      Referring Provider:      I certify that I have read the below Plan of Care and certify the need for these services furnished under this plan of treatment while under my care.                    Anderson Farr MD  [address] [address]  Via Fax: [phone]          PT Evaluation     Today's date: 2025  Patient name: Rosita Soriano  : 1950  MRN: [id number]  Referring provider: Anderson Farr MD  Dx:   Encounter Diagnosis     ICD-10-CM    1. Unilateral primary osteoarthritis, right knee  M17.11                      Assessment  Impairments: abnormal gait, abnormal or restricted ROM, abnormal movement, activity intolerance, impaired balance, impaired physical strength, lacks appropriate home exercise program, pain with function and activity limitations  Symptom irritability: moderate    Assessment details: Rosita Soriano is a 74 y.o. female presenting to Physical Therapy today pre-operatively R TKA procedure scheduled on 3/3/25 with Dr. Farr.  Upon completion of the initial PT evaluation the patient is presenting with limitations in R knee mobility, R knee strength, R knee joint effusion, gait mechanics, balance, and  pain. Patient would benefit from a course of skilled Physical Therapy services post-operatively to address functional limitations to return to prior level of function. Thank you for your referral.   Understanding of Dx/Px/POC: excellent     Prognosis: good    Goals  STG: (6 weeks)  1.) Patient will initiate HEP Independently   2.) Patient will have a 50% reduction in overall symptoms   3.) Patient will demonstrate improved L knee strength evidenced by a 1-2 MMT grade increase  4.) Patient will demonstrate L knee flexion AROM 0-90 deg  5.) Patient will ambulate 300ft w/ SPC  6.) Patient will demonstrate improved standing tolerance >/= 1 hour   7.) Patient will demonstrate improved L hip strength evidenced by a 1-2 MMT grade increase    LTG: (12 weeks)  1.) Patient will be d/c to an HEP independently  2.) Patient will improve functional abilities evidenced by FOTO scores  3.) Eliminate pain with functional activity   4.) Patient will demonstrate improved ability to lift, push, pull, and carry safely  5.) Return to PLOF   6.) Patient will demonstrate L knee flexion AROM 0- >/= 110 deg  7.) Patient will ambulate 300ft w/o AD      Plan  Patient would benefit from: PT eval and skilled physical therapy  Referral necessary: No  Planned modality interventions: cryotherapy and thermotherapy: hydrocollator packs    Planned therapy interventions: functional ROM exercises, graded activity, graded exercise, graded motor, home exercise program, flexibility, joint mobilization, manual therapy, neuromuscular re-education, patient education, self care, strengthening, stretching, therapeutic activities, therapeutic exercise and therapeutic training    Frequency: 2-3x week  Duration in weeks: 10  Plan of Care beginning date: 2/24/2025  Plan of Care expiration date: 5/5/2025  Treatment plan discussed with: patient  Plan details: Plan of care was reviewed and discussed thoroughly with the patient on their current condition. Patient was  instructed on a HEP with written instructions. Patient is in agreement with PT recommendations and will attend Physical Therapy 2-3x/week for the next 10 weeks to address current deficits.        Subjective Evaluation    History of Present Illness  Mechanism of injury: surgery  Mechanism of injury: The patient reports today pre-operative R knee TKA procedure scheduled on 3/3 with Dr. Farr. Patient notes experiencing R knee pain for over 10 years with no precipitating injury or trauma to the region. She notes associated R knee buckling and weakness. She notes pain is aggravated by walking, lifting, pushing, pulling, pulling, and carrying. Patient lives in a two story home with one step to enter. She has a RW and SPC. Her daughter lives close by and is able to help assist patient post-operatively. She is now referred to OPPT for pre and post operative R TKA rehabilitation.          Recurrent probem    Quality of life: good    Patient Goals  Patient goals for therapy: decreased pain, decreased edema, improved balance, increased motion, increased strength, independence with ADLs/IADLs and return to sport/leisure activities    Pain  Current pain ratin  At best pain ratin  At worst pain ratin  Location: R knee medial/lateral joint line.  Quality: dull ache  Aggravating factors: sitting, standing, walking, stair climbing and lifting (transferring, carrying)    Social Support  Steps to enter house: yes  Stairs in house: yes   Lives in: multiple-level home  Lives with: Daughter lives close.    Employment status: working    Diagnostic Tests  X-ray: abnormal  Treatments  Current treatment: physical therapy      Objective     Observations     Right Knee   Positive for effusion.     Palpation     Right   Hypertonic in the distal biceps femoris, distal semimembranosus, distal semitendinosus, lateral gastrocnemius and medial gastrocnemius.     Tenderness     Right Knee   Tenderness in the lateral joint line and  "medial joint line.     Neurological Testing     Sensation     Knee   Left Knee   Intact: Light touch    Right Knee   Intact: light touch     Active Range of Motion   Left Knee   Flexion: 130 degrees   Extensor la degrees     Right Knee   Flexion: 110 degrees   Extension: 22 degrees with pain    Passive Range of Motion     Right Knee   Flexion: 115 degrees   Extension: 18 degrees with pain    Mobility   Patellar Mobility:     Right Knee   Hypomobile: superior and inferior     Patellar Static Positioning   Left Knee: WFL  Right Knee: WFL    Strength/Myotome Testing     Left Knee   Flexion: 5  Extension: 5  Quadriceps contraction: good    Right Knee   Flexion: 3-  Extension: 3-  Quadriceps contraction: poor    General Comments:      Knee Comments  Discussed DOS and patient's questions were answered. Patient was educated on post-operative management regarding; swelling, wound care, pain, AD utilization, car transfers, stair navigation, positioning, ROM, ice application, gait mechanics, and HEP.              Precautions: R TKA (3/3)      Manuals       R Knee PROM        R Knee Patellar Mobs       R Hamstring/Gastroc Stretch         R knee STM        Neuro Re-Ed                                                        Ther Ex       Ankle Pumps X30       Heel Slides  2x10 w/ sliding board & strap       Quad Sets 5\" Hold x10       Hamstring Stretch  Reviewed      Gastroc Stretch  Reviewed       Seated Assisted Knee Flexion Stretch  10\" Hold x10       Standing Knee Flexion Stretch        LAQ       SAQ       SLR       TKE       Mini Squats       Mini Forward Lunges       NU Step for muscular endurance & ROM       HEP Instruction  BM      Ther Activity       Fwd/Bwd Walking       Forward Step Ups        Forward Step Downs        Side Stepping        Sit to Stands        Gait Training       SPC        No AD       Modalities       Ice                                    "

## 2025-02-25 ENCOUNTER — CONSULT (OUTPATIENT)
Dept: FAMILY MEDICINE CLINIC | Facility: CLINIC | Age: 75
End: 2025-02-25
Payer: COMMERCIAL

## 2025-02-25 VITALS
BODY MASS INDEX: 31.44 KG/M2 | WEIGHT: 200.3 LBS | SYSTOLIC BLOOD PRESSURE: 120 MMHG | OXYGEN SATURATION: 98 % | DIASTOLIC BLOOD PRESSURE: 68 MMHG | HEART RATE: 78 BPM | TEMPERATURE: 98.7 F | HEIGHT: 67 IN

## 2025-02-25 DIAGNOSIS — Z01.818 PRE-OP EXAMINATION: Primary | ICD-10-CM

## 2025-02-25 DIAGNOSIS — M17.11 PRIMARY OSTEOARTHRITIS OF RIGHT KNEE: ICD-10-CM

## 2025-02-25 PROCEDURE — 99203 OFFICE O/P NEW LOW 30 MIN: CPT | Performed by: PHYSICIAN ASSISTANT

## 2025-02-25 NOTE — PROGRESS NOTES
PRE-OPERATIVE EVALUATION  [facility]    NAME: Rosita Soriano  AGE: 74 y.o. SEX: female  : 1950     DATE: 2025     Assessment/Plan:     1. Pre-op examination        2. Primary osteoarthritis of right knee            74 y.o. female with planned surgery: Right total knee replacement.      1. Further preoperative workup as follows:   - None; no further preoperative work-up is required    2. Medication Management/Recommendations:   - Patient has been instructed to avoid herbs or non-directed vitamins the week prior to surgery to ensure no drug interactions with perioperative surgical and anesthetic medications.  - Patient has been instructed to avoid aspirin containing medications or non-steroidal anti-inflammatory drugs for the week preceding surgery.    3. Prophylaxis for cardiac events with perioperative beta-blockers: not indicated.    4. Patient requires further consultation with: None    Clearance  Patient is CLEARED for surgery without any additional cardiac testing.      History of Present Illness:     Rosita Soriano is a 74 y.o. female who presents to the office today for a preoperative consultation.    Surgery: Right total knee arthroplasty  Anticipated Date of Surgery: 3/3/2025  Referring Provider: Anderson Farr MD    Planned anesthesia is spinal. Patient has a bleeding risk of: no recent abnormal bleeding. Patient does not have objections to receiving blood products if needed. Current anti-platelet/anti-coagulation medications that the patient is prescribed includes:  None .      Assessment of Chronic Conditions:   Allergies: Stable  GERD: Stable     Assessment of Cardiac Risk:  Denies unstable or severe angina or MI in the last 6 weeks or history of stent placement in the last year   Denies decompensated heart failure (e.g. New onset heart failure, NYHA functional class IV heart failure, or worsening existing heart failure)  Denies significant  arrhythmias such as high grade AV block, symptomatic ventricular arrhythmia, newly recognized ventricular tachycardia, supraventricular tachycardia with resting heart rate >100, or symptomatic bradycardia  Denies severe heart valve disease including aortic stenosis or symptomatic mitral stenosis          Prior Anesthesia Reactions: Yes, nausea and difficulty awakening.     Personal history of venous thromboembolic disease? No    History of steroid use for >2 weeks within last year? No     Review of Systems:     Review of Systems   Constitutional:  Negative for chills, diaphoresis, fatigue and fever.   HENT:  Negative for congestion, ear pain, postnasal drip, rhinorrhea, sneezing, sore throat and trouble swallowing.    Eyes:  Negative for pain and visual disturbance.   Respiratory:  Negative for apnea, cough, shortness of breath and wheezing.    Cardiovascular:  Negative for chest pain and palpitations.   Gastrointestinal:  Negative for abdominal pain, constipation, diarrhea, nausea and vomiting.   Genitourinary:  Negative for dysuria.   Musculoskeletal:  Positive for arthralgias (right knee pain) and gait problem. Negative for myalgias.   Neurological:  Negative for dizziness, syncope, weakness, light-headedness, numbness and headaches.   Psychiatric/Behavioral:  Negative for suicidal ideas. The patient is not nervous/anxious.         Problem List:     Patient Active Problem List   Diagnosis   • Chronic bilateral low back pain with bilateral sciatica   • Lumbar radiculopathy   • Chronic pain syndrome   • Lumbar spondylosis   • Lumbar disc disease with radiculopathy   • Neurogenic claudication due to lumbar spinal stenosis   • Vitamin D deficiency   • Polyp of colon   • Osteoarthritis of knee   • Hyperlipidemia   • Generalized osteoarthritis   • Gastroesophageal reflux disease   • Chest pain   • Right ankle tendonitis   • Chronic pain of left knee        Allergies:     Allergies   Allergen Reactions   •  Corticosteroids Other (See Comments)     Increased eye pressure   • Morphine GI Intolerance   • Fentanyl Rash        Current Medications:     Current Outpatient Medications on File Prior to Visit   Medication Sig   • cetirizine (ZyrTEC) 10 mg tablet Take 1 tablet (10 mg total) by mouth daily   • doxycycline monohydrate (MONODOX) 50 mg capsule    • meclizine (ANTIVERT) 25 mg tablet Take 1 tablet (25 mg total) by mouth every 8 (eight) hours as needed for dizziness   • meloxicam (MOBIC) 15 mg tablet TAKE 1 TABLET DAILY AS     NEEDED FOR MODERATE PAIN   • pantoprazole (PROTONIX) 40 mg tablet Take 1 tablet (40 mg total) by mouth daily   • [DISCONTINUED] ergocalciferol (VITAMIN D2) 50,000 units take 1 capsule by mouth every week   • Alcaftadine (Lastacaft) 0.25 % SOLN  (Patient not taking: Reported on 2/25/2025)   • ketotifen (ZADITOR) 0.025 % ophthalmic solution Administer 1 drop to both eyes 2 (two) times a day (Patient not taking: Reported on 2/25/2025)   • [DISCONTINUED] aspirin (ECOTRIN LOW STRENGTH) 81 mg EC tablet Take 81 mg by mouth 2 (two) times a day        Past History:     Past Medical History:   Diagnosis Date   • Joint pain    • Urine incontinence         Past Surgical History:   Procedure Laterality Date   • BLADDER SURGERY     • BREAST SURGERY     • CHOLECYSTECTOMY     • COLONOSCOPY     • EPIDURAL BLOCK INJECTION Right 06/01/2023    Procedure: Right L3-4 and L4-5 transforaminal epidural steroid injection;  Surgeon: Soumya Prasad MD;  Location: [facility];  Service: Pain Management    • EYE SURGERY     • HYSTERECTOMY     • IR SPINE PROCEDURE  10/24/2019   • KNEE ARTHROSCOPY     • LUMBAR EPIDURAL INJECTION N/A 1/22/2025    Procedure: L3-L4 IESI;  Surgeon: Soumya Prasad MD;  Location: [facility];  Service: Pain Management    • ROTATOR CUFF REPAIR     • SHOULDER SURGERY     • THROAT SURGERY     • TONSILLECTOMY          Family History   Problem Relation Age of Onset   • Breast cancer Mother    •  "Cancer Mother    • Stroke Father    • Heart attack Father         Social History     Occupational History   • Not on file   Tobacco Use   • Smoking status: Never   • Smokeless tobacco: Never   Vaping Use   • Vaping status: Never Used   Substance and Sexual Activity   • Alcohol use: Yes     Comment: social   • Drug use: No   • Sexual activity: Not Currently        Physical Exam:      /68   Pulse 78   Temp 98.7 °F (37.1 °C)   Ht 5' 7\" (1.702 m)   Wt 90.9 kg (200 lb 4.8 oz)   SpO2 98%   BMI 31.37 kg/m²     Physical Exam  Constitutional:       Appearance: She is well-developed.   HENT:      Head: Normocephalic and atraumatic.      Right Ear: Tympanic membrane, ear canal and external ear normal.      Left Ear: Tympanic membrane, ear canal and external ear normal.      Nose: Nose normal.      Mouth/Throat:      Pharynx: No oropharyngeal exudate or posterior oropharyngeal erythema.   Eyes:      Extraocular Movements: Extraocular movements intact.   Cardiovascular:      Rate and Rhythm: Normal rate and regular rhythm.      Heart sounds: Normal heart sounds. No murmur heard.     No friction rub. No gallop.   Pulmonary:      Effort: Pulmonary effort is normal. No respiratory distress.      Breath sounds: Normal breath sounds. No wheezing or rales.   Abdominal:      General: Bowel sounds are normal.      Palpations: Abdomen is soft.      Tenderness: There is no abdominal tenderness. There is no guarding or rebound.   Musculoskeletal:      Cervical back: Normal range of motion and neck supple.      Right knee: Deformity (arthritic) present.   Skin:     General: Skin is warm and dry.   Neurological:      Mental Status: She is alert and oriented to person, place, and time.      Gait: Gait abnormal.   Psychiatric:         Behavior: Behavior normal.         Thought Content: Thought content normal.         Judgment: Judgment normal.         Pre-operative work-up:  Laboratory Results: I have personally reviewed the " pertinent laboratory results/reports   EKG: I have personally reviewed the pertinent EKG and report  Chest x-ray: I have personally reviewed CXR and report    Brittany Webb PA-C  [facility]  [facility] [address] [facility] [address]  Phone#  [phone]  Fax#  [phone]

## 2025-02-27 NOTE — PROGRESS NOTES
PT Re-Evaluation     Today's date: 3/5/2025  Patient name: Rosita Soriano  : 1950  MRN: [id number]  Referring provider: Anderson Farr MD  Dx:   Encounter Diagnosis     ICD-10-CM    1. Unilateral primary osteoarthritis, right knee  M17.11       2. S/P total knee replacement, right  Z96.651       3. Impaired functional mobility, balance, gait, and endurance  Z74.09                      Assessment  Impairments: abnormal gait, abnormal or restricted ROM, abnormal movement, activity intolerance, impaired balance, impaired physical strength, lacks appropriate home exercise program, pain with function, weight-bearing intolerance and activity limitations    Assessment details: The patient is a 73 y/o female who presents to OPPT s/p R TKR on 3/3/25 by Dr. Farr.  She has complaints of constant pain since surgery.  She demonstrates deficits with decreased A/PROM and strength, decreased flexibility, edema, decreased balance and proprioception and TTP.  These deficits lead to antalgic gait with use of AD, difficulty with transfers, limited standing tolerance, difficulty with stair negotiation and decreased tolerance to functional activities.  She ambulates with RW for household and community distances, slow renuka noted along with decreased weight shift to RLE in stance phase.  She also lacks knee flexion in swing phase and TKE with heel strike.  She has not done the steps at home, has been staying on the first floor.  Secondary to surgery and above deficits she is limited with her overall mobility and function.  The patient would benefit from continued PT to address deficits and improve function.  Tx to include ROM, stretching, strengthening, modalities, HEP, pt education, postural ed, lifting/body mechanics, neuro re-ed, balance/proprioception Te, MT and equipment.    Understanding of Dx/Px/POC: good     Prognosis: good    Goals  STG: (6 weeks)  1.) Patient will initiate HEP Independently   2.) Patient will have  a 50% reduction in overall symptoms   3.) Patient will demonstrate improved L knee strength evidenced by a 1-2 MMT grade increase  4.) Patient will demonstrate L knee flexion AROM 0-90 deg  5.) Patient will ambulate 300ft w/ SPC  6.) Patient will demonstrate improved standing tolerance >/= 1 hour   7.) Patient will demonstrate improved L hip strength evidenced by a 1-2 MMT grade increase    LTG: (12 weeks)  1.) Patient will be d/c to an HEP independently  2.) Patient will improve functional abilities evidenced by FOTO scores  3.) Eliminate pain with functional activity   4.) Patient will demonstrate improved ability to lift, push, pull, and carry safely  5.) Return to PLOF   6.) Patient will demonstrate L knee flexion AROM 0- >/= 110 deg  7.) Patient will ambulate 300ft w/o AD   8.)  Decrease edema R knee by > 3 cm to help improve flexibility.      Plan  Patient would benefit from: skilled physical therapy  Planned modality interventions: cryotherapy and thermotherapy: hydrocollator packs    Planned therapy interventions: functional ROM exercises, graded activity, graded exercise, graded motor, home exercise program, flexibility, joint mobilization, manual therapy, neuromuscular re-education, patient education, self care, strengthening, stretching, therapeutic activities, therapeutic exercise, therapeutic training, balance, balance/weight bearing training, postural training, patient/caregiver education, transfer training and gait training    Frequency: 2-3x week  Duration in weeks: 12  Plan of Care beginning date: 3/5/2025  Plan of Care expiration date: 5/28/2025  Treatment plan discussed with: patient  Plan details: Plan of care was reviewed and discussed thoroughly with the patient on their current condition. Patient was instructed on a HEP with written instructions. Patient is in agreement with PT recommendations and will attend Physical Therapy 2-3x/week for the next 10 weeks to address current deficits.   "      Subjective Evaluation    History of Present Illness  Mechanism of injury: surgery  Mechanism of injury: The patient reports today pre-operative R knee TKA procedure scheduled on 3/3 with Dr. Farr. Patient notes experiencing R knee pain for over 10 years with no precipitating injury or trauma to the region. She notes associated R knee buckling and weakness. She notes pain is aggravated by walking, lifting, pushing, pulling, pulling, and carrying. Patient lives in a two story home with one step to enter. She has a RW and SPC. Her daughter lives close by and is able to help assist patient post-operatively. She is now referred to OPPT for pre and post operative R TKA rehabilitation.    UPDATE 3/5/25:  The patient states that she had R TKR by Dr. Farr on 3/3.  She stayed one night with D/C to home yesterday.  The patient states that her knee pain has been constant since surgery.  Pain is in her thigh and into her knee.  Pain along her medial knee.  She denies any N&T into her R knee.     She is unsure of the day of her follow up appointment with the doctor.            Recurrent probem    Quality of life: good    Patient Goals  Patient goals for therapy: decreased pain, decreased edema, improved balance, increased motion, increased strength, independence with ADLs/IADLs and return to sport/leisure activities  Patient goal: \"To get more movement and strength back in my leg.\"  Pain  At best pain ratin  At worst pain rating: 10  Location: R Knee and thigh  Quality: dull ache, burning, discomfort, sharp and tight  Relieving factors: medications and ice  Aggravating factors: sitting, standing, walking and stair climbing (transferring, carrying)    Social Support  Steps to enter house: yes  Stairs in house: yes   Lives in: multiple-level home  Lives with: Daughter lives close.    Employment status: working    Diagnostic Tests  X-ray: abnormal  Treatments  Current treatment: physical therapy      Objective " "    Observations     Right Knee   Positive for edema, effusion and incision.     Additional Observation Details  Incision present along anterior knee, covered with bandage.  No active bleeding or drainage noted, no redness, slightly warm.     Tenderness     Right Knee   Tenderness in the lateral joint line and medial joint line.     Neurological Testing     Sensation     Knee   Left Knee   Intact: Light touch    Right Knee   Intact: light touch     Active Range of Motion   Left Knee   Flexion: 130 degrees   Extensor la degrees     Right Knee   Flexion: 60 degrees with pain  Extension: -12 degrees with pain    Passive Range of Motion     Right Knee   Flexion: 67 degrees with pain  Extension: -10 degrees with pain    Mobility   Patellar Mobility:     Right Knee   Hypomobile: medial, lateral, superior and inferior     Patellar Static Positioning   Left Knee: WFL  Right Knee: WFL    Strength/Myotome Testing     Left Knee   Flexion: 5  Extension: 5  Quadriceps contraction: good    Right Knee   Flexion: 2+  Extension: 2+  Quadriceps contraction: poor    Swelling     Left Knee Girth Measurement (cm)   Joint line: 42 cm    Right Knee Girth Measurement (cm)   Joint line: 47 cm    Ambulation   Weight-Bearing Status   Assistive device used: two-wheeled walker    Additional Weight-Bearing Status Details  Using RW for household and community distances.      Observational Gait   Decreased walking speed and right stance time.     Additional Observational Gait Details  Lacks knee flexion in swing phase and TKE with heel strike.               Precautions: R TKA (3/3)      Manuals 2/24 3/5     R Knee PROM        R Knee Patellar Mobs       R Hamstring/Gastroc Stretch         R knee STM        Neuro Re-Ed                                                        Ther Ex       Ankle Pumps X30  2x10     Heel Slides  2x10 w/ sliding board & strap  2x10 with board    Hip Abd on board 10x     Quad Sets 5\" Hold x10  5\"x10     Hamstring " "Stretch  Reviewed      Gastroc Stretch  Reviewed       Seated Assisted Knee Flexion Stretch  10\" Hold x10       Standing Knee Flexion Stretch        LAQ       SAQ  Unable     SLR       TKE       Mini Squats       Mini Forward Lunges       NU Step for muscular endurance & ROM       HEP Instruction  BM ML     Ther Activity       Fwd/Bwd Walking       Forward Step Ups        Forward Step Downs        Side Stepping        Sit to Stands        Gait Training       SPC        No AD       Modalities       Ice   5' post               Access Code: [email or web address]  URL: [email or web address]  Date: 03/05/2025  Prepared by: Jes    Exercises  - Long Sitting Quad Set  - 1 x daily - 7 x weekly - 2 sets - 10 reps - 3\" hold  - Hooklying Gluteal Sets  - 1 x daily - 7 x weekly - 2 sets - 10 reps - 3\" hold  - Supine Ankle Pumps  - 1 x daily - 7 x weekly - 3 sets - 10 reps  - Supine Heel Slide  - 1 x daily - 7 x weekly - 2 sets - 10 reps  "

## 2025-03-05 ENCOUNTER — OFFICE VISIT (OUTPATIENT)
Dept: PHYSICAL THERAPY | Facility: CLINIC | Age: 75
End: 2025-03-05
Payer: COMMERCIAL

## 2025-03-05 DIAGNOSIS — M17.11 UNILATERAL PRIMARY OSTEOARTHRITIS, RIGHT KNEE: Primary | ICD-10-CM

## 2025-03-05 DIAGNOSIS — Z74.09 IMPAIRED FUNCTIONAL MOBILITY, BALANCE, GAIT, AND ENDURANCE: ICD-10-CM

## 2025-03-05 DIAGNOSIS — Z96.651 S/P TOTAL KNEE REPLACEMENT, RIGHT: ICD-10-CM

## 2025-03-05 PROCEDURE — 97110 THERAPEUTIC EXERCISES: CPT | Performed by: PHYSICAL THERAPIST

## 2025-03-05 PROCEDURE — 97535 SELF CARE MNGMENT TRAINING: CPT | Performed by: PHYSICAL THERAPIST

## 2025-03-05 PROCEDURE — 97164 PT RE-EVAL EST PLAN CARE: CPT | Performed by: PHYSICAL THERAPIST

## 2025-03-07 ENCOUNTER — OFFICE VISIT (OUTPATIENT)
Dept: PHYSICAL THERAPY | Facility: CLINIC | Age: 75
End: 2025-03-07
Payer: COMMERCIAL

## 2025-03-07 DIAGNOSIS — M17.11 UNILATERAL PRIMARY OSTEOARTHRITIS, RIGHT KNEE: Primary | ICD-10-CM

## 2025-03-07 DIAGNOSIS — Z96.651 S/P TOTAL KNEE REPLACEMENT, RIGHT: ICD-10-CM

## 2025-03-07 DIAGNOSIS — Z74.09 IMPAIRED FUNCTIONAL MOBILITY, BALANCE, GAIT, AND ENDURANCE: ICD-10-CM

## 2025-03-07 PROCEDURE — 97110 THERAPEUTIC EXERCISES: CPT

## 2025-03-07 PROCEDURE — 97530 THERAPEUTIC ACTIVITIES: CPT

## 2025-03-07 NOTE — PROGRESS NOTES
"Daily Note     Today's date: 3/7/2025  Patient name: Rosita Soriano  : 1950  MRN: [id number]  Referring provider: Anderson Farr MD  Dx:   Encounter Diagnosis     ICD-10-CM    1. Unilateral primary osteoarthritis, right knee  M17.11       2. S/P total knee replacement, right  Z96.651       3. Impaired functional mobility, balance, gait, and endurance  Z74.09                      Subjective: Pt reports doing well and managing pain and swelling with ice at home. Pt reports some medial joint line pain today.       Objective: See treatment diary below      Assessment: Tolerated treatment well. Patient exhibited good technique with therapeutic exercises. Pt able complete program with good overall lara. Demonstrates improved lara to ROM program. Demo's near 90 degrees of PROM. Ed on cont HEP.       Plan: Continue per plan of care.      Precautions: R TKA (3/3)      Manuals 2/24 3/5 3/7    R Knee PROM    10' JV    R Knee Patellar Mobs       R Hamstring/Gastroc Stretch         R knee STM        Neuro Re-Ed                                                        Ther Ex       Ankle Pumps X30  2x10 2/10    Heel Slides  2x10 w/ sliding board & strap  2x10 with board    Hip Abd on board 10x 2/10 knee flex/ext    Quad Sets 5\" Hold x10  5\"x10 2/10 5\"    Hamstring Stretch  Reviewed  8\" step 4/20\"    Gastroc Stretch  Reviewed   1/2 foam roll /20\"    Seated Assisted Knee Flexion Stretch  10\" Hold x10   2/10 5\"    Standing Knee Flexion Stretch        LAQ       SAQ  Unable     SLR       TKE       Mini Squats       Mini Forward Lunges       NU Step for muscular endurance & ROM       HEP Instruction  BM ML     Ther Activity       Fwd/Bwd Walking       Forward Step Ups        Forward Step Downs        Side Stepping        Sit to Stands        Gait Training       SPC        No AD       Modalities       Ice   5' post 10'                "
pale

## 2025-03-10 ENCOUNTER — OFFICE VISIT (OUTPATIENT)
Dept: PHYSICAL THERAPY | Facility: CLINIC | Age: 75
End: 2025-03-10
Payer: COMMERCIAL

## 2025-03-10 DIAGNOSIS — Z96.651 S/P TOTAL KNEE REPLACEMENT, RIGHT: ICD-10-CM

## 2025-03-10 DIAGNOSIS — Z74.09 IMPAIRED FUNCTIONAL MOBILITY, BALANCE, GAIT, AND ENDURANCE: ICD-10-CM

## 2025-03-10 DIAGNOSIS — M17.11 UNILATERAL PRIMARY OSTEOARTHRITIS, RIGHT KNEE: Primary | ICD-10-CM

## 2025-03-10 PROCEDURE — 97110 THERAPEUTIC EXERCISES: CPT

## 2025-03-10 NOTE — PROGRESS NOTES
"Daily Note     Today's date: 3/10/2025  Patient name: Rosita Soriano  : 1950  MRN: [id number]  Referring provider: Anderson Farr MD  Dx:   Encounter Diagnosis     ICD-10-CM    1. Unilateral primary osteoarthritis, right knee  M17.11       2. S/P total knee replacement, right  Z96.651       3. Impaired functional mobility, balance, gait, and endurance  Z74.09                      Subjective: Patient reports R anterior knee pain this morning. She states performing home exercises as instructed and this is going well.      Objective: See treatment diary below      Assessment: Tolerated treatment well. We progressed the current program with the addition of R quad strengthening and R knee mobility. Patient demonstrated a mild increase R knee discomfort with progressions. She demonstrated R knee flexion PROM -10-80 deg this session. Patient demonstrated fatigue post treatment and would benefit from continued PT to address functional deficits to return to functional independence.      Plan: Continue per plan of care.      Precautions: R TKA (3/3)      Manuals 2/24 3/5 3/7 3/10   R Knee PROM    10' JV BM Seated/Supine    R Knee Patellar Mobs       R Hamstring/Gastroc Stretch     BM     R knee STM        Neuro Re-Ed                                                        Ther Ex       Ankle Pumps X30  2x10 2/10 HEP    Heel Slides  2x10 w/ sliding board & strap  2x10 with board    Hip Abd on board 10x 2/10 knee flex/ext 2x10 w/ sliding board & strap    Quad Sets 5\" Hold x10  5\"x10 2/10 5\" 2x10 5\" Hold    Hamstring Stretch  Reviewed  8\" step 20\" 4\" step 10\" Hold x10    Gastroc Stretch  Reviewed    foam roll \" 10\" Hold x10 w/ strap    Seated Assisted Knee Flexion Stretch  10\" Hold x10   2/10 5\" 10\" Hold x10    Standing Knee Flexion Stretch     10\" Hold x10 4\" step    LAQ    X10 AAROM   SAQ  Unable     SLR       TKE       Mini Squats       Mini Forward Lunges       NU Step for muscular endurance & ROM    10' L1 "    HEP Instruction  BM ML     Ther Activity       Fwd/Bwd Walking       Forward Step Ups        Forward Step Downs        Side Stepping        Sit to Stands        Gait Training       SPC        No AD       Modalities       Ice   5' post 10' 5' Post

## 2025-03-11 DIAGNOSIS — K21.9 GASTROESOPHAGEAL REFLUX DISEASE, UNSPECIFIED WHETHER ESOPHAGITIS PRESENT: ICD-10-CM

## 2025-03-12 RX ORDER — PANTOPRAZOLE SODIUM 40 MG/1
40 TABLET, DELAYED RELEASE ORAL DAILY
Qty: 90 TABLET | Refills: 1 | Status: SHIPPED | OUTPATIENT
Start: 2025-03-12

## 2025-03-13 ENCOUNTER — OFFICE VISIT (OUTPATIENT)
Dept: PHYSICAL THERAPY | Facility: CLINIC | Age: 75
End: 2025-03-13
Payer: COMMERCIAL

## 2025-03-13 DIAGNOSIS — M17.11 UNILATERAL PRIMARY OSTEOARTHRITIS, RIGHT KNEE: Primary | ICD-10-CM

## 2025-03-13 DIAGNOSIS — Z74.09 IMPAIRED FUNCTIONAL MOBILITY, BALANCE, GAIT, AND ENDURANCE: ICD-10-CM

## 2025-03-13 DIAGNOSIS — Z96.651 S/P TOTAL KNEE REPLACEMENT, RIGHT: ICD-10-CM

## 2025-03-13 PROCEDURE — 97110 THERAPEUTIC EXERCISES: CPT

## 2025-03-13 NOTE — PROGRESS NOTES
"Daily Note     Today's date: 3/13/2025  Patient name: Rosita Soriano  : 1950  MRN: [id number]  Referring provider: Anderson Farr MD  Dx:   Encounter Diagnosis     ICD-10-CM    1. Unilateral primary osteoarthritis, right knee  M17.11       2. S/P total knee replacement, right  Z96.651       3. Impaired functional mobility, balance, gait, and endurance  Z74.09                      Subjective: Patient reports R knee soreness this morning. She notes continuing to perform HEP as instructed.      Objective: See treatment diary below      Assessment: Tolerated treatment well. We progressed the current program with the addition of R quad strengthening with good tolerance. Patient continues to demonstrate limitations in R knee mobility, R knee strength, R knee joint effusion, gait mechanics, and R knee pain. Patient demonstrated fatigue post treatment and would benefit from continued PT.      Plan: Continue per plan of care.      Precautions: R TKA (3/3)      Manuals 3/13 3 3 3/10   R Knee PROM  BM Seated/Supine   10' JV BM Seated/Supine    R Knee Patellar Mobs       R Hamstring/Gastroc Stretch  BM    BM    R knee STM        Neuro Re-Ed                                                        Ther Ex       Ankle Pumps  2x10 2/10 HEP    Heel Slides  2x10 w/ sliding board & strap  2x10 with board    Hip Abd on board 10x 2/10 knee flex/ext 2x10 w/ sliding board & strap    Quad Sets 5\" Hold 2x10  5\"x10 2/10 5\" 2x10 5\" Hold    Hamstring Stretch  4\" step 10\" Hold x10   8\" step /20\" 4\" step 10\" Hold x10    Gastroc Stretch  10\" Hold x10 w/ strap   1/2 foam roll \" 10\" Hold x10 w/ strap    Seated Assisted Knee Flexion Stretch  10\" Hold x10   2/10 5\" 10\" Hold x10    Standing Knee Flexion Stretch  10\" Hold x10 4\" step    10\" Hold x10 4\" step    LAQ 2x10    X10 AAROM   SAQ 2x10 Unable     SLR x10      TKE       Mini Squats       Mini Forward Lunges       NU Step for muscular endurance & ROM 10' L4   10' L1    HEP " Instruction  BM ML     Ther Activity       Fwd/Bwd Walking       Forward Step Ups        Forward Step Downs        Side Stepping        Sit to Stands        Gait Training       SPC        No AD       Modalities       Ice  5' Post  5' post 10' 5' Post

## 2025-03-17 ENCOUNTER — OFFICE VISIT (OUTPATIENT)
Dept: PHYSICAL THERAPY | Facility: CLINIC | Age: 75
End: 2025-03-17
Payer: COMMERCIAL

## 2025-03-17 DIAGNOSIS — Z74.09 IMPAIRED FUNCTIONAL MOBILITY, BALANCE, GAIT, AND ENDURANCE: ICD-10-CM

## 2025-03-17 DIAGNOSIS — Z96.651 S/P TOTAL KNEE REPLACEMENT, RIGHT: ICD-10-CM

## 2025-03-17 DIAGNOSIS — M17.11 UNILATERAL PRIMARY OSTEOARTHRITIS, RIGHT KNEE: Primary | ICD-10-CM

## 2025-03-17 PROCEDURE — 97116 GAIT TRAINING THERAPY: CPT

## 2025-03-17 PROCEDURE — 97530 THERAPEUTIC ACTIVITIES: CPT

## 2025-03-17 PROCEDURE — 97110 THERAPEUTIC EXERCISES: CPT

## 2025-03-17 NOTE — PROGRESS NOTES
"Daily Note     Today's date: 3/17/2025  Patient name: Rosita Soriano  : 1950  MRN: [id number]  Referring provider: Anderson Farr MD  Dx:   Encounter Diagnosis     ICD-10-CM    1. Unilateral primary osteoarthritis, right knee  M17.11       2. S/P total knee replacement, right  Z96.651       3. Impaired functional mobility, balance, gait, and endurance  Z74.09                      Subjective: Patient reports increased soreness this morning in the R knee. She notes performing home exercises without taking pain medication.       Objective: See treatment diary below      Assessment: Tolerated treatment well. PT initiated gait training with SPC this session. We progressed the current program with the addition of forward step ups, forward step downs, and side stepping. Patient demonstrated a mild increase in R knee discomfort with progressions. She is demonstrating R knee flexion PROM -5-105 deg. Patient would benefit from continued PT to address limitations in R knee ROM, R knee strength, gait mechanics, R knee joint effusion, and R knee pain to return to functional independence.      Plan: Continue per plan of care.      Precautions: R TKA (3/3)      Manuals 3/13 3/17 3/7 3/10   R Knee PROM  BM Seated/Supine  BM Seated  10' JV BM Seated/Supine    R Knee Patellar Mobs       R Hamstring/Gastroc Stretch  BM  BM  BM    R knee STM        Neuro Re-Ed                                                        Ther Ex       Heel Slides  2x10 w/ sliding board & strap  2x10 with board & strap     2/10 knee flex/ext 2x10 w/ sliding board & strap    Quad Sets 5\" Hold 2x10  5\" 2x10 2/10 5\" 2x10 5\" Hold    Hamstring Stretch  4\" step 10\" Hold x10  6\" step 10\" Hold x10  8\" step 4/20\" 4\" step 10\" Hold x10    Gastroc Stretch  10\" Hold x10 w/ strap  10\" Hold x10 w/ strap  1/2 foam roll \" 10\" Hold x10 w/ strap    Seated Assisted Knee Flexion Stretch  10\" Hold x10  10\" Hold x10  2/10 5\" 10\" Hold x10    Standing Knee Flexion " "Stretch  10\" Hold x10 4\" step  10\" Hold x10 6\" step   10\" Hold x10 4\" step    LAQ 2x10  2x10  X10 AAROM   SAQ 2x10 2x10     SLR x10 x10     TKE       Mini Squats       Mini Forward Lunges       NU Step for muscular endurance & ROM 10' L4 10' L4  10' L1    HEP Instruction  BM BM     Ther Activity       Fwd/Bwd Walking  X4 laps      Forward Step Ups   2\" step x10      Forward Step Downs   2\" step x10      Side Stepping   X4 lap s     Sit to Stands        Gait Training       SPC   10'     No AD       Modalities       Ice  5' Post  5' post 10' 5' Post                      "

## 2025-03-19 ENCOUNTER — OFFICE VISIT (OUTPATIENT)
Dept: PHYSICAL THERAPY | Facility: CLINIC | Age: 75
End: 2025-03-19
Payer: COMMERCIAL

## 2025-03-19 DIAGNOSIS — Z74.09 IMPAIRED FUNCTIONAL MOBILITY, BALANCE, GAIT, AND ENDURANCE: ICD-10-CM

## 2025-03-19 DIAGNOSIS — M17.11 UNILATERAL PRIMARY OSTEOARTHRITIS, RIGHT KNEE: Primary | ICD-10-CM

## 2025-03-19 DIAGNOSIS — Z96.651 S/P TOTAL KNEE REPLACEMENT, RIGHT: ICD-10-CM

## 2025-03-19 PROCEDURE — 97530 THERAPEUTIC ACTIVITIES: CPT

## 2025-03-19 PROCEDURE — 97110 THERAPEUTIC EXERCISES: CPT

## 2025-03-19 NOTE — PROGRESS NOTES
"Daily Note     Today's date: 3/19/2025  Patient name: Rosita Soriano  : 1950  MRN: [id number]  Referring provider: Anderson Farr MD  Dx:   Encounter Diagnosis     ICD-10-CM    1. Unilateral primary osteoarthritis, right knee  M17.11       2. S/P total knee replacement, right  Z96.651       3. Impaired functional mobility, balance, gait, and endurance  Z74.09                      Subjective: Patient reports following last treatment session she walked at the local mall with her RW while shopping. She notes an increase in swelling in the R ankle and an increase in R knee pain following this. She states applying compression socks and elevated her RLE. She states pain today is a 2/10.      Objective: See treatment diary below      Assessment: Tolerated treatment well with a minimal increase in R knee symptoms. We progressed the current program with the addition of increased repetitions with good tolerance. Patient demonstrated fatigue post treatment, exhibited good technique with therapeutic exercises, and would benefit from continued PT.      Plan: Continue per plan of care.      Precautions: R TKA (3/3)      Manuals 3/13 3/17 3/19 3/10   R Knee PROM  BM Seated/Supine  BM Seated  BM seated  BM Seated/Supine    R Knee Patellar Mobs       R Hamstring/Gastroc Stretch  BM  BM BM  BM    R knee STM        Neuro Re-Ed                                                        Ther Ex       Heel Slides  2x10 w/ sliding board & strap  2x10 with board & strap     2/10 with board & strap  2x10 w/ sliding board & strap    Quad Sets 5\" Hold 2x10  5\" 2x10 2/10 5\" 2x10 5\" Hold    Hamstring Stretch  4\" step 10\" Hold x10  6\" step 10\" Hold x10  6\" step 10\" hold  x10  4\" step 10\" Hold x10    Gastroc Stretch  10\" Hold x10 w/ strap  10\" Hold x10 w/ strap  10\" Hold x10 w/ strap  10\" Hold x10 w/ strap    Seated Assisted Knee Flexion Stretch  10\" Hold x10  10\" Hold x10  10\" Hold x10  10\" Hold x10    Standing Knee Flexion Stretch  10\" " "Hold x10 4\" step  10\" Hold x10 6\" step  10\" Hold x10 6\" step  10\" Hold x10 4\" step    LAQ 2x10  2x10 2x10  X10 AAROM   SAQ 2x10 2x10 HEP     SLR x10 x10 2x10     TKE       Mini Squats       Mini Forward Lunges       NU Step for muscular endurance & ROM 10' L4 10' L4 10' L4  10' L1    HEP Instruction  BM BM     Ther Activity       Fwd/Bwd Walking  X4 laps  x4 laps     Forward Step Ups   2\" step x10  4\" step x10     Forward Step Downs   2\" step x10  4\" step x10     Side Stepping   X4 lap s 4 laps     Sit to Stands        Gait Training       SPC   10'     No AD       Modalities       Ice  5' Post  5' post 5' post 5' Post                        "

## 2025-03-24 ENCOUNTER — OFFICE VISIT (OUTPATIENT)
Dept: PHYSICAL THERAPY | Facility: CLINIC | Age: 75
End: 2025-03-24
Payer: COMMERCIAL

## 2025-03-24 DIAGNOSIS — M17.11 UNILATERAL PRIMARY OSTEOARTHRITIS, RIGHT KNEE: Primary | ICD-10-CM

## 2025-03-24 DIAGNOSIS — Z74.09 IMPAIRED FUNCTIONAL MOBILITY, BALANCE, GAIT, AND ENDURANCE: ICD-10-CM

## 2025-03-24 DIAGNOSIS — Z96.651 S/P TOTAL KNEE REPLACEMENT, RIGHT: ICD-10-CM

## 2025-03-24 PROCEDURE — 97110 THERAPEUTIC EXERCISES: CPT

## 2025-03-24 PROCEDURE — 97530 THERAPEUTIC ACTIVITIES: CPT

## 2025-03-24 NOTE — PROGRESS NOTES
"Daily Note     Today's date: 3/24/2025  Patient name: Rosita Soriano  : 1950  MRN: [id number]  Referring provider: Anderson Farr MD  Dx:   Encounter Diagnosis     ICD-10-CM    1. Unilateral primary osteoarthritis, right knee  M17.11       2. S/P total knee replacement, right  Z96.651       3. Impaired functional mobility, balance, gait, and endurance  Z74.09                      Subjective: Patient reports walking without SPC for household ambulation. Patient states pain levels are manageable and she is doing well overall.      Objective: See treatment diary below      Assessment: Tolerated treatment well. We progressed the current program with the addition of sit to stands focusing on R knee mobility and strength. Patient demonstrated fatigue post treatment, exhibited good technique with therapeutic exercises, and would benefit from continued PT to address functional deficits. She is continuing to respond well to PT treatment and is making appropriate progressions with the current program.      Plan: Continue per plan of care.      Precautions: R TKA (3/3)      Manuals 3/13 3/17 3/19 3/24   R Knee PROM  BM Seated/Supine  BM Seated  BM seated  BM Seated/Supine    R Knee Patellar Mobs       R Hamstring/Gastroc Stretch  BM  BM BM  BM    R knee STM        Neuro Re-Ed                                                        Ther Ex       Heel Slides  2x10 w/ sliding board & strap  2x10 with board & strap     2/10 with board & strap  2x10 w/ sliding board & strap    Quad Sets 5\" Hold 2x10  5\" 2x10 2/10 5\" 2x10 5\" Hold    Hamstring Stretch  4\" step 10\" Hold x10  6\" step 10\" Hold x10  6\" step 10\" hold  x10  8\" step 10\" Hold x10    Gastroc Stretch  10\" Hold x10 w/ strap  10\" Hold x10 w/ strap  10\" Hold x10 w/ strap  10\" Hold x10 w/ strap    Seated Assisted Knee Flexion Stretch  10\" Hold x10  10\" Hold x10  10\" Hold x10  10\" Hold x10    Standing Knee Flexion Stretch  10\" Hold x10 4\" step  10\" Hold x10 6\" step  10\" " "Hold x10 6\" step  10\" Hold x10 8\" step    LAQ 2x10  2x10 2x10  2x10    SAQ 2x10 2x10 HEP     SLR x10 x10 2x10  2x10   TKE       Mini Squats       Mini Forward Lunges       NU Step for muscular endurance & ROM 10' L4 10' L4 10' L4  10' L4    HEP Instruction  BM BM     Ther Activity       Fwd/Bwd Walking  X4 laps  x4 laps  X4 laps    Forward Step Ups   2\" step x10  4\" step x10  6\" step 2x10    Forward Step Downs   2\" step x10  4\" step x10  4\" step 2x10    Side Stepping   X4 lap s 4 laps  4 laps    Sit to Stands     x10   Gait Training       SPC   10'     No AD       Modalities       Ice  5' Post  5' post 5' post 5' Post                          "

## 2025-03-25 DIAGNOSIS — Z78.0 POSTMENOPAUSAL: Primary | ICD-10-CM

## 2025-03-25 RX ORDER — B-COMPLEX WITH VITAMIN C
1 TABLET ORAL
Qty: 90 TABLET | Refills: 3 | Status: SHIPPED | OUTPATIENT
Start: 2025-03-25

## 2025-03-27 ENCOUNTER — OFFICE VISIT (OUTPATIENT)
Dept: PHYSICAL THERAPY | Facility: CLINIC | Age: 75
End: 2025-03-27
Payer: COMMERCIAL

## 2025-03-27 DIAGNOSIS — Z96.651 S/P TOTAL KNEE REPLACEMENT, RIGHT: ICD-10-CM

## 2025-03-27 DIAGNOSIS — Z74.09 IMPAIRED FUNCTIONAL MOBILITY, BALANCE, GAIT, AND ENDURANCE: ICD-10-CM

## 2025-03-27 DIAGNOSIS — M17.11 UNILATERAL PRIMARY OSTEOARTHRITIS, RIGHT KNEE: Primary | ICD-10-CM

## 2025-03-27 PROCEDURE — 97530 THERAPEUTIC ACTIVITIES: CPT

## 2025-03-27 PROCEDURE — 97110 THERAPEUTIC EXERCISES: CPT

## 2025-03-27 NOTE — PROGRESS NOTES
"Daily Note     Today's date: 3/27/2025  Patient name: Rosita Soriano  : 1950  MRN: [id number]  Referring provider: Anderson Farr MD  Dx:   Encounter Diagnosis     ICD-10-CM    1. Unilateral primary osteoarthritis, right knee  M17.11       2. S/P total knee replacement, right  Z96.651       3. Impaired functional mobility, balance, gait, and endurance  Z74.09                      Subjective: Patient reports increased R knee symptoms yesterday and into this morning. She notes walking without SPC for household ambulation with good tolerance.       Objective: See treatment diary below  R Knee Flexion AROM: -2-116 deg       Assessment: Tolerated treatment well. PT progressed the current program with the addition of R SLS and terminal knee extension strengthening. Patient demonstrated a moderate loss in proprioceptive control with singe leg stance progression requiring HHA for stability. We will continue to progress patient within tolerance to address functional limitations. Patient demonstrated fatigue post treatment and would benefit from continued PT.       Plan: Continue per plan of care.      Precautions: R TKA (3/3)      Manuals 3/27 3/17 3/19 3/24   R Knee PROM  BM Seated/Supine  BM Seated  BM seated  BM Seated/Supine    R Knee Patellar Mobs       R Hamstring/Gastroc Stretch  BM  BM BM  BM    R knee STM        Neuro Re-Ed       SLS 20\" Hold x3                                                 Ther Ex       Heel Slides  2x10 w/ sliding board & strap  2x10 with board & strap     2/10 with board & strap  2x10 w/ sliding board & strap    Quad Sets 5\" Hold 2x10  5\" 2x10 2/10 5\" 2x10 5\" Hold    Hamstring Stretch  8\" step 10\" Hold x10  6\" step 10\" Hold x10  6\" step 10\" hold  x10  8\" step 10\" Hold x10    Gastroc Stretch  10\" Hold x10 w/ strap  10\" Hold x10 w/ strap  10\" Hold x10 w/ strap  10\" Hold x10 w/ strap    Seated Assisted Knee Flexion Stretch  10\" Hold x10  10\" Hold x10  10\" Hold x10  10\" Hold x10  " "  Standing Knee Flexion Stretch  10\" Hold x10 8\" step  10\" Hold x10 6\" step  10\" Hold x10 6\" step  10\" Hold x10 8\" step    LAQ 2x10  2x10 2x10  2x10    SLR 2x10 x10 2x10  2x10   TKE L3 2x10       Mini Squats       Mini Forward Lunges       NU Step for muscular endurance & ROM 10' L4 10' L4 10' L4  10' L4    HEP Instruction  BM BM     Ther Activity       Fwd/Bwd Walking X4 laps  X4 laps  x4 laps  X4 laps    Forward Step Ups  8\" step 2x10  2\" step x10  4\" step x10  6\" step 2x10    Forward Step Downs  6\" step 2x10  2\" step x10  4\" step x10  4\" step 2x10    Side Stepping  L2 x3 laps  X4 lap s 4 laps  4 laps    Sit to Stands  X10    x10   Gait Training       SPC   10'     No AD       Modalities       Ice  5' Post  5' post 5' post 5' Post                            "

## 2025-04-01 ENCOUNTER — OFFICE VISIT (OUTPATIENT)
Dept: PHYSICAL THERAPY | Facility: CLINIC | Age: 75
End: 2025-04-01
Payer: COMMERCIAL

## 2025-04-01 DIAGNOSIS — M17.11 UNILATERAL PRIMARY OSTEOARTHRITIS, RIGHT KNEE: Primary | ICD-10-CM

## 2025-04-01 DIAGNOSIS — Z96.651 S/P TOTAL KNEE REPLACEMENT, RIGHT: ICD-10-CM

## 2025-04-01 DIAGNOSIS — Z74.09 IMPAIRED FUNCTIONAL MOBILITY, BALANCE, GAIT, AND ENDURANCE: ICD-10-CM

## 2025-04-01 PROCEDURE — 97110 THERAPEUTIC EXERCISES: CPT

## 2025-04-01 PROCEDURE — 97530 THERAPEUTIC ACTIVITIES: CPT

## 2025-04-01 NOTE — PROGRESS NOTES
"Daily Note     Today's date: 2025  Patient name: Rosita Soriano  : 1950  MRN: [id number]  Referring provider: Anderson Farr MD  Dx:   Encounter Diagnosis     ICD-10-CM    1. Unilateral primary osteoarthritis, right knee  M17.11       2. S/P total knee replacement, right  Z96.651       3. Impaired functional mobility, balance, gait, and endurance  Z74.09                      Subjective: Patient reports R knee stiffness this morning. She notes ascending/descending stairs reciprocally yesterday that resulted in increased R knee as well as R ankle swelling.       Objective: See treatment diary below      Assessment: Tolerated treatment well with a mild increase in R posterior knee discomfort. We progressed the current program with the addition of increased resistance with R quad strengthening with good tolerance. Patient continues to demonstrate limitations in R knee ROM, strength, joint effusion, gait mechanics, and pain. She would benefit from continued PT to address functional limitations.      Plan: Continue per plan of care.      Precautions: R TKA (3/3)      Manuals 3/27 4/1 3/19 3/24   R Knee PROM  BM Seated/Supine  BM Seated/Supine BM seated  BM Seated/Supine    R Knee Patellar Mobs       R Hamstring/Gastroc Stretch  BM  BM BM  BM    R knee STM        Neuro Re-Ed       SLS 20\" Hold x3  20\" Hold x3                                                Ther Ex       Heel Slides  2x10 w/ sliding board & strap  2x10 with board & strap     2/10 with board & strap  2x10 w/ sliding board & strap    Quad Sets 5\" Hold 2x10  5\" 2x10 2/10 5\" 2x10 5\" Hold    Hamstring Stretch  8\" step 10\" Hold x10  8\" step 10\" Hold x10  6\" step 10\" hold  x10  8\" step 10\" Hold x10    Gastroc Stretch  10\" Hold x10 w/ strap  10\" Hold x10 w/ 1/2 foam  10\" Hold x10 w/ strap  10\" Hold x10 w/ strap    Seated Assisted Knee Flexion Stretch  10\" Hold x10  10\" Hold x10  10\" Hold x10  10\" Hold x10    Standing Knee Flexion Stretch  10\" Hold x10 " "8\" step  10\" Hold x10 8\" step  10\" Hold x10 6\" step  10\" Hold x10 8\" step    LAQ 2x10  1.5# 2x10 2x10  2x10    SLR 2x10 2x10 2x10  2x10   TKE L3 2x10  L4 2x10     Mini Squats       Mini Forward Lunges       NU Step for muscular endurance & ROM 10' L4 10' L4 10' L4  10' L4    HEP Instruction  BM BM     Ther Activity       Fwd/Bwd Walking X4 laps  HEP  x4 laps  X4 laps    Forward Step Ups  8\" step 2x10  8\" step 2x10  4\" step x10  6\" step 2x10    Forward Step Downs  6\" step 2x10  6\" step 2x10  4\" step x10  4\" step 2x10    Side Stepping  L2 x3 laps  L2 X3 laps 4 laps  4 laps    Sit to Stands  X10  x10  x10   Gait Training       SPC        No AD       Modalities       Ice  5' Post  5' post 5' post 5' Post                              "

## 2025-04-04 ENCOUNTER — EVALUATION (OUTPATIENT)
Dept: PHYSICAL THERAPY | Facility: CLINIC | Age: 75
End: 2025-04-04
Payer: COMMERCIAL

## 2025-04-04 DIAGNOSIS — Z96.651 S/P TOTAL KNEE REPLACEMENT, RIGHT: ICD-10-CM

## 2025-04-04 DIAGNOSIS — M17.11 UNILATERAL PRIMARY OSTEOARTHRITIS, RIGHT KNEE: Primary | ICD-10-CM

## 2025-04-04 DIAGNOSIS — Z74.09 IMPAIRED FUNCTIONAL MOBILITY, BALANCE, GAIT, AND ENDURANCE: ICD-10-CM

## 2025-04-04 PROCEDURE — 97530 THERAPEUTIC ACTIVITIES: CPT

## 2025-04-04 PROCEDURE — 97110 THERAPEUTIC EXERCISES: CPT

## 2025-04-04 NOTE — PROGRESS NOTES
PT Re-Evaluation     Today's date: 2025  Patient name: Rosita Soriano  : 1950  MRN: [id number]  Referring provider: Anderson Farr MD  Dx:   Encounter Diagnosis     ICD-10-CM    1. Unilateral primary osteoarthritis, right knee  M17.11       2. S/P total knee replacement, right  Z96.651       3. Impaired functional mobility, balance, gait, and endurance  Z74.09                        Assessment  Impairments: abnormal gait, abnormal or restricted ROM, abnormal movement, activity intolerance, impaired balance, impaired physical strength, lacks appropriate home exercise program, pain with function, weight-bearing intolerance and activity limitations    Assessment details: Rosita Soriano is a 74 y.o. female presenting to Physical Therapy today post-operatively R TKA procedure performed on 3/3/25 with Dr. Farr. Patient is making steady progress with PT treatment. Upon completion of the PT re-evaluation the patient is presenting with limitations in R knee mobility, R knee strength, R knee joint effusion, gait mechanics, balance, and pain. Patient is currently have difficulty with activities such as ambulation, stair navigation, standing tolerance, carrying, lifting, pushing, pulling, and carrying. Patient would continue to benefit from skilled Physical Therapy services post-operatively to address functional limitations to return to prior level of function.  Understanding of Dx/Px/POC: good     Prognosis: good    Goals  STG: (6 weeks)  1.) Patient will initiate HEP Independently MET   2.) Patient will have a 50% reduction in overall symptoms MET  3.) Patient will demonstrate improved L knee strength evidenced by a 1-2 MMT grade increase PROGRESSING  4.) Patient will demonstrate L knee flexion AROM 0-90 deg MET  5.) Patient will ambulate 300ft w/ SPC MET  6.) Patient will demonstrate improved standing tolerance >/= 1 hour PROGRESSING  7.) Patient will demonstrate improved L hip strength evidenced by a 1-2  MMT grade increase PROGRESSING    LTG: (12 weeks)  1.) Patient will be d/c to an HEP independently PROGRESSING  2.) Patient will improve functional abilities evidenced by FOTO scores MET  3.) Eliminate pain with functional activity PROGRESSING  4.) Patient will demonstrate improved ability to lift, push, pull, and carry safely PROGRESSING  5.) Return to PLOF PROGRESSING  6.) Patient will demonstrate L knee flexion AROM 0- >/= 110 deg MET  7.) Patient will ambulate 300ft w/o AD PROGRESSING  8.)  Decrease edema R knee by > 3 cm to help improve flexibility.  PROGRESSING    Plan  Patient would benefit from: skilled physical therapy  Planned modality interventions: cryotherapy and thermotherapy: hydrocollator packs    Planned therapy interventions: functional ROM exercises, graded activity, graded exercise, graded motor, home exercise program, flexibility, joint mobilization, manual therapy, neuromuscular re-education, patient education, self care, strengthening, stretching, therapeutic activities, therapeutic exercise, therapeutic training, balance, balance/weight bearing training, postural training, patient/caregiver education, transfer training and gait training    Frequency: 1-2x week  Duration in weeks: 12  Plan of Care beginning date: 3/5/2025  Plan of Care expiration date: 5/28/2025  Treatment plan discussed with: patient  Plan details: Plan of care was reviewed and discussed thoroughly with the patient on their current condition. Patient was instructed on a HEP with written instructions. Patient is in agreement with PT recommendations and will attend Physical Therapy 1-2x/week to address current deficits.      Subjective Evaluation    History of Present Illness  Mechanism of injury: surgery  Mechanism of injury: The patient reports today pre-operative R knee TKA procedure scheduled on 3/3 with Dr. Farr. Patient notes experiencing R knee pain for over 10 years with no precipitating injury or trauma to the  "region. She notes associated R knee buckling and weakness. She notes pain is aggravated by walking, lifting, pushing, pulling, pulling, and carrying. Patient lives in a two story home with one step to enter. She has a RW and SPC. Her daughter lives close by and is able to help assist patient post-operatively. She is now referred to [facility] for pre and post operative R TKA rehabilitation.    UPDATE 3/5/25:  The patient states that she had R TKR by Dr. Farr on 3/3.  She stayed one night with D/C to home yesterday.  The patient states that her knee pain has been constant since surgery.  Pain is in her thigh and into her knee.  Pain along her medial knee.  She denies any N&T into her R knee.     She is unsure of the day of her follow up appointment with the doctor.      Update 25: The patient reports today with a 55-60% improvement in R knee function and symptoms since the start of Physical Therapy treatment. She followed up with her MD yesterday. She notes still having difficulty with stair navigation, walking, standing tolerance, pushing, pulling, carrying, and lifting.           Recurrent probem    Quality of life: good    Patient Goals  Patient goals for therapy: decreased pain, decreased edema, improved balance, increased motion, increased strength, independence with ADLs/IADLs and return to sport/leisure activities  Patient goal: \"To get more movement and strength back in my leg.\"  Pain  Current pain ratin  At best pain ratin  At worst pain ratin  Location: R anterior Knee  Quality: dull ache, discomfort, tight and sharp  Relieving factors: medications and ice  Aggravating factors: standing, walking, stair climbing and lifting (transferring, carrying)    Social Support  Steps to enter house: yes  Stairs in house: yes   Lives in: multiple-level home  Lives with: Daughter lives close.    Employment status: working    Diagnostic Tests  X-ray: abnormal  Treatments  Current treatment: physical " "therapy    Objective     Observations     Right Knee   Positive for effusion and incision.     Additional Observation Details  Incision is healing well, no drainage, no warmth, no redness.      Tenderness     Right Knee   Tenderness in the lateral joint line and medial joint line.     Neurological Testing     Sensation     Knee   Left Knee   Intact: Light touch    Right Knee   Intact: light touch     Active Range of Motion   Left Knee   Flexion: 130 degrees   Extensor la degrees     Right Knee   Flexion: 115 degrees with pain  Extension: -7 degrees with pain    Passive Range of Motion     Right Knee   Flexion: 112 degrees with pain  Extension: -5 degrees with pain    Mobility   Patellar Mobility:     Right Knee   Hypomobile: medial, lateral, superior and inferior     Patellar Static Positioning   Left Knee: WFL  Right Knee: WFL    Strength/Myotome Testing     Left Knee   Flexion: 5  Extension: 5  Quadriceps contraction: good    Right Knee   Flexion: 3+  Extension: 4-  Quadriceps contraction: fair    Swelling     Left Knee Girth Measurement (cm)   Joint line: 42 cm    Right Knee Girth Measurement (cm)   Joint line: 47 cm    General Comments:      Knee Comments  Mobility/ROM and strength results in objective findings. Post-operative pain management expectations discussed.  Gait mechanics: decreased stride length, decreased step length, decreased step width, and decreased renuka  Patient ambulates with a SPC for community ambulation, no AD household ambulation   Stair navigation: non-reciprocal with utilization of BHR's             Precautions: R TKA (3/3)    Manuals 3/27 4/1 4/4 3/24   R Knee PROM  BM Seated/Supine  BM Seated/Supine BM seated/supine BM Seated/Supine    R Knee Patellar Mobs       R Hamstring/Gastroc Stretch  BM  BM BM  BM    R knee STM        Neuro Re-Ed       SLS 20\" Hold x3  20\" Hold x3                                                Ther Ex       Heel Slides  2x10 w/ sliding board & strap  2x10 " "with board & strap     2/10 with board & strap  2x10 w/ sliding board & strap    Quad Sets 5\" Hold 2x10  5\" 2x10 2/10 5\" 2x10 5\" Hold    Hamstring Stretch  8\" step 10\" Hold x10  8\" step 10\" Hold x10  8\" step 10\" hold  x10  8\" step 10\" Hold x10    Gastroc Stretch  10\" Hold x10 w/ strap  10\" Hold x10 w/ 1/2 foam  10\" Hold x10 w/ strap  10\" Hold x10 w/ strap    Seated Assisted Knee Flexion Stretch  10\" Hold x10  10\" Hold x10  10\" Hold x10  10\" Hold x10    Standing Knee Flexion Stretch  10\" Hold x10 8\" step  10\" Hold x10 8\" step  10\" Hold x10 6\" step  10\" Hold x10 8\" step    LAQ 2x10  1.5# 2x10 1.5# 2x10  2x10    SLR 2x10 2x10 2x10  2x10   TKE L3 2x10  L4 2x10 NT     Mini Squats   TRX 2x10     Mini Forward Lunges       NU Step for muscular endurance & ROM 10' L4 10' L4 10' L4  10' L4    HEP Instruction  BM BM     Ther Activity       Fwd/Bwd Walking X4 laps  HEP  x4 laps  X4 laps    Forward Step Ups  8\" step 2x10  8\" step 2x10  8\" step 2x10  6\" step 2x10    Forward Step Downs  6\" step 2x10  6\" step 2x10  6\" step 2x10  4\" step 2x10    Side Stepping  L2 x3 laps  L2 X3 laps NT  4 laps    Sit to Stands  X10  x10 x10 x10   Gait Training       SPC        No AD       Modalities       Ice  5' Post  5' post 5' post 5' Post                  Access [email or web address]: [email or web address]  URL: [email or web address]  Date: 03/05/2025  Prepared by: Jes    Exercises  - Long Sitting Quad Set  - 1 x daily - 7 x weekly - 2 sets - 10 reps - 3\" hold  - Hooklying Gluteal Sets  - 1 x daily - 7 x weekly - 2 sets - 10 reps - 3\" hold  - Supine Ankle Pumps  - 1 x daily - 7 x weekly - 3 sets - 10 reps  - Supine Heel Slide  - 1 x daily - 7 x weekly - 2 sets - 10 reps  "

## 2025-04-08 ENCOUNTER — APPOINTMENT (OUTPATIENT)
Dept: PHYSICAL THERAPY | Facility: CLINIC | Age: 75
End: 2025-04-08
Payer: COMMERCIAL

## 2025-04-08 ENCOUNTER — OFFICE VISIT (OUTPATIENT)
Dept: PHYSICAL THERAPY | Facility: CLINIC | Age: 75
End: 2025-04-08
Payer: COMMERCIAL

## 2025-04-08 DIAGNOSIS — M17.11 UNILATERAL PRIMARY OSTEOARTHRITIS, RIGHT KNEE: Primary | ICD-10-CM

## 2025-04-08 DIAGNOSIS — Z96.651 S/P TOTAL KNEE REPLACEMENT, RIGHT: ICD-10-CM

## 2025-04-08 DIAGNOSIS — Z74.09 IMPAIRED FUNCTIONAL MOBILITY, BALANCE, GAIT, AND ENDURANCE: ICD-10-CM

## 2025-04-08 PROCEDURE — 97110 THERAPEUTIC EXERCISES: CPT | Performed by: PHYSICAL THERAPIST

## 2025-04-08 PROCEDURE — 97530 THERAPEUTIC ACTIVITIES: CPT | Performed by: PHYSICAL THERAPIST

## 2025-04-08 NOTE — PROGRESS NOTES
"Daily Note     Today's date: 2025  Patient name: Rosita Soriano  : 1950  MRN: [id number]  Referring provider: Anderson Farr MD  Dx:   Encounter Diagnosis     ICD-10-CM    1. Unilateral primary osteoarthritis, right knee  M17.11       2. S/P total knee replacement, right  Z96.651       3. Impaired functional mobility, balance, gait, and endurance  Z74.09           Start Time: 0915  Stop Time: 1000  Total time in clinic (min): 45 minutes    Subjective: The patient states that she has minimal R knee pain throughout the day. She continues to have discomfort at night making it difficult to find a comfortable position to sleep.  She drove herself to therapy today.        Objective: See treatment diary below  R knee AROM -6-116 degrees      Assessment: SLS remains challenging. Deficits noted in B hip stability. Discussed practicing SLS at home to further improve stability. Tolerated treatment well. Patient would benefit from continued PT to address ongoing deficits.       Plan: Continue per plan of care.      Precautions: R TKA (3/3)    Manuals 3/27 4/1 4/4 4/8   R Knee PROM  BM Seated/Supine  BM Seated/Supine BM seated/supine    R Knee Patellar Mobs       R Hamstring/Gastroc Stretch  BM  BM BM     R knee STM        Neuro Re-Ed       SLS 20\" Hold x3  20\" Hold x3   20\"x3                                      Ther Ex           TKE stretch in supine-JM   Heel Slides  2x10 w/ sliding board & strap  2x10 with board & strap     2/10 with board & strap  2x10 w/ sliding board & strap    Quad Sets 5\" Hold 2x10  5\" 2x10 2/10 5\" 2x10 5\" Hold    Hamstring Stretch  8\" step 10\" Hold x10  8\" step 10\" Hold x10  8\" step 10\" hold  x10  8\" step 10\" Hold x10    Gastroc Stretch  10\" Hold x10 w/ strap  10\" Hold x10 w/ 1/2 foam  10\" Hold x10 w/ strap  30\"x3 1/2 roll in standing   Seated Assisted Knee Flexion Stretch  10\" Hold x10  10\" Hold x10  10\" Hold x10  10\" Hold x10    Standing Knee Flexion Stretch  10\" Hold x10 8\" step  10\" " "Hold x10 8\" step  10\" Hold x10 6\" step  10\" Hold x10 8\" step    LAQ 2x10  1.5# 2x10 1.5# 2x10  2# 2x10    SLR 2x10 2x10 2x10  2x10   TKE L3 2x10  L4 2x10 NT  CC P2 2x10 5\" hold   Mini Squats   TRX 2x10  TRX 2x10   Mini Forward Lunges       NU Step for muscular endurance & ROM 10' L4 10' L4 10' L4  10' L4    HEP Instruction  BM BM     Ther Activity       Fwd/Bwd Walking X4 laps  HEP  x4 laps     Forward Step Ups  8\" step 2x10  8\" step 2x10  8\" step 2x10  8\" step 2x10    Forward Step Downs  6\" step 2x10  6\" step 2x10  6\" step 2x10  8\" step 2x10    Side Stepping  L2 x3 laps  L2 X3 laps NT  L2 5x //bars   Sit to Stands  X10  x10 x10 x10   Gait Training       SPC        No AD       Modalities       Ice  5' Post  5' post 5' post 5' Post                    "

## 2025-04-10 ENCOUNTER — OFFICE VISIT (OUTPATIENT)
Dept: PHYSICAL THERAPY | Facility: CLINIC | Age: 75
End: 2025-04-10
Payer: COMMERCIAL

## 2025-04-10 DIAGNOSIS — M17.11 UNILATERAL PRIMARY OSTEOARTHRITIS, RIGHT KNEE: Primary | ICD-10-CM

## 2025-04-10 DIAGNOSIS — Z96.651 S/P TOTAL KNEE REPLACEMENT, RIGHT: ICD-10-CM

## 2025-04-10 DIAGNOSIS — Z74.09 IMPAIRED FUNCTIONAL MOBILITY, BALANCE, GAIT, AND ENDURANCE: ICD-10-CM

## 2025-04-10 PROCEDURE — 97530 THERAPEUTIC ACTIVITIES: CPT

## 2025-04-10 PROCEDURE — 97110 THERAPEUTIC EXERCISES: CPT

## 2025-04-10 NOTE — PROGRESS NOTES
"Daily Note     Today's date: 4/10/2025  Patient name: Rosita Soriano  : 1950  MRN: [id number]  Referring provider: Anderson Farr MD  Dx:   Encounter Diagnosis     ICD-10-CM    1. Unilateral primary osteoarthritis, right knee  M17.11       2. S/P total knee replacement, right  Z96.651       3. Impaired functional mobility, balance, gait, and endurance  Z74.09                      Subjective: Patient reports R posterior knee discomfort this morning.       Objective: See treatment diary below      Assessment: Tolerated treatment well. Patient is continuing to respond well to PT treatment and is making appropriate progressions with the current program. PT will continue to progress patient within tolerance to address functional limitations. She exhibited good technique with therapeutic exercises and would benefit from continued PT.      Plan: Continue per plan of care.      Precautions: R TKA (3/3)    Manuals 4/10  4/1 4/4 4/8   R Knee PROM  BM Seated/Supine  BM Seated/Supine BM seated/supine    R Knee Patellar Mobs       R Hamstring/Gastroc Stretch  BM  BM BM     R knee STM        Neuro Re-Ed       SLS  20\" Hold x3   20\"x3                                      Ther Ex           TKE stretch in supine-JM   Heel Slides  2x10 w/ sliding board & strap  2x10 with board & strap     2/10 with board & strap  2x10 w/ sliding board & strap    Quad Sets 5\" Hold 2x10  5\" 2x10 2/10 5\" 2x10 5\" Hold    Hamstring Stretch  8\" step 10\" Hold x10  8\" step 10\" Hold x10  8\" step 10\" hold  x10  8\" step 10\" Hold x10    Gastroc Stretch  10\" Hold x10 w/ strap  10\" Hold x10 w/ 1/2 foam  10\" Hold x10 w/ strap  30\"x3 1/2 roll in standing   Seated Assisted Knee Flexion Stretch  10\" Hold x10  10\" Hold x10  10\" Hold x10  10\" Hold x10    Standing Knee Flexion Stretch  10\" Hold x10 8\" step  10\" Hold x10 8\" step  10\" Hold x10 6\" step  10\" Hold x10 8\" step    LAQ 2# 2x10  1.5# 2x10 1.5# 2x10  2# 2x10    SLR 2x10 2x10 2x10  2x10   TKE CC P2 2x10 " "5\" Hold  L4 2x10 NT  CC P2 2x10 5\" hold   Mini Squats TRX 2x10   TRX 2x10  TRX 2x10   Mini Forward Lunges       NU Step for muscular endurance & ROM 10' L5 10' L4 10' L4  10' L4    HEP Instruction  BM BM     Ther Activity       Forward Step Ups  8\" step 2x10  8\" step 2x10  8\" step 2x10  8\" step 2x10    Forward Step Downs  8\" step 2x10  6\" step 2x10  6\" step 2x10  8\" step 2x10    Side Stepping  L2 x5 laps  L2 X3 laps NT  L2 5x //bars   Sit to Stands  X10  x10 x10 x10   Gait Training       SPC        No AD       Modalities       Ice  5' Post  5' post 5' post 5' Post                      "

## 2025-04-15 ENCOUNTER — OFFICE VISIT (OUTPATIENT)
Dept: PHYSICAL THERAPY | Facility: CLINIC | Age: 75
End: 2025-04-15
Payer: COMMERCIAL

## 2025-04-15 DIAGNOSIS — M17.11 UNILATERAL PRIMARY OSTEOARTHRITIS, RIGHT KNEE: Primary | ICD-10-CM

## 2025-04-15 DIAGNOSIS — Z74.09 IMPAIRED FUNCTIONAL MOBILITY, BALANCE, GAIT, AND ENDURANCE: ICD-10-CM

## 2025-04-15 DIAGNOSIS — Z96.651 S/P TOTAL KNEE REPLACEMENT, RIGHT: ICD-10-CM

## 2025-04-15 PROCEDURE — 97110 THERAPEUTIC EXERCISES: CPT

## 2025-04-15 PROCEDURE — 97530 THERAPEUTIC ACTIVITIES: CPT

## 2025-04-15 RX ORDER — ONDANSETRON 4 MG/1
4 TABLET, ORALLY DISINTEGRATING ORAL
COMMUNITY

## 2025-04-15 RX ORDER — ASPIRIN 81 MG/1
81 TABLET ORAL 2 TIMES DAILY
COMMUNITY
Start: 2025-03-03

## 2025-04-15 RX ORDER — OXYCODONE HYDROCHLORIDE 5 MG/1
5 TABLET ORAL
COMMUNITY
Start: 2025-02-27

## 2025-04-15 RX ORDER — METHOCARBAMOL 500 MG/1
500 TABLET, FILM COATED ORAL
COMMUNITY
Start: 2025-04-03

## 2025-04-15 NOTE — PROGRESS NOTES
"Daily Note     Today's date: 4/15/2025  Patient name: Rosita Soriano  : 1950  MRN: [id number]  Referring provider: Anderson Farr MD  Dx:   Encounter Diagnosis     ICD-10-CM    1. Unilateral primary osteoarthritis, right knee  M17.11       2. S/P total knee replacement, right  Z96.651       3. Impaired functional mobility, balance, gait, and endurance  Z74.09                      Subjective: Patient reports still experiencing clicking in the posterior R knee with certain activity.       Objective: See treatment diary below  R Knee Flexion AROM: -3-121 deg       Assessment: Tolerated treatment well. Patient demonstrated fatigue post treatment, exhibited good technique with therapeutic exercises, and would benefit from continued PT. She is continuing to respond well to PT treatment and is making appropriate progressions with the current program.      Plan: Continue per plan of care.      Precautions: R TKA (3/3)    Manuals 4/10  4/15 4/4 4   R Knee PROM  BM Seated/Supine  BM Seated/Supine BM seated/supine    R Knee Patellar Mobs       R Hamstring/Gastroc Stretch  BM  BM BM     R knee STM        Neuro Re-Ed       SLS  20\" Hold x3   20\"x3                                      Ther Ex           TKE stretch in supine-JM   Heel Slides  2x10 w/ sliding board & strap  2x10 with board & strap     2/10 with board & strap  2x10 w/ sliding board & strap    Quad Sets 5\" Hold 2x10  5\" 2x10 2/10 5\" 2x10 5\" Hold    Hamstring Stretch  8\" step 10\" Hold x10  8\" step 10\" Hold x10  8\" step 10\" hold  x10  8\" step 10\" Hold x10    Gastroc Stretch  10\" Hold x10 w/ strap  10\" Hold x10 w/ 1/2 foam  10\" Hold x10 w/ strap  30\"x3 1/2 roll in standing   Seated Assisted Knee Flexion Stretch  10\" Hold x10  10\" Hold x10  10\" Hold x10  10\" Hold x10    Standing Knee Flexion Stretch  10\" Hold x10 8\" step  10\" Hold x10 8\" step  10\" Hold x10 6\" step  10\" Hold x10 8\" step    LAQ 2# 2x10  3# 2x10 1.5# 2x10  2# 2x10    SLR 2x10 2x10 2x10  2x10 " "  TKE CC P2 2x10 5\" Hold  CC P2 2x10 5\" Hold  NT  CC P2 2x10 5\" hold   Mini Squats TRX 2x10  TRX 2x10  TRX 2x10  TRX 2x10   Mini Forward Lunges  2x10      NU Step for muscular endurance & ROM 10' L5 10' L4 10' L4  10' L4    HEP Instruction  BM BM     Ther Activity       Forward Step Ups  8\" step 2x10  8\" step 2x10  8\" step 2x10  8\" step 2x10    Forward Step Downs  8\" step 2x10  8\" step 2x10  6\" step 2x10  8\" step 2x10    Side Stepping  L2 x5 laps  L2 X5 laps NT  L2 5x //bars   Sit to Stands  X10  x10 x10 x10   Gait Training       SPC        No AD       Modalities       Ice  5' Post  5' post 5' post 5' Post                        "

## 2025-04-17 ENCOUNTER — OFFICE VISIT (OUTPATIENT)
Dept: PHYSICAL THERAPY | Facility: CLINIC | Age: 75
End: 2025-04-17
Payer: COMMERCIAL

## 2025-04-17 DIAGNOSIS — Z74.09 IMPAIRED FUNCTIONAL MOBILITY, BALANCE, GAIT, AND ENDURANCE: ICD-10-CM

## 2025-04-17 DIAGNOSIS — M17.11 UNILATERAL PRIMARY OSTEOARTHRITIS, RIGHT KNEE: Primary | ICD-10-CM

## 2025-04-17 DIAGNOSIS — Z96.651 S/P TOTAL KNEE REPLACEMENT, RIGHT: ICD-10-CM

## 2025-04-17 PROCEDURE — 97110 THERAPEUTIC EXERCISES: CPT

## 2025-04-17 PROCEDURE — 97530 THERAPEUTIC ACTIVITIES: CPT

## 2025-04-17 NOTE — PROGRESS NOTES
"Daily Note     Today's date: 2025  Patient name: Rosita Soriano  : 1950  MRN: [id number]  Referring provider: Anderson Farr MD  Dx:   Encounter Diagnosis     ICD-10-CM    1. Unilateral primary osteoarthritis, right knee  M17.11       2. S/P total knee replacement, right  Z96.651       3. Impaired functional mobility, balance, gait, and endurance  Z74.09                      Subjective: Patient reports with no new complaints or issues since last treatment session.       Objective: See treatment diary below      Assessment: Tolerated treatment well with a minimal increase in R knee symptoms. Patient demonstrated fatigue post treatment, exhibited good technique with therapeutic exercises, and would benefit from continued PT to address functional limitations.      Plan: Continue per plan of care.      Precautions: R TKA (3/3)    Manuals 4/10  4/15 4/17 4   R Knee PROM  BM Seated/Supine  BM Seated/Supine BM seated/supine    R Knee Patellar Mobs       R Hamstring/Gastroc Stretch  BM  BM BM     R knee STM        Neuro Re-Ed       SLS  20\" Hold x3  20\" Hold x3  20\"x3                                      Ther Ex           TKE stretch in supine-JM   Heel Slides  2x10 w/ sliding board & strap  2x10 with board & strap     2/10 with board & strap  2x10 w/ sliding board & strap    Quad Sets 5\" Hold 2x10  5\" 2x10 HEP  2x10 5\" Hold    Hamstring Stretch  8\" step 10\" Hold x10  8\" step 10\" Hold x10  8\" step 10\" hold  x10  8\" step 10\" Hold x10    Gastroc Stretch  10\" Hold x10 w/ strap  10\" Hold x10 w/ 1/2 foam  10\" Hold x10 w/ strap w/ 1/2 foam  30\"x3 1/2 roll in standing   Seated Assisted Knee Flexion Stretch  10\" Hold x10  10\" Hold x10  10\" Hold x10  10\" Hold x10    Standing Knee Flexion Stretch  10\" Hold x10 8\" step  10\" Hold x10 8\" step  10\" Hold x10 8\" step  10\" Hold x10 8\" step    LAQ 2# 2x10  3# 2x10 3# 2x10  2# 2x10    SLR 2x10 2x10 2x10  2x10   TKE CC P2 2x10 5\" Hold  CC P2 2x10 5\" Hold  CC P2 2x10 5\" Hold  " "CC P2 2x10 5\" hold   Mini Squats TRX 2x10  TRX 2x10  TRX 2x10  TRX 2x10   Mini Forward Lunges  2x10  2x10     NU Step for muscular endurance & ROM 10' L5 10' L4 10' L4  10' L4    HEP Instruction  BM BM     Ther Activity       Forward Step Ups  8\" step 2x10  8\" step 2x10  8\" step 2x10  8\" step 2x10    Forward Step Downs  8\" step 2x10  8\" step 2x10  8\" step 2x10  8\" step 2x10    Side Stepping  L2 x5 laps  L2 X5 laps L2 x5 laps   L2 5x //bars   Sit to Stands  X10  x10 NT  x10   Gait Training       SPC        No AD       Modalities       Ice  5' Post  5' post 5' post 5' Post                          "

## 2025-04-18 ENCOUNTER — OFFICE VISIT (OUTPATIENT)
Dept: PAIN MEDICINE | Facility: CLINIC | Age: 75
End: 2025-04-18
Payer: COMMERCIAL

## 2025-04-18 VITALS — RESPIRATION RATE: 16 BRPM | BODY MASS INDEX: 31.08 KG/M2 | TEMPERATURE: 97.6 F | HEIGHT: 67 IN | WEIGHT: 198 LBS

## 2025-04-18 DIAGNOSIS — M51.16 LUMBAR DISC DISEASE WITH RADICULOPATHY: ICD-10-CM

## 2025-04-18 DIAGNOSIS — G89.4 CHRONIC PAIN SYNDROME: Primary | ICD-10-CM

## 2025-04-18 PROCEDURE — 99213 OFFICE O/P EST LOW 20 MIN: CPT | Performed by: NURSE PRACTITIONER

## 2025-04-18 NOTE — PROGRESS NOTES
Assessment:  1. Chronic pain syndrome    2. Lumbar disc disease with radiculopathy        Plan:  While the patient was in the office today, I did have a thorough conversation regarding their chronic pain syndrome, medication management, and treatment plan options.  Patient is being seen for a follow-up visit.  She underwent an L3-4 interlaminar epidural steroid injection on 1/22/2025.  She is reporting almost complete resolution of her pain.  She underwent a right total knee replacement on 3/3/2025.  Postop course was unremarkable.  No complications.  She is still in physical therapy and doing well.    I discussed with the patient that at this point time she can follow up with our office on an as-needed basis. I did review the patient that if her pain symptoms should change, worsen, and/or if she would experience any new symptoms as she would like to be evaluated for, she should give our office a call. The patient was agreeable and verbalized an understanding.      History of Present Illness:  The patient is a 74 y.o. female who presents for a follow up office visit in regards to Back Pain.   The patient is reporting complete resolution of back and leg pain following recent L3-4 interlaminar epidural steroid injection.  Current pain level is a 0/10.    I have personally reviewed and/or updated the patient's past medical history, past surgical history, family history, social history, current medications, allergies, and vital signs today.         Review of Systems  Review of Systems   Musculoskeletal:  Positive for back pain.   All other systems reviewed and are negative.          Past Medical History:   Diagnosis Date    Joint pain     Urine incontinence        Past Surgical History:   Procedure Laterality Date    BLADDER SURGERY      BREAST SURGERY      CHOLECYSTECTOMY      COLONOSCOPY      EPIDURAL BLOCK INJECTION Right 06/01/2023    Procedure: Right L3-4 and L4-5 transforaminal epidural steroid injection;   Surgeon: Soumya Prasad MD;  Location: [facility];  Service: Pain Management     EYE SURGERY      HYSTERECTOMY      IR SPINE PROCEDURE  10/24/2019    KNEE ARTHROSCOPY      LUMBAR EPIDURAL INJECTION N/A 1/22/2025    Procedure: L3-L4 IESI;  Surgeon: Soumya Prasad MD;  Location: [facility];  Service: Pain Management     ROTATOR CUFF REPAIR      SHOULDER SURGERY      THROAT SURGERY      TONSILLECTOMY         Family History   Problem Relation Age of Onset    Breast cancer Mother     Cancer Mother     Stroke Father     Heart attack Father        Social History     Occupational History    Not on file   Tobacco Use    Smoking status: Never    Smokeless tobacco: Never   Vaping Use    Vaping status: Never Used   Substance and Sexual Activity    Alcohol use: Yes     Comment: social    Drug use: No    Sexual activity: Not Currently         Current Outpatient Medications:     aspirin (ECOTRIN LOW STRENGTH) 81 mg EC tablet, Take 81 mg by mouth 2 (two) times a day, Disp: , Rfl:     Calcium Carb-Cholecalciferol 600-12.5 MG-MCG CAPS, , Disp: , Rfl:     calcium carbonate-vitamin D 500 mg-5 mcg per tablet, Take 1 tablet by mouth daily with breakfast, Disp: 90 tablet, Rfl: 3    cetirizine (ZyrTEC) 10 mg tablet, Take 1 tablet (10 mg total) by mouth daily, Disp: 30 tablet, Rfl: 2    meclizine (ANTIVERT) 25 mg tablet, Take 1 tablet (25 mg total) by mouth every 8 (eight) hours as needed for dizziness, Disp: 30 tablet, Rfl: 1    meloxicam (MOBIC) 15 mg tablet, TAKE 1 TABLET DAILY AS     NEEDED FOR MODERATE PAIN, Disp: 90 tablet, Rfl: 1    methocarbamol (ROBAXIN) 500 mg tablet, Take 500 mg by mouth, Disp: , Rfl:     ondansetron (ZOFRAN-ODT) 4 mg disintegrating tablet, Take 4 mg by mouth, Disp: , Rfl:     oxyCODONE (ROXICODONE) 5 immediate release tablet, Take 5 mg by mouth, Disp: , Rfl:     pantoprazole (PROTONIX) 40 mg tablet, TAKE 1 TABLET DAILY, Disp: 90 tablet, Rfl: 1    Alcaftadine (Lastacaft) 0.25 % SOLN, , Disp: , Rfl:     " doxycycline monohydrate (MONODOX) 50 mg capsule, , Disp: , Rfl:     ketotifen (ZADITOR) 0.025 % ophthalmic solution, Administer 1 drop to both eyes 2 (two) times a day (Patient not taking: Reported on 2/25/2025), Disp: 5 mL, Rfl: 0    Allergies   Allergen Reactions    Corticosteroids Other (See Comments)     Increased eye pressure    Increased Intraocular pressure      Increased eye pressure    Morphine GI Intolerance    Fentanyl Rash       Physical Exam:    Temp 97.6 °F (36.4 °C) (Temporal)   Resp 16   Ht 5' 7\" (1.702 m)   Wt 89.8 kg (198 lb)   BMI 31.01 kg/m²     Constitutional:normal, well developed, well nourished, alert, in no distress and non-toxic and no overt pain behavior.  Eyes:anicteric  HEENT:grossly intact  Neck:supple, symmetric, trachea midline and no masses   Pulmonary:even and unlabored  Cardiovascular:No edema or pitting edema present  Skin:Normal without rashes or lesions and well hydrated  Psychiatric:Mood and affect appropriate  Neurologic:Cranial Nerves II-XII grossly intact  Musculoskeletal:antalgic    Imaging    Impression:  1.  Multifactorial moderate spinal stenosis at L3-L4. There is narrowing of the  left subarticular recess with crowding and possible impingement on the  traversing left L4 nerve root.  2.  Mild to moderate spinal stenosis at L2-L3 appears slightly progressed since  the prior study.  3.  Neural foraminal narrowing at multiple levels. See details above.            Workstation:[id number]  Narrative    MRI lumbar spine without contrast    History: Spinal stenosis of lumbar region    Technique: Sagittal T1, T2 and STIR as well as axial T2-weighted images through  the lumbar spine.    Comparison: MRI dated 8/15/2016    Findings:  For the purposes of this dictation, the lumbar vertebrae are labeled from a  caudal to cranial direction, the first vertebra with lumbar morphology is  labeled as L5.    There is dextroscoliosis of the lumbar spine. The lumbar lordosis is " preserved.  Vertebral body heights are maintained. Marrow signal somewhat heterogeneous.  There are Modic type II degenerative endplate changes at L3-L4, L4-L5 and L5-S1.  The conus terminates at the L1  level and appears normal in signal on the  sagittal sequence.    Disc bulge at T12-L1 without spinal canal narrowing though there is moderate  left neural foraminal narrowing due to facet arthrosis.    L1-L2: Disc bulge and facet arthrosis with mild to moderate spinal canal  narrowing. No neural foraminal narrowing. Findings are unchanged.     L2-L3: Minimal anterolisthesis of L2 over L3. There is disc bulge with facet  arthrosis and slight thickening of ligamentum flavum. Mild to moderate spinal  stenosis which appears slightly progressed since the prior study. Narrowing of  the subarticular recesses. Moderate bilateral neural foraminal narrowing. Facet  osteophyte appears to contact the exiting left L2 nerve root.    L3-L4: Approximately 4 mm anterolisthesis of L3 over L4. There is disc bulge and  prominent bilateral facet arthrosis with slight thickening of the ligamentum  flavum. Moderate spinal stenosis. There is narrowing of the left subarticular  recess with crowding and possible impingement on the traversing left L4 nerve  root. Mild bilateral neural foraminal narrowing. Findings are unchanged.    L4-L5: No spinal stenosis. Marginal osteophytes and facet arthrosis with  moderate right neural foraminal narrowing. Findings are unchanged.    L5-S1: Disc space narrowing with a disc osteophyte complex and bilateral facet  arthrosis. The spinal canal is not narrowed. Mild left and moderate right neural  foraminal narrowing. Findings are unchanged.      No orders to display       No orders of the defined types were placed in this encounter.

## 2025-04-22 ENCOUNTER — OFFICE VISIT (OUTPATIENT)
Dept: PHYSICAL THERAPY | Facility: CLINIC | Age: 75
End: 2025-04-22
Payer: COMMERCIAL

## 2025-04-22 DIAGNOSIS — M17.11 UNILATERAL PRIMARY OSTEOARTHRITIS, RIGHT KNEE: Primary | ICD-10-CM

## 2025-04-22 DIAGNOSIS — Z96.651 S/P TOTAL KNEE REPLACEMENT, RIGHT: ICD-10-CM

## 2025-04-22 DIAGNOSIS — Z74.09 IMPAIRED FUNCTIONAL MOBILITY, BALANCE, GAIT, AND ENDURANCE: ICD-10-CM

## 2025-04-22 PROCEDURE — 97110 THERAPEUTIC EXERCISES: CPT

## 2025-04-22 PROCEDURE — 97530 THERAPEUTIC ACTIVITIES: CPT

## 2025-04-22 NOTE — PROGRESS NOTES
"Daily Note     Today's date: 2025  Patient name: Rosita Soriano  : 1950  MRN: [id number]  Referring provider: Anderson Farr MD  Dx:   Encounter Diagnosis     ICD-10-CM    1. Unilateral primary osteoarthritis, right knee  M17.11       2. S/P total knee replacement, right  Z96.651       3. Impaired functional mobility, balance, gait, and endurance  Z74.09                      Subjective: Patient reports no new issues or complaints since last treatment session.       Objective: See treatment diary below      Assessment: Tolerated treatment well. Patient continues to respond well to PT treatment post-operatively and is making appropriate progressions with the current program. She would benefit from continued PT to return to prior level of function.      Plan: Continue per plan of care.      Precautions: R TKA (3/3)    Manuals 4/10  4/15 4/17 4/22   R Knee PROM  BM Seated/Supine  BM Seated/Supine BM seated/supine BM Seated/supine    R Knee Patellar Mobs       R Hamstring/Gastroc Stretch  BM  BM BM     R knee STM        Neuro Re-Ed       SLS  20\" Hold x3  20\" Hold x3  20\"x3                                      Ther Ex              Heel Slides  2x10 w/ sliding board & strap  2x10 with board & strap     2/10 with board & strap  2x10 w/ sliding board & strap    Quad Sets 5\" Hold 2x10  5\" 2x10 HEP     Hamstring Stretch  8\" step 10\" Hold x10  8\" step 10\" Hold x10  8\" step 10\" hold  x10  8\" step 10\" Hold x10    Gastroc Stretch  10\" Hold x10 w/ strap  10\" Hold x10 w/ 1/2 foam  10\" Hold x10 w/ strap w/ 1/2 foam  10\" Hold x10 w/ strap    Seated Assisted Knee Flexion Stretch  10\" Hold x10  10\" Hold x10  10\" Hold x10  10\" Hold x10    Standing Knee Flexion Stretch  10\" Hold x10 8\" step  10\" Hold x10 8\" step  10\" Hold x10 8\" step  10\" Hold x10 8\" step    LAQ 2# 2x10  3# 2x10 3# 2x10  3# 2x10    SLR 2x10 2x10 2x10  2x10   TKE CC P2 2x10 5\" Hold  CC P2 2x10 5\" Hold  CC P2 2x10 5\" Hold  CC P2 2x10 5\" hold   Mini Squats " "TRX 2x10  TRX 2x10  TRX 2x10  TRX 2x10   Mini Forward Lunges  2x10  2x10  2x10   NU Step for muscular endurance & ROM 10' L5 10' L4 10' L4  10' L5    HEP Instruction  BM BM     Ther Activity       Forward Step Ups  8\" step 2x10  8\" step 2x10  8\" step 2x10  8\" step 2x10    Forward Step Downs  8\" step 2x10  8\" step 2x10  8\" step 2x10  8\" step 2x10    Side Stepping  L2 x5 laps  L2 X5 laps L2 x5 laps   L2 5x //bars   Sit to Stands  X10  x10 NT  x10   Gait Training       SPC        No AD       Modalities       Ice  5' Post  5' post 5' post 5' Post                            "

## 2025-04-24 ENCOUNTER — OFFICE VISIT (OUTPATIENT)
Dept: PHYSICAL THERAPY | Facility: CLINIC | Age: 75
End: 2025-04-24
Payer: COMMERCIAL

## 2025-04-24 DIAGNOSIS — M17.11 UNILATERAL PRIMARY OSTEOARTHRITIS, RIGHT KNEE: Primary | ICD-10-CM

## 2025-04-24 DIAGNOSIS — Z74.09 IMPAIRED FUNCTIONAL MOBILITY, BALANCE, GAIT, AND ENDURANCE: ICD-10-CM

## 2025-04-24 DIAGNOSIS — Z96.651 S/P TOTAL KNEE REPLACEMENT, RIGHT: ICD-10-CM

## 2025-04-24 PROCEDURE — 97110 THERAPEUTIC EXERCISES: CPT

## 2025-04-24 PROCEDURE — 97530 THERAPEUTIC ACTIVITIES: CPT

## 2025-04-24 NOTE — PROGRESS NOTES
"Daily Note     Today's date: 2025  Patient name: Rosita Soriano  : 1950  MRN: [id number]  Referring provider: Anderson Farr MD  Dx:   Encounter Diagnosis     ICD-10-CM    1. Unilateral primary osteoarthritis, right knee  M17.11       2. S/P total knee replacement, right  Z96.651       3. Impaired functional mobility, balance, gait, and endurance  Z74.09                      Subjective: ***      Objective: See treatment diary below      Assessment: Tolerated treatment {Tolerated treatment :[id number]}. Patient {assessment:7986684397}      Plan: {PLAN:[id number]}     Precautions: R TKA (3/3)    Manuals 4/10  4/15 4/17 4/22   R Knee PROM  BM Seated/Supine  BM Seated/Supine BM seated/supine BM Seated/supine    R Knee Patellar Mobs       R Hamstring/Gastroc Stretch  BM  BM BM     R knee STM        Neuro Re-Ed       SLS  20\" Hold x3  20\" Hold x3  20\"x3                                      Ther Ex              Heel Slides  2x10 w/ sliding board & strap  2x10 with board & strap     2/10 with board & strap  2x10 w/ sliding board & strap    Quad Sets 5\" Hold 2x10  5\" 2x10 HEP     Hamstring Stretch  8\" step 10\" Hold x10  8\" step 10\" Hold x10  8\" step 10\" hold  x10  8\" step 10\" Hold x10    Gastroc Stretch  10\" Hold x10 w/ strap  10\" Hold x10 w/ 1/2 foam  10\" Hold x10 w/ strap w/ 1/2 foam  10\" Hold x10 w/ strap    Seated Assisted Knee Flexion Stretch  10\" Hold x10  10\" Hold x10  10\" Hold x10  10\" Hold x10    Standing Knee Flexion Stretch  10\" Hold x10 8\" step  10\" Hold x10 8\" step  10\" Hold x10 8\" step  10\" Hold x10 8\" step    LAQ 2# 2x10  3# 2x10 3# 2x10  3# 2x10    SLR 2x10 2x10 2x10  2x10   TKE CC P2 2x10 5\" Hold  CC P2 2x10 5\" Hold  CC P2 2x10 5\" Hold  CC P2 2x10 5\" hold   Mini Squats TRX 2x10  TRX 2x10  TRX 2x10  TRX 2x10   Mini Forward Lunges  2x10  2x10  2x10   NU Step for muscular endurance & ROM 10' L5 10' L4 10' L4  10' L5    HEP Instruction  BM BM     Ther Activity       Forward Step Ups  8\" step 2x10  " "8\" step 2x10  8\" step 2x10  8\" step 2x10    Forward Step Downs  8\" step 2x10  8\" step 2x10  8\" step 2x10  8\" step 2x10    Side Stepping  L2 x5 laps  L2 X5 laps L2 x5 laps   L2 5x //bars   Sit to Stands  X10  x10 NT  x10   Gait Training       SPC        No AD       Modalities       Ice  5' Post  5' post 5' post 5' Post                              "

## 2025-04-24 NOTE — PROGRESS NOTES
"Daily Note     Today's date: 2025  Patient name: Rosita Soriano  : 1950  MRN: [id number]  Referring provider: Anderson Farr MD  Dx: No diagnosis found.               Subjective: ***      Objective: See treatment diary below      Assessment: Tolerated treatment {Tolerated treatment :[id number]}. Patient {assessment:[id number]}      Plan: Continue per plan of care.      Precautions: R TKA (3/3)    Manuals 4/24 4/15 4/17 4/22   R Knee PROM  BM Seated/Supine  BM Seated/Supine BM seated/supine BM Seated/supine    R Knee Patellar Mobs       R Hamstring/Gastroc Stretch  BM  BM BM     R knee STM        Neuro Re-Ed       SLS  20\" Hold x3  20\" Hold x3  20\"x3                                      Ther Ex              Heel Slides  2x10 w/ sliding board & strap  2x10 with board & strap     2/10 with board & strap  2x10 w/ sliding board & strap    Quad Sets 5\" Hold 2x10  5\" 2x10 HEP     Hamstring Stretch  8\" step 10\" Hold x10  8\" step 10\" Hold x10  8\" step 10\" hold  x10  8\" step 10\" Hold x10    Gastroc Stretch  10\" Hold x10 w/ strap  10\" Hold x10 w/ 1/2 foam  10\" Hold x10 w/ strap w/ 1/2 foam  10\" Hold x10 w/ strap    Seated Assisted Knee Flexion Stretch  10\" Hold x10  10\" Hold x10  10\" Hold x10  10\" Hold x10    Standing Knee Flexion Stretch  10\" Hold x10 8\" step  10\" Hold x10 8\" step  10\" Hold x10 8\" step  10\" Hold x10 8\" step    LAQ 2# 2x10  3# 2x10 3# 2x10  3# 2x10    SLR 2x10 2x10 2x10  2x10   TKE CC P2 2x10 5\" Hold  CC P2 2x10 5\" Hold  CC P2 2x10 5\" Hold  CC P2 2x10 5\" hold   Mini Squats TRX 2x10  TRX 2x10  TRX 2x10  TRX 2x10   Mini Forward Lunges  2x10  2x10  2x10   NU Step for muscular endurance & ROM 10' L5 10' L4 10' L4  10' L5    HEP Instruction  BM BM     Ther Activity       Forward Step Ups  8\" step 2x10  8\" step 2x10  8\" step 2x10  8\" step 2x10    Forward Step Downs  8\" step 2x10  8\" step 2x10  8\" step 2x10  8\" step 2x10    Side Stepping  L2 x5 laps  L2 X5 laps L2 x5 laps   L2 5x //bars   Sit to Stands "  X10  x10 NT  x10   Gait Training       SPC        No AD       Modalities       Ice  5' Post  5' post 5' post 5' Post

## 2025-04-24 NOTE — PROGRESS NOTES
"Daily Note     Today's date: 2025  Patient name: Rosita Soriano  : 1950  MRN: [id number]  Referring provider: Anderson Farr MD  Dx:   Encounter Diagnosis     ICD-10-CM    1. Unilateral primary osteoarthritis, right knee  M17.11       2. S/P total knee replacement, right  Z96.651       3. Impaired functional mobility, balance, gait, and endurance  Z74.09                      Subjective: Patient reports R knee swelling yesterday evening. She notes performing a lot of activity throughout the day.       Objective: See treatment diary below      Assessment: Tolerated treatment well with a minimal increase in R knee symptoms. Patient demonstrates moderate restriction in R terminal knee extension ROM. PT is addressing this with static stretching. Patient would benefit from continued PT to address functional limitations.       Plan: Continue per plan of care.      Precautions: R TKA (3/3)    Manuals 4/24 4/15 4/17 4/22   R Knee PROM  BM Seated/Supine  BM Seated/Supine BM seated/supine BM Seated/supine    R Knee Patellar Mobs       R Hamstring/Gastroc Stretch  BM  BM BM     R knee STM        Neuro Re-Ed       SLS 20\" Hold x3  20\" Hold x3  20\" Hold x3  20\"x3                                      Ther Ex              Heel Slides  2x10 w/ sliding board  2x10 with board & strap     2/10 with board & strap  2x10 w/ sliding board & strap    Quad Sets  5\" 2x10 HEP     Hamstring Stretch  8\" step 10\" Hold x10  8\" step 10\" Hold x10  8\" step 10\" hold  x10  8\" step 10\" Hold x10    Gastroc Stretch  10\" Hold x10 w/ strap  10\" Hold x10 w/ 1/2 foam  10\" Hold x10 w/ strap w/ 1/2 foam  10\" Hold x10 w/ strap    Seated Assisted Knee Flexion Stretch  10\" Hold x10  10\" Hold x10  10\" Hold x10  10\" Hold x10    Standing Knee Flexion Stretch  10\" Hold x10 8\" step  10\" Hold x10 8\" step  10\" Hold x10 8\" step  10\" Hold x10 8\" step    LAQ 3# 2x10  3# 2x10 3# 2x10  3# 2x10    SLR 2x10 2x10 2x10  2x10   TKE CC P2 2x10 5\" Hold  CC P2 2x10 5\" " "Hold  CC P2 2x10 5\" Hold  CC P2 2x10 5\" hold   Mini Squats TRX 2x10  TRX 2x10  TRX 2x10  TRX 2x10   Mini Forward Lunges 2x10 2x10  2x10  2x10   NU Step for muscular endurance & ROM 10' L5 10' L4 10' L4  10' L5    HEP Instruction  BM BM     Ther Activity       Forward Step Ups  8\" step 2x10  8\" step 2x10  8\" step 2x10  8\" step 2x10    Lateral step ups  8\" step 2x10       Forward Step Downs  8\" step 2x10  8\" step 2x10  8\" step 2x10  8\" step 2x10    Side Stepping  L2 x5 laps  L2 X5 laps L2 x5 laps   L2 5x //bars   Sit to Stands  X10  x10 NT  x10   Gait Training       SPC        No AD       Modalities       Ice  5' Post  5' post 5' post 5' Post                              "

## 2025-04-29 ENCOUNTER — OFFICE VISIT (OUTPATIENT)
Dept: PHYSICAL THERAPY | Facility: CLINIC | Age: 75
End: 2025-04-29
Payer: COMMERCIAL

## 2025-04-29 DIAGNOSIS — Z74.09 IMPAIRED FUNCTIONAL MOBILITY, BALANCE, GAIT, AND ENDURANCE: ICD-10-CM

## 2025-04-29 DIAGNOSIS — Z96.651 S/P TOTAL KNEE REPLACEMENT, RIGHT: ICD-10-CM

## 2025-04-29 DIAGNOSIS — M17.11 UNILATERAL PRIMARY OSTEOARTHRITIS, RIGHT KNEE: Primary | ICD-10-CM

## 2025-04-29 PROCEDURE — 97530 THERAPEUTIC ACTIVITIES: CPT

## 2025-04-29 PROCEDURE — 97110 THERAPEUTIC EXERCISES: CPT

## 2025-04-29 NOTE — PROGRESS NOTES
"Daily Note     Today's date: 2025  Patient name: Rosita Soriano  : 1950  MRN: [id number]  Referring provider: Anderson Farr MD  Dx:   Encounter Diagnosis     ICD-10-CM    1. Unilateral primary osteoarthritis, right knee  M17.11       2. S/P total knee replacement, right  Z96.651       3. Impaired functional mobility, balance, gait, and endurance  Z74.09                      Subjective: Patient reports increased R knee symptoms when she is on her feet for longer periods of time.      Objective: See treatment diary below      Assessment: Tolerated treatment well. PT progressed the current program with the addition of increased resistance with good tolerance. Patient demonstrated fatigue post treatment, exhibited good technique with therapeutic exercises, and would benefit from continued PT.      Plan: Continue per plan of care.      Precautions: R TKA (3/3)    Manuals    R Knee PROM  BM Seated/Supine  BM Seated/Supine BM seated/supine BM Seated/supine    R Knee Patellar Mobs       R Hamstring/Gastroc Stretch  BM  BM BM     R knee STM        Neuro Re-Ed       SLS 20\" Hold x3  20\" Hold x3  20\" Hold x3  20\"x3                                      Ther Ex              Heel Slides  2x10 w/ sliding board  2x10 with board & strap     2/10 with board & strap  2x10 w/ sliding board & strap    Quad Sets   HEP     Hamstring Stretch  8\" step 10\" Hold x10  8\" step 10\" Hold x10  8\" step 10\" hold  x10  8\" step 10\" Hold x10    Gastroc Stretch  10\" Hold x10 w/ strap  10\" Hold x10 w/ 1/2 foam  10\" Hold x10 w/ strap w/ 1/2 foam  10\" Hold x10 w/ strap    Seated Assisted Knee Flexion Stretch  10\" Hold x10  10\" Hold x10  10\" Hold x10  10\" Hold x10    Standing Knee Flexion Stretch  10\" Hold x10 8\" step  10\" Hold x10 8\" step  10\" Hold x10 8\" step  10\" Hold x10 8\" step    LAQ 3# 2x10  3# 2x10 3# 2x10  3# 2x10    SLR 2x10 1.5# 2x10 2x10  2x10   TKE CC P2 2x10 5\" Hold  CC P2 2x10 5\" Hold  CC P2 2x10 5\" Hold  CC " "P2 2x10 5\" hold   Mini Squats TRX 2x10  TRX 2x10  TRX 2x10  TRX 2x10   Mini Forward Lunges 2x10 2x10  2x10  2x10   NU Step for muscular endurance & ROM 10' L5 10' L5 10' L4  10' L5    HEP Instruction  BM BM     Ther Activity       Forward Step Ups  8\" step 2x10  8\" step 2x10  8\" step 2x10  8\" step 2x10    Lateral step ups  8\" step 2x10  8\" step 2x10      Forward Step Downs  8\" step 2x10  8\" step 2x10  8\" step 2x10  8\" step 2x10    Side Stepping  L2 x5 laps  L3 X5 laps L2 x5 laps   L2 5x //bars   Sit to Stands  X10  x10 NT  x10   Gait Training       SPC        No AD       Modalities       Ice  5' Post  5' post 5' post 5' Post                                "

## 2025-04-30 ENCOUNTER — TELEPHONE (OUTPATIENT)
Age: 75
End: 2025-04-30

## 2025-04-30 DIAGNOSIS — M65.271 CALCIFIC TENDINITIS OF RIGHT ANKLE: Primary | ICD-10-CM

## 2025-04-30 NOTE — TELEPHONE ENCOUNTER
Of course I can place a referral. Is she having pain in her foot or ankle? Which side? I need this for the referral.

## 2025-04-30 NOTE — TELEPHONE ENCOUNTER
Patient returned call and states it is her right ankle for tendinitis and she has an appointment on 5/16/2025.

## 2025-05-01 ENCOUNTER — OFFICE VISIT (OUTPATIENT)
Dept: PHYSICAL THERAPY | Facility: CLINIC | Age: 75
End: 2025-05-01
Payer: COMMERCIAL

## 2025-05-01 DIAGNOSIS — M17.11 UNILATERAL PRIMARY OSTEOARTHRITIS, RIGHT KNEE: Primary | ICD-10-CM

## 2025-05-01 DIAGNOSIS — Z74.09 IMPAIRED FUNCTIONAL MOBILITY, BALANCE, GAIT, AND ENDURANCE: ICD-10-CM

## 2025-05-01 DIAGNOSIS — Z96.651 S/P TOTAL KNEE REPLACEMENT, RIGHT: ICD-10-CM

## 2025-05-01 PROCEDURE — 97110 THERAPEUTIC EXERCISES: CPT

## 2025-05-01 PROCEDURE — 97530 THERAPEUTIC ACTIVITIES: CPT

## 2025-05-01 NOTE — PROGRESS NOTES
"Daily Note     Today's date: 2025  Patient name: Rosita Soriano  : 1950  MRN: [id number]  Referring provider: Anderson Farr MD  Dx:   Encounter Diagnosis     ICD-10-CM    1. Unilateral primary osteoarthritis, right knee  M17.11       2. S/P total knee replacement, right  Z96.651       3. Impaired functional mobility, balance, gait, and endurance  Z74.09                      Subjective: Patient reports no new complaints or issues since the last treatment session. She notes return to work date is Wednesday of next week.      Objective: See treatment diary below      Assessment: Tolerated treatment well. PT progressed the current program with the addition of increased resistance with good tolerance. Patient continues to make appropriate progressions with PT treatment. Patient demonstrated fatigue post treatment and would benefit from continued PT.      Plan: Continue per plan of care.      Precautions: R TKA (3/3)    Manuals    R Knee PROM  BM Seated/Supine  BM Seated/Supine BM seated/supine BM Seated/supine    R Knee Patellar Mobs       R Hamstring/Gastroc Stretch  BM  BM BM     R knee STM        Neuro Re-Ed       SLS 20\" Hold x3  20\" Hold x3  20\" Hold x3  20\"x3                                      Ther Ex              Heel Slides  2x10 w/ sliding board  2x10 with board & strap     2/10 with board & strap  2x10 w/ sliding board & strap    Quad Sets   HEP     Hamstring Stretch  8\" step 10\" Hold x10  8\" step 10\" Hold x10  8\" step 10\" hold  x10  8\" step 10\" Hold x10    Gastroc Stretch  10\" Hold x10 w/ strap  10\" Hold x10 w/ 1/2 foam  10\" Hold x10 w/ strap w/ 1/2 foam  10\" Hold x10 w/ strap    Seated Assisted Knee Flexion Stretch  10\" Hold x10  10\" Hold x10  10\" Hold x10  10\" Hold x10    Standing Knee Flexion Stretch  10\" Hold x10 8\" step  10\" Hold x10 8\" step  10\" Hold x10 8\" step  10\" Hold x10 8\" step    LAQ 3# 2x10  3# 2x10 3# 2x10  3# 2x10    SLR 2x10 1.5# 2x10 2# 2x10  2x10   TKE CC " "P2 2x10 5\" Hold  CC P2 2x10 5\" Hold  CC P2 2x10 5\" Hold  CC P2 2x10 5\" hold   Mini Squats TRX 2x10  TRX 2x10  TRX 2x10  TRX 2x10   Mini Forward Lunges 2x10 2x10  2x10  2x10   NU Step for muscular endurance & ROM 10' L5 10' L5 10' L5  10' L5    HEP Instruction  BM BM     Ther Activity       Forward Step Ups  8\" step 2x10  8\" step 2x10  8\" step 2x10  8\" step 2x10    Lateral step ups  8\" step 2x10  8\" step 2x10  8\" step 2x10     Forward Step Downs  8\" step 2x10  8\" step 2x10  8\" step 2x10  8\" step 2x10    Side Stepping  L2 x5 laps  L3 X5 laps L3 x5 laps   L2 5x //bars   Sit to Stands  X10  x10 NT  x10   Gait Training       SPC        No AD       Modalities       Ice  5' Post  5' post 5' post 5' Post                                  "

## 2025-05-05 ENCOUNTER — EVALUATION (OUTPATIENT)
Dept: PHYSICAL THERAPY | Facility: CLINIC | Age: 75
End: 2025-05-05
Attending: ORTHOPAEDIC SURGERY
Payer: COMMERCIAL

## 2025-05-05 DIAGNOSIS — Z96.651 S/P TOTAL KNEE REPLACEMENT, RIGHT: ICD-10-CM

## 2025-05-05 DIAGNOSIS — Z74.09 IMPAIRED FUNCTIONAL MOBILITY, BALANCE, GAIT, AND ENDURANCE: ICD-10-CM

## 2025-05-05 DIAGNOSIS — M17.11 UNILATERAL PRIMARY OSTEOARTHRITIS, RIGHT KNEE: Primary | ICD-10-CM

## 2025-05-05 PROCEDURE — 97530 THERAPEUTIC ACTIVITIES: CPT

## 2025-05-05 PROCEDURE — 97110 THERAPEUTIC EXERCISES: CPT

## 2025-05-05 NOTE — PROGRESS NOTES
PT Re-Evaluation  and PT Discharge    Today's date: 2025  Patient name: Rosita Soriano  : 1950  MRN: [id number]  Referring provider: Anderson Farr MD  Dx:   Encounter Diagnosis     ICD-10-CM    1. Unilateral primary osteoarthritis, right knee  M17.11       2. S/P total knee replacement, right  Z96.651       3. Impaired functional mobility, balance, gait, and endurance  Z74.09                          Assessment    Assessment details: The patient presents to Physical Therapy today with a notable improvement in symptomatology and function in the R knee. Patient is happy with her progress to this point and feels that her  condition is much more manageable. We will d/c patient today from Physical Therapy services and transition to an independent HEP to continue managing her condition. Patient was encouraged to call the office if she has any questions or concerns.   Understanding of Dx/Px/POC: good     Prognosis: good    Goals  STG: (6 weeks)  1.) Patient will initiate HEP Independently MET   2.) Patient will have a 50% reduction in overall symptoms MET  3.) Patient will demonstrate improved L knee strength evidenced by a 1-2 MMT grade increase MET  4.) Patient will demonstrate L knee flexion AROM 0-90 deg MET  5.) Patient will ambulate 300ft w/ SPC MET  6.) Patient will demonstrate improved standing tolerance >/= 1 hour MET   7.) Patient will demonstrate improved L hip strength evidenced by a 1-2 MMT grade increase MET     LTG: (12 weeks)  1.) Patient will be d/c to an HEP independently MET  2.) Patient will improve functional abilities evidenced by FOTO scores MET  3.) Eliminate pain with functional activity MET  4.) Patient will demonstrate improved ability to lift, push, pull, and carry safely MET  5.) Return to PLOF MET  6.) Patient will demonstrate L knee flexion AROM 0- >/= 110 deg MET  7.) Patient will ambulate 300ft w/o AD MET  8.)  Decrease edema R knee by > 3 cm to help improve flexibility.   MET    Plan    Duration in weeks: 12  Plan of Care beginning date: 3/5/2025  Plan of Care expiration date: 5/28/2025  Treatment plan discussed with: patient  Plan details: Plan of care was reviewed and discussed thoroughly with the patient on their current condition. Patient was instructed on a HEP with written instructions.       Subjective Evaluation    History of Present Illness  Mechanism of injury: surgery  Mechanism of injury: The patient reports today pre-operative R knee TKA procedure scheduled on 3/3 with Dr. Farr. Patient notes experiencing R knee pain for over 10 years with no precipitating injury or trauma to the region. She notes associated R knee buckling and weakness. She notes pain is aggravated by walking, lifting, pushing, pulling, pulling, and carrying. Patient lives in a two story home with one step to enter. She has a RW and SPC. Her daughter lives close by and is able to help assist patient post-operatively. She is now referred to OPPT for pre and post operative R TKA rehabilitation.    UPDATE 3/5/25:  The patient states that she had R TKR by Dr. Farr on 3/3.  She stayed one night with D/C to home yesterday.  The patient states that her knee pain has been constant since surgery.  Pain is in her thigh and into her knee.  Pain along her medial knee.  She denies any N&T into her R knee.     She is unsure of the day of her follow up appointment with the doctor.      Update 4/4/25: The patient reports today with a 55-60% improvement in R knee function and symptoms since the start of Physical Therapy treatment. She followed up with her MD yesterday. She notes still having difficulty with stair navigation, walking, standing tolerance, pushing, pulling, carrying, and lifting.     Update 5/5/25: The patient reports today with an overall improvement in R knee function and symptoms since the start of Physical Therapy treatment. She feels that her condition is much more manageable and would like to  transition to an independent home exercise program.          Recurrent probem    Quality of life: good    Patient Goals  Patient goals for therapy: decreased pain, decreased edema, improved balance, increased motion, increased strength, independence with ADLs/IADLs and return to sport/leisure activities  Patient goal: goals have been met.  Pain  Current pain ratin  At best pain ratin  At worst pain ratin    Social Support  Steps to enter house: yes  Stairs in house: yes   Lives in: multiple-level home  Lives with: Daughter lives close.    Employment status: working    Diagnostic Tests  X-ray: abnormal  Treatments  Current treatment: physical therapy    Objective     Observations     Right Knee   Positive for incision.     Additional Observation Details  Incision is healing well, no drainage, no warmth, no redness.      Neurological Testing     Sensation     Knee   Left Knee   Intact: Light touch    Right Knee   Intact: light touch     Active Range of Motion   Left Knee   Flexion: 130 degrees   Extensor la degrees     Right Knee   Flexion: 130 degrees   Extension: -3 degrees with pain    Passive Range of Motion     Right Knee   Flexion: WFL  Extension: 0 degrees     Mobility   Patellar Mobility:     Right Knee   WFL: medial, lateral, superior and inferior    Patellar Static Positioning   Left Knee: WFL  Right Knee: WFL    Strength/Myotome Testing     Left Knee   Flexion: 5  Extension: 5  Quadriceps contraction: good    Right Knee   Flexion: WFL  Extension: WFL  Quadriceps contraction: good    Swelling     Left Knee Girth Measurement (cm)   Joint line: 42 cm    Right Knee Girth Measurement (cm)   Joint line: 42 cm    General Comments:      Knee Comments  Mobility/ROM and strength results in objective findings. Post-operative pain management expectations discussed.  Gait mechanics: WNL  Patient ambulates w/o AD  Stair navigation: reciprocal with utilization of BHR's             Precautions: R TKA  "(3/3)      Manuals 4/24 4/29 5/1 5/5   R Knee PROM  BM Seated/Supine  BM Seated/Supine BM seated/supine BM Seated/supine    R Knee Patellar Mobs       R Hamstring/Gastroc Stretch  BM  BM BM  BM    R knee STM        Neuro Re-Ed       SLS 20\" Hold x3  20\" Hold x3  20\" Hold x3  20\" Hold x3                                      Ther Ex              Heel Slides  2x10 w/ sliding board  2x10 with board & strap     2/10 with board & strap  2x10 w/ sliding board & strap    Quad Sets   HEP     Hamstring Stretch  8\" step 10\" Hold x10  8\" step 10\" Hold x10  8\" step 10\" hold  x10  8\" step 10\" Hold x10    Gastroc Stretch  10\" Hold x10 w/ strap  10\" Hold x10 w/ 1/2 foam  10\" Hold x10 w/ strap w/ 1/2 foam  10\" Hold x10 w/ strap    Seated Assisted Knee Flexion Stretch  10\" Hold x10  10\" Hold x10  10\" Hold x10  10\" Hold x10    Standing Knee Flexion Stretch  10\" Hold x10 8\" step  10\" Hold x10 8\" step  10\" Hold x10 8\" step  10\" Hold x10 8\" step    LAQ 3# 2x10  3# 2x10 3# 2x10  3# 2x10    SLR 2x10 1.5# 2x10 2# 2x10  3# 2x10   TKE CC P2 2x10 5\" Hold  CC P2 2x10 5\" Hold  CC P2 2x10 5\" Hold  CC P3 2x10 5\" hold   Mini Squats TRX 2x10  TRX 2x10  TRX 2x10  TRX 2x10   Mini Forward Lunges 2x10 2x10  2x10  2x10   NU Step for muscular endurance & ROM 10' L5 10' L5 10' L5  10' L5    HEP Instruction  BM BM     Ther Activity       Forward Step Ups  8\" step 2x10  8\" step 2x10  8\" step 2x10  8\" step 2x10    Lateral step ups  8\" step 2x10  8\" step 2x10  8\" step 2x10  8\" step 2x10    Forward Step Downs  8\" step 2x10  8\" step 2x10  8\" step 2x10  8\" step 2x10    Side Stepping  L2 x5 laps  L3 X5 laps L3 x5 laps   L3 5x //bars   Sit to Stands  X10  x10 NT  x10   Gait Training       SPC        No AD       Modalities       Ice  5' Post  5' post 5' post 5' Post                    Access Code: [email or web address]  URL: [email or web address]  Date: 03/05/2025  Prepared by: Jes Tai  - Long Sitting Quad Set  - 1 x daily - 7 x weekly - 2 sets - 10 " "reps - 3\" hold  - Hooklying Gluteal Sets  - 1 x daily - 7 x weekly - 2 sets - 10 reps - 3\" hold  - Supine Ankle Pumps  - 1 x daily - 7 x weekly - 3 sets - 10 reps  - Supine Heel Slide  - 1 x daily - 7 x weekly - 2 sets - 10 reps  "

## 2025-05-06 ENCOUNTER — APPOINTMENT (OUTPATIENT)
Dept: PHYSICAL THERAPY | Facility: CLINIC | Age: 75
End: 2025-05-06
Attending: ORTHOPAEDIC SURGERY
Payer: COMMERCIAL

## 2025-05-07 ENCOUNTER — OFFICE VISIT (OUTPATIENT)
Dept: PODIATRY | Facility: CLINIC | Age: 75
End: 2025-05-07
Payer: COMMERCIAL

## 2025-05-07 ENCOUNTER — APPOINTMENT (OUTPATIENT)
Dept: RADIOLOGY | Facility: MEDICAL CENTER | Age: 75
End: 2025-05-07
Attending: PODIATRIST
Payer: COMMERCIAL

## 2025-05-07 VITALS
HEIGHT: 67 IN | BODY MASS INDEX: 31.08 KG/M2 | WEIGHT: 198 LBS | OXYGEN SATURATION: 98 % | RESPIRATION RATE: 16 BRPM | HEART RATE: 74 BPM | TEMPERATURE: 98.7 F

## 2025-05-07 DIAGNOSIS — M21.42 PES PLANUS OF BOTH FEET: ICD-10-CM

## 2025-05-07 DIAGNOSIS — G89.29 CHRONIC PAIN OF RIGHT ANKLE: ICD-10-CM

## 2025-05-07 DIAGNOSIS — M25.571 CHRONIC PAIN OF RIGHT ANKLE: ICD-10-CM

## 2025-05-07 DIAGNOSIS — M65.271 CALCIFIC TENDINITIS OF RIGHT ANKLE: ICD-10-CM

## 2025-05-07 DIAGNOSIS — M21.41 PES PLANUS OF BOTH FEET: ICD-10-CM

## 2025-05-07 DIAGNOSIS — M76.821 POSTERIOR TIBIAL TENDINITIS OF RIGHT LOWER EXTREMITY: Primary | ICD-10-CM

## 2025-05-07 PROCEDURE — 73610 X-RAY EXAM OF ANKLE: CPT

## 2025-05-07 PROCEDURE — 99203 OFFICE O/P NEW LOW 30 MIN: CPT | Performed by: PODIATRIST

## 2025-05-07 NOTE — PROGRESS NOTES
Name: Rosita Soriano      : 1950      MRN: [id number]  Encounter Provider: Missy Powers DPM  Encounter Date: 2025   Encounter department: [facility] [place]  :  Assessment & Plan  Chronic pain of right ankle    Orders:    Ambulatory Referral to Podiatry    X-ray ankle right 3+ views; Future    Posterior tibial tendinitis of right lower extremity    Orders:    Ambulatory referral to Physical Therapy; Future    Orthotics B/L    Ambulatory referral to Physical Therapy; Future    Pes planus of both feet    Orders:    Orthotics B/L    Ambulatory referral to Physical Therapy; Future      Imaging Reviewed at this visit (I personally reviewed/independently interpreted images and reports in PACS)  XR right ankle weightbearing 3 views 2025: No acute osseous abnormalities noted.  No acute fracture or dislocation noted.   Pes planus noted.  Diffuse arthritic changes at level of midfoot.  Mild joint space narrowing at the level of ankle.  Small posterior enthesophyte noted at calcaneus.  Small area of calcified tissue at level of posterior calcaneal bursa.  Plantar medial calcaneus enthesophyte noted     IMPRESSION:  RLE posterior tibial tendinitis  Pes planus    PLAN:  I reviewed clinical exam and radiographic imaging (XR) with patient in detail today. I have discussed with the patient the pathophysiology of this diagnosis and reviewed how the examination correlates with this diagnosis.  Pain management note reviewed 2025  Orthopedics note reviewed 3/3/2025, knee replacement  PT notes reviewed most recent 2025, s/p total knee replacement RLE  Rx PT for posterior tibial tendinitis rehabilitation  PT for CMO  Initiate use of meloxicam as previously prescribed by her PCP  Rest ice elevation RLE medial ankle  Compression ankle brace  Avoid high-impact activity  Counseled on pes planus and posterior tibial tendinitis.  Also counseled patient on early osteoarthritic changes in use of  "conservative modalities.  Patient counseled on the need for continued conservative treatment modalities in order to avoid symptoms.  Follow-up 2 months for reevaluation    History of Present Illness   HPI  Rosita Soriano is a 75 y.o. female who presents right medial ankle pain.  Patient has a history of right medial ankle pain demonstrated by Dr. oM in the past.  She states she was in a boot for approximately 9 weeks and symptoms largely resolved.  Patient has flatfeet.  She recently had a knee replacement and has been undergoing physical therapy for the right lower extremity.  She is supposed to return to work in the next week or 2 and has noticed some pain the medial aspect of her right ankle extending into her arch.  Patient denies any trauma to the area.  She denies any swelling.  She states that it is not constant pain but she feels a \"picking sensation\".  She denies any numbness or tingling.  She has tried some topicals which have not helped her symptoms.  Patient used to wear arch support but now does not as she saw on TV that it is better to have a natural flatfoot.  Patient presents for evaluation management      Review of Systems   Constitutional:  Negative for chills and fever.   Respiratory:  Negative for chest tightness and shortness of breath.    Cardiovascular:  Positive for leg swelling.   Musculoskeletal:  Positive for arthralgias, back pain and gait problem.   Skin:  Negative for wound.          Objective   Pulse 74   Temp 98.7 °F (37.1 °C)   Resp 16   Ht 5' 7\" (1.702 m)   Wt 89.8 kg (198 lb)   SpO2 98%   BMI 31.01 kg/m²      Physical Exam  Constitutional:       General: She is not in acute distress.     Appearance: She is not ill-appearing.   Cardiovascular:      Comments: DP/PT pulse 1/4  Absent pedal hair growth  Skin temperature gradient even from knees to digits  Mild increase in edema right lower extremity when compared to left, s/p total knee replacement  Musculoskeletal:      " Comments: Right foot pes planus.  Minimal arch reconstitution with dorsiflexion of first MPJ  Equinus noted, dorsiflexion of ankle less than 10 degrees with knees bent and extended  Tender with palpation over course of posterior tibial tendon primarily at the plantar distal aspect of the medial malleolus.  Tender with resisted dorsiflexion and inversion over posterior tibial tendons.  No significant swelling in the area.   Skin:     Capillary Refill: Capillary refill takes less than 2 seconds.      Findings: No bruising, erythema or rash.   Neurological:      Sensory: No sensory deficit.

## 2025-05-07 NOTE — ASSESSMENT & PLAN NOTE
Orders:    Ambulatory referral to Physical Therapy; Future    Orthotics B/L    Ambulatory referral to Physical Therapy; Future

## 2025-05-14 ENCOUNTER — EVALUATION (OUTPATIENT)
Dept: PHYSICAL THERAPY | Facility: CLINIC | Age: 75
End: 2025-05-14
Attending: PODIATRIST
Payer: COMMERCIAL

## 2025-05-14 DIAGNOSIS — M25.571 RIGHT ANKLE PAIN, UNSPECIFIED CHRONICITY: ICD-10-CM

## 2025-05-14 DIAGNOSIS — M76.821 POSTERIOR TIBIAL TENDINITIS OF RIGHT LOWER EXTREMITY: Primary | ICD-10-CM

## 2025-05-14 PROCEDURE — 97161 PT EVAL LOW COMPLEX 20 MIN: CPT

## 2025-05-14 PROCEDURE — 97110 THERAPEUTIC EXERCISES: CPT

## 2025-05-14 NOTE — PROGRESS NOTES
PT Evaluation     Today's date: 2025  Patient name: Rosita Soriano  : 1950  MRN: [id number]  Referring provider: Missy Powers DPM  Dx:   Encounter Diagnosis     ICD-10-CM    1. Posterior tibial tendinitis of right lower extremity  M76.821       2. Right ankle pain, unspecified chronicity  M25.571             Start Time: 1545  Stop Time: 1645  Total time in clinic (min): 60 minutes    Assessment  Impairments: abnormal or restricted ROM, abnormal movement, activity intolerance, impaired physical strength, lacks appropriate home exercise program, pain with function and activity limitations  Symptom irritability: low    Assessment details: Rosita Soriano is a 75 y.o. female presenting to Physical Therapy today with signs and symptoms suggestive of R sided posterior tibial tendinitis. Upon completion of the initial PT evaluation the patient is presenting with limitations in R ankle ROM, R ankle strength, R ankle joint effusion, and R ankle pain. She is currently experiencing difficulty with activities such as standing for longer periods, walking, and stair climbing due to symptoms. Patient would benefit from a course of skilled Physical Therapy services to address functional limitations to return to prior level of function. Thank you for your referral.   Understanding of Dx/Px/POC: excellent     Prognosis: good    Goals  STG: (6 weeks)  1.) Patient will initiate HEP Independently   2.) Patient will have a 50% reduction in overall symptoms   3.) Patient will demonstrate improved R ankle strength by 50% in all planes of motion   4.) Patient will demonstrate improved ability to ascend/descend a full flight of stairs with zero limitations and w/o symptoms  5.) Patient will demonstrate improved R ankle ROM by 50% in all planes of motion     LTG: (12 weeks)  1.) Patient will be d/c to an HEP independently  2.) Patient will improve functional abilities evidenced by FOTO scores  3.) Eliminate pain with  functional activity   4.) Patient will demonstrate improved R ankle ROM by 90% in all planes of motion as evidence of restored function  5.) Patient will demonstrate improved R ankle strength by 90% in all planes of motion as evidence of restored function    Plan  Patient would benefit from: PT eval and skilled physical therapy  Referral necessary: No  Planned modality interventions: cryotherapy    Planned therapy interventions: functional ROM exercises, graded activity, graded exercise, graded motor, home exercise program, flexibility, joint mobilization, manual therapy, neuromuscular re-education, patient education, postural training, self care, strengthening, stretching, therapeutic activities, therapeutic exercise, therapeutic training and balance    Frequency: 1-2x week  Duration in weeks: 8  Plan of Care beginning date: 5/14/2025  Plan of Care expiration date: 7/9/2025  Treatment plan discussed with: patient  Plan details: Plan of care was reviewed and discussed thoroughly with the patient on their current condition. Patient was instructed on a HEP with written instructions. Patient is in agreement with PT recommendations and will attend Physical Therapy 1-2x/week for the next 8 weeks to address current deficits.        Subjective Evaluation    History of Present Illness  Mechanism of injury: The patient reports today with R sided medial foot pain that started to increase prior to her recent R TKA surgery on 3/3 with no precipitating injury or trauma to the region. She notes h/o of R ankle pain a few years ago and was placed in a CAM boot for 9 weeks with great improvement. She notes following up with Dr. 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 She is currently wearing a R ankle brace that helps mildly with symptoms. Pain is located at medial arch and proximal/medial malleolus. Pain is aggravated by activity such as walking, stairs, and  standing for long periods. She is now referred to OPPT.           Recurrent probem    Quality of life: good    Patient Goals  Patient goals for therapy: decreased pain, increased motion, increased strength and independence with ADLs/IADLs    Pain  Current pain ratin  At best pain ratin  At worst pain ratin  Location: R medial arch, R medial/proximal medial malleolus  Quality: needle-like  Relieving factors: ice and rest  Aggravating factors: standing, stair climbing and walking    Social Support    Employment status: working    Diagnostic Tests  X-ray: abnormal  Treatments  Current treatment: physical therapy      Objective     Observations     Right Ankle/Foot   Positive for deformity and effusion.     Additional Observation Details  R ankle: Pes Planus deformity, mild R ankle joint effusion, equinus    Palpation     Right   Hypertonic in the lateral gastrocnemius, medial gastrocnemius, posterior tibialis and soleus.     Tenderness     Right Ankle/Foot   Tenderness in the medial malleolus and posterior tibial tendon.     Additional Tenderness Details          Neurological Testing     Sensation     Ankle/Foot   Left Ankle/Foot   Hyposensation: light touch    Right Ankle/Foot   Hyposensation: light touch    Active Range of Motion   Left Ankle/Foot   Dorsiflexion (ke): 0 degrees   Plantar flexion: WFL  Inversion: WFL  Eversion: WFL    Right Ankle/Foot   Dorsiflexion (ke): -10 degrees with pain  Plantar flexion: 25 degrees with pain  Inversion: 15 degrees with pain  Eversion: 5 degrees     Passive Range of Motion     Right Ankle/Foot    Dorsiflexion (ke): -8 degrees   Plantar flexion: 30 degrees   Inversion: 18 degrees   Eversion: 8 degrees     Joint Play     Right Ankle/Foot  Hypomobile in the talocrural joint and subtalar joint.     Strength/Myotome Testing     Left Ankle/Foot   Normal strength    Right Ankle/Foot   Dorsiflexion: 3-  Plantar flexion: 3-  Inversion: 3-  Eversion: 3-    Swelling   Left  "Ankle/Foot   Figure 8: 52 cm    Right Ankle/Foot   Figure 8: 53 cm    General Comments:      Ankle/Foot Comments   Patient was educated on R injury management regarding pain, swelling, ice application, pathoanatomic's of injury, orthotics, sneaker wear, MLA support, and HEP.      Flowsheet Rows      Flowsheet Row Most Recent Value   PT/OT G-Codes    Current Score 41   Projected Score 59               Precautions: *Avoid high impact and aggravating activity at this time*       Manuals 5/14      STM Post Tib Tendon                            Neuro Re-Ed       SLS on AE        Side Stepping on AE        Tandem Amb Fwd/Bwd on AE        SLS on AE w/ Hip Drills       Fitter Board PF/DF       Fitter Board Inv/Ev       Fitter Board cw/ccw        Ther Ex       PT Assist PROM Ankle all planes of motion  BM       MRE's all planes of motion        Gastroc Stretch  10\" Hold x5 w/ 1/2 foam       Soleus Stretch 10\" Hold x5 w/ 1/2 foam       Posterior Tibialis Tendon Stretch        TB PF  L2 x10       TB DF L2 x10       TB Inv  L2 x10       TB Ev  L2 x10       Toe Curls        Plantar Fascia Stretch        Seated HR/TR       HEP Instruction BM      Ther Activity                     Gait Training                     Modalities       Ice 8' PT                    "

## 2025-05-20 ENCOUNTER — OFFICE VISIT (OUTPATIENT)
Dept: PHYSICAL THERAPY | Facility: CLINIC | Age: 75
End: 2025-05-20
Attending: PODIATRIST
Payer: COMMERCIAL

## 2025-05-20 DIAGNOSIS — M76.821 POSTERIOR TIBIAL TENDINITIS OF RIGHT LOWER EXTREMITY: Primary | ICD-10-CM

## 2025-05-20 DIAGNOSIS — M25.571 RIGHT ANKLE PAIN, UNSPECIFIED CHRONICITY: ICD-10-CM

## 2025-05-20 PROCEDURE — 97110 THERAPEUTIC EXERCISES: CPT

## 2025-05-20 PROCEDURE — 97112 NEUROMUSCULAR REEDUCATION: CPT

## 2025-05-20 NOTE — PROGRESS NOTES
"Daily Note     Today's date: 2025  Patient name: Rosita Soriano  : 1950  MRN: [id number]  Referring provider: Missy Powers DPM  Dx:   Encounter Diagnosis     ICD-10-CM    1. Posterior tibial tendinitis of right lower extremity  M76.821       2. Right ankle pain, unspecified chronicity  M25.571                      Subjective: Patient reports compliance with home exercises since the initial PT evaluation.      Objective: See treatment diary below      Assessment: Tolerated treatment well. PT progressed the current program with the addition of R ankle AROM, R ankle strengthening, and R ankle stability exercise with good tolerance. Patient would benefit from continued PT to address functional limitations to return to prior level of function.      Plan: Continue per plan of care.      Precautions: *Avoid high impact and aggravating activity at this time*       Manuals      STM Post Tib Tendon                              Neuro Re-Ed       SLS on AE        Side Stepping on AE   X3 laps      Tandem Amb Fwd/Bwd on AE        SLS on AE w/ Hip Drills       Fitter Board PF/DF  X20 ea      Fitter Board Inv/Ev  X20 ea     Fitter Board cw/ccw   X20 ea      Ther Ex       PT Assist PROM Ankle all planes of motion  BM  BM     MRE's all planes of motion   2x10 ea BM      Gastroc Stretch  10\" Hold x5 w/ 1/2 foam  10\" Hold x10 w/ 1/2 foam      Soleus Stretch 10\" Hold x5 w/ 1/2 foam  10\" Hold x10 w/ 1/2 foam      Posterior Tibialis Tendon Stretch        TB PF  L2 x10  L2 2x10      TB DF L2 x10  L2 2x10      TB Inv  L2 x10  L2 2x10      TB Ev  L2 x10  L2 2x10      Toe Curls        Plantar Fascia Stretch          Seated HR/TR       HEP Instruction BM      Ther Activity                     Gait Training                     Modalities       Ice 8' PT 8' PT                         "

## 2025-05-22 ENCOUNTER — OFFICE VISIT (OUTPATIENT)
Dept: PHYSICAL THERAPY | Facility: CLINIC | Age: 75
End: 2025-05-22
Attending: PODIATRIST
Payer: COMMERCIAL

## 2025-05-22 DIAGNOSIS — M25.571 RIGHT ANKLE PAIN, UNSPECIFIED CHRONICITY: ICD-10-CM

## 2025-05-22 DIAGNOSIS — M76.821 POSTERIOR TIBIAL TENDINITIS OF RIGHT LOWER EXTREMITY: Primary | ICD-10-CM

## 2025-05-22 PROCEDURE — 97112 NEUROMUSCULAR REEDUCATION: CPT

## 2025-05-22 PROCEDURE — 97110 THERAPEUTIC EXERCISES: CPT

## 2025-05-22 NOTE — PROGRESS NOTES
"Daily Note     Today's date: 2025  Patient name: Rosita Soriano  : 1950  MRN: [id number]  Referring provider: Missy Powers DPM  Dx:   Encounter Diagnosis     ICD-10-CM    1. Posterior tibial tendinitis of right lower extremity  M76.821       2. Right ankle pain, unspecified chronicity  M25.571                      Subjective: Patient reports no new issues or complaints since last treatment session in the R ankle.      Objective: See treatment diary below      Assessment: Tolerated treatment well with a minimal increase in symptoms. She continues to demonstrate limitations in R ankle ROM and strength in all planes of motion. PT will continue to progress patient within tolerance to address functional limitations. Patient exhibited good technique with therapeutic exercises and would benefit from continued PT.      Plan: Continue per plan of care.      Precautions: *Avoid high impact and aggravating activity at this time*       Manuals     STM Post Tib Tendon                              Neuro Re-Ed       SLS on AE        Side Stepping on AE   X3 laps  X3 laps     Tandem Amb Fwd/Bwd on AE        SLS on AE w/ Hip Drills       Fitter Board PF/DF  X20 ea  X20 ea    Fitter Board Inv/Ev  X20 ea X20 ea    Fitter Board cw/ccw   X20 ea  X20 ea    Ther Ex       PT Assist PROM Ankle all planes of motion  BM  BM BM     MRE's all planes of motion   2x10 ea BM  2x10 ea BM    Gastroc Stretch  10\" Hold x5 w/ 1/2 foam  10\" Hold x10 w/ 1/2 foam  10\" Hold x10 w/ 1/2 foam     Soleus Stretch 10\" Hold x5 w/ 1/2 foam  10\" Hold x10 w/ 1/2 foam  10' Hold x10 w/ 1/2 foam     Posterior Tibialis Tendon Stretch        TB PF  L2 x10  L2 2x10  L2 2x10     TB DF L2 x10  L2 2x10  L2 2x10     TB Inv  L2 x10  L2 2x10  L2 2x10     TB Ev  L2 x10  L2 2x10  L2 2x10     Toe Curls        Plantar Fascia Stretch          Seated HR/TR       HEP Instruction BM      Ther Activity                     Gait Training                 "     Modalities       Ice 8' PT 8' PT  8' PT

## 2025-05-27 ENCOUNTER — OFFICE VISIT (OUTPATIENT)
Dept: PHYSICAL THERAPY | Facility: CLINIC | Age: 75
End: 2025-05-27
Attending: PODIATRIST
Payer: COMMERCIAL

## 2025-05-27 DIAGNOSIS — M25.571 RIGHT ANKLE PAIN, UNSPECIFIED CHRONICITY: ICD-10-CM

## 2025-05-27 DIAGNOSIS — M76.821 POSTERIOR TIBIAL TENDINITIS OF RIGHT LOWER EXTREMITY: Primary | ICD-10-CM

## 2025-05-27 PROCEDURE — 97110 THERAPEUTIC EXERCISES: CPT

## 2025-05-27 PROCEDURE — 97112 NEUROMUSCULAR REEDUCATION: CPT

## 2025-05-27 NOTE — PROGRESS NOTES
"Daily Note     Today's date: 2025  Patient name: Rosita Soriano  : 1950  MRN: [id number]  Referring provider: Missy Powers DPM  Dx: No diagnosis found.               Subjective: ***      Objective: See treatment diary below      Assessment: Tolerated treatment {Tolerated treatment :}. Patient {assessment:}      Plan: {PLAN:}     Precautions: *Avoid high impact and aggravating activity at this time*       Manuals     STM Post Tib Tendon                              Neuro Re-Ed       SLS on AE        Side Stepping on AE   X3 laps  X3 laps     Tandem Amb Fwd/Bwd on AE        SLS on AE w/ Hip Drills       Fitter Board PF/DF  X20 ea  X20 ea    Fitter Board Inv/Ev  X20 ea X20 ea    Fitter Board cw/ccw   X20 ea  X20 ea    Ther Ex       PT Assist PROM Ankle all planes of motion  BM  BM BM     MRE's all planes of motion   2x10 ea BM  2x10 ea BM    Gastroc Stretch  10\" Hold x5 w/ 1/2 foam  10\" Hold x10 w/ 1/2 foam  10\" Hold x10 w/ 1/2 foam     Soleus Stretch 10\" Hold x5 w/ 1/2 foam  10\" Hold x10 w/ 1/2 foam  10' Hold x10 w/ 1/2 foam     Posterior Tibialis Tendon Stretch        TB PF  L2 x10  L2 2x10  L2 2x10     TB DF L2 x10  L2 2x10  L2 2x10     TB Inv  L2 x10  L2 2x10  L2 2x10     TB Ev  L2 x10  L2 2x10  L2 2x10     Toe Curls        Plantar Fascia Stretch          Seated HR/TR       HEP Instruction BM      Ther Activity                     Gait Training                     Modalities       Ice 8' PT 8' PT  8' PT                            "

## 2025-05-29 ENCOUNTER — OFFICE VISIT (OUTPATIENT)
Dept: PHYSICAL THERAPY | Facility: CLINIC | Age: 75
End: 2025-05-29
Attending: PODIATRIST
Payer: COMMERCIAL

## 2025-05-29 DIAGNOSIS — M76.821 POSTERIOR TIBIAL TENDINITIS OF RIGHT LOWER EXTREMITY: Primary | ICD-10-CM

## 2025-05-29 DIAGNOSIS — M25.571 RIGHT ANKLE PAIN, UNSPECIFIED CHRONICITY: ICD-10-CM

## 2025-05-29 PROCEDURE — 97112 NEUROMUSCULAR REEDUCATION: CPT

## 2025-05-29 PROCEDURE — 97110 THERAPEUTIC EXERCISES: CPT

## 2025-05-29 NOTE — PROGRESS NOTES
"Daily Note     Today's date: 2025  Patient name: Rosita Soriano  : 1950  MRN: [id number]  Referring provider: Missy Powers DPM  Dx: No diagnosis found.               Subjective: ***      Objective: See treatment diary below      Assessment: Tolerated treatment {Tolerated treatment :6081989975}. Patient {assessment:}      Plan: {PLAN:7751028816}     Precautions: *Avoid high impact and aggravating activity at this time*       Manuals    STM Post Tib Tendon                              Neuro Re-Ed       SLS on AE        Side Stepping on AE   X3 laps  X3 laps  X3 laps   Tandem Amb Fwd/Bwd on AE        SLS on AE w/ Hip Drills       Fitter Board PF/DF  X20 ea  X20 ea X20  ea   Fitter Board Inv/Ev  X20 ea X20 ea X20 ea   Fitter Board cw/ccw   X20 ea  X20 ea X20 ea   Ther Ex       PT Assist PROM Ankle all planes of motion  BM  BM BM  BM   MRE's all planes of motion   2x10 ea BM  2x10 ea BM 2x10 ea BM   Gastroc Stretch  10\" Hold x5 w/ 1/2 foam  10\" Hold x10 w/ 1/2 foam  10\" Hold x10 w/ 1/2 foam  10\" Hold x10 w/1/2 foam   Soleus Stretch 10\" Hold x5 w/ 1/2 foam  10\" Hold x10 w/ 1/2 foam  10' Hold x10 w/ 1/2 foam  10\" Hold x10 w/ 1/2 foam   Posterior Tibialis Tendon Stretch        TB PF  L2 x10  L2 2x10  L2 2x10  L2 2x10   TB DF L2 x10  L2 2x10  L2 2x10  L2 2x10   TB Inv  L2 x10  L2 2x10  L2 2x10  L2 2x10   TB Ev  L2 x10  L2 2x10  L2 2x10  L2 2x10   Toe Curls        Plantar Fascia Stretch          Seated HR/TR       HEP Instruction BM      Ther Activity                     Gait Training                     Modalities       Ice 8' PT 8' PT  8' PT  8'PT                            "

## 2025-05-29 NOTE — PROGRESS NOTES
"Daily Note     Today's date: 2025  Patient name: Rosita Soriano  : 1950  MRN: [id number]  Referring provider: Missy Powers DPM  Dx: No diagnosis found.               Subjective: ***      Objective: See treatment diary below      Assessment: Tolerated treatment {Tolerated treatment :5117833830}. Patient {assessment:}      Plan: {PLAN:9815732526}     Precautions: *Avoid high impact and aggravating activity at this time*       Manuals    STM Post Tib Tendon                              Neuro Re-Ed       SLS on AE        Side Stepping on AE   X3 laps  X3 laps  X3 laps   Tandem Amb Fwd/Bwd on AE        SLS on AE w/ Hip Drills       Fitter Board PF/DF  X20 ea  X20 ea X20  ea   Fitter Board Inv/Ev  X20 ea X20 ea X20 ea   Fitter Board cw/ccw   X20 ea  X20 ea X20 ea   Ther Ex       PT Assist PROM Ankle all planes of motion  BM  BM BM  BM   MRE's all planes of motion   2x10 ea BM  2x10 ea BM 2x10 ea BM   Gastroc Stretch  10\" Hold x5 w/ 1/2 foam  10\" Hold x10 w/ 1/2 foam  10\" Hold x10 w/ 1/2 foam  10\" Hold x10 w/1/2 foam   Soleus Stretch 10\" Hold x5 w/ 1/2 foam  10\" Hold x10 w/ 1/2 foam  10' Hold x10 w/ 1/2 foam  10\" Hold x10 w/ 1/2 foam   Posterior Tibialis Tendon Stretch        TB PF  L2 x10  L2 2x10  L2 2x10  L2 2x10   TB DF L2 x10  L2 2x10  L2 2x10  L2 2x10   TB Inv  L2 x10  L2 2x10  L2 2x10  L2 2x10   TB Ev  L2 x10  L2 2x10  L2 2x10  L2 2x10   Toe Curls        Plantar Fascia Stretch          Seated HR/TR       HEP Instruction BM      Ther Activity                     Gait Training                     Modalities       Ice 8' PT 8' PT  8' PT  8'PT                            "

## 2025-05-29 NOTE — PROGRESS NOTES
"Daily Note     Today's date: 2025  Patient name: Rosita Soriano  : 1950  MRN: [id number]  Referring provider: Missy Powers DPM  Dx:   Encounter Diagnosis     ICD-10-CM    1. Posterior tibial tendinitis of right lower extremity  M76.821       2. Right ankle pain, unspecified chronicity  M25.571           Start Time: 0415  Stop Time: 0510  Total time in clinic (min): 55 minutes    Subjective: Patient reports of swelling over the R ankle last night and icing reduced it.      Objective: See treatment diary below      Assessment: Tolerated treatment well. Patient demonstrates stiffness in the R ankle into inversion and eversion. Patient would benefit from continued PT to increase R ankle ROM, strength and stability to improve functional limitations to return prior level.      Plan: Continue per plan of care.      Precautions: *Avoid high impact and aggravating activity at this time*       Manuals    STM Post Tib Tendon                              Neuro Re-Ed       SLS on AE        Side Stepping on AE  X3 laps X3 laps  X3 laps  X3 laps   Tandem Amb Fwd/Bwd on AE        SLS on AE w/ Hip Drills       Fitter Board PF/DF X20 ea X20 ea  X20 ea X20  ea   Fitter Board Inv/Ev X20 ea X20 ea X20 ea X20 ea   Fitter Board cw/ccw  X20 ea X20 ea  X20 ea X20 ea   Ther Ex       PT Assist PROM Ankle all planes of motion  BM  BM BM  BM   MRE's all planes of motion  2X10 ea BM 2x10 ea BM  2x10 ea BM 2x10 ea BM   Gastroc Stretch  10\" Hold x5 w/ 1/2 foam  10\" Hold x10 w/ 1/2 foam  10\" Hold x10 w/ 1/2 foam  10\" Hold x10 w/1/2 foam   Soleus Stretch 10\" Hold x5 w/ 1/2 foam  10\" Hold x10 w/ 1/2 foam  10' Hold x10 w/ 1/2 foam  10\" Hold x10 w/ 1/2 foam   Posterior Tibialis Tendon Stretch        TB PF  L2 2x10  L2 2x10  L2 2x10  L2 2x10   TB DF L2 2x10  L2 2x10  L2 2x10  L2 2x10   TB Inv  L2 2x10  L2 2x10  L2 2x10  L2 2x10   TB Ev  L2 2x10  L2 2x10  L2 2x10  L2 2x10   Toe Curls        Plantar Fascia Stretch  "         Seated HR/TR       HEP Instruction       Ther Activity                     Gait Training                     Modalities       Ice 8' PT 8' PT  8' PT  8'PT

## 2025-05-29 NOTE — PROGRESS NOTES
"Daily Note     Today's date: 2025  Patient name: Rosita Soriano  : 1950  MRN: [id number]  Referring provider: Missy Powers DPM  Dx: No diagnosis found.               Subjective: ***      Objective: See treatment diary below      Assessment: Tolerated treatment {Tolerated treatment :1771037851}. Patient {assessment:6590099592}      Plan: Continue per plan of care.      Precautions: *Avoid high impact and aggravating activity at this time*       Manuals    STM Post Tib Tendon                              Neuro Re-Ed       SLS on AE        Side Stepping on AE   X3 laps  X3 laps  X3 laps   Tandem Amb Fwd/Bwd on AE        SLS on AE w/ Hip Drills       Fitter Board PF/DF  X20 ea  X20 ea X20  ea   Fitter Board Inv/Ev  X20 ea X20 ea X20 ea   Fitter Board cw/ccw   X20 ea  X20 ea X20 ea   Ther Ex       PT Assist PROM Ankle all planes of motion  BM  BM BM  BM   MRE's all planes of motion   2x10 ea BM  2x10 ea BM 2x10 ea BM   Gastroc Stretch  10\" Hold x5 w/ 1/2 foam  10\" Hold x10 w/ 1/2 foam  10\" Hold x10 w/ 1/2 foam  10\" Hold x10 w/1/2 foam   Soleus Stretch 10\" Hold x5 w/ 1/2 foam  10\" Hold x10 w/ 1/2 foam  10' Hold x10 w/ 1/2 foam  10\" Hold x10 w/ 1/2 foam   Posterior Tibialis Tendon Stretch        TB PF  L2 x10  L2 2x10  L2 2x10  L2 2x10   TB DF L2 x10  L2 2x10  L2 2x10  L2 2x10   TB Inv  L2 x10  L2 2x10  L2 2x10  L2 2x10   TB Ev  L2 x10  L2 2x10  L2 2x10  L2 2x10   Toe Curls        Plantar Fascia Stretch          Seated HR/TR       HEP Instruction BM      Ther Activity                     Gait Training                     Modalities       Ice 8' PT 8' PT  8' PT  8'PT                            "

## 2025-05-29 NOTE — PROGRESS NOTES
"Daily Note     Today's date: 2025  Patient name: Rosita Soriano  : 1950  MRN: [id number]  Referring provider: Missy Powers DPM  Dx: No diagnosis found.               Subjective: ***      Objective: See treatment diary below      Assessment: Tolerated treatment {Tolerated treatment :0807357715}. Patient {assessment:}      Plan: {PLAN:9549277592}     Precautions: *Avoid high impact and aggravating activity at this time*       Manuals    STM Post Tib Tendon                              Neuro Re-Ed       SLS on AE        Side Stepping on AE   X3 laps  X3 laps  X3 laps   Tandem Amb Fwd/Bwd on AE        SLS on AE w/ Hip Drills       Fitter Board PF/DF  X20 ea  X20 ea X20  ea   Fitter Board Inv/Ev  X20 ea X20 ea X20 ea   Fitter Board cw/ccw   X20 ea  X20 ea X20 ea   Ther Ex       PT Assist PROM Ankle all planes of motion  BM  BM BM  BM   MRE's all planes of motion   2x10 ea BM  2x10 ea BM 2x10 ea BM   Gastroc Stretch  10\" Hold x5 w/ 1/2 foam  10\" Hold x10 w/ 1/2 foam  10\" Hold x10 w/ 1/2 foam  10\" Hold x10 w/1/2 foam   Soleus Stretch 10\" Hold x5 w/ 1/2 foam  10\" Hold x10 w/ 1/2 foam  10' Hold x10 w/ 1/2 foam  10\" Hold x10 w/ 1/2 foam   Posterior Tibialis Tendon Stretch        TB PF  L2 x10  L2 2x10  L2 2x10  L2 2x10   TB DF L2 x10  L2 2x10  L2 2x10  L2 2x10   TB Inv  L2 x10  L2 2x10  L2 2x10  L2 2x10   TB Ev  L2 x10  L2 2x10  L2 2x10  L2 2x10   Toe Curls        Plantar Fascia Stretch          Seated HR/TR       HEP Instruction BM      Ther Activity                     Gait Training                     Modalities       Ice 8' PT 8' PT  8' PT  8'PT                            "

## 2025-06-03 ENCOUNTER — OFFICE VISIT (OUTPATIENT)
Dept: PHYSICAL THERAPY | Facility: CLINIC | Age: 75
End: 2025-06-03
Attending: PODIATRIST
Payer: COMMERCIAL

## 2025-06-03 DIAGNOSIS — M25.571 RIGHT ANKLE PAIN, UNSPECIFIED CHRONICITY: Primary | ICD-10-CM

## 2025-06-03 DIAGNOSIS — M76.821 POSTERIOR TIBIAL TENDINITIS OF RIGHT LOWER EXTREMITY: ICD-10-CM

## 2025-06-03 PROCEDURE — 97112 NEUROMUSCULAR REEDUCATION: CPT

## 2025-06-03 PROCEDURE — 97110 THERAPEUTIC EXERCISES: CPT

## 2025-06-03 NOTE — PROGRESS NOTES
"Daily Note     Today's date: 6/3/2025  Patient name: Rosita Soriano  : 1950  MRN: [id number]  Referring provider: Missy Powers DPM  Dx:   Encounter Diagnosis     ICD-10-CM    1. Right ankle pain, unspecified chronicity  M25.571       2. Posterior tibial tendinitis of right lower extremity  M76.821                      Subjective: Patient reports a \"picking\" sensation on the inside of her R foot.       Objective: See treatment diary below      Assessment: Tolerated treatment well. PT progressed the current program with the addition of R forefoot strengthening and plantar fascia stretching with good tolerance. She exhibited good technique with therapeutic exercises and would benefit from continued PT.      Plan: Continue per plan of care.      Precautions: *Avoid high impact and aggravating activity at this time*       Manuals 5/29 6/3 5/22 5/27   STM Post Tib Tendon                              Neuro Re-Ed       SLS on AE        Side Stepping on AE  X3 laps X3 laps  X3 laps  X3 laps   Tandem Amb Fwd/Bwd on AE        SLS on AE w/ Hip Drills       Fitter Board PF/DF X20 ea X20 ea  X20 ea X20  ea   Fitter Board Inv/Ev X20 ea X20 ea X20 ea X20 ea   Fitter Board cw/ccw  X20 ea X20 ea  X20 ea X20 ea   Ther Ex       PT Assist PROM Ankle all planes of motion  BM  BM BM  BM   MRE's all planes of motion  2X10 ea BM 2x10 ea BM  2x10 ea BM 2x10 ea BM   Gastroc Stretch  10\" Hold x5 w/ 1/2 foam  10\" Hold x10 w/ 1/2 foam  10\" Hold x10 w/ 1/2 foam  10\" Hold x10 w/1/2 foam   Soleus Stretch 10\" Hold x5 w/ 1/2 foam  10\" Hold x10 w/ 1/2 foam  10' Hold x10 w/ 1/2 foam  10\" Hold x10 w/ 1/2 foam   Posterior Tibialis Tendon Stretch        TB PF  L2 2x10  L2 2x10  L2 2x10  L2 2x10   TB DF L2 2x10  L2 2x10  L2 2x10  L2 2x10   TB Inv  L2 2x10  L2 2x10  L2 2x10  L2 2x10   TB Ev  L2 2x10  L2 2x10  L2 2x10  L2 2x10   Toe Curls   2x10      Plantar Fascia Stretch     10\" Hold x5      Seated HR/TR       HEP Instruction       Ther " Activity                     Gait Training                     Modalities       Ice 8' PT 8' PT  8' PT  8'PT

## 2025-06-05 ENCOUNTER — OFFICE VISIT (OUTPATIENT)
Dept: PHYSICAL THERAPY | Facility: CLINIC | Age: 75
End: 2025-06-05
Attending: PODIATRIST
Payer: COMMERCIAL

## 2025-06-05 DIAGNOSIS — M76.821 POSTERIOR TIBIAL TENDINITIS OF RIGHT LOWER EXTREMITY: ICD-10-CM

## 2025-06-05 DIAGNOSIS — M25.571 RIGHT ANKLE PAIN, UNSPECIFIED CHRONICITY: Primary | ICD-10-CM

## 2025-06-05 PROCEDURE — 97110 THERAPEUTIC EXERCISES: CPT

## 2025-06-05 NOTE — PROGRESS NOTES
"Daily Note     Today's date: 2025  Patient name: Rosita Soriano  : 1950  MRN: [id number]  Referring provider: Missy Powers DPM  Dx:   Encounter Diagnosis     ICD-10-CM    1. Right ankle pain, unspecified chronicity  M25.571       2. Posterior tibial tendinitis of right lower extremity  M76.821                      Subjective: Patient reports increased swelling in her R ankle yesterday evening. She notes still experiencing \"picking\" sensation along the inside of her R foot.      Objective: See treatment diary below      Assessment: Tolerated treatment well with a minimal increase in symptoms. She continues to present with limitations in R ankle ROM, R ankle strength, R ankle joint effusion, and pain. Patient would benefit from continued PT to address limitations to return to prior level of function with minimal symptoms.      Plan: Continue per plan of care.      Precautions: *Avoid high impact and aggravating activity at this time*       Manuals 5/29 6/3 6/5 5/27   STM Post Tib Tendon                              Neuro Re-Ed       SLS on AE        Side Stepping on AE  X3 laps X3 laps  X3 laps  X3 laps   Tandem Amb Fwd/Bwd on AE        SLS on AE w/ Hip Drills       Fitter Board PF/DF X20 ea X20 ea  X20 ea X20  ea   Fitter Board Inv/Ev X20 ea X20 ea X20 ea X20 ea   Fitter Board cw/ccw  X20 ea X20 ea  X20 ea X20 ea   Ther Ex       PT Assist PROM Ankle all planes of motion  BM  BM BM  BM   MRE's all planes of motion  2X10 ea BM 2x10 ea BM  2x10 ea BM 2x10 ea BM   Gastroc Stretch  10\" Hold x5 w/ 1/2 foam  10\" Hold x10 w/ 1/2 foam  10\" Hold x10 w/ 1/2 foam  10\" Hold x10 w/1/2 foam   Soleus Stretch 10\" Hold x5 w/ 1/2 foam  10\" Hold x10 w/ 1/2 foam  10' Hold x10 w/ 1/2 foam  10\" Hold x10 w/ 1/2 foam   Posterior Tibialis Tendon Stretch        TB PF  L2 2x10  L2 2x10  L2 2x10  L2 2x10   TB DF L2 2x10  L2 2x10  L2 2x10  L2 2x10   TB Inv  L2 2x10  L2 2x10  L2 2x10  L2 2x10   TB Ev  L2 2x10  L2 2x10  L2 2x10 " " L2 2x10   Toe Curls   2x10  2x10    Plantar Fascia Stretch     10\" Hold x5  10\" Hold x5    Seated HR/TR       HEP Instruction       Ther Activity                     Gait Training                     Modalities       Ice 8' PT 8' PT  8' PT  8'PT                                "

## 2025-06-10 ENCOUNTER — OFFICE VISIT (OUTPATIENT)
Dept: PHYSICAL THERAPY | Facility: CLINIC | Age: 75
End: 2025-06-10
Attending: PODIATRIST
Payer: COMMERCIAL

## 2025-06-10 DIAGNOSIS — M76.821 POSTERIOR TIBIAL TENDINITIS OF RIGHT LOWER EXTREMITY: ICD-10-CM

## 2025-06-10 DIAGNOSIS — M25.571 RIGHT ANKLE PAIN, UNSPECIFIED CHRONICITY: Primary | ICD-10-CM

## 2025-06-10 PROCEDURE — 97110 THERAPEUTIC EXERCISES: CPT

## 2025-06-10 PROCEDURE — 97112 NEUROMUSCULAR REEDUCATION: CPT

## 2025-06-10 NOTE — PROGRESS NOTES
"Daily Note     Today's date: 6/10/2025  Patient name: Rosita Soriano  : 1950  MRN: [id number]  Referring provider: Missy Powers DPM  Dx:   Encounter Diagnosis     ICD-10-CM    1. Right ankle pain, unspecified chronicity  M25.571       2. Posterior tibial tendinitis of right lower extremity  M76.821                      Subjective: Patient states of no new complaints.      Objective: See treatment diary below      Assessment: Tolerated treatment well. We progressed the current program by adding static and narrow base of support exercises to improve her balance with good tolerance. Patient would benefit from continued PT to improve ROM, balance, and strength to return to prior level of function.      Plan: Continue per plan of care.      Precautions: *Avoid high impact and aggravating activity at this time*       Manuals 5/29 6/3 6/5 6/10   STM Post Tib Tendon                              Neuro Re-Ed       SLS on AE     20\"x3   Side Stepping on AE  X3 laps X3 laps  X3 laps  X3 laps   Tandem Amb Fwd/Bwd on AE     X3 laps   SLS on AE w/ Hip Drills       Fitter Board PF/DF X20 ea X20 ea  X20 ea X20  ea   Fitter Board Inv/Ev X20 ea X20 ea X20 ea X20 ea   Fitter Board cw/ccw  X20 ea X20 ea  X20 ea X20 ea   Ther Ex       PT Assist PROM Ankle all planes of motion  BM  BM BM  BM   MRE's all planes of motion  2X10 ea BM 2x10 ea BM  2x10 ea BM 2x10 ea BM   Gastroc Stretch  10\" Hold x5 w/ 1/2 foam  10\" Hold x10 w/ 1/2 foam  10\" Hold x10 w/ 1/2 foam  10\" Hold x10 w/1/2 foam   Soleus Stretch 10\" Hold x5 w/ 1/2 foam  10\" Hold x10 w/ 1/2 foam  10' Hold x10 w/ 1/2 foam  10\" Hold x10 w/ 1/2 foam   Posterior Tibialis Tendon Stretch        TB PF  L2 2x10  L2 2x10  L2 2x10  L3 2x10   TB DF L2 2x10  L2 2x10  L2 2x10  L3 2x10   TB Inv  L2 2x10  L2 2x10  L2 2x10  L3 2x10   TB Ev  L2 2x10  L2 2x10  L2 2x10  L3 2x10   Toe Curls   2x10  2x10    Plantar Fascia Stretch     10\" Hold x5  10\" Hold x5 10\" Hold x5   Seated HR/TR     "   HEP Instruction       Ther Activity                     Gait Training                     Modalities       Ice 8' PT 8' PT  8' PT  8'PT

## 2025-06-10 NOTE — PROGRESS NOTES
"Daily Note     Today's date: 6/10/2025  Patient name: Rosita Soriano  : 1950  MRN: [id number]  Referring provider: Missy Powers DPM  Dx:   Encounter Diagnosis     ICD-10-CM    1. Right ankle pain, unspecified chronicity  M25.571       2. Posterior tibial tendinitis of right lower extremity  M76.821                      Subjective: ***      Objective: See treatment diary below      Assessment: Tolerated treatment {Tolerated treatment :9361896003}. Patient {assessment:5329875583}      Plan: {PLAN:5854163465}     Precautions: *Avoid high impact and aggravating activity at this time*       Manuals 5/29 6/3 6/5 5/27   STM Post Tib Tendon                              Neuro Re-Ed       SLS on AE        Side Stepping on AE  X3 laps X3 laps  X3 laps  X3 laps   Tandem Amb Fwd/Bwd on AE        SLS on AE w/ Hip Drills       Fitter Board PF/DF X20 ea X20 ea  X20 ea X20  ea   Fitter Board Inv/Ev X20 ea X20 ea X20 ea X20 ea   Fitter Board cw/ccw  X20 ea X20 ea  X20 ea X20 ea   Ther Ex       PT Assist PROM Ankle all planes of motion  BM  BM BM  BM   MRE's all planes of motion  2X10 ea BM 2x10 ea BM  2x10 ea BM 2x10 ea BM   Gastroc Stretch  10\" Hold x5 w/ 1/2 foam  10\" Hold x10 w/ 1/2 foam  10\" Hold x10 w/ 1/2 foam  10\" Hold x10 w/1/2 foam   Soleus Stretch 10\" Hold x5 w/ 1/2 foam  10\" Hold x10 w/ 1/2 foam  10' Hold x10 w/ 1/2 foam  10\" Hold x10 w/ 1/2 foam   Posterior Tibialis Tendon Stretch        TB PF  L2 2x10  L2 2x10  L2 2x10  L2 2x10   TB DF L2 2x10  L2 2x10  L2 2x10  L2 2x10   TB Inv  L2 2x10  L2 2x10  L2 2x10  L2 2x10   TB Ev  L2 2x10  L2 2x10  L2 2x10  L2 2x10   Toe Curls   2x10  2x10    Plantar Fascia Stretch     10\" Hold x5  10\" Hold x5    Seated HR/TR       HEP Instruction       Ther Activity                     Gait Training                     Modalities       Ice 8' PT 8' PT  8' PT  8'PT                                  "

## 2025-06-11 ENCOUNTER — OFFICE VISIT (OUTPATIENT)
Dept: PHYSICAL THERAPY | Facility: CLINIC | Age: 75
End: 2025-06-11
Attending: PODIATRIST
Payer: COMMERCIAL

## 2025-06-11 DIAGNOSIS — M25.571 RIGHT ANKLE PAIN, UNSPECIFIED CHRONICITY: Primary | ICD-10-CM

## 2025-06-11 DIAGNOSIS — M76.821 POSTERIOR TIBIAL TENDINITIS OF RIGHT LOWER EXTREMITY: ICD-10-CM

## 2025-06-11 PROCEDURE — 97110 THERAPEUTIC EXERCISES: CPT

## 2025-06-11 PROCEDURE — 97112 NEUROMUSCULAR REEDUCATION: CPT

## 2025-06-11 NOTE — PROGRESS NOTES
"Daily Note     Today's date: 2025  Patient name: Rosita Soriano  : 1950  MRN: [id number]  Referring provider: Missy Powers DPM  Dx:   Encounter Diagnosis     ICD-10-CM    1. Right ankle pain, unspecified chronicity  M25.571       2. Posterior tibial tendinitis of right lower extremity  M76.821                      Subjective: Patient reports no new complaints or issues since last treatment session.       Objective: See treatment diary below      Assessment: Tolerated treatment well. Patient is continuing to respond well to PT treatment and is making appropriate progressions with the current program. Patient exhibited good technique with therapeutic exercises and would benefit from continued PT.      Plan: Continue per plan of care.      Precautions: *Avoid high impact and aggravating activity at this time*       Manuals 6/11 6/3 6/5 6/10   STM Post Tib Tendon                              Neuro Re-Ed       SLS on AE  20\" x3   20\"x3   Side Stepping on AE  X3 laps X3 laps  X3 laps  X3 laps   Tandem Amb Fwd/Bwd on AE  X3 laps    X3 laps   SLS on AE w/ Hip Drills       Fitter Board PF/DF X20 ea X20 ea  X20 ea X20  ea   Fitter Board Inv/Ev X20 ea X20 ea X20 ea X20 ea   Fitter Board cw/ccw  X20 ea X20 ea  X20 ea X20 ea   Ther Ex       PT Assist PROM Ankle all planes of motion  BM  BM BM  BM   MRE's all planes of motion  2X10 ea BM 2x10 ea BM  2x10 ea BM 2x10 ea BM   Gastroc Stretch  10\" Hold x5 w/ 1/2 foam  10\" Hold x10 w/ 1/2 foam  10\" Hold x10 w/ 1/2 foam  10\" Hold x10 w/1/2 foam   Soleus Stretch 10\" Hold x5 w/ 1/2 foam  10\" Hold x10 w/ 1/2 foam  10' Hold x10 w/ 1/2 foam  10\" Hold x10 w/ 1/2 foam   Posterior Tibialis Tendon Stretch        TB PF  L2 2x10  L2 2x10  L2 2x10  L3 2x10   TB DF L2 2x10  L2 2x10  L2 2x10  L3 2x10   TB Inv  L2 2x10  L2 2x10  L2 2x10  L3 2x10   TB Ev  L2 2x10  L2 2x10  L2 2x10  L3 2x10   Toe Curls  2x10 2x10  2x10    Plantar Fascia Stretch    10\" Hold x5 10\" Hold x5  10\" Hold " "x5 10\" Hold x5   Seated HR/TR       HEP Instruction       Ther Activity                     Gait Training                     Modalities       Ice 8' PT 8' PT  8' PT  8'PT                                    "

## 2025-06-12 ENCOUNTER — APPOINTMENT (OUTPATIENT)
Dept: PHYSICAL THERAPY | Facility: CLINIC | Age: 75
End: 2025-06-12
Attending: PODIATRIST
Payer: COMMERCIAL

## 2025-06-17 ENCOUNTER — EVALUATION (OUTPATIENT)
Dept: PHYSICAL THERAPY | Facility: CLINIC | Age: 75
End: 2025-06-17
Attending: PODIATRIST
Payer: COMMERCIAL

## 2025-06-17 DIAGNOSIS — M25.571 RIGHT ANKLE PAIN, UNSPECIFIED CHRONICITY: Primary | ICD-10-CM

## 2025-06-17 DIAGNOSIS — M76.821 POSTERIOR TIBIAL TENDINITIS OF RIGHT LOWER EXTREMITY: ICD-10-CM

## 2025-06-17 PROCEDURE — 97110 THERAPEUTIC EXERCISES: CPT

## 2025-06-17 NOTE — PROGRESS NOTES
PT Re-Evaluation     Today's date: 2025  Patient name: Rosita Soriano  : 1950  MRN: [id number]  Referring provider: Missy Powers DPM  Dx:   Encounter Diagnosis     ICD-10-CM    1. Right ankle pain, unspecified chronicity  M25.571       2. Posterior tibial tendinitis of right lower extremity  M76.821                          Assessment  Impairments: abnormal or restricted ROM, abnormal movement, activity intolerance, impaired physical strength, pain with function and activity limitations  Symptom irritability: low    Assessment details: Rosita Soriano is a 75 y.o. female presenting to Physical Therapy today with signs and symptoms suggestive of R sided posterior tibial tendinitis. Patient is making steady progressions with Physical Therapy treatment at this time. Upon completion of the PT re-evaluation the patient is continuing to present with limitations in R ankle ROM, R ankle strength, R ankle joint effusion, and R ankle pain. She is currently still experiencing difficulty with activities such as standing for longer periods, walking, and stair climbing due to symptoms. Patient would continue to benefit skilled Physical Therapy services to address functional limitations to return to prior level of function.   Understanding of Dx/Px/POC: excellent     Prognosis: good    Goals  STG: (6 weeks)  1.) Patient will initiate HEP Independently MET  2.) Patient will have a 50% reduction in overall symptoms MET  3.) Patient will demonstrate improved R ankle strength by 50% in all planes of motion MET   4.) Patient will demonstrate improved ability to ascend/descend a full flight of stairs with zero limitations and w/o symptoms PROGRESSING  5.) Patient will demonstrate improved R ankle ROM by 50% in all planes of motion PROGRESSING    LTG: (12 weeks)  1.) Patient will be d/c to an HEP independently PROGRESSING  2.) Patient will improve functional abilities evidenced by FOTO scores PROGRESSING  3.)  Eliminate pain with functional activity PROGRESSING  4.) Patient will demonstrate improved R ankle ROM by 90% in all planes of motion as evidence of restored function PROGRESSING  5.) Patient will demonstrate improved R ankle strength by 90% in all planes of motion as evidence of restored function PROGRESSING    Plan  Patient would benefit from: skilled physical therapy  Referral necessary: No  Planned modality interventions: cryotherapy    Planned therapy interventions: functional ROM exercises, graded activity, graded exercise, graded motor, home exercise program, flexibility, joint mobilization, manual therapy, neuromuscular re-education, patient education, postural training, self care, strengthening, stretching, therapeutic activities, therapeutic exercise, therapeutic training and balance    Frequency: 1-2x week  Duration in weeks: 8  Plan of Care beginning date: 5/14/2025  Plan of Care expiration date: 7/9/2025  Treatment plan discussed with: patient  Plan details: Plan of care was reviewed and discussed thoroughly with the patient on their current condition. Patient was instructed on a HEP with written instructions.       Subjective Evaluation    History of Present Illness  Mechanism of injury: The patient reports today with R sided medial foot pain that started to increase prior to her recent R TKA surgery on 3/3 with no precipitating injury or trauma to the region. She notes h/o of R ankle pain a few years ago and was placed in a CAM boot for 9 weeks with great improvement. She notes following up with Dr. 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 She is currently wearing a R ankle brace that helps mildly with symptoms. Pain is located at medial arch and proximal/medial malleolus. Pain is aggravated by activity such as walking, stairs, and standing for long periods. She is now referred to OPPT.     Update 6-17-25: The patient reports today  "with a 60-70% improvement in R ankle function and symptoms since the start of Physical Therapy treatment. She notes still experiencing a \"picking\" sensation on the inside of her R ankle with increased activity.          Recurrent probem    Quality of life: good    Patient Goals  Patient goals for therapy: decreased pain, increased motion, increased strength and independence with ADLs/IADLs    Pain  Current pain ratin  At best pain ratin  At worst pain rating: 3  Location: R medial arch, R medial/proximal medial malleolus  Quality: needle-like  Relieving factors: ice and rest  Aggravating factors: standing, stair climbing and walking    Social Support    Employment status: working    Diagnostic Tests  X-ray: abnormal  Treatments  Current treatment: physical therapy      Objective     Observations     Right Ankle/Foot   Positive for deformity and effusion.     Additional Observation Details  R ankle: Pes Planus deformity, mild R ankle joint effusion, equinus    Palpation     Right   Hypertonic in the lateral gastrocnemius, medial gastrocnemius, posterior tibialis and soleus.     Tenderness     Right Ankle/Foot   Tenderness in the medial malleolus and posterior tibial tendon.     Additional Tenderness Details          Neurological Testing     Sensation     Ankle/Foot   Left Ankle/Foot   Hyposensation: light touch    Right Ankle/Foot   Hyposensation: light touch    Active Range of Motion   Left Ankle/Foot   Dorsiflexion (ke): 0 degrees   Plantar flexion: WFL  Inversion: WFL  Eversion: WFL    Right Ankle/Foot   Dorsiflexion (ke): -4 degrees with pain  Plantar flexion: 40 degrees with pain  Inversion: 20 degrees with pain  Eversion: 8 degrees     Passive Range of Motion     Right Ankle/Foot    Dorsiflexion (ke): -1 degrees   Plantar flexion: 45 degrees   Inversion: WFL  Eversion: WFL    Joint Play     Right Ankle/Foot  Hypomobile in the talocrural joint and subtalar joint.     Strength/Myotome Testing     Left " "Ankle/Foot   Normal strength    Right Ankle/Foot   Dorsiflexion: 3+  Plantar flexion: 4-  Inversion: 4-  Eversion: 4-    Swelling   Left Ankle/Foot   Figure 8: 52 cm    Right Ankle/Foot   Figure 8: 52.5 cm    General Comments:      Ankle/Foot Comments   Patient was educated on R injury management regarding pain, swelling, ice application, pathoanatomic's of injury, orthotics, sneaker wear, MLA support, and HEP.               Precautions: *Avoid high impact and aggravating activity at this time*     Manuals 6/11 6/17 6/5 6/10   STM Post Tib Tendon                              Neuro Re-Ed       SLS on AE  20\" x3 20\" x3  20\"x3   Side Stepping on AE  X3 laps X3 laps X3 laps  X3 laps   Tandem Amb Fwd/Bwd on AE  X3 laps  X3 laps  X3 laps   SLS on AE w/ Hip Drills       Fitter Board PF/DF X20 ea X20 ea X20 ea X20  ea   Fitter Board Inv/Ev X20 ea X20 ea X20 ea X20 ea   Fitter Board cw/ccw  X20 ea X20 ea X20 ea X20 ea   Ther Ex       PT Assist PROM Ankle all planes of motion  BM  BM BM  BM   MRE's all planes of motion  2X10 ea BM 2x10 ea BM  2x10 ea BM 2x10 ea BM   Gastroc Stretch  10\" Hold x5 w/ 1/2 foam  10\" Hold x5 w/ 1/2 foam 10\" Hold x10 w/ 1/2 foam  10\" Hold x10 w/1/2 foam   Soleus Stretch 10\" Hold x5 w/ 1/2 foam  10\" Hold x5 w/ 1/2 foam  10' Hold x10 w/ 1/2 foam  10\" Hold x10 w/ 1/2 foam   Posterior Tibialis Tendon Stretch        TB PF  L2 2x10  L3 2x10  L2 2x10  L3 2x10   TB DF L2 2x10  L3 2x10  L2 2x10  L3 2x10   TB Inv  L2 2x10  L3 2x10  L2 2x10  L3 2x10   TB Ev  L2 2x10  L3 2x10  L2 2x10  L3 2x10   Toe Curls  2x10 2x10  2x10    Plantar Fascia Stretch    10\" Hold x5 10\" Hold x5  10\" Hold x5 10\" Hold x5   Seated HR/TR       HEP Instruction       Ther Activity                     Gait Training                     Modalities       Ice 8' PT 8' PT  8' PT  8'PT                 "

## 2025-06-19 ENCOUNTER — OFFICE VISIT (OUTPATIENT)
Dept: PHYSICAL THERAPY | Facility: CLINIC | Age: 75
End: 2025-06-19
Attending: PODIATRIST
Payer: COMMERCIAL

## 2025-06-19 DIAGNOSIS — M25.571 RIGHT ANKLE PAIN, UNSPECIFIED CHRONICITY: Primary | ICD-10-CM

## 2025-06-19 DIAGNOSIS — M76.821 POSTERIOR TIBIAL TENDINITIS OF RIGHT LOWER EXTREMITY: ICD-10-CM

## 2025-06-19 PROCEDURE — 97112 NEUROMUSCULAR REEDUCATION: CPT

## 2025-06-19 PROCEDURE — 97110 THERAPEUTIC EXERCISES: CPT

## 2025-06-19 NOTE — PROGRESS NOTES
"Daily Note     Today's date: 2025  Patient name: Rosita Soriano  : 1950  MRN: [id number]  Referring provider: Missy Powers DPM  Dx:   Encounter Diagnosis     ICD-10-CM    1. Right ankle pain, unspecified chronicity  M25.571       2. Posterior tibial tendinitis of right lower extremity  M76.821                      Subjective: Patient reports applying ice to the R ankle region yesterday due to \"picking\" sensation on the inside of her R ankle.      Objective: See treatment diary below      Assessment: Tolerated treatment well. PT progressed the current program with the addition of R ankle proprioceptive control exercises. Patient demonstrated a mild loss in balance control with progressions requiring HHA for stability. Patient would benefit from continued PT to address pain, R ankle ROM, R ankle strength, and R ankle stability to return to prior level of function.       Plan: Continue per plan of care.      Precautions: *Avoid high impact and aggravating activity at this time*     Manuals 6/11 6/17 6/19 6/10     STM Post Tib Tendon                              Neuro Re-Ed       SLS on AE  20\" x3 20\" x3 20\" x3  20\"x3   Side Stepping on AE  X3 laps X3 laps X3 laps  X3 laps   Tandem Amb Fwd/Bwd on AE  X3 laps  X3  X3 laps w/ low hurdles  X3 laps   SLS on AE w/ Hip Drills       Fitter Board PF/DF X20 ea X20 ea X20 ea X20  ea   Fitter Board Inv/Ev X20 ea X20 ea X20 ea X20 ea   Fitter Board cw/ccw  X20 ea X20 ea X20 ea X20 ea   Ther Ex       PT Assist PROM Ankle all planes of motion  BM  BM BM  BM   MRE's all planes of motion  2X10 ea BM 2x10 ea BM  2x10 ea BM 2x10 ea BM   Gastroc Stretch  10\" Hold x5 w/ 1/2 foam  10\" Hold x5 w/ 1/2 foam 10\" Hold x10 w/ 1/2 foam  10\" Hold x10 w/1/2 foam   Soleus Stretch 10\" Hold x5 w/ 1/2 foam  10\" Hold x5 w/ 1/2 foam  10' Hold x10 w/ 1/2 foam  10\" Hold x10 w/ 1/2 foam   Posterior Tibialis Tendon Stretch        TB PF  L2 2x10  L3 2x10  L3 2x10  L3 2x10   TB DF L2 2x10  " "L3 2x10  L3 2x10  L3 2x10   TB Inv  L2 2x10  L3 2x10  L3 2x10  L3 2x10   TB Ev  L2 2x10  L3 2x10  L3 2x10  L3 2x10   Toe Curls  2x10 2x10  2x10    Plantar Fascia Stretch    10\" Hold x5 10\" Hold x5  10\" Hold x5 10\" Hold x5   Seated HR/TR       HEP Instruction       Ther Activity                     Gait Training                     Modalities       Ice 8' PT 8' PT  8' PT  8'PT                      "

## 2025-06-24 ENCOUNTER — OFFICE VISIT (OUTPATIENT)
Dept: PHYSICAL THERAPY | Facility: CLINIC | Age: 75
End: 2025-06-24
Attending: PODIATRIST
Payer: COMMERCIAL

## 2025-06-24 DIAGNOSIS — M25.571 RIGHT ANKLE PAIN, UNSPECIFIED CHRONICITY: Primary | ICD-10-CM

## 2025-06-24 DIAGNOSIS — M76.821 POSTERIOR TIBIAL TENDINITIS OF RIGHT LOWER EXTREMITY: ICD-10-CM

## 2025-06-24 PROCEDURE — 97110 THERAPEUTIC EXERCISES: CPT

## 2025-06-24 PROCEDURE — 97112 NEUROMUSCULAR REEDUCATION: CPT

## 2025-06-24 NOTE — PROGRESS NOTES
"Daily Note     Today's date: 2025  Patient name: Rosita Soriano  : 1950  MRN: [id number]  Referring provider: Missy Powers DPM  Dx:   Encounter Diagnosis     ICD-10-CM    1. Right ankle pain, unspecified chronicity  M25.571       2. Posterior tibial tendinitis of right lower extremity  M76.821                      Subjective: Patient reports minimal R ankle pain this afternoon. She notes improvement with PT treatment.      Objective: See treatment diary below      Assessment: Tolerated treatment well. Patient is continuing to respond well to PT treatment and is making appropriate progressions with the current program. She would benefit from continued PT to address R ankle ROM, R ankle strength, and R ankle stability to return to prior level of function.      Plan: Continue per plan of care.      Precautions: *Avoid high impact and aggravating activity at this time*     Manuals      STM Post Tib Tendon                              Neuro Re-Ed       SLS on AE  20\" x3 20\" x3 20\" x3  20\"x3   Side Stepping on AE  X3 laps X3 laps X3 laps  X3 laps   Tandem Amb Fwd/Bwd on AE  X3 laps  X3  X3 laps w/ low hurdles  X3 laps   SLS on AE w/ Hip Drills       Fitter Board PF/DF X20 ea X20 ea X20 ea X20  ea   Fitter Board Inv/Ev X20 ea X20 ea X20 ea X20 ea   Fitter Board cw/ccw  X20 ea X20 ea X20 ea X20 ea   Ther Ex       PT Assist PROM Ankle all planes of motion  BM  BM BM  BM   MRE's all planes of motion  2X10 ea BM 2x10 ea BM  2x10 ea BM 2x10 ea BM   Gastroc Stretch  10\" Hold x5 w/ 1/2 foam  10\" Hold x5 w/ 1/2 foam 10\" Hold x10 w/ 1/2 foam  10\" Hold x10 w/1/2 foam   Soleus Stretch 10\" Hold x5 w/ 1/2 foam  10\" Hold x5 w/ 1/2 foam  10' Hold x10 w/ 1/2 foam  10\" Hold x10 w/ 1/2 foam   Posterior Tibialis Tendon Stretch        TB PF  L2 2x10  L3 2x10  L3 2x10  L3 2x10   TB DF L2 2x10  L3 2x10  L3 2x10  L3 2x10   TB Inv  L2 2x10  L3 2x10  L3 2x10  L3 2x10   TB Ev  L2 2x10  L3 2x10  L3 2x10  L3 " "2x10   Toe Curls  2x10 2x10  2x10 2x10   Plantar Fascia Stretch    10\" Hold x5 10\" Hold x5  10\" Hold x5 10\" Hold x5   Seated HR/TR       HEP Instruction       Ther Activity                     Gait Training                     Modalities       Ice 8' PT 8' PT  8' PT  8'PT                        "

## 2025-06-26 ENCOUNTER — OFFICE VISIT (OUTPATIENT)
Dept: PHYSICAL THERAPY | Facility: CLINIC | Age: 75
End: 2025-06-26
Attending: PODIATRIST
Payer: COMMERCIAL

## 2025-06-26 DIAGNOSIS — M25.571 RIGHT ANKLE PAIN, UNSPECIFIED CHRONICITY: Primary | ICD-10-CM

## 2025-06-26 DIAGNOSIS — M76.821 POSTERIOR TIBIAL TENDINITIS OF RIGHT LOWER EXTREMITY: ICD-10-CM

## 2025-06-26 PROCEDURE — 97110 THERAPEUTIC EXERCISES: CPT

## 2025-06-26 PROCEDURE — 97112 NEUROMUSCULAR REEDUCATION: CPT

## 2025-06-26 NOTE — PROGRESS NOTES
"Daily Note     Today's date: 2025  Patient name: Rosita Soriano  : 1950  MRN: [id number]  Referring provider: Missy Powers DPM  Dx:   Encounter Diagnosis     ICD-10-CM    1. Right ankle pain, unspecified chronicity  M25.571       2. Posterior tibial tendinitis of right lower extremity  M76.821                      Subjective: Patient reports no new complaints or issues since last treatment session.      Objective: See treatment diary below      Assessment: Tolerated treatment well. Patient is demonstrating improved R ankle proprioceptive control with balance exercises requiring less HHA for stability. She continues to respond well to PT treatment and is making appropriate progressions towards goals. Patient would benefit from continued PT to return to prior level of function.      Plan: Continue per plan of care.      Precautions: *Avoid high impact and aggravating activity at this time*     Manuals      STM Post Tib Tendon                              Neuro Re-Ed       SLS on AE  20\" x3 20\" x3 20\" x3  20\"x3   Side Stepping on AE  X3 laps X3 laps X3 laps  X3 laps   Tandem Amb Fwd/Bwd on AE  X3 laps  X3  X3 laps w/ low hurdles  X3 laps   SLS on AE w/ Hip Drills       Fitter Board PF/DF X20 ea X20 ea X20 ea X20  ea   Fitter Board Inv/Ev X20 ea X20 ea X20 ea X20 ea   Fitter Board cw/ccw  X20 ea X20 ea X20 ea X20 ea   Ther Ex       PT Assist PROM Ankle all planes of motion  BM  BM BM  BM   MRE's all planes of motion  2X10 ea BM 2x10 ea BM  2x10 ea BM 2x10 ea BM   Gastroc Stretch  10\" Hold x10 w/ 1/2 foam  10\" Hold x5 w/ 1/2 foam 10\" Hold x10 w/ 1/2 foam  10\" Hold x10 w/1/2 foam   Soleus Stretch 10\" Hold x10 w/ 1/2 foam  10\" Hold x5 w/ 1/2 foam  10' Hold x10 w/ 1/2 foam  10\" Hold x10 w/ 1/2 foam   Posterior Tibialis Tendon Stretch        TB PF  L3 2x10  L3 2x10  L3 2x10  L3 2x10   TB DF L3 2x10  L3 2x10  L3 2x10  L3 2x10   TB Inv  L3 2x10  L3 2x10  L3 2x10  L3 2x10   TB Ev  L3 " "2x10  L3 2x10  L3 2x10  L3 2x10   Toe Curls  2x10 2x10  2x10 2x10   Plantar Fascia Stretch    10\" Hold x5 10\" Hold x5  10\" Hold x5 10\" Hold x5   Seated HR/TR       HEP Instruction       Ther Activity                     Gait Training                     Modalities       Ice 8' PT 8' PT  8' PT  8'PT                          "

## 2025-07-03 ENCOUNTER — OFFICE VISIT (OUTPATIENT)
Dept: PHYSICAL THERAPY | Facility: CLINIC | Age: 75
End: 2025-07-03
Attending: PODIATRIST
Payer: COMMERCIAL

## 2025-07-03 DIAGNOSIS — M76.821 POSTERIOR TIBIAL TENDINITIS OF RIGHT LOWER EXTREMITY: ICD-10-CM

## 2025-07-03 DIAGNOSIS — M25.571 RIGHT ANKLE PAIN, UNSPECIFIED CHRONICITY: Primary | ICD-10-CM

## 2025-07-03 PROCEDURE — 97110 THERAPEUTIC EXERCISES: CPT

## 2025-07-03 PROCEDURE — 97112 NEUROMUSCULAR REEDUCATION: CPT

## 2025-07-03 NOTE — PROGRESS NOTES
"Daily Note     Today's date: 7/3/2025  Patient name: Rosita Soriano  : 1950  MRN: [id number]  Referring provider: Missy Powers DPM  Dx:   Encounter Diagnosis     ICD-10-CM    1. Right ankle pain, unspecified chronicity  M25.571       2. Posterior tibial tendinitis of right lower extremity  M76.821                      Subjective: Patient reports minimal R ankle pain this afternoon. She notes continued ice application to R ankle.       Objective: See treatment diary below      Assessment: Tolerated treatment well with a minimal increase in symptomatology. Patient exhibited good technique with therapeutic exercises and would benefit from continued PT to address functional limitations.      Plan: Continue per plan of care.      Precautions: *Avoid high impact and aggravating activity at this time*     Manuals 6/26 7/3 6/19 6/24     STM Post Tib Tendon                              Neuro Re-Ed       SLS on AE  20\" x3 20\" x3 20\" x3  20\"x3   Side Stepping on AE  X3 laps X3 laps X3 laps  X3 laps   Tandem Amb Fwd/Bwd on AE  X3 laps  X3 laps  X3 laps w/ low hurdles  X3 laps   SLS on AE w/ Hip Drills       Fitter Board PF/DF X20 ea X20 ea X20 ea X20  ea   Fitter Board Inv/Ev X20 ea X20 ea X20 ea X20 ea   Fitter Board cw/ccw  X20 ea X20 ea X20 ea X20 ea   Ther Ex       PT Assist PROM Ankle all planes of motion  BM  BM BM  BM   MRE's all planes of motion  2X10 ea BM 2x10 ea BM  2x10 ea BM 2x10 ea BM   Gastroc Stretch  10\" Hold x10 w/ 1/2 foam  10\" Hold x5 w/ 1/2 foam 10\" Hold x10 w/ 1/2 foam  10\" Hold x10 w/1/2 foam   Soleus Stretch 10\" Hold x10 w/ 1/2 foam  10\" Hold x5 w/ 1/2 foam  10' Hold x10 w/ 1/2 foam  10\" Hold x10 w/ 1/2 foam   Posterior Tibialis Tendon Stretch        TB PF  L3 2x10  L3 2x10  L3 2x10  L3 2x10   TB DF L3 2x10  L3 2x10  L3 2x10  L3 2x10   TB Inv  L3 2x10  L3 2x10  L3 2x10  L3 2x10   TB Ev  L3 2x10  L3 2x10  L3 2x10  L3 2x10   Toe Curls  2x10 2x10  2x10 2x10   Plantar Fascia Stretch    10\" " "Hold x5 10\" Hold x5  10\" Hold x5 10\" Hold x5   Seated HR/TR       HEP Instruction       Ther Activity                     Gait Training                     Modalities       Ice 8' PT 8' PT  8' PT  8'PT                            "

## 2025-07-08 ENCOUNTER — OFFICE VISIT (OUTPATIENT)
Dept: PHYSICAL THERAPY | Facility: CLINIC | Age: 75
End: 2025-07-08
Attending: PODIATRIST
Payer: COMMERCIAL

## 2025-07-08 DIAGNOSIS — M76.821 POSTERIOR TIBIAL TENDINITIS OF RIGHT LOWER EXTREMITY: ICD-10-CM

## 2025-07-08 DIAGNOSIS — M25.571 RIGHT ANKLE PAIN, UNSPECIFIED CHRONICITY: Primary | ICD-10-CM

## 2025-07-08 PROCEDURE — 97112 NEUROMUSCULAR REEDUCATION: CPT

## 2025-07-08 PROCEDURE — 97110 THERAPEUTIC EXERCISES: CPT

## 2025-07-08 NOTE — PROGRESS NOTES
"Daily Note     Today's date: 2025  Patient name: Rosita Soriano  : 1950  MRN: [id number]  Referring provider: Missy Powers DPM  Dx:   Encounter Diagnosis     ICD-10-CM    1. Right ankle pain, unspecified chronicity  M25.571       2. Posterior tibial tendinitis of right lower extremity  M76.821                      Subjective: Patient reports a mild \"picking\" sensation on the inside of the R ankle. She notes symptoms are very mild at this point. She will follow up with Dr. Powers tomorrow.       Objective: See treatment diary below      Assessment: Tolerated treatment well with a minimal increase in symptomatology. Patient exhibited good technique with therapeutic exercises and would benefit from continued PT.      Plan: Continue per plan of care.  Potential discharge next visit.     Precautions: *Avoid high impact and aggravating activity at this time*     Manuals 6/26 7/3 7/8 6/24     STM Post Tib Tendon                              Neuro Re-Ed       SLS on AE  20\" x3 20\" x3 20\" x3  20\"x3   Side Stepping on AE  X3 laps X3 laps X3 laps  X3 laps   Tandem Amb Fwd/Bwd on AE  X3 laps  X3 laps  X3 laps  X3 laps   SLS on AE w/ Hip Drills       Fitter Board PF/DF X20 ea X20 ea X20 ea X20  ea   Fitter Board Inv/Ev X20 ea X20 ea X20 ea X20 ea   Fitter Board cw/ccw  X20 ea X20 ea X20 ea X20 ea   Ther Ex       PT Assist PROM Ankle all planes of motion  BM  BM BM  BM   MRE's all planes of motion  2X10 ea BM 2x10 ea BM  2x10 ea BM 2x10 ea BM   Gastroc Stretch  10\" Hold x10 w/ 1/2 foam  10\" Hold x5 w/ 1/2 foam 10\" Hold x5 w/ 1/2 foam  10\" Hold x10 w/1/2 foam   Soleus Stretch 10\" Hold x10 w/ 1/2 foam  10\" Hold x5 w/ 1/2 foam  10' Hold x5 w/ 1/2 foam  10\" Hold x10 w/ 1/2 foam   Posterior Tibialis Tendon Stretch        TB PF  L3 2x10  L3 2x10  L3 2x10  L3 2x10   TB DF L3 2x10  L3 2x10  L3 2x10  L3 2x10   TB Inv  L3 2x10  L3 2x10  L3 2x10  L3 2x10   TB Ev  L3 2x10  L3 2x10  L3 2x10  L3 2x10   Toe Curls  2x10 " "2x10  2x10 2x10   Plantar Fascia Stretch    10\" Hold x5 10\" Hold x5  10\" Hold x5 10\" Hold x5   Seated HR/TR       HEP Instruction       Ther Activity                     Gait Training                     Modalities       Ice 8' PT 8' PT  8' PT  8'PT                              "

## 2025-07-09 ENCOUNTER — OFFICE VISIT (OUTPATIENT)
Dept: PODIATRY | Facility: CLINIC | Age: 75
End: 2025-07-09
Payer: COMMERCIAL

## 2025-07-09 VITALS
OXYGEN SATURATION: 97 % | BODY MASS INDEX: 31.08 KG/M2 | TEMPERATURE: 97.9 F | HEART RATE: 81 BPM | HEIGHT: 67 IN | WEIGHT: 198 LBS | RESPIRATION RATE: 16 BRPM

## 2025-07-09 DIAGNOSIS — G57.51 TARSAL TUNNEL SYNDROME OF RIGHT SIDE: Primary | ICD-10-CM

## 2025-07-09 DIAGNOSIS — M21.42 PES PLANUS OF BOTH FEET: ICD-10-CM

## 2025-07-09 DIAGNOSIS — M76.821 POSTERIOR TIBIAL TENDINITIS OF RIGHT LOWER EXTREMITY: ICD-10-CM

## 2025-07-09 DIAGNOSIS — M21.41 PES PLANUS OF BOTH FEET: ICD-10-CM

## 2025-07-09 PROCEDURE — 99213 OFFICE O/P EST LOW 20 MIN: CPT | Performed by: PODIATRIST

## 2025-07-09 NOTE — PATIENT INSTRUCTIONS
I recommend stiff bottomed sneakers/shoes (ex Asics, Vionic, New balance, Causey, etc) for daily use and Massiel Hunter (recovery slides) for in home use     I recommend obtaining OTC orthotics which can be purchased online.  I recommend specifically Redi-thotic Comfort

## 2025-07-09 NOTE — PROGRESS NOTES
"Name: Rosita Soriano      : 1950      MRN: [id number]  Encounter Provider: Missy Powers DPM  Encounter Date: 2025   [facility]  :  Assessment & Plan  Tarsal tunnel syndrome of right side    Orders:    Ambulatory referral to Physical Therapy; Future    Posterior tibial tendinitis of right lower extremity         Pes planus of both feet           IMPRESSION:  RLE posterior tibial tendinitis  RLE tarsal tunnel syndrome  Pes planus     PLAN:  Posterior tibial tendinitis significantly improved, continue with therapy as recommended  Patient with symptoms of tarsal tunnel and positive Tinel's sign at today's visit.  Patient counseled on pathophysiology.  Recommend formal physical therapy for tarsal tunnel  Rx formal physical therapy for tarsal tunnel  Pes planus reviewed, the effect of flatfoot on the posterior tibial tendon as well as the tarsal tunnel reviewed with patient.  Patient should offload area with supportive orthotics  Patient unable to obtain custom molded orthotics due to expense  Recommend OTC orthotics Redi-thotic comfort.  Placed in chart  Continue NSAIDs as needed  Continue rest, ice, elevation and home stretching as needed  Compression ankle brace as needed however symptoms have significantly improved  Follow-up 2 months for reevaluation    History of Present Illness   HPI  Rosita Soriano is a 75 y.o. female who presents for evaluation management of right posterior tibial tendinitis.  Patient states her symptoms have significantly improved and she is no longer wearing ankle brace.  She still has a sensation of \"picking at the medial aspect of her ankle.  She states there is swelling in the area intermittently if she is on her feet for long periods of time.  Patient states she did not obtain orthotics and she likes to walk barefoot.  She states symptoms developed with a numbing sensation in her medial ankle and arch after standing on her feet " "for long periods of time.      Review of Systems  Constitutional:  Negative for chills and fever.   Respiratory:  Negative for chest tightness and shortness of breath.    Cardiovascular:  Positive for leg swelling.   Musculoskeletal:  Positive for arthralgias, back pain and gait problem.   Skin:  Negative for wound       Objective   Pulse 81   Temp 97.9 °F (36.6 °C)   Resp 16   Ht 5' 7\" (1.702 m)   Wt 89.8 kg (198 lb)   SpO2 97%   BMI 31.01 kg/m²      Physical Exam  Constitutional:       General: She is not in acute distress.     Appearance: She is not ill-appearing.   Cardiovascular:      Comments: DP/PT pulse 1/4  Absent pedal hair growth  Skin temperature gradient even from knees to digits  Mild increase in edema right lower extremity when compared to left, s/p total knee replacement  Musculoskeletal:      Comments: Right foot pes planus.  Minimal arch reconstitution with dorsiflexion of first MPJ  Equinus noted, dorsiflexion of ankle less than 10 degrees with knees bent and extended  Significant improvement along posterior tibial tendon without significant pain with palpation.  No significant pain with resisted ankle range of motion.  Patient with pinpoint pain over kristian pedis with positive Tinel's sign at today's evaluation.  Skin:     Capillary Refill: Capillary refill takes less than 2 seconds.      Findings: No bruising, erythema or rash.   Neurological:      Sensory: No sensory deficit.          "

## 2025-07-10 ENCOUNTER — EVALUATION (OUTPATIENT)
Dept: PHYSICAL THERAPY | Facility: CLINIC | Age: 75
End: 2025-07-10
Attending: PODIATRIST
Payer: COMMERCIAL

## 2025-07-10 DIAGNOSIS — M25.571 RIGHT ANKLE PAIN, UNSPECIFIED CHRONICITY: Primary | ICD-10-CM

## 2025-07-10 DIAGNOSIS — M76.821 POSTERIOR TIBIAL TENDINITIS OF RIGHT LOWER EXTREMITY: ICD-10-CM

## 2025-07-10 DIAGNOSIS — G57.51 TARSAL TUNNEL SYNDROME OF RIGHT SIDE: ICD-10-CM

## 2025-07-10 PROCEDURE — 97112 NEUROMUSCULAR REEDUCATION: CPT

## 2025-07-10 PROCEDURE — 97110 THERAPEUTIC EXERCISES: CPT

## 2025-07-10 NOTE — PROGRESS NOTES
PT Re-Evaluation     Today's date: 7/10/2025  Patient name: Rosita Soriano  : 1950  MRN: [id number]  Referring provider: Missy Powers DPM  Dx:   Encounter Diagnosis     ICD-10-CM    1. Right ankle pain, unspecified chronicity  M25.571       2. Posterior tibial tendinitis of right lower extremity  M76.821       3. Tarsal tunnel syndrome of right side  G57.51                            Assessment  Impairments: abnormal or restricted ROM, abnormal movement, activity intolerance, impaired physical strength, pain with function and activity limitations  Symptom irritability: low    Assessment details: Rosita Soriano is a 75 y.o. female presenting to Physical Therapy today with the chief complaint of R sided ankle and foot pain. Patient is continuing to make steady progressions with Physical Therapy treatment. Upon completion of the PT re-evaluation the patient is presenting with limitations in R ankle ROM, R ankle strength, R ankle joint effusion, and R ankle pain. Patient would continue to benefit skilled Physical Therapy services to address functional limitations to return to prior level of function.   Understanding of Dx/Px/POC: excellent     Prognosis: good    Goals  STG: (6 weeks)  1.) Patient will initiate HEP Independently MET  2.) Patient will have a 50% reduction in overall symptoms MET  3.) Patient will demonstrate improved R ankle strength by 50% in all planes of motion MET   4.) Patient will demonstrate improved ability to ascend/descend a full flight of stairs with zero limitations and w/o symptoms PROGRESSING  5.) Patient will demonstrate improved R ankle ROM by 50% in all planes of motion MET    LTG: (12 weeks)  1.) Patient will be d/c to an HEP independently PROGRESSING  2.) Patient will improve functional abilities evidenced by FOTO scores PROGRESSING  3.) Eliminate pain with functional activity PROGRESSING  4.) Patient will demonstrate improved R ankle ROM by 90% in all planes of  motion as evidence of restored function PROGRESSING  5.) Patient will demonstrate improved R ankle strength by 90% in all planes of motion as evidence of restored function PROGRESSING    Plan  Patient would benefit from: skilled physical therapy  Referral necessary: No  Planned modality interventions: cryotherapy    Planned therapy interventions: functional ROM exercises, graded activity, graded exercise, graded motor, home exercise program, flexibility, joint mobilization, manual therapy, neuromuscular re-education, patient education, postural training, self care, strengthening, stretching, therapeutic activities, therapeutic exercise, therapeutic training and balance    Frequency: 1-2x week  Duration in weeks: 6  Plan of Care beginning date: 7/10/2025  Plan of Care expiration date: 8/21/2025  Treatment plan discussed with: patient  Plan details: Plan of care was reviewed and discussed thoroughly with the patient on their current condition. Patient was instructed on a HEP with written instructions.         Subjective Evaluation    History of Present Illness  Mechanism of injury: The patient reports today with R sided medial foot pain that started to increase prior to her recent R TKA surgery on 3/3 with no precipitating injury or trauma to the region. She notes h/o of R ankle pain a few years ago and was placed in a CAM boot for 9 weeks with great improvement. She notes following up with Dr. 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 She is currently wearing a R ankle brace that helps mildly with symptoms. Pain is located at medial arch and proximal/medial malleolus. Pain is aggravated by activity such as walking, stairs, and standing for long periods. She is now referred to OPPT.     Update 6-17-25: The patient reports today with a 60-70% improvement in R ankle function and symptoms since the start of Physical Therapy treatment. She notes still  "experiencing a \"picking\" sensation on the inside of her R ankle with increased activity.    Update 7-10-25: The patient reports today with continued improvement in posterior tibialis tendon symptoms. She notes still experiencing a pricking sensation in the inside of her R foot. She followed up with Dr. Powers yesterday and is to start PT focusing on the tarsal tunnel. She is to begin wearing supportive orthotics.           Recurrent probem    Quality of life: good    Patient Goals  Patient goals for therapy: decreased pain, increased motion, increased strength and independence with ADLs/IADLs    Pain  Current pain ratin  At best pain ratin  At worst pain rating: 3  Location: R medial arch, R medial/proximal medial malleolus  Quality: needle-like  Relieving factors: ice and rest  Aggravating factors: standing, stair climbing and walking    Social Support    Employment status: working    Diagnostic Tests  X-ray: abnormal  Treatments  Current treatment: physical therapy        Objective     Observations     Right Ankle/Foot   Positive for deformity and effusion.     Additional Observation Details  R ankle: Pes Planus deformity, mild R ankle joint effusion, equinus    Palpation     Right   Hypertonic in the lateral gastrocnemius, medial gastrocnemius, posterior tibialis and soleus.     Tenderness     Right Ankle/Foot   Tenderness in the medial malleolus and posterior tibial tendon.     Additional Tenderness Details          Neurological Testing     Sensation     Ankle/Foot   Left Ankle/Foot   Hyposensation: light touch    Right Ankle/Foot   Hyposensation: light touch    Active Range of Motion   Left Ankle/Foot   Dorsiflexion (ke): 0 degrees   Plantar flexion: WFL  Inversion: WFL  Eversion: WFL    Right Ankle/Foot   Dorsiflexion (ke): 2 degrees   Plantar flexion: 45 degrees   Inversion: 20 degrees with pain  Eversion: 10 degrees     Passive Range of Motion     Right Ankle/Foot    Dorsiflexion (ke): 4 degrees " "  Plantar flexion: 45 degrees   Inversion: WFL  Eversion: WFL    Strength/Myotome Testing     Left Ankle/Foot   Normal strength    Right Ankle/Foot   Dorsiflexion: 4-  Plantar flexion: 4  Inversion: 4  Eversion: 4    Tests     Right Ankle/Foot   Positive for Tinel's sign (tarsal tunnel).     Swelling   Left Ankle/Foot   Figure 8: 52 cm    Right Ankle/Foot   Figure 8: 52.5 cm    General Comments:      Ankle/Foot Comments   Patient was educated on R injury management regarding pain, swelling, ice application, pathoanatomic's of injury, orthotics, sneaker wear, MLA support, and HEP.               Precautions: *Avoid high impact and aggravating activity at this time*     Manuals 6/26 7/3 7/8 7/10     STM Tarsal Tunnel    BM                          Neuro Re-Ed       SLS on AE  20\" x3 20\" x3 20\" x3  20\"x3   Side Stepping on AE  X3 laps X3 laps X3 laps  X3 laps   Tandem Amb Fwd/Bwd on AE  X3 laps  X3 laps  X3 laps  X3 laps   SLS on AE w/ Hip Drills       Fitter Board PF/DF X20 ea X20 ea X20 ea X20  ea   Fitter Board Inv/Ev X20 ea X20 ea X20 ea X20 ea   Fitter Board cw/ccw  X20 ea X20 ea X20 ea X20 ea   Ther Ex       PT Assist PROM Ankle all planes of motion  BM  BM BM  BM   MRE's all planes of motion  2X10 ea BM 2x10 ea BM  2x10 ea BM 2x10 ea BM   Gastroc Stretch  10\" Hold x10 w/ 1/2 foam  10\" Hold x5 w/ 1/2 foam 10\" Hold x5 w/ 1/2 foam  10\" Hold x10 w/1/2 foam   Soleus Stretch 10\" Hold x10 w/ 1/2 foam  10\" Hold x5 w/ 1/2 foam  10' Hold x5 w/ 1/2 foam  10\" Hold x10 w/ 1/2 foam   Posterior Tibialis Tendon Stretch        TB PF  L3 2x10  L3 2x10  L3 2x10  L3 2x10   TB DF L3 2x10  L3 2x10  L3 2x10  L3 2x10   TB Inv  L3 2x10  L3 2x10  L3 2x10  L3 2x10   TB Ev  L3 2x10  L3 2x10  L3 2x10  L3 2x10   Toe Curls  2x10 2x10  2x10 2x10   Plantar Fascia Stretch    10\" Hold x5 10\" Hold x5  10\" Hold x5 10\" Hold x5   Seated HR/TR       HEP Instruction       Ther Activity                     Gait Training                     Modalities     "   Ice 8' PT 8' PT  8' PT  8'PT

## 2025-07-15 ENCOUNTER — OFFICE VISIT (OUTPATIENT)
Dept: PHYSICAL THERAPY | Facility: CLINIC | Age: 75
End: 2025-07-15
Attending: PODIATRIST
Payer: COMMERCIAL

## 2025-07-15 DIAGNOSIS — M25.571 RIGHT ANKLE PAIN, UNSPECIFIED CHRONICITY: Primary | ICD-10-CM

## 2025-07-15 DIAGNOSIS — M76.821 POSTERIOR TIBIAL TENDINITIS OF RIGHT LOWER EXTREMITY: ICD-10-CM

## 2025-07-15 DIAGNOSIS — G57.51 TARSAL TUNNEL SYNDROME OF RIGHT SIDE: ICD-10-CM

## 2025-07-15 PROCEDURE — 97112 NEUROMUSCULAR REEDUCATION: CPT

## 2025-07-15 PROCEDURE — 97110 THERAPEUTIC EXERCISES: CPT

## 2025-07-17 ENCOUNTER — OFFICE VISIT (OUTPATIENT)
Dept: PHYSICAL THERAPY | Facility: CLINIC | Age: 75
End: 2025-07-17
Attending: PODIATRIST
Payer: COMMERCIAL

## 2025-07-17 DIAGNOSIS — M25.571 RIGHT ANKLE PAIN, UNSPECIFIED CHRONICITY: Primary | ICD-10-CM

## 2025-07-17 DIAGNOSIS — M76.821 POSTERIOR TIBIAL TENDINITIS OF RIGHT LOWER EXTREMITY: ICD-10-CM

## 2025-07-17 DIAGNOSIS — G57.51 TARSAL TUNNEL SYNDROME OF RIGHT SIDE: ICD-10-CM

## 2025-07-17 PROCEDURE — 97112 NEUROMUSCULAR REEDUCATION: CPT

## 2025-07-17 PROCEDURE — 97110 THERAPEUTIC EXERCISES: CPT

## 2025-07-17 NOTE — PROGRESS NOTES
"Daily Note     Today's date: 2025  Patient name: Rosita Soriano  : 1950  MRN: [id number]  Referring provider: Missy Powers DPM  Dx:   Encounter Diagnosis     ICD-10-CM    1. Right ankle pain, unspecified chronicity  M25.571       2. Posterior tibial tendinitis of right lower extremity  M76.821       3. Tarsal tunnel syndrome of right side  G57.51                      Subjective: Patient reports continuing to experience symptoms on the inside of her R ankle.       Objective: See treatment diary below      Assessment: Tolerated treatment well with a minimal increase in symptomatology. Patient exhibited good technique with therapeutic exercises and would benefit from continued PT.      Plan: Continue per plan of care.      Precautions: *Avoid high impact and aggravating activity at this time*     Manuals 7/15 7/17 7/8 7/10     STM Tarsal Tunnel BM  BM   BM                          Neuro Re-Ed       SLS on AE  20\" x3 20\" x3 20\" x3  20\"x3   Side Stepping on AE  X3 laps X3 laps X3 laps  X3 laps   Tandem Amb Fwd/Bwd on AE  X3 laps  X3 laps  X3 laps  X3 laps   SLS on AE w/ Hip Drills       Fitter Board PF/DF X20 ea X20 ea X20 ea X20  ea   Fitter Board Inv/Ev X20 ea X20 ea X20 ea X20 ea   Fitter Board cw/ccw  X20 ea X20 ea X20 ea X20 ea   Ther Ex       PT Assist PROM Ankle all planes of motion  BM  BM BM  BM   MRE's all planes of motion  2X10 ea BM 2x10 ea BM  2x10 ea BM 2x10 ea BM   Gastroc Stretch  10\" Hold x10 w/ 1/2 foam  10\" Hold x5 w/ 1/2 foam 10\" Hold x5 w/ 1/2 foam  10\" Hold x10 w/1/2 foam   Soleus Stretch 10\" Hold x10 w/ 1/2 foam  10\" Hold x5 w/ 1/2 foam  10' Hold x5 w/ 1/2 foam  10\" Hold x10 w/ 1/2 foam   Posterior Tibialis Tendon Stretch        TB PF  L3 2x10  L3 2x10  L3 2x10  L3 2x10   TB DF L3 2x10  L3 2x10  L3 2x10  L3 2x10   TB Inv  L3 2x10  L3 2x10  L3 2x10  L3 2x10   TB Ev  L3 2x10  L3 2x10  L3 2x10  L3 2x10   Toe Curls  2x10 2x10  2x10 2x10   Plantar Fascia Stretch    10\" Hold x5 10\" " "Hold x5  10\" Hold x5 10\" Hold x5   Seated HR/TR       HEP Instruction       Ther Activity                     Gait Training                     Modalities       Ice 8' PT 8' PT  8' PT  8'PT                        "

## 2025-07-22 ENCOUNTER — OFFICE VISIT (OUTPATIENT)
Dept: PHYSICAL THERAPY | Facility: CLINIC | Age: 75
End: 2025-07-22
Attending: PODIATRIST
Payer: COMMERCIAL

## 2025-07-22 DIAGNOSIS — G57.51 TARSAL TUNNEL SYNDROME OF RIGHT SIDE: ICD-10-CM

## 2025-07-22 DIAGNOSIS — M76.821 POSTERIOR TIBIAL TENDINITIS OF RIGHT LOWER EXTREMITY: ICD-10-CM

## 2025-07-22 DIAGNOSIS — M25.571 RIGHT ANKLE PAIN, UNSPECIFIED CHRONICITY: Primary | ICD-10-CM

## 2025-07-22 PROCEDURE — 97110 THERAPEUTIC EXERCISES: CPT

## 2025-07-22 NOTE — PROGRESS NOTES
"Daily Note     Today's date: 2025  Patient name: Rosita Soriano  : 1950  MRN: [id number]  Referring provider: Missy Powers DPM  Dx:   Encounter Diagnosis     ICD-10-CM    1. Right ankle pain, unspecified chronicity  M25.571       2. Posterior tibial tendinitis of right lower extremity  M76.821       3. Tarsal tunnel syndrome of right side  G57.51                      Subjective: Patient reports minimal complaints or new issues since last treatment session.      Objective: See treatment diary below      Assessment: Tolerated treatment well. PT progressed the current program with the addition of R ankle strengthening with good tolerance. Patient exhibited good technique with therapeutic exercises and would benefit from continued PT to address R ankle ROM, strength, and stability to return to prior level of function.      Plan: Continue per plan of care.      Precautions: *Avoid high impact and aggravating activity at this time*     Manuals 7/15 7/17 7/22 7/10     STM Tarsal Tunnel BM  BM  BM BM                          Neuro Re-Ed       SLS on AE  20\" x3 20\" x3 20\" x3  20\"x3   Side Stepping on AE  X3 laps X3 laps X3 laps  X3 laps   Tandem Amb Fwd/Bwd on AE  X3 laps  X3 laps  X3 laps  X3 laps   SLS on AE w/ Hip Drills       Fitter Board PF/DF X20 ea X20 ea D/C X20  ea   Fitter Board Inv/Ev X20 ea X20 ea D/C X20 ea   Fitter Board cw/ccw  X20 ea X20 ea D/C X20 ea   Ther Ex       PT Assist PROM Ankle all planes of motion  BM  BM BM  BM   MRE's all planes of motion  2X10 ea BM 2x10 ea BM  2x10 ea BM 2x10 ea BM   Gastroc Stretch  10\" Hold x10 w/ 1/2 foam  10\" Hold x5 w/ 1/2 foam 10\" Hold x5 w/ 1/2 foam  10\" Hold x10 w/1/2 foam   Soleus Stretch 10\" Hold x10 w/ 1/2 foam  10\" Hold x5 w/ 1/2 foam  10' Hold x5 w/ 1/2 foam  10\" Hold x10 w/ 1/2 foam   Posterior Tibialis Tendon Stretch        TB PF  L3 2x10  L3 2x10  L3 2x10  L3 2x10   TB DF L3 2x10  L3 2x10  L3 2x10  L3 2x10   TB Inv  L3 2x10  L3 2x10  L3 " "2x10  L3 2x10   TB Ev  L3 2x10  L3 2x10  L3 2x10  L3 2x10   Toe Curls  2x10 2x10  2x10 2x10   Plantar Fascia Stretch    10\" Hold x5 10\" Hold x5  10\" Hold x5 10\" Hold x5   Seated HR/TR   2x10     HEP Instruction       Ther Activity                     Gait Training                     Modalities       Ice 8' PT 8' PT  8' PT  8'PT                          "

## 2025-07-24 ENCOUNTER — OFFICE VISIT (OUTPATIENT)
Dept: PHYSICAL THERAPY | Facility: CLINIC | Age: 75
End: 2025-07-24
Attending: PODIATRIST
Payer: COMMERCIAL

## 2025-07-24 DIAGNOSIS — G57.51 TARSAL TUNNEL SYNDROME OF RIGHT SIDE: ICD-10-CM

## 2025-07-24 DIAGNOSIS — M76.821 POSTERIOR TIBIAL TENDINITIS OF RIGHT LOWER EXTREMITY: ICD-10-CM

## 2025-07-24 DIAGNOSIS — M25.571 RIGHT ANKLE PAIN, UNSPECIFIED CHRONICITY: Primary | ICD-10-CM

## 2025-07-24 PROCEDURE — 97110 THERAPEUTIC EXERCISES: CPT

## 2025-07-24 NOTE — PROGRESS NOTES
"Daily Note     Today's date: 2025  Patient name: Rosita Soriano  : 1950  MRN: [id number]  Referring provider: Missy Powers DPM  Dx:   Encounter Diagnosis     ICD-10-CM    1. Right ankle pain, unspecified chronicity  M25.571       2. Posterior tibial tendinitis of right lower extremity  M76.821       3. Tarsal tunnel syndrome of right side  G57.51                      Subjective: Patient reports minimal R ankle pain this afternoon.       Objective: See treatment diary below      Assessment: Tolerated treatment well. Patient exhibited good technique with therapeutic exercises and would benefit from continued PT to address R ankle ROM, stability, and strength.       Plan: Continue per plan of care.      Precautions: *Avoid high impact and aggravating activity at this time*     Manuals 7/15 7/17 7/22 7/24     STM Tarsal Tunnel BM  BM  BM BM                          Neuro Re-Ed       SLS on AE  20\" x3 20\" x3 20\" x3  20\"x3   Side Stepping on AE  X3 laps X3 laps X3 laps  X3 laps   Tandem Amb Fwd/Bwd on AE  X3 laps  X3 laps  X3 laps  X3 laps   SLS on AE w/ Hip Drills       Fitter Board PF/DF X20 ea X20 ea D/C    Fitter Board Inv/Ev X20 ea X20 ea D/C    Fitter Board cw/ccw  X20 ea X20 ea D/C    Ther Ex       PT Assist PROM Ankle all planes of motion  BM  BM BM  BM   MRE's all planes of motion  2X10 ea BM 2x10 ea BM  2x10 ea BM 2x10 ea BM   Gastroc Stretch  10\" Hold x10 w/ 1/2 foam  10\" Hold x5 w/ 1/2 foam 10\" Hold x5 w/ 1/2 foam  10\" Hold x10 w/1/2 foam   Soleus Stretch 10\" Hold x10 w/ 1/2 foam  10\" Hold x5 w/ 1/2 foam  10' Hold x5 w/ 1/2 foam  10\" Hold x10 w/ 1/2 foam   Posterior Tibialis Tendon Stretch        TB PF  L3 2x10  L3 2x10  L3 2x10  L3 2x10   TB DF L3 2x10  L3 2x10  L3 2x10  L3 2x10   TB Inv  L3 2x10  L3 2x10  L3 2x10  L3 2x10   TB Ev  L3 2x10  L3 2x10  L3 2x10  L3 2x10   Toe Curls  2x10 2x10  2x10 2x10   Plantar Fascia Stretch    10\" Hold x5 10\" Hold x5  10\" Hold x5 10\" Hold x5   Seated " HR/TR   2x10  2x10   HEP Instruction       Ther Activity                     Gait Training                     Modalities       Ice 8' PT 8' PT  8' PT  8'PT

## 2025-07-29 ENCOUNTER — OFFICE VISIT (OUTPATIENT)
Dept: PHYSICAL THERAPY | Facility: CLINIC | Age: 75
End: 2025-07-29
Attending: PODIATRIST
Payer: COMMERCIAL

## 2025-07-29 DIAGNOSIS — M25.571 RIGHT ANKLE PAIN, UNSPECIFIED CHRONICITY: Primary | ICD-10-CM

## 2025-07-29 DIAGNOSIS — G57.51 TARSAL TUNNEL SYNDROME OF RIGHT SIDE: ICD-10-CM

## 2025-07-29 DIAGNOSIS — M76.821 POSTERIOR TIBIAL TENDINITIS OF RIGHT LOWER EXTREMITY: ICD-10-CM

## 2025-07-29 PROCEDURE — 97110 THERAPEUTIC EXERCISES: CPT

## 2025-07-31 ENCOUNTER — OFFICE VISIT (OUTPATIENT)
Dept: PHYSICAL THERAPY | Facility: CLINIC | Age: 75
End: 2025-07-31
Attending: PODIATRIST
Payer: COMMERCIAL

## 2025-07-31 DIAGNOSIS — G57.51 TARSAL TUNNEL SYNDROME OF RIGHT SIDE: ICD-10-CM

## 2025-07-31 DIAGNOSIS — M76.821 POSTERIOR TIBIAL TENDINITIS OF RIGHT LOWER EXTREMITY: ICD-10-CM

## 2025-07-31 DIAGNOSIS — M25.571 RIGHT ANKLE PAIN, UNSPECIFIED CHRONICITY: Primary | ICD-10-CM

## 2025-07-31 PROCEDURE — 97110 THERAPEUTIC EXERCISES: CPT

## 2025-08-11 ENCOUNTER — OFFICE VISIT (OUTPATIENT)
Dept: PHYSICAL THERAPY | Facility: CLINIC | Age: 75
End: 2025-08-11
Attending: PODIATRIST
Payer: COMMERCIAL

## 2025-08-14 ENCOUNTER — EVALUATION (OUTPATIENT)
Dept: PHYSICAL THERAPY | Facility: CLINIC | Age: 75
End: 2025-08-14
Attending: PODIATRIST
Payer: COMMERCIAL

## (undated) DEVICE — SYRINGE 5ML LL

## (undated) DEVICE — GLOVE INDICATOR PI UNDERGLOVE SZ 8.5 BLUE

## (undated) DEVICE — NEEDLE 25G X 1 1/2

## (undated) DEVICE — NEEDLE 18 G X 1 1/2

## (undated) DEVICE — TRAY EPIDURAL PERIFIX 20GA X 3.5IN TUOHY 8ML

## (undated) DEVICE — SYRINGE 10ML LL

## (undated) DEVICE — CHLORAPREP HI-LITE 10.5ML ORANGE

## (undated) DEVICE — GLOVE SRG BIOGEL 8

## (undated) DEVICE — FLEXIBLE ADHESIVE BANDAGE,X-LARGE: Brand: CURITY

## (undated) DEVICE — SPINAL NEEDLE QUINCKE 25 G X 4.69

## (undated) DEVICE — NEEDLE SPINAL 22G X 3.5IN  QUINCKE

## (undated) DEVICE — SYRINGE 3ML LL

## (undated) DEVICE — SYRINGE EPI 8ML LUER SLIP LOSS OF RESISTANCE PLASTIC PERFIX